# Patient Record
Sex: FEMALE | Race: BLACK OR AFRICAN AMERICAN | Employment: OTHER | URBAN - METROPOLITAN AREA
[De-identification: names, ages, dates, MRNs, and addresses within clinical notes are randomized per-mention and may not be internally consistent; named-entity substitution may affect disease eponyms.]

---

## 2020-02-02 ENCOUNTER — HOSPITAL ENCOUNTER (OUTPATIENT)
Dept: CT IMAGING | Age: 45
Discharge: HOME | End: 2020-02-02
Attending: NURSE PRACTITIONER

## 2020-02-02 ENCOUNTER — APPOINTMENT (OUTPATIENT)
Dept: GENERAL RADIOLOGY | Age: 45
DRG: 004 | End: 2020-02-02
Attending: NURSE PRACTITIONER
Payer: COMMERCIAL

## 2020-02-02 ENCOUNTER — HOSPITAL ENCOUNTER (INPATIENT)
Age: 45
LOS: 16 days | Discharge: NURSING, REHAB OR LONG-TERM CARE | DRG: 004 | End: 2020-02-18
Attending: NEUROLOGICAL SURGERY | Admitting: NEUROLOGICAL SURGERY
Payer: COMMERCIAL

## 2020-02-02 DIAGNOSIS — R53.1 ACUTE LEFT-SIDED WEAKNESS: ICD-10-CM

## 2020-02-02 DIAGNOSIS — D18.00 CAVERNOMA: ICD-10-CM

## 2020-02-02 DIAGNOSIS — J96.01 ACUTE RESPIRATORY FAILURE WITH HYPOXIA (HCC): ICD-10-CM

## 2020-02-02 DIAGNOSIS — R93.0 ABNORMAL MRI OF HEAD: ICD-10-CM

## 2020-02-02 DIAGNOSIS — R93.0 ABNORMAL MRI SCAN, HEAD: ICD-10-CM

## 2020-02-02 DIAGNOSIS — G95.19 EDEMA OF SPINAL CORD (HCC): ICD-10-CM

## 2020-02-02 DIAGNOSIS — I67.1 CEREBROVASCULAR DURAL AV FISTULA: ICD-10-CM

## 2020-02-02 DIAGNOSIS — Q28.8 AVM (ARTERIOVENOUS MALFORMATION) SPINE: Primary | ICD-10-CM

## 2020-02-02 LAB
ABO + RH BLD: NORMAL
AMPHET UR QL SCN: NEGATIVE
ANION GAP SERPL CALC-SCNC: 6 MMOL/L (ref 7–16)
APPEARANCE UR: CLEAR
APPEARANCE UR: CLEAR
ARTERIAL PATENCY WRIST A: ABNORMAL
BACTERIA URNS QL MICRO: 0 /HPF
BACTERIA URNS QL MICRO: 0 /HPF
BARBITURATES UR QL SCN: NEGATIVE
BASE EXCESS BLD CALC-SCNC: 2 MMOL/L
BASOPHILS # BLD: 0.1 K/UL (ref 0–0.2)
BASOPHILS NFR BLD: 1 % (ref 0–2)
BDY SITE: ABNORMAL
BENZODIAZ UR QL: NEGATIVE
BILIRUB UR QL: NEGATIVE
BILIRUB UR QL: NEGATIVE
BLOOD GROUP ANTIBODIES SERPL: NORMAL
BUN SERPL-MCNC: 9 MG/DL (ref 6–23)
CALCIUM SERPL-MCNC: 8.3 MG/DL (ref 8.3–10.4)
CANNABINOIDS UR QL SCN: NEGATIVE
CASTS URNS QL MICRO: ABNORMAL /LPF
CHLORIDE SERPL-SCNC: 107 MMOL/L (ref 98–107)
CHOLEST SERPL-MCNC: 168 MG/DL
CO2 BLD-SCNC: 29 MMOL/L
CO2 SERPL-SCNC: 27 MMOL/L (ref 21–32)
COCAINE UR QL SCN: NEGATIVE
COLLECT TIME,HTIME: 410
COLOR UR: ABNORMAL
COLOR UR: YELLOW
CREAT SERPL-MCNC: 0.79 MG/DL (ref 0.6–1)
DIFFERENTIAL METHOD BLD: ABNORMAL
EOSINOPHIL # BLD: 0 K/UL (ref 0–0.8)
EOSINOPHIL NFR BLD: 0 % (ref 0.5–7.8)
EPI CELLS #/AREA URNS HPF: ABNORMAL /HPF
ERYTHROCYTE [DISTWIDTH] IN BLOOD BY AUTOMATED COUNT: 12.7 % (ref 11.9–14.6)
EXHALED MINUTE VOLUME, VE: 7.5 L/MIN
GAS FLOW.O2 O2 DELIVERY SYS: ABNORMAL L/MIN
GAS FLOW.O2 SETTING OXYMISER: 15 BPM
GLUCOSE SERPL-MCNC: 105 MG/DL (ref 65–100)
GLUCOSE UR STRIP.AUTO-MCNC: NEGATIVE MG/DL
GLUCOSE UR STRIP.AUTO-MCNC: NEGATIVE MG/DL
HCG UR QL: NEGATIVE
HCO3 BLD-SCNC: 27.3 MMOL/L (ref 22–26)
HCT VFR BLD AUTO: 34.7 % (ref 35.8–46.3)
HDLC SERPL-MCNC: 40 MG/DL (ref 40–60)
HDLC SERPL: 4.2 {RATIO}
HGB BLD-MCNC: 10.9 G/DL (ref 11.7–15.4)
HGB UR QL STRIP: NEGATIVE
HGB UR QL STRIP: NEGATIVE
IMM GRANULOCYTES # BLD AUTO: 0 K/UL (ref 0–0.5)
IMM GRANULOCYTES NFR BLD AUTO: 0 % (ref 0–5)
KETONES UR QL STRIP.AUTO: 15 MG/DL
KETONES UR QL STRIP.AUTO: NEGATIVE MG/DL
LDLC SERPL CALC-MCNC: 105.6 MG/DL
LEUKOCYTE ESTERASE UR QL STRIP.AUTO: NEGATIVE
LEUKOCYTE ESTERASE UR QL STRIP.AUTO: NEGATIVE
LIPID PROFILE,FLP: ABNORMAL
LYMPHOCYTES # BLD: 1.8 K/UL (ref 0.5–4.6)
LYMPHOCYTES NFR BLD: 17 % (ref 13–44)
MCH RBC QN AUTO: 27.9 PG (ref 26.1–32.9)
MCHC RBC AUTO-ENTMCNC: 31.4 G/DL (ref 31.4–35)
MCV RBC AUTO: 88.7 FL (ref 79.6–97.8)
METHADONE UR QL: NEGATIVE
MONOCYTES # BLD: 0.9 K/UL (ref 0.1–1.3)
MONOCYTES NFR BLD: 9 % (ref 4–12)
NEUTS SEG # BLD: 7.6 K/UL (ref 1.7–8.2)
NEUTS SEG NFR BLD: 73 % (ref 43–78)
NITRITE UR QL STRIP.AUTO: NEGATIVE
NITRITE UR QL STRIP.AUTO: NEGATIVE
NRBC # BLD: 0 K/UL (ref 0–0.2)
O2/TOTAL GAS SETTING VFR VENT: 60 %
OPIATES UR QL: POSITIVE
PCO2 BLD: 45.4 MMHG (ref 35–45)
PCP UR QL: NEGATIVE
PEEP RESPIRATORY: 8 CMH2O
PH BLD: 7.39 [PH] (ref 7.35–7.45)
PH UR STRIP: 6 [PH] (ref 5–9)
PH UR STRIP: 7 [PH] (ref 5–9)
PLATELET # BLD AUTO: 193 K/UL (ref 150–450)
PMV BLD AUTO: 11 FL (ref 9.4–12.3)
PO2 BLD: 199 MMHG (ref 75–100)
POTASSIUM SERPL-SCNC: 4 MMOL/L (ref 3.5–5.1)
PRESSURE SUPPORT SETTING VENT: 10 CMH2O
PROT UR STRIP-MCNC: ABNORMAL MG/DL
PROT UR STRIP-MCNC: NEGATIVE MG/DL
RBC # BLD AUTO: 3.91 M/UL (ref 4.05–5.2)
RBC #/AREA URNS HPF: ABNORMAL /HPF
SAO2 % BLD: 100 % (ref 95–98)
SERVICE CMNT-IMP: ABNORMAL
SODIUM SERPL-SCNC: 140 MMOL/L (ref 136–145)
SP GR UR REFRACTOMETRY: 1.03 (ref 1–1.02)
SP GR UR REFRACTOMETRY: <1.005 (ref 1–1.02)
SPECIMEN EXP DATE BLD: NORMAL
SPECIMEN TYPE: ABNORMAL
TRIGL SERPL-MCNC: 112 MG/DL (ref 35–150)
TROPONIN I SERPL-MCNC: <0.02 NG/ML (ref 0.02–0.05)
UROBILINOGEN UR QL STRIP.AUTO: 0.2 EU/DL (ref 0.2–1)
UROBILINOGEN UR QL STRIP.AUTO: 0.2 EU/DL (ref 0.2–1)
VENTILATION MODE VENT: ABNORMAL
VLDLC SERPL CALC-MCNC: 22.4 MG/DL (ref 6–23)
VT SETTING VENT: 500 ML
WBC # BLD AUTO: 10.3 K/UL (ref 4.3–11.1)
WBC URNS QL MICRO: ABNORMAL /HPF

## 2020-02-02 PROCEDURE — C1751 CATH, INF, PER/CENT/MIDLINE: HCPCS

## 2020-02-02 PROCEDURE — 70450 CT HEAD/BRAIN W/O DYE: CPT

## 2020-02-02 PROCEDURE — 77030040830 HC CATH URETH FOL MDII -A

## 2020-02-02 PROCEDURE — 87070 CULTURE OTHR SPECIMN AEROBIC: CPT

## 2020-02-02 PROCEDURE — 87086 URINE CULTURE/COLONY COUNT: CPT

## 2020-02-02 PROCEDURE — 74018 RADEX ABDOMEN 1 VIEW: CPT

## 2020-02-02 PROCEDURE — 36415 COLL VENOUS BLD VENIPUNCTURE: CPT

## 2020-02-02 PROCEDURE — 71045 X-RAY EXAM CHEST 1 VIEW: CPT

## 2020-02-02 PROCEDURE — 84484 ASSAY OF TROPONIN QUANT: CPT

## 2020-02-02 PROCEDURE — 36620 INSERTION CATHETER ARTERY: CPT | Performed by: NURSE PRACTITIONER

## 2020-02-02 PROCEDURE — 94002 VENT MGMT INPAT INIT DAY: CPT

## 2020-02-02 PROCEDURE — 87040 BLOOD CULTURE FOR BACTERIA: CPT

## 2020-02-02 PROCEDURE — 70498 CT ANGIOGRAPHY NECK: CPT

## 2020-02-02 PROCEDURE — 86900 BLOOD TYPING SEROLOGIC ABO: CPT

## 2020-02-02 PROCEDURE — 74011250636 HC RX REV CODE- 250/636

## 2020-02-02 PROCEDURE — 80048 BASIC METABOLIC PNL TOTAL CA: CPT

## 2020-02-02 PROCEDURE — 5A1945Z RESPIRATORY VENTILATION, 24-96 CONSECUTIVE HOURS: ICD-10-PCS | Performed by: NEUROLOGICAL SURGERY

## 2020-02-02 PROCEDURE — 74011250637 HC RX REV CODE- 250/637: Performed by: INTERNAL MEDICINE

## 2020-02-02 PROCEDURE — 81025 URINE PREGNANCY TEST: CPT

## 2020-02-02 PROCEDURE — 74011250637 HC RX REV CODE- 250/637: Performed by: NURSE PRACTITIONER

## 2020-02-02 PROCEDURE — 65610000001 HC ROOM ICU GENERAL

## 2020-02-02 PROCEDURE — 81003 URINALYSIS AUTO W/O SCOPE: CPT

## 2020-02-02 PROCEDURE — 03HY32Z INSERTION OF MONITORING DEVICE INTO UPPER ARTERY, PERCUTANEOUS APPROACH: ICD-10-PCS | Performed by: NEUROLOGICAL SURGERY

## 2020-02-02 PROCEDURE — 82803 BLOOD GASES ANY COMBINATION: CPT

## 2020-02-02 PROCEDURE — 80061 LIPID PANEL: CPT

## 2020-02-02 PROCEDURE — 85025 COMPLETE CBC W/AUTO DIFF WBC: CPT

## 2020-02-02 PROCEDURE — 77030005513 HC CATH URETH FOL11 MDII -B

## 2020-02-02 PROCEDURE — 80307 DRUG TEST PRSMV CHEM ANLYZR: CPT

## 2020-02-02 PROCEDURE — 75810000275 HC EMERGENCY DEPT VISIT NO LEVEL OF CARE

## 2020-02-02 PROCEDURE — 74011250636 HC RX REV CODE- 250/636: Performed by: NURSE PRACTITIONER

## 2020-02-02 PROCEDURE — 36573 INSJ PICC RS&I 5 YR+: CPT | Performed by: NURSE PRACTITIONER

## 2020-02-02 RX ORDER — FENTANYL CITRATE 50 UG/ML
50 INJECTION, SOLUTION INTRAMUSCULAR; INTRAVENOUS
Status: DISCONTINUED | OUTPATIENT
Start: 2020-02-02 | End: 2020-02-18 | Stop reason: HOSPADM

## 2020-02-02 RX ORDER — FAMOTIDINE 20 MG/1
20 TABLET, FILM COATED ORAL DAILY
Status: DISCONTINUED | OUTPATIENT
Start: 2020-02-02 | End: 2020-02-03

## 2020-02-02 RX ORDER — FENTANYL CITRATE 50 UG/ML
INJECTION, SOLUTION INTRAMUSCULAR; INTRAVENOUS
Status: COMPLETED
Start: 2020-02-02 | End: 2020-02-02

## 2020-02-02 RX ORDER — ACETAMINOPHEN 325 MG/1
650 TABLET ORAL
Status: DISCONTINUED | OUTPATIENT
Start: 2020-02-02 | End: 2020-02-02

## 2020-02-02 RX ORDER — HEPARIN 100 UNIT/ML
900 SYRINGE INTRAVENOUS EVERY 8 HOURS
Status: DISCONTINUED | OUTPATIENT
Start: 2020-02-02 | End: 2020-02-18 | Stop reason: HOSPADM

## 2020-02-02 RX ORDER — SODIUM CHLORIDE 9 MG/ML
75 INJECTION, SOLUTION INTRAVENOUS CONTINUOUS
Status: DISCONTINUED | OUTPATIENT
Start: 2020-02-02 | End: 2020-02-08

## 2020-02-02 RX ORDER — ATORVASTATIN CALCIUM 40 MG/1
40 TABLET, FILM COATED ORAL
Status: DISCONTINUED | OUTPATIENT
Start: 2020-02-02 | End: 2020-02-18 | Stop reason: HOSPADM

## 2020-02-02 RX ORDER — ONDANSETRON 2 MG/ML
4 INJECTION INTRAMUSCULAR; INTRAVENOUS
Status: DISCONTINUED | OUTPATIENT
Start: 2020-02-02 | End: 2020-02-18 | Stop reason: HOSPADM

## 2020-02-02 RX ORDER — SODIUM CHLORIDE 0.9 % (FLUSH) 0.9 %
5-40 SYRINGE (ML) INJECTION AS NEEDED
Status: DISCONTINUED | OUTPATIENT
Start: 2020-02-02 | End: 2020-02-18 | Stop reason: HOSPADM

## 2020-02-02 RX ORDER — NICARDIPINE HYDROCHLORIDE 0.1 MG/ML
5-15 INJECTION INTRAVENOUS
Status: DISCONTINUED | OUTPATIENT
Start: 2020-02-02 | End: 2020-02-02

## 2020-02-02 RX ORDER — ATORVASTATIN CALCIUM 40 MG/1
40 TABLET, FILM COATED ORAL
Status: DISCONTINUED | OUTPATIENT
Start: 2020-02-02 | End: 2020-02-02

## 2020-02-02 RX ORDER — SODIUM CHLORIDE 0.9 % (FLUSH) 0.9 %
30 SYRINGE (ML) INJECTION AS NEEDED
Status: DISCONTINUED | OUTPATIENT
Start: 2020-02-02 | End: 2020-02-18 | Stop reason: HOSPADM

## 2020-02-02 RX ORDER — FENTANYL CITRATE 50 UG/ML
100 INJECTION, SOLUTION INTRAMUSCULAR; INTRAVENOUS ONCE
Status: COMPLETED | OUTPATIENT
Start: 2020-02-02 | End: 2020-02-02

## 2020-02-02 RX ORDER — ACETAMINOPHEN 325 MG/1
650 TABLET ORAL
Status: DISCONTINUED | OUTPATIENT
Start: 2020-02-02 | End: 2020-02-18 | Stop reason: HOSPADM

## 2020-02-02 RX ORDER — ONDANSETRON 2 MG/ML
INJECTION INTRAMUSCULAR; INTRAVENOUS
Status: COMPLETED
Start: 2020-02-02 | End: 2020-02-02

## 2020-02-02 RX ORDER — SODIUM CHLORIDE 0.9 % (FLUSH) 0.9 %
30 SYRINGE (ML) INJECTION EVERY 8 HOURS
Status: DISCONTINUED | OUTPATIENT
Start: 2020-02-02 | End: 2020-02-18 | Stop reason: HOSPADM

## 2020-02-02 RX ORDER — ONDANSETRON 2 MG/ML
8 INJECTION INTRAMUSCULAR; INTRAVENOUS ONCE
Status: COMPLETED | OUTPATIENT
Start: 2020-02-02 | End: 2020-02-02

## 2020-02-02 RX ORDER — HEPARIN 100 UNIT/ML
900 SYRINGE INTRAVENOUS AS NEEDED
Status: DISCONTINUED | OUTPATIENT
Start: 2020-02-02 | End: 2020-02-18 | Stop reason: HOSPADM

## 2020-02-02 RX ORDER — KETOROLAC TROMETHAMINE 30 MG/ML
15 INJECTION, SOLUTION INTRAMUSCULAR; INTRAVENOUS
Status: DISPENSED | OUTPATIENT
Start: 2020-02-02 | End: 2020-02-07

## 2020-02-02 RX ADMIN — ACETAMINOPHEN 650 MG: 325 TABLET, FILM COATED ORAL at 10:06

## 2020-02-02 RX ADMIN — Medication 900 UNITS: at 21:19

## 2020-02-02 RX ADMIN — ACETAMINOPHEN 650 MG: 325 TABLET, FILM COATED ORAL at 14:55

## 2020-02-02 RX ADMIN — FENTANYL CITRATE 50 MCG: 50 INJECTION, SOLUTION INTRAMUSCULAR; INTRAVENOUS at 09:43

## 2020-02-02 RX ADMIN — FENTANYL CITRATE 100 MCG: 50 INJECTION, SOLUTION INTRAMUSCULAR; INTRAVENOUS at 03:59

## 2020-02-02 RX ADMIN — ONDANSETRON 8 MG: 2 INJECTION INTRAMUSCULAR; INTRAVENOUS at 04:24

## 2020-02-02 RX ADMIN — Medication 30 ML: at 13:19

## 2020-02-02 RX ADMIN — ACETAMINOPHEN 650 MG: 325 TABLET, FILM COATED ORAL at 19:36

## 2020-02-02 RX ADMIN — KETOROLAC TROMETHAMINE 15 MG: 30 INJECTION, SOLUTION INTRAMUSCULAR at 08:34

## 2020-02-02 RX ADMIN — Medication 30 ML: at 21:39

## 2020-02-02 RX ADMIN — ONDANSETRON 4 MG: 2 INJECTION INTRAMUSCULAR; INTRAVENOUS at 16:16

## 2020-02-02 RX ADMIN — FENTANYL CITRATE 50 MCG: 50 INJECTION, SOLUTION INTRAMUSCULAR; INTRAVENOUS at 13:19

## 2020-02-02 RX ADMIN — KETOROLAC TROMETHAMINE 15 MG: 30 INJECTION, SOLUTION INTRAMUSCULAR at 14:28

## 2020-02-02 RX ADMIN — Medication 1 EACH: at 17:11

## 2020-02-02 RX ADMIN — SODIUM CHLORIDE 75 ML/HR: 900 INJECTION, SOLUTION INTRAVENOUS at 19:41

## 2020-02-02 RX ADMIN — FAMOTIDINE 20 MG: 20 TABLET, FILM COATED ORAL at 08:33

## 2020-02-02 RX ADMIN — SODIUM CHLORIDE 75 ML/HR: 900 INJECTION, SOLUTION INTRAVENOUS at 06:46

## 2020-02-02 RX ADMIN — ATORVASTATIN CALCIUM 40 MG: 40 TABLET, FILM COATED ORAL at 21:19

## 2020-02-02 RX ADMIN — Medication 900 UNITS: at 13:19

## 2020-02-02 NOTE — PROGRESS NOTES
Pt arrived from [facility] via flight and brought to unit on stretcher. ETT in place and bagged by RT, then placed onto ventilator. [facility] NP at bedside to place arterial line and update family. Pt alert, following commands with weakness on the left arm/leg. NIH 13 on admission. Skin dry and intact with no signs of bruising, tears, excoriation, etc. Pt cleaned with CHG wipes, placed on the monitor, and family brought into the bedside.

## 2020-02-02 NOTE — PROGRESS NOTES
Bedside shift change report given to DEEP Bello (oncoming nurse) by Lavinia Pond RN (offgoing nurse). Report included the following information SBAR, Kardex, ED Summary, Intake/Output, MAR, Recent Results and Dual Neuro Assessment.

## 2020-02-02 NOTE — PROGRESS NOTES
Patient resting. Family at bedside. Patient is able to pick her left foot and left hand off of the bed.

## 2020-02-02 NOTE — PROGRESS NOTES
Patient  from [facility], transported to CT scan then arrived to ICU and placed on the ventilator on documented settings. Patient is orally intubated with a # 7.5 ET Tube secured at the 23 cm wade at the lip. Breath sounds are clear. Trachea is midline. Negative for subcutaneous air, chest excursion is symmetrical.  Negative for pitting edema. Patient is also Negative for cyanosis. Patient has a Left Radial arterial line. All alarms are set and audible. Resuscitation bag and mask are at the head of the bed.       Ventilator Settings  Mode FIO2 Rate Tidal Volume Pressure PEEP I:E Ratio   PRVC, SIMV  60 %   15 500 ml  10 cm H2O  8 cm H20  1:2      Peak airway pressure: 24 cm H2O   Minute ventilation: 7.5 l/min       Katie Liu

## 2020-02-02 NOTE — PROGRESS NOTES
PICC Placement Note    PRE-PROCEDURE VERIFICATION  Correct Procedure: yes. Time out completed with assistant Fabiola Regan RN and all persons present in agreement with time out. Correct Site:  yes  Temperature: Temp: 100.4 °F (38 °C), Temperature Source: Temp Source: Bladder  Recent Labs     02/02/20  0358   BUN 9   CREA 0.79      WBC 10.3     Allergies: Ace inhibitors  Education materials for [facility] Care given to patient or family. PROCEDURE DETAIL  A triple lumen PICC line was started for vascular access and desire for reliable access. The following documentation is in addition to the PICC properties in the lines/airways flowsheet :  Lot #: [id number]  xylocaine used: yes  Mid-Arm Circumference: 37 (cm)  Internal Catheter Length: 36 (cm)  Internal Catheter Total Length: 36 (cm)  Vein Selection for PICC:right brachial  Central Line Bundle followed yes  Complication Related to Insertion: Difficulty getting wire to advance in basilic times 2 attempts  Both the insertion guidewire and ECG guidewire were removed intact all ports have positive blood return and were flush well with normal saline. The location of the tip of the PICC is verified using ECG technology. The tip is in the SVC per ECG reading. See image below.          Line is okay to use: yes

## 2020-02-02 NOTE — PROGRESS NOTES
Ventilator check complete; patient has a #7.5 ET tube secured at the 23 at the lip. Patient is not able to follow commands. Breath sounds are diminished. Trachea is midline, Negative for subcutaneous air, and chest excursion is symmetric. Patient is also Negative for cyanosis and is Negative for pitting edema. All alarms are set and audible. Resuscitation bag is at the head of the bed. Ventilator Settings  Mode FIO2 Rate Tidal Volume Pressure PEEP I:E Ratio   SIMV, Pressure support, PRVC  46 %   15 500 ml  10 cm H2O  8 cm H20  1:2      Peak airway pressure: 24.9 cm H2O   Minute ventilation: 7.3 l/min     ABG: No results for input(s): PH, PCO2, PO2, HCO3 in the last 72 hours.       Enma Rich, RT

## 2020-02-02 NOTE — H&P
History and Physical    Patient: Sofiya Dacosta MRN: [id number]  SSN: [id number]    YOB: 1975  Age: 53 y.o. Sex: female      Subjective: Sofiya Dacosta is a 53 y.o. female who originally presented to [facility] in [facility] with acute left neck pain and left arm weakness and paresthesia. The pt had a CT of head which was normal, the pt was transported to [facility] ED for a MRI of brain/C-spine, she had acute resp failure/bradycardia and required intubation. The MRI of Cspine was suspicious for vascular abnormality, AVM vs [place], with hemorrhage and edema noted Cervical Spine at C2-C5 area. The pt was transferred to [address] [place] [address] Dr. Lamar Chan  via [facility] flight team to Dr. Talia Montanez and Endovascular team for further evaluation and plan of care. The pt was on propofol gtt on arrival and sedated, but it was turned off on arrival and pt was noted to wake up easily and began to follow commands. She is alert with obvious weakness on her left sided compared to R. She is able to hole RUE off bed, but unable to lift up LUE, she was able to give me a thumbs up bilat, and  with left hand. She can move toes LLE and has tone. Pt remains intubated with 7.5 OETT, [email or web address]. GCS: E4, V1I, M6: 11I, NIHSS 12 ( intubated). Alla Banegas      PAST MED HX: Diagnosis Date    Anemia    Anxiety    Breast mass 2014   left    Carpal tunnel syndrome 2012   bilateral    Diffuse cystic mastopathy of breast, left 2015    Genital herpes 2014    Herpes    Hypertension    Menorrhagia    Ovarian cyst 04/15/2015     Past Surgical History:   Procedure Laterality Date    BREAST BIOPSY Left 1912   US core, benign    BREAST BIOPSY EXCISION Right    benign     SECTION   times 2    [facility]OSCOPY [facility] Dr. Hernandez Melvin CYST REMOVAL Right   RIGHT BREAST    TONSILLECTOMY    TUBAL LIGATION 5098    UMBILICAL HERNIA REPAIR   2 times        FAMILY HX:  family history includes Breast cancer in her cousin; Diabetes in an other family member; Heart disease in her maternal grandmother; Lung cancer in her mother; No Known Problems in her daughter and daughter; Prostate cancer in her father. SOCIAL HX:  and lives with . TOBACCO: no  ETOH: no     No Known Allergies    Review of Systems:  Unable to obtain due to pt condition:  Per report pt with acute left neck pain and then acute left hemiparesis with left sided paresthesia ( per notes). Objective:     Vitals:    02/02/20 0414 02/02/20 0430 02/02/20 0444 02/02/20 0459   BP: 104/65 126/74 133/74 124/68   Pulse: 61 74 66 (!) 58   Resp: 15 15 15 15   Temp:       SpO2: 100% 100% 100% 100%   Weight:       Height:            Physical Exam:  General:  Alert, follows commands. Intubated. HEENT: Supple, symmetrical, trachea midline, no adenopathy, thyroid: no enlargment/tenderness/nodules, no carotid bruit and no JVD. PERRL +3 midline. Lungs:   Clear to auscultation bilaterally. Heart:  Regular rate and rhythm, S1, S2 normal, no murmur, click, rub or gallop. Abdomen:   Soft, non-tender. Bowel sounds normal. No masses,  No organomegaly. Extremities: Extremities normal, atraumatic, no cyanosis or edema. Pulses: 2+ and symmetric all extremities. Skin: Skin color, texture, turgor normal. No rashes or lesions   Neurologic: Alert, intubated for airway protection, follows commands. R sided 5/5 strength. Left: upper 2/5, lower 3/5. Assessment:     Hospital Problems  Never Reviewed          Codes Class Noted POA    AVM (arteriovenous malformation) spine ICD-10-CM: Q28.8  ICD-9-CM: 747.82  2/2/2020 Unknown            DYSPHAGIA SCORE ON ADMIT: 0 ( pt intubated)  NIHSS ON ADMIT: 12 ( pt intubated and weak on left)  MALLAMPATI ON ADMIT: ( pt already intubated).      Plan:     NEURO: pt is intubated, but alert, sedation held at this time for neuro exam. Pt with cervical edema/hemorrhage secondary to vascular abnormality, will repeat MRI C-spine in a few days, plan for Cerebral angiogram on Monday afternoon with Dr. Norbert Ag. PT/OT/ST/Rehab consulted. Family updated at bedside.  states pt in normal state of health until this am, complained of acute left lateral neck pain with onset around 0700 Sat am. No prior hx of cva or known vascular abnormality per /family. Repeat CTA H/N and CT head on arrival to [address] [place] [address] Dr. Katelyn Curtis, Dr. Norbert Ag has viewed and discussed images with team. Family updated at bedside this am.   RESP: pt intubated PTA, 7.5OETT. ABG on 60%: 7.3/45/199, decreased to 50%, pt comfortable. CXR/KUB pending. Pt has NGT in place on arrival. Lungs sounds CTA. CV:SBP goal 100-160, cardene gtt prn. 2D Echo pending. Trop neg. , placed on lipitor 40mg po HS. SCD. HEME:HH: 10/34,   NEPH:bun/cr: 9/0.7  GI:NPO, will start NS @ 75cc/hr. NGT/intubated. Will consult nutrition. Pepcid. ID:afebrile no abx. LINES:IV x3, floyd, arterial line. PICC ordered. Reviewed Care everywhere:  Pt seen in Sept 2019 at PCP office with complaints of left ear pain/left arm pain and dizziness. No imaging at time.      Signed By: Leopold Fruit, NP     February 2, 2020

## 2020-02-02 NOTE — PROGRESS NOTES
Bedside shift change report given to Harleen Lopez RN (oncoming nurse) by [facility] DEEP VALENTINE (offgoing nurse). Report included the following information SBAR, Kardex, ED Summary, Intake/Output, MAR, Recent Results and Dual Neuro Assessment.

## 2020-02-02 NOTE — PROCEDURES
ARTERIAL LINE (A-Line) PLACEMENT  Date: 2-2-20  Time: 0400  Indication: Hemodynamic monitoring        A time-out was completed verifying correct patient, procedure, site, positioning, and special equipment if applicable. Anders test was performed to ensure adequate perfusion. The patients left wrist was prepped and draped in sterile fashion. 1% Lidocaine was used to anesthetize the area. A 20G Arrow arterial line was introduced into the left radial artery. The catheter was threaded over the guide wire and the needle was removed with appropriate pulsatile blood return. The catheter was then sutured in place to the skin and a sterile dressing applied. Perfusion to the extremity distal to the point of catheter insertion was checked and found to be adequate. Dr. Angelito Malcolm was in agreement with plan of care and available at all times during procedure.     The patient tolerated the procedure well and there were no complications

## 2020-02-02 NOTE — PROGRESS NOTES
Bedside shift change report given to [address] (oncoming nurse) by Roya Burroughs (offgoing nurse). Report included the following information SBAR, ED Summary, Intake/Output, MAR, Recent Results, Cardiac Rhythm Sinus, Alarm Parameters  and Dual Neuro Assessment. Dual NIH completed, pt calm and resting with  present.

## 2020-02-02 NOTE — PROGRESS NOTES
LMSW consulted to follow pt plan of care for anticipate discharge needs. PT/OT and ST evals ordered. Await assessments for rehab recommendations and appropriate referrals. Per report pt normally manages ADL's. Lives with spouse and has supportive extended family. She is insured by [employer] and they will require precert for in pt rehab if recommended. Will follow and assist as needs are known. Care Management Interventions  PCP Verified by CM: Ayanna Ibanez)  Transition of Care Consult (CM Consult): Discharge Planning(Pt is employed and insured by [employer] heat plan which includes pharmacy benefits.  )  Discharge Durable Medical Equipment: No  Physical Therapy Consult: Yes  Occupational Therapy Consult: Yes  Speech Therapy Consult: Yes  Current Support Network: Lives with Spouse  Confirm Follow Up Transport: Family  The Plan for Transition of Care is Related to the Following Treatment Goals : Pt is anticipated to needs supportive care services to return to her functional baseline.   Discharge Location  Discharge Placement: Unable to determine at this time(Await therpy evals/recommendations)

## 2020-02-02 NOTE — PROGRESS NOTES
Azalia NP notified of patient's minimal hourly UOP, persistently elevated temperature, and nausea. Orders received.

## 2020-02-02 NOTE — PROGRESS NOTES
PT Note:  Evaluation received and per RN pt with plans for angio tomorrow (2/3/20). Will hold today and evaluate following procedure.   Thanks,  Suleiman Bravo, PT, DPT

## 2020-02-02 NOTE — INTERDISCIPLINARY ROUNDS
Interdisciplinary team rounds were held 2/2/2020 with the following team members:Nursing and Physician and the patient. Plan of care discussed. See clinical pathway and/or care plan for interventions and desired outcomes.

## 2020-02-02 NOTE — PROGRESS NOTES
SPEECH PATHOLOGY NOTE:  Consult received and chart reviewed. Patient is currently on vent. Will follow along and assess when medically appropriate off vent.   Maria Luisa Crook MA, CCC-SLP

## 2020-02-03 ENCOUNTER — APPOINTMENT (OUTPATIENT)
Dept: OTHER | Age: 45
DRG: 004 | End: 2020-02-03
Attending: NURSE PRACTITIONER
Payer: COMMERCIAL

## 2020-02-03 ENCOUNTER — APPOINTMENT (OUTPATIENT)
Dept: GENERAL RADIOLOGY | Age: 45
DRG: 004 | End: 2020-02-03
Attending: NURSE PRACTITIONER
Payer: COMMERCIAL

## 2020-02-03 ENCOUNTER — APPOINTMENT (OUTPATIENT)
Dept: CT IMAGING | Age: 45
DRG: 004 | End: 2020-02-03
Attending: NEUROLOGICAL SURGERY
Payer: COMMERCIAL

## 2020-02-03 PROBLEM — J96.01 ACUTE RESPIRATORY FAILURE WITH HYPOXIA (HCC): Status: ACTIVE | Noted: 2020-02-03

## 2020-02-03 PROBLEM — D18.00 CAVERNOMA: Status: ACTIVE | Noted: 2020-02-03

## 2020-02-03 PROBLEM — R53.1 ACUTE LEFT-SIDED WEAKNESS: Status: ACTIVE | Noted: 2020-02-03

## 2020-02-03 PROBLEM — G95.19 EDEMA OF SPINAL CORD (HCC): Status: ACTIVE | Noted: 2020-02-03

## 2020-02-03 LAB
ANION GAP SERPL CALC-SCNC: 7 MMOL/L (ref 7–16)
ARTERIAL PATENCY WRIST A: ABNORMAL
BASE EXCESS BLD CALC-SCNC: 2 MMOL/L
BDY SITE: ABNORMAL
BUN SERPL-MCNC: 10 MG/DL (ref 6–23)
CALCIUM SERPL-MCNC: 8.9 MG/DL (ref 8.3–10.4)
CHLORIDE SERPL-SCNC: 109 MMOL/L (ref 98–107)
CO2 BLD-SCNC: 28 MMOL/L
CO2 SERPL-SCNC: 27 MMOL/L (ref 21–32)
COLLECT TIME,HTIME: 435
CREAT SERPL-MCNC: 0.71 MG/DL (ref 0.6–1)
ERYTHROCYTE [DISTWIDTH] IN BLOOD BY AUTOMATED COUNT: 12.9 % (ref 11.9–14.6)
EXHALED MINUTE VOLUME, VE: 7.4 L/MIN
GAS FLOW.O2 O2 DELIVERY SYS: ABNORMAL L/MIN
GAS FLOW.O2 SETTING OXYMISER: 15 BPM
GLUCOSE SERPL-MCNC: 95 MG/DL (ref 65–100)
HCO3 BLD-SCNC: 26.5 MMOL/L (ref 22–26)
HCT VFR BLD AUTO: 33.3 % (ref 35.8–46.3)
HGB BLD-MCNC: 10.7 G/DL (ref 11.7–15.4)
MAGNESIUM SERPL-MCNC: 2.4 MG/DL (ref 1.8–2.4)
MCH RBC QN AUTO: 28.3 PG (ref 26.1–32.9)
MCHC RBC AUTO-ENTMCNC: 32.1 G/DL (ref 31.4–35)
MCV RBC AUTO: 88.1 FL (ref 79.6–97.8)
NRBC # BLD: 0 K/UL (ref 0–0.2)
O2/TOTAL GAS SETTING VFR VENT: 45 %
PCO2 BLD: 39 MMHG (ref 35–45)
PEEP RESPIRATORY: 8 CMH2O
PH BLD: 7.44 [PH] (ref 7.35–7.45)
PLATELET # BLD AUTO: 156 K/UL (ref 150–450)
PMV BLD AUTO: 10.5 FL (ref 9.4–12.3)
PO2 BLD: 184 MMHG (ref 75–100)
POTASSIUM SERPL-SCNC: 3.6 MMOL/L (ref 3.5–5.1)
RBC # BLD AUTO: 3.78 M/UL (ref 4.05–5.2)
SAO2 % BLD: 100 % (ref 95–98)
SERVICE CMNT-IMP: ABNORMAL
SERVICE CMNT-IMP: ABNORMAL
SODIUM SERPL-SCNC: 143 MMOL/L (ref 136–145)
SPECIMEN TYPE: ABNORMAL
VENTILATION MODE VENT: ABNORMAL
VT SETTING VENT: 500 ML
WBC # BLD AUTO: 8.8 K/UL (ref 4.3–11.1)

## 2020-02-03 PROCEDURE — 36227 PLACE CATH XTRNL CAROTID: CPT

## 2020-02-03 PROCEDURE — 87070 CULTURE OTHR SPECIMN AEROBIC: CPT

## 2020-02-03 PROCEDURE — 99251 PR INITL INPATIENT CONSULT NEW/ESTAB PT 20 MIN: CPT | Performed by: PHYSICAL MEDICINE & REHABILITATION

## 2020-02-03 PROCEDURE — C1887 CATHETER, GUIDING: HCPCS

## 2020-02-03 PROCEDURE — 36620 INSERTION CATHETER ARTERY: CPT | Performed by: NURSE PRACTITIONER

## 2020-02-03 PROCEDURE — C1894 INTRO/SHEATH, NON-LASER: HCPCS

## 2020-02-03 PROCEDURE — 76376 3D RENDER W/INTRP POSTPROCES: CPT

## 2020-02-03 PROCEDURE — 74011250636 HC RX REV CODE- 250/636: Performed by: NURSE PRACTITIONER

## 2020-02-03 PROCEDURE — 03HY32Z INSERTION OF MONITORING DEVICE INTO UPPER ARTERY, PERCUTANEOUS APPROACH: ICD-10-PCS | Performed by: NEUROLOGICAL SURGERY

## 2020-02-03 PROCEDURE — 36226 PLACE CATH VERTEBRAL ART: CPT | Performed by: NEUROLOGICAL SURGERY

## 2020-02-03 PROCEDURE — 85027 COMPLETE CBC AUTOMATED: CPT

## 2020-02-03 PROCEDURE — 36224 PLACE CATH CAROTD ART: CPT

## 2020-02-03 PROCEDURE — 71045 X-RAY EXAM CHEST 1 VIEW: CPT

## 2020-02-03 PROCEDURE — 74011250637 HC RX REV CODE- 250/637: Performed by: NURSE PRACTITIONER

## 2020-02-03 PROCEDURE — 65610000001 HC ROOM ICU GENERAL

## 2020-02-03 PROCEDURE — 77030031476 HC EXCH HEAT MOISTW FLTR HALY -A

## 2020-02-03 PROCEDURE — 99291 CRITICAL CARE FIRST HOUR: CPT | Performed by: NEUROLOGICAL SURGERY

## 2020-02-03 PROCEDURE — B31C1ZZ FLUOROSCOPY OF BILATERAL EXTERNAL CAROTID ARTERIES USING LOW OSMOLAR CONTRAST: ICD-10-PCS | Performed by: NEUROLOGICAL SURGERY

## 2020-02-03 PROCEDURE — C8929 TTE W OR WO FOL WCON,DOPPLER: HCPCS

## 2020-02-03 PROCEDURE — 74011636320 HC RX REV CODE- 636/320: Performed by: NEUROLOGICAL SURGERY

## 2020-02-03 PROCEDURE — 77030002916 HC SUT ETHLN J&J -A

## 2020-02-03 PROCEDURE — C1769 GUIDE WIRE: HCPCS

## 2020-02-03 PROCEDURE — 82803 BLOOD GASES ANY COMBINATION: CPT

## 2020-02-03 PROCEDURE — 74011000250 HC RX REV CODE- 250: Performed by: NURSE PRACTITIONER

## 2020-02-03 PROCEDURE — B3181ZZ FLUOROSCOPY OF BILATERAL INTERNAL CAROTID ARTERIES USING LOW OSMOLAR CONTRAST: ICD-10-PCS | Performed by: NEUROLOGICAL SURGERY

## 2020-02-03 PROCEDURE — 74011250636 HC RX REV CODE- 250/636: Performed by: NEUROLOGICAL SURGERY

## 2020-02-03 PROCEDURE — 36227 PLACE CATH XTRNL CAROTID: CPT | Performed by: NEUROLOGICAL SURGERY

## 2020-02-03 PROCEDURE — 36224 PLACE CATH CAROTD ART: CPT | Performed by: NEUROLOGICAL SURGERY

## 2020-02-03 PROCEDURE — 77030005402 HC CATH RAD ART LN KT TELE -B

## 2020-02-03 PROCEDURE — 76376 3D RENDER W/INTRP POSTPROCES: CPT | Performed by: NEUROLOGICAL SURGERY

## 2020-02-03 PROCEDURE — 77030013794 HC KT TRNSDUC BLD EDWD -B

## 2020-02-03 PROCEDURE — 83735 ASSAY OF MAGNESIUM: CPT

## 2020-02-03 PROCEDURE — 71250 CT THORAX DX C-: CPT

## 2020-02-03 PROCEDURE — 74011000250 HC RX REV CODE- 250: Performed by: NEUROLOGICAL SURGERY

## 2020-02-03 PROCEDURE — C1760 CLOSURE DEV, VASC: HCPCS

## 2020-02-03 PROCEDURE — 80048 BASIC METABOLIC PNL TOTAL CA: CPT

## 2020-02-03 PROCEDURE — B31G1ZZ FLUOROSCOPY OF BILATERAL VERTEBRAL ARTERIES USING LOW OSMOLAR CONTRAST: ICD-10-PCS | Performed by: NEUROLOGICAL SURGERY

## 2020-02-03 PROCEDURE — 77030012468 HC VLV BLEEDBK CNTRL ABBT -B

## 2020-02-03 PROCEDURE — 94003 VENT MGMT INPAT SUBQ DAY: CPT

## 2020-02-03 RX ORDER — FENTANYL CITRATE 50 UG/ML
25-100 INJECTION, SOLUTION INTRAMUSCULAR; INTRAVENOUS
Status: DISCONTINUED | OUTPATIENT
Start: 2020-02-03 | End: 2020-02-03 | Stop reason: ALTCHOICE

## 2020-02-03 RX ORDER — MIDAZOLAM HYDROCHLORIDE 1 MG/ML
.25-2 INJECTION, SOLUTION INTRAMUSCULAR; INTRAVENOUS
Status: DISCONTINUED | OUTPATIENT
Start: 2020-02-03 | End: 2020-02-03 | Stop reason: ALTCHOICE

## 2020-02-03 RX ORDER — METOPROLOL TARTRATE 25 MG/1
25 TABLET, FILM COATED ORAL 2 TIMES DAILY
Status: DISCONTINUED | OUTPATIENT
Start: 2020-02-03 | End: 2020-02-06

## 2020-02-03 RX ORDER — SODIUM CHLORIDE 9 MG/ML
25 INJECTION, SOLUTION INTRAVENOUS ONCE
Status: COMPLETED | OUTPATIENT
Start: 2020-02-03 | End: 2020-02-03

## 2020-02-03 RX ORDER — METOPROLOL TARTRATE 25 MG/1
25 TABLET, FILM COATED ORAL 2 TIMES DAILY
Status: DISCONTINUED | OUTPATIENT
Start: 2020-02-03 | End: 2020-02-03

## 2020-02-03 RX ORDER — LIDOCAINE HYDROCHLORIDE 10 MG/ML
1-20 INJECTION INFILTRATION; PERINEURAL
Status: DISCONTINUED | OUTPATIENT
Start: 2020-02-03 | End: 2020-02-03 | Stop reason: ALTCHOICE

## 2020-02-03 RX ORDER — HEPARIN SODIUM 200 [USP'U]/100ML
1000 INJECTION, SOLUTION INTRAVENOUS
Status: DISCONTINUED | OUTPATIENT
Start: 2020-02-03 | End: 2020-02-03 | Stop reason: ALTCHOICE

## 2020-02-03 RX ORDER — FAMOTIDINE 20 MG/1
20 TABLET, FILM COATED ORAL DAILY
Status: DISCONTINUED | OUTPATIENT
Start: 2020-02-03 | End: 2020-02-12

## 2020-02-03 RX ORDER — FAMOTIDINE 20 MG/1
20 TABLET, FILM COATED ORAL DAILY
Status: DISCONTINUED | OUTPATIENT
Start: 2020-02-04 | End: 2020-02-03

## 2020-02-03 RX ORDER — TRIPROLIDINE/PSEUDOEPHEDRINE 2.5MG-60MG
600 TABLET ORAL
Status: DISCONTINUED | OUTPATIENT
Start: 2020-02-03 | End: 2020-02-18

## 2020-02-03 RX ADMIN — HEPARIN SODIUM 2000 UNITS: 200 INJECTION, SOLUTION INTRAVENOUS at 11:24

## 2020-02-03 RX ADMIN — METOPROLOL TARTRATE 25 MG: 25 TABLET ORAL at 17:20

## 2020-02-03 RX ADMIN — IOPAMIDOL 224 ML: 612 INJECTION, SOLUTION INTRAVENOUS at 12:04

## 2020-02-03 RX ADMIN — FENTANYL CITRATE 100 MCG: 50 INJECTION, SOLUTION INTRAMUSCULAR; INTRAVENOUS at 11:07

## 2020-02-03 RX ADMIN — SODIUM CHLORIDE 25 ML/HR: 900 INJECTION, SOLUTION INTRAVENOUS at 11:13

## 2020-02-03 RX ADMIN — ACETAMINOPHEN 650 MG: 325 TABLET, FILM COATED ORAL at 16:37

## 2020-02-03 RX ADMIN — Medication 900 UNITS: at 21:16

## 2020-02-03 RX ADMIN — SODIUM CHLORIDE 75 ML/HR: 900 INJECTION, SOLUTION INTRAVENOUS at 09:56

## 2020-02-03 RX ADMIN — Medication 30 ML: at 23:30

## 2020-02-03 RX ADMIN — FENTANYL CITRATE 50 MCG: 50 INJECTION, SOLUTION INTRAMUSCULAR; INTRAVENOUS at 13:35

## 2020-02-03 RX ADMIN — Medication 30 ML: at 05:36

## 2020-02-03 RX ADMIN — PERFLUTREN 1 ML: 6.52 INJECTION, SUSPENSION INTRAVENOUS at 09:00

## 2020-02-03 RX ADMIN — KETOROLAC TROMETHAMINE 15 MG: 30 INJECTION, SOLUTION INTRAMUSCULAR at 15:01

## 2020-02-03 RX ADMIN — Medication 900 UNITS: at 05:36

## 2020-02-03 RX ADMIN — FAMOTIDINE 20 MG: 20 TABLET, FILM COATED ORAL at 12:49

## 2020-02-03 RX ADMIN — NICARDIPINE HYDROCHLORIDE 5 MG/HR: 25 INJECTION INTRAVENOUS at 14:06

## 2020-02-03 RX ADMIN — KETOROLAC TROMETHAMINE 15 MG: 30 INJECTION, SOLUTION INTRAMUSCULAR at 05:34

## 2020-02-03 RX ADMIN — Medication 900 UNITS: at 14:15

## 2020-02-03 RX ADMIN — HEPARIN SODIUM 2000 UNITS: 200 INJECTION, SOLUTION INTRAVENOUS at 11:25

## 2020-02-03 RX ADMIN — SODIUM CHLORIDE 75 ML/HR: 900 INJECTION, SOLUTION INTRAVENOUS at 22:45

## 2020-02-03 RX ADMIN — HEPARIN SODIUM 2000 UNITS: 200 INJECTION, SOLUTION INTRAVENOUS at 11:20

## 2020-02-03 RX ADMIN — ACETAMINOPHEN 650 MG: 325 TABLET, FILM COATED ORAL at 01:34

## 2020-02-03 RX ADMIN — FENTANYL CITRATE 50 MCG: 50 INJECTION, SOLUTION INTRAMUSCULAR; INTRAVENOUS at 10:08

## 2020-02-03 RX ADMIN — LIDOCAINE HYDROCHLORIDE 5 ML: 10 INJECTION, SOLUTION INFILTRATION; PERINEURAL at 11:10

## 2020-02-03 RX ADMIN — HEPARIN SODIUM 2000 UNITS: 200 INJECTION, SOLUTION INTRAVENOUS at 11:23

## 2020-02-03 RX ADMIN — FENTANYL CITRATE 50 MCG: 50 INJECTION, SOLUTION INTRAMUSCULAR; INTRAVENOUS at 20:02

## 2020-02-03 RX ADMIN — MIDAZOLAM 2 MG: 1 INJECTION INTRAMUSCULAR; INTRAVENOUS at 11:07

## 2020-02-03 RX ADMIN — ATORVASTATIN CALCIUM 40 MG: 40 TABLET, FILM COATED ORAL at 21:15

## 2020-02-03 RX ADMIN — Medication 30 ML: at 14:15

## 2020-02-03 RX ADMIN — FENTANYL CITRATE 50 MCG: 50 INJECTION, SOLUTION INTRAMUSCULAR; INTRAVENOUS at 07:09

## 2020-02-03 NOTE — PROGRESS NOTES
[facility] Pt resting quietly, opens eyes to voice, follows commands. R  > L . Moves all extremities, L arm weakness. Pt nodding appropriately, writing words on notepad. T 99.5, NSR, -160, O2 sat 100% on FiO2 45% on vent. Skin assessed.

## 2020-02-03 NOTE — PROGRESS NOTES
Chart reviewed after admission to ICU pt of Dr. Mickey Rae for AVM spine. Screen completed by [facility] yesterday. Pt remains intubated/vent currently. CM will continue to follow for any assist and d/c POC when stable.

## 2020-02-03 NOTE — CONSULTS
PM&R Rehab Consult    Subjective:     Date of Consultation:  February 3, 2020    Referring Physician: Dr Jemma Anderson    Patient is a 53 y.o. 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 [address] (2/3/2020)      Edema of spinal cord ([facility] [address] (2/3/2020)      Acute left-sided weakness (2/3/2020)    HPI; per original H&P; Ariel Cortes is a 53 y.o. female who originally presented to [facility] in [facility] with acute left neck pain and left arm weakness and paresthesia. The pt had a CT of head which was normal, the pt was transported to [facility] ED for a MRI of brain/C-spine, she had acute resp failure/bradycardia and required intubation. The MRI of [facility] [place] was suspicious for vascular abnormality, AVM vs Cavernoma, with hemorrhage and edema noted Cervical Spine at C2-C5 area. The pt was transferred to [address] [place] [address] Dr. Emma Negrete DT via [facility] flight team to Dr. Artem Hahn and Endovascular team for further evaluation and plan of care. The pt was on propofol gtt on arrival and sedated, but it was turned off on arrival and pt was noted to wake up easily and began to follow commands. She is alert with obvious weakness on her left sided compared to R. She is able to hole RUE off bed, but unable to lift up LUE, she was able to give me a thumbs up bilat, and  with left hand. She can move toes LLE and has tone. Pt remains intubated with 7.5 OETT, [email or web address]. GCS: E4, V1I, M6: 11I, NIHSS 12 ( intubated). \"  SBP goal 100-160 on admit with cardene gtt prn. She was placed on lipitor 40mg, .6 , NGT placed as well as A-line. ( of note; pt seen by PCP in Sept 2019 with c/o left ear pain, left arm pain and dizziness. No imaging)  Today, pt went for a cerebral angio with conscious sedation with fentanyl and versed.  Findings included a questionable abnl vessel around C3 region. 2D ECHO with EF of 55-60%, no valvular abnl and no shunting. CT chest neg. Obtained due to hypoxia. Tmax 101.3, bp 109//107. PAST MED HX: Diagnosis Date    Anemia    Anxiety    Breast mass 2014   left    Carpal tunnel syndrome 2012   bilateral    Diffuse cystic mastopathy of breast, left 2015    Genital herpes 2014    Herpes    Hypertension    Menorrhagia    Ovarian cyst 04/15/2015     Past Surgical History:   Procedure Laterality Date    BREAST BIOPSY Left 1912   US core, benign    BREAST BIOPSY EXCISION Right    benign     SECTION   times 2    COLONOSCOPY 2013   [facility] - Dr. Ezequiel Barron CYST REMOVAL Right   RIGHT BREAST    TONSILLECTOMY    TUBAL LIGATION 1493    UMBILICAL HERNIA REPAIR   2 times         FAMILY HX:  family history includes Breast cancer in her cousin; Diabetes in an other family member; Heart disease in her maternal grandmother; Lung cancer in her mother; No Known Problems in her daughter and daughter; Prostate cancer in her father.     SOCIAL HX:  and lives with . TOBACCO: no  ETOH: no      Allergies; ACE inhibitors     Review of Systems:  Review of systems not obtained due to patient factors. Objective:     Vitals:  Blood pressure 132/76, pulse 74, temperature 99.6 °F (37.6 °C), resp. rate 15, height 5' 4\" (1.626 m), weight 217 lb 6 oz (98.6 kg), SpO2 100 %. Temp (24hrs), Av.2 °F (37.9 °C), Min:98.9 °F (37.2 °C), Max:101.3 °F (38.5 °C)      Intake and Output:   1901 -  0700  In: 767.5 [I.V.:767.5]  Out: 5468 [Urine:1085]    Physical Exam:  Deferred. Pt intubated.  Returned from [facility] where she was given Fentanyl and Versed    Labs/Studies:  Recent Results (from the past 72 hour(s))   CULTURE, RESPIRATORY/SPUTUM/BRONCH W GRAM STAIN    Collection Time: 20 12:36 AM   Result Value Ref Range    Special Requests: NO SPECIAL REQUESTS      GRAM STAIN PENDING     Culture result:        NO GROWTH AFTER SHORT PERIOD OF INCUBATION. FURTHER RESULTS TO FOLLOW AFTER OVERNIGHT INCUBATION. METABOLIC PANEL, BASIC    Collection Time: 02/02/20  3:58 AM   Result Value Ref Range    Sodium 140 136 - 145 mmol/L    Potassium 4.0 3.5 - 5.1 mmol/L    Chloride 107 98 - 107 mmol/L    CO2 27 21 - 32 mmol/L    Anion gap 6 (L) 7 - 16 mmol/L    Glucose 105 (H) 65 - 100 mg/dL    BUN 9 6 - 23 MG/DL    Creatinine 0.79 0.6 - 1.0 MG/DL    GFR est AA >60 >60 ml/min/1.73m2    GFR est non-AA >60 >60 ml/min/1.73m2    Calcium 8.3 8.3 - 10.4 MG/DL   CBC WITH AUTOMATED DIFF    Collection Time: 02/02/20  3:58 AM   Result Value Ref Range    WBC 10.3 4.3 - 11.1 K/uL    RBC 3.91 (L) 4.05 - 5.2 M/uL    HGB 10.9 (L) 11.7 - 15.4 g/dL    HCT 34.7 (L) 35.8 - 46.3 %    MCV 88.7 79.6 - 97.8 FL    MCH 27.9 26.1 - 32.9 PG    MCHC 31.4 31.4 - 35.0 g/dL    RDW 12.7 11.9 - 14.6 %    PLATELET 189 529 - 139 K/uL    MPV 11.0 9.4 - 12.3 FL    ABSOLUTE NRBC 0.00 0.0 - 0.2 K/uL    DF AUTOMATED      NEUTROPHILS 73 43 - 78 %    LYMPHOCYTES 17 13 - 44 %    MONOCYTES 9 4.0 - 12.0 %    EOSINOPHILS 0 (L) 0.5 - 7.8 %    BASOPHILS 1 0.0 - 2.0 %    IMMATURE GRANULOCYTES 0 0.0 - 5.0 %    ABS. NEUTROPHILS 7.6 1.7 - 8.2 K/UL    ABS. LYMPHOCYTES 1.8 0.5 - 4.6 K/UL    ABS. MONOCYTES 0.9 0.1 - 1.3 K/UL    ABS. EOSINOPHILS 0.0 0.0 - 0.8 K/UL    ABS. BASOPHILS 0.1 0.0 - 0.2 K/UL    ABS. IMM.  GRANS. 0.0 0.0 - 0.5 K/UL   LIPID PANEL    Collection Time: 02/02/20  3:58 AM   Result Value Ref Range    LIPID PROFILE          Cholesterol, total 168 <200 MG/DL    Triglyceride 112 35 - 150 MG/DL    HDL Cholesterol 40 40 - 60 MG/DL    LDL, calculated 105.6 (H) <100 MG/DL    VLDL, calculated 22.4 6.0 - 23.0 MG/DL    CHOL/HDL Ratio 4.2     TROPONIN I    Collection Time: 02/02/20  3:58 AM   Result Value Ref Range    Troponin-I, Qt. <0.02 (L) 0.02 - 0.05 NG/ML   DRUG SCREEN, URINE    Collection Time: 02/02/20  4:06 AM   Result Value Ref Range PCP(PHENCYCLIDINE) NEGATIVE       BENZODIAZEPINES NEGATIVE       COCAINE NEGATIVE       AMPHETAMINES NEGATIVE       METHADONE NEGATIVE       THC (TH-CANNABINOL) NEGATIVE       OPIATES POSITIVE      BARBITURATES NEGATIVE      HCG URINE, QL    Collection Time: 02/02/20  4:06 AM   Result Value Ref Range    HCG urine, QL NEGATIVE  NEG     URINALYSIS W/ RFLX MICROSCOPIC    Collection Time: 02/02/20  4:06 AM   Result Value Ref Range    Color YELLOW      Appearance CLEAR      Specific gravity <1.005 1.001 - 1.023    pH (UA) 7.0 5.0 - 9.0      Protein NEGATIVE  NEG mg/dL    Glucose NEGATIVE  NEG mg/dL    Ketone NEGATIVE  NEG mg/dL    Bilirubin NEGATIVE  NEG      Blood NEGATIVE  NEG      Urobilinogen 0.2 0.2 - 1.0 EU/dL    Nitrites NEGATIVE  NEG      Leukocyte Esterase NEGATIVE  NEG      Bacteria 0 0 /hpf   TYPE & SCREEN    Collection Time: 02/02/20  4:13 AM   Result Value Ref Range    Crossmatch Expiration 02/05/2020     ABO/Rh(D) B POSITIVE     Antibody screen NEG    POC G3    Collection Time: 02/02/20  4:14 AM   Result Value Ref Range    Device: VENT      FIO2 (POC) 60 %    pH (POC) 7.387 7.35 - 7.45      pCO2 (POC) 45.4 (H) 35 - 45 MMHG    pO2 (POC) 199 (H) 75 - 100 MMHG    HCO3 (POC) 27.3 (H) 22 - 26 MMOL/L    sO2 (POC) 100 (H) 95 - 98 %    Base excess (POC) 2 mmol/L    Mode Pressure regulated volume control      Tidal volume 500 ml    Set Rate 15 bpm    PEEP/CPAP (POC) 8 cmH2O    Pressure support 10 cmH2O    Allens test (POC) NOT APPLICABLE      Site DRAWN FROM ARTERIAL LINE      Specimen type (POC) ARTERIAL      Performed by Bunny     CO2, POC 29 MMOL/L    Critical value read back 04:15     Respiratory comment: PhysicianNotified     Exhaled minute volume 7.50 L/min    COLLECT TIME 410     CULTURE, BLOOD    Collection Time: 02/02/20  9:25 PM   Result Value Ref Range    Special Requests: NO SPECIAL REQUESTS  RED PICC        Culture result: NO GROWTH AFTER 9 HOURS     CULTURE, URINE    Collection Time: 02/02/20 9:26 PM   Result Value Ref Range    Special Requests: NO SPECIAL REQUESTS      Culture result:        NO GROWTH AFTER SHORT PERIOD OF INCUBATION. FURTHER RESULTS TO FOLLOW AFTER OVERNIGHT INCUBATION.    CULTURE, BLOOD    Collection Time: 02/02/20  9:30 PM   Result Value Ref Range    Special Requests: LEFT  HAND        Culture result: NO GROWTH AFTER 9 HOURS     URINALYSIS W/ RFLX MICROSCOPIC    Collection Time: 02/02/20  9:34 PM   Result Value Ref Range    Color EMILIANO      Appearance CLEAR      Specific gravity 1.031 (H) 1.001 - 1.023      pH (UA) 6.0 5.0 - 9.0      Protein TRACE (A) NEG mg/dL    Glucose NEGATIVE  mg/dL    Ketone 15 (A) NEG mg/dL    Bilirubin NEGATIVE  NEG      Blood NEGATIVE  NEG      Urobilinogen 0.2 0.2 - 1.0 EU/dL    Nitrites NEGATIVE  NEG      Leukocyte Esterase NEGATIVE  NEG      WBC 5-10 0 /hpf    RBC 5-10 0 /hpf    Epithelial cells 0-3 0 /hpf    Bacteria 0 0 /hpf    Casts 5-10 0 /lpf   CBC W/O DIFF    Collection Time: 02/03/20  3:56 AM   Result Value Ref Range    WBC 8.8 4.3 - 11.1 K/uL    RBC 3.78 (L) 4.05 - 5.2 M/uL    HGB 10.7 (L) 11.7 - 15.4 g/dL    HCT 33.3 (L) 35.8 - 46.3 %    MCV 88.1 79.6 - 97.8 FL    MCH 28.3 26.1 - 32.9 PG    MCHC 32.1 31.4 - 35.0 g/dL    RDW 12.9 11.9 - 14.6 %    PLATELET 844 646 - 013 K/uL    MPV 10.5 9.4 - 12.3 FL    ABSOLUTE NRBC 0.00 0.0 - 0.2 K/uL   METABOLIC PANEL, BASIC    Collection Time: 02/03/20  3:56 AM   Result Value Ref Range    Sodium 143 136 - 145 mmol/L    Potassium 3.6 3.5 - 5.1 mmol/L    Chloride 109 (H) 98 - 107 mmol/L    CO2 27 21 - 32 mmol/L    Anion gap 7 7 - 16 mmol/L    Glucose 95 65 - 100 mg/dL    BUN 10 6 - 23 MG/DL    Creatinine 0.71 0.6 - 1.0 MG/DL    GFR est AA >60 >60 ml/min/1.73m2    GFR est non-AA >60 >60 ml/min/1.73m2    Calcium 8.9 8.3 - 10.4 MG/DL   MAGNESIUM    Collection Time: 02/03/20  3:56 AM   Result Value Ref Range    Magnesium 2.4 1.8 - 2.4 mg/dL   POC G3    Collection Time: 02/03/20  4:35 AM   Result Value Ref Range Device: VENT      FIO2 (POC) 45 %    pH (POC) 7.440 7.35 - 7.45      pCO2 (POC) 39.0 35 - 45 MMHG    pO2 (POC) 184 (H) 75 - 100 MMHG    HCO3 (POC) 26.5 (H) 22 - 26 MMOL/L    sO2 (POC) 100 (H) 95 - 98 %    Base excess (POC) 2 mmol/L    Mode Pressure regulated volume control      Tidal volume 500 ml    Set Rate 15 bpm    PEEP/CPAP (POC) 8 cmH2O    Allens test (POC) NOT APPLICABLE      Site DRAWN FROM ARTERIAL LINE      Specimen type (POC) ARTERIAL      Performed by Laverne     CO2, POC 28 MMOL/L    Respiratory comment: NurseNotified     Exhaled minute volume 7.40 L/min    COLLECT TIME 435           Ambulation:  Activity and Safety  Activity Level: Bed Rest  Ambulate: No (Comment)  Activity: In bed, Resting quietly  Activity Assistance: Complete care  Weight Bearing Status: WBAT (Weight Bearing as Tolerated)  Mode of Transportation: Stretcher, Oxygen, IV equipment  Repositioned: Reverse trendelenberg  Patient Turned: Supine  Head of Bed Elevated: HOB less then 20  Activity Response: Fairly tolerated  Assistive Device: Fall prevention device  Safety Measures: Bed/Chair-Wheels locked, Bed in low position, Call light within reach, Fall prevention (comment), Restraints, Side rails X 3, Video Monitoring for Safety  Venipuncture/BP Precautions: Venipuncture/BP precautions RUE(RUE PICC, LUE art line)     Impression/Plan:     Principal Problem:    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 PT/OT/ST when medically stable to participate. Thus will follow until pt ready for transition to [facility]  -unable to assess due to procedure. Chart reviewed including labs, radiology and nursing/resp/MD notes. -will need insurance clearance  Thank you for this consultation.    Discussion with [facility] admissions staff  Reviewed Therapies/Labs/Meds/Records  Gregory Moore MD

## 2020-02-03 NOTE — PROGRESS NOTES
2/3/2020  Pt is scheduled for angio today. Will hold OT assessment until after procedure.   Thank you,  Dilia Anaya, OT

## 2020-02-03 NOTE — OP NOTES
Procedure: Cerebral angiogram  Surgeon: Dr. Spears Loleta  Assistant: Davidson Bello NP  Pre-op Dx:  Spinal hemorrhage  Post-op Dx: same  Anesthesia: Conscious sedation with fentanyl and versed  Complications: None  Findings: Questionable abnormal vessel around C3 area

## 2020-02-03 NOTE — PROGRESS NOTES
Pt with fever 101.1-101.4 after tylenol administered. Ice packs applied under arms and cool washcloth to forehead. Pan cultures drawn due to continued temperate above 101, per nursing report given from day shift RN.

## 2020-02-03 NOTE — PROGRESS NOTES
Ventilator check complete; patient has a #7.5 ET tube secured at the 23 at the lip. Patient is not sedated. Patient is able to follow commands. Breath sounds are clear. Trachea is midline, Negative for subcutaneous air, and chest excursion is symmetric. Patient is also Negative for cyanosis and is Negative for pitting edema. All alarms are set and audible. Resuscitation bag is at the head of the bed. Ventilator Settings  Mode FIO2 Rate Tidal Volume Pressure PEEP I:E Ratio   SIMV, PRVC  47 %    500 ml  10 cm H2O  8 cm H20  1:2      Peak airway pressure: 23.7 cm H2O   Minute ventilation: 9.2 l/min     ABG: No results for input(s): PH, PCO2, PO2, HCO3 in the last 72 hours.       Josias Barney, RT

## 2020-02-03 NOTE — PROGRESS NOTES
PT note: Patient on hold today, going for procedure then will be bed rest for rest of day. Will attempt PT evaluation tomorrow as time and schedule allow. Thank you.    Deni Ho, PT  2/3/2020

## 2020-02-03 NOTE — PROGRESS NOTES
Progress Note    Patient: Weston Fenton MRN: [id number]  SSN: [id number]    YOB: 1975  Age: 53 y.o. Sex: female      Admit Date: 2/2/2020    LOS: 1 day     Subjective:   +HA. Left art line stopped working this am, replaced with R radial wo difficulty. Family updated at bedside.      Current Facility-Administered Medications   Medication Dose Route Frequency    famotidine (PEPCID) tablet 20 mg  20 mg Per NG tube DAILY    perflutren lipid microspheres (DEFINITY) in NS bolus IV  1 mL IntraVENous PRN    iopamidoL (ISOVUE 300) 61 % contrast injection 5-200 mL  5-200 mL IntraarTERial RAD ONCE    heparin (PF) 2 units/ml in NS infusion 2,000 Units  1,000 mL Irrigation Multiple    0.9% sodium chloride infusion  25 mL/hr IntraVENous ONCE    fentaNYL citrate (PF) injection  mcg   mcg IntraVENous Multiple    midazolam (VERSED) injection 0.25-2 mg  0.25-2 mg IntraVENous Multiple    lidocaine (XYLOCAINE) 10 mg/mL (1 %) injection 1-20 mL  1-20 mL SubCUTAneous Rad Multiple    sodium chloride (NS) flush 5-40 mL  5-40 mL IntraVENous PRN    fentaNYL citrate (PF) injection 50 mcg  50 mcg IntraVENous Q2H PRN    niCARdipine in Saline (CARDENE) 25 MG/250 mL infusion kit  0-15 mg/hr IntraVENous TITRATE    acetaminophen (TYLENOL) tablet 650 mg  650 mg Per NG tube Q4H PRN    ondansetron (ZOFRAN) injection 4 mg  4 mg IntraVENous Q4H PRN    ketorolac (TORADOL) injection 15 mg  15 mg IntraVENous Q6H PRN    0.9% sodium chloride infusion  75 mL/hr IntraVENous CONTINUOUS    atorvastatin (LIPITOR) tablet 40 mg  40 mg Per NG tube QHS    sodium chloride (NS) flush 30 mL  30 mL InterCATHeter Q8H    heparin (porcine) pf 900 Units  900 Units InterCATHeter Q8H    sodium chloride (NS) flush 30 mL  30 mL InterCATHeter PRN    heparin (porcine) pf 900 Units  900 Units InterCATHeter PRN    lip protectant (BLISTEX) ointment 1 Each  1 Each Topical PRN       Objective:     Vitals:    02/03/20 0900 02/03/20 0914 02/03/20 0929 02/03/20 0944   BP: 132/71 132/71 144/78 152/83   Pulse: 80 67 61 64   Resp: 18 15 15 15   Temp:  99.5 °F (37.5 °C) 99.6 °F (37.6 °C) 99.6 °F (37.6 °C)   SpO2: 100% 100% 99% 98%   Weight:       Height:             Intake and Output:  Current Shift: 02/03 0701 - 02/03 1900  In: -   Out: 155 [Urine:155]  Last 24 hr: 02/02 0701 - 02/03 0700  In: 767.5 [I.V.:767.5]  Out: 284 [Urine:865]    Physical Exam:   General:  Alert, cooperative, no distress. Eyes:  PERRL, EOMs intact. Neck: Supple, symmetrical, trachea midline, no adenopathy, thyroid: no enlargment/tenderness/nodules, no carotid bruit and no JVD. Lungs:   Clear to auscultation bilaterally. Heart:  Regular rate and rhythm, S1, S2 normal, no murmur, click, rub or gallop. Abdomen:   Soft, non-tender. Bowel sounds normal. No masses,  No organomegaly. Extremities: Extremities normal, atraumatic, no cyanosis or edema. Moving Left side better today, can hold LUE/LLE off bed to command. Pulses: 2+ and symmetric all extremities. Skin: Skin color, texture, turgor normal. No rashes or lesions   Neurologic: Alert, continues to follow commands and is stronger today on her left side, able to hold left extrems off bed,  4/5. Writing notes to communicate as she is intubated.        Lab/Data Review:    Recent Results (from the past 12 hour(s))   CBC W/O DIFF    Collection Time: 02/03/20  3:56 AM   Result Value Ref Range    WBC 8.8 4.3 - 11.1 K/uL    RBC 3.78 (L) 4.05 - 5.2 M/uL    HGB 10.7 (L) 11.7 - 15.4 g/dL    HCT 33.3 (L) 35.8 - 46.3 %    MCV 88.1 79.6 - 97.8 FL    MCH 28.3 26.1 - 32.9 PG    MCHC 32.1 31.4 - 35.0 g/dL    RDW 12.9 11.9 - 14.6 %    PLATELET 509 252 - 958 K/uL    MPV 10.5 9.4 - 12.3 FL    ABSOLUTE NRBC 0.00 0.0 - 0.2 K/uL   METABOLIC PANEL, BASIC    Collection Time: 02/03/20  3:56 AM   Result Value Ref Range    Sodium 143 136 - 145 mmol/L    Potassium 3.6 3.5 - 5.1 mmol/L    Chloride 109 (H) 98 - 107 mmol/L    CO2 27 21 - 32 mmol/L    Anion gap 7 7 - 16 mmol/L    Glucose 95 65 - 100 mg/dL    BUN 10 6 - 23 MG/DL    Creatinine 0.71 0.6 - 1.0 MG/DL    GFR est AA >60 >60 ml/min/1.73m2    GFR est non-AA >60 >60 ml/min/1.73m2    Calcium 8.9 8.3 - 10.4 MG/DL   MAGNESIUM    Collection Time: 20  3:56 AM   Result Value Ref Range    Magnesium 2.4 1.8 - 2.4 mg/dL   POC G3    Collection Time: 20  4:35 AM   Result Value Ref Range    Device: VENT      FIO2 (POC) 45 %    pH (POC) 7.440 7.35 - 7.45      pCO2 (POC) 39.0 35 - 45 MMHG    pO2 (POC) 184 (H) 75 - 100 MMHG    HCO3 (POC) 26.5 (H) 22 - 26 MMOL/L    sO2 (POC) 100 (H) 95 - 98 %    Base excess (POC) 2 mmol/L    Mode Pressure regulated volume control      Tidal volume 500 ml    Set Rate 15 bpm    PEEP/CPAP (POC) 8 cmH2O    Allens test (POC) NOT APPLICABLE      Site DRAWN FROM ARTERIAL LINE      Specimen type (POC) ARTERIAL      Performed by [email or web address]     CO2, POC 28 MMOL/L    Respiratory comment: NurseNotified     Exhaled minute volume 7.40 L/min    COLLECT TIME 435         Assessment/ Plan:     Principal Problem:    AVM (arteriovenous malformation) spine (2020)    Active Problems:    Cavernoma (2/3/2020)        NEURO:pt with cervical hemorrhage and edema secondary to vascular abnormality, cavernoma vs AVM. Pt remains intubated, but is alert and following commands this am. Family updated by Dr. Delfino Solomon this am, consent for diagnostic angio today obtained. Pt looks better this am. Fentanyl and toradol for headache. No other neuro changes. MRI Cspine repeat this weak. RESP:PRVC @ 50%: AB.4/184. no distress. CV:-160 goal. Replaced virgil this am. No pressors needed. 2D Echo final read pending. Trop neg. [address] 10/33,   NEPH:bun/cr: 10/0.7  GI:NPO. IVF NS @ 75cc/hr. paulette Davidson. Will discuss nutrition post procedure today. ID:[id number] [id number]: pan cx sent, neg thus far. Will send resp cx. LINES:RUE: PICC. Right radial virgil.        Signed By: Trent Hraley Luis Rea, NP     February 3, 2020

## 2020-02-03 NOTE — PROGRESS NOTES
Assessed PICC line on pt. PIVs noted. Primary RN at bedside and stated will remove PIVs as PICC line in place now.

## 2020-02-03 NOTE — PROGRESS NOTES
SPEECH PATHOLOGY NOTE:    Patient remains on ventilator. Will continue to follow with plans to initiate evaluation when medically appropriate.    Eloise Bryson [address] CCC-SLP

## 2020-02-03 NOTE — PROGRESS NOTES
Ventilator check complete; patient has a #7.5 ET tube secured at the 23 at the lip. Patient is not sedated. Patient is able to follow commands. Breath sounds are clear. Trachea is midline, Negative for subcutaneous air, and chest excursion is symmetric. Patient is also Negative for cyanosis and is Negative for pitting edema. All alarms are set and audible. Resuscitation bag is at the head of the bed. Ventilator Settings  Mode FIO2 Rate Tidal Volume Pressure PEEP I:E Ratio   SIMV, PRVC  45 %    500 ml  10 cm H2O  8 cm H20  1:2      Peak airway pressure: 17 cm H2O   Minute ventilation: 9.2 l/min     ABG: No results for input(s): PH, PCO2, PO2, HCO3 in the last 72 hours.       Charlee Huerta, RT

## 2020-02-03 NOTE — PROGRESS NOTES
Called by RT for being unable to draw blood back from arterial line. On assessment, catheter still intact but unable to draw back blood, yet still able to flush. Arterial line pressures inaccurate and was unable to position for a good waveform. Cuff pressures have been correlating with BP's in parameter without additional Cardene gtt. Will notify Regina Sr NP in the morning and maintain frequent cuff pressures.

## 2020-02-03 NOTE — PROCEDURES
ARTERIAL LINE (A-Line) PLACEMENT  Date: 2-3-20  Time: 1000  Indication: Hemodynamic monitoring        A time-out was completed verifying correct patient, procedure, site, positioning, and special equipment if applicable. Anders test was performed to ensure adequate perfusion. The patients right wrist was prepped and draped in sterile fashion. 1% Lidocaine was used to anesthetize the area. A 20G Arrow arterial line was introduced into the radial artery. The catheter was threaded over the guide wire and the needle was removed with appropriate pulsatile blood return. The catheter was then sutured in place to the skin and a sterile dressing applied. Perfusion to the extremity distal to the point of catheter insertion was checked and found to be adequate. Dr. Denana Florence was in agreement with plan of care and available at all times during procedure.     The patient tolerated the procedure well and there were no complications

## 2020-02-04 ENCOUNTER — APPOINTMENT (OUTPATIENT)
Dept: GENERAL RADIOLOGY | Age: 45
DRG: 004 | End: 2020-02-04
Attending: NURSE PRACTITIONER
Payer: COMMERCIAL

## 2020-02-04 LAB
ANION GAP SERPL CALC-SCNC: 10 MMOL/L (ref 7–16)
ARTERIAL PATENCY WRIST A: ABNORMAL
BASE DEFICIT BLD-SCNC: 1 MMOL/L
BDY SITE: ABNORMAL
BUN SERPL-MCNC: 13 MG/DL (ref 6–23)
CALCIUM SERPL-MCNC: 8.9 MG/DL (ref 8.3–10.4)
CHLORIDE SERPL-SCNC: 109 MMOL/L (ref 98–107)
CO2 BLD-SCNC: 26 MMOL/L
CO2 SERPL-SCNC: 25 MMOL/L (ref 21–32)
COLLECT TIME,HTIME: 255
CREAT SERPL-MCNC: 0.53 MG/DL (ref 0.6–1)
ERYTHROCYTE [DISTWIDTH] IN BLOOD BY AUTOMATED COUNT: 12.6 % (ref 11.9–14.6)
EXHALED MINUTE VOLUME, VE: 7.2 L/MIN
GAS FLOW.O2 O2 DELIVERY SYS: ABNORMAL L/MIN
GAS FLOW.O2 SETTING OXYMISER: 15 BPM
GLUCOSE SERPL-MCNC: 96 MG/DL (ref 65–100)
HCO3 BLD-SCNC: 24.7 MMOL/L (ref 22–26)
HCT VFR BLD AUTO: 32.7 % (ref 35.8–46.3)
HGB BLD-MCNC: 10.4 G/DL (ref 11.7–15.4)
INSPIRATION.DURATION SETTING TIME VENT: 0.9 SEC
MAGNESIUM SERPL-MCNC: 2.2 MG/DL (ref 1.8–2.4)
MCH RBC QN AUTO: 27.9 PG (ref 26.1–32.9)
MCHC RBC AUTO-ENTMCNC: 31.8 G/DL (ref 31.4–35)
MCV RBC AUTO: 87.7 FL (ref 79.6–97.8)
NRBC # BLD: 0 K/UL (ref 0–0.2)
O2/TOTAL GAS SETTING VFR VENT: 45 %
PCO2 BLD: 41.7 MMHG (ref 35–45)
PEEP RESPIRATORY: 8 CMH2O
PH BLD: 7.38 [PH] (ref 7.35–7.45)
PLATELET # BLD AUTO: 148 K/UL (ref 150–450)
PMV BLD AUTO: 10.9 FL (ref 9.4–12.3)
PO2 BLD: 186 MMHG (ref 75–100)
POTASSIUM SERPL-SCNC: 3.7 MMOL/L (ref 3.5–5.1)
PRESSURE SUPPORT SETTING VENT: 10 CMH2O
RBC # BLD AUTO: 3.73 M/UL (ref 4.05–5.2)
SAO2 % BLD: 100 % (ref 95–98)
SERVICE CMNT-IMP: ABNORMAL
SERVICE CMNT-IMP: ABNORMAL
SODIUM SERPL-SCNC: 144 MMOL/L (ref 136–145)
SPECIMEN TYPE: ABNORMAL
VENTILATION MODE VENT: ABNORMAL
VT SETTING VENT: 500 ML
WBC # BLD AUTO: 9.4 K/UL (ref 4.3–11.1)

## 2020-02-04 PROCEDURE — 97162 PT EVAL MOD COMPLEX 30 MIN: CPT

## 2020-02-04 PROCEDURE — 83735 ASSAY OF MAGNESIUM: CPT

## 2020-02-04 PROCEDURE — 99231 SBSQ HOSP IP/OBS SF/LOW 25: CPT | Performed by: PHYSICAL MEDICINE & REHABILITATION

## 2020-02-04 PROCEDURE — 36592 COLLECT BLOOD FROM PICC: CPT

## 2020-02-04 PROCEDURE — 80048 BASIC METABOLIC PNL TOTAL CA: CPT

## 2020-02-04 PROCEDURE — 99291 CRITICAL CARE FIRST HOUR: CPT | Performed by: NEUROLOGICAL SURGERY

## 2020-02-04 PROCEDURE — 97116 GAIT TRAINING THERAPY: CPT

## 2020-02-04 PROCEDURE — 97112 NEUROMUSCULAR REEDUCATION: CPT

## 2020-02-04 PROCEDURE — 71045 X-RAY EXAM CHEST 1 VIEW: CPT

## 2020-02-04 PROCEDURE — 74011250637 HC RX REV CODE- 250/637: Performed by: NURSE PRACTITIONER

## 2020-02-04 PROCEDURE — 65610000001 HC ROOM ICU GENERAL

## 2020-02-04 PROCEDURE — 85027 COMPLETE CBC AUTOMATED: CPT

## 2020-02-04 PROCEDURE — 97166 OT EVAL MOD COMPLEX 45 MIN: CPT

## 2020-02-04 PROCEDURE — 77030018798 HC PMP KT ENTRL FED COVD -A

## 2020-02-04 PROCEDURE — 74011250636 HC RX REV CODE- 250/636: Performed by: NURSE PRACTITIONER

## 2020-02-04 PROCEDURE — 74011250636 HC RX REV CODE- 250/636: Performed by: NEUROLOGICAL SURGERY

## 2020-02-04 PROCEDURE — 97161 PT EVAL LOW COMPLEX 20 MIN: CPT

## 2020-02-04 PROCEDURE — 82803 BLOOD GASES ANY COMBINATION: CPT

## 2020-02-04 PROCEDURE — 94003 VENT MGMT INPAT SUBQ DAY: CPT

## 2020-02-04 RX ORDER — CLONIDINE HYDROCHLORIDE 0.1 MG/1
0.1 TABLET ORAL
COMMUNITY
End: 2020-02-18

## 2020-02-04 RX ORDER — SCOLOPAMINE TRANSDERMAL SYSTEM 1 MG/1
1 PATCH, EXTENDED RELEASE TRANSDERMAL
Status: DISCONTINUED | OUTPATIENT
Start: 2020-02-04 | End: 2020-02-05

## 2020-02-04 RX ORDER — VALSARTAN 80 MG/1
80 TABLET ORAL DAILY
COMMUNITY
End: 2020-02-18

## 2020-02-04 RX ORDER — ENOXAPARIN SODIUM 100 MG/ML
40 INJECTION SUBCUTANEOUS EVERY 24 HOURS
Status: DISCONTINUED | OUTPATIENT
Start: 2020-02-04 | End: 2020-02-13

## 2020-02-04 RX ADMIN — ATORVASTATIN CALCIUM 40 MG: 40 TABLET, FILM COATED ORAL at 21:07

## 2020-02-04 RX ADMIN — SODIUM CHLORIDE 75 ML/HR: 900 INJECTION, SOLUTION INTRAVENOUS at 12:00

## 2020-02-04 RX ADMIN — ENOXAPARIN SODIUM 40 MG: 40 INJECTION SUBCUTANEOUS at 09:35

## 2020-02-04 RX ADMIN — FENTANYL CITRATE 50 MCG: 50 INJECTION, SOLUTION INTRAMUSCULAR; INTRAVENOUS at 05:04

## 2020-02-04 RX ADMIN — FAMOTIDINE 20 MG: 20 TABLET, FILM COATED ORAL at 09:35

## 2020-02-04 RX ADMIN — Medication 30 ML: at 05:07

## 2020-02-04 RX ADMIN — Medication 30 ML: at 13:57

## 2020-02-04 RX ADMIN — METOPROLOL TARTRATE 25 MG: 25 TABLET ORAL at 09:35

## 2020-02-04 RX ADMIN — IBUPROFEN 600 MG: 200 SUSPENSION ORAL at 19:08

## 2020-02-04 RX ADMIN — FENTANYL CITRATE 50 MCG: 50 INJECTION, SOLUTION INTRAMUSCULAR; INTRAVENOUS at 09:35

## 2020-02-04 RX ADMIN — METOPROLOL TARTRATE 25 MG: 25 TABLET ORAL at 17:00

## 2020-02-04 RX ADMIN — ACETAMINOPHEN 650 MG: 325 TABLET, FILM COATED ORAL at 14:58

## 2020-02-04 RX ADMIN — Medication 900 UNITS: at 13:56

## 2020-02-04 RX ADMIN — Medication 900 UNITS: at 05:06

## 2020-02-04 RX ADMIN — FENTANYL CITRATE 50 MCG: 50 INJECTION, SOLUTION INTRAMUSCULAR; INTRAVENOUS at 19:08

## 2020-02-04 RX ADMIN — Medication 30 ML: at 21:07

## 2020-02-04 NOTE — PROGRESS NOTES
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 Did very well. Excellent rehab potential. Plans for repeat MRI and angio early next week. Cont PT/OT as tolerated. OOB to chair bid for 2hrs at a time. History/Subjective/Complaint:     Patient seen and examined. Records reviewed. Pt orally intubated.  No current complaints    Pain 1  Pain Scale 1: Numeric (0 - 10) (02/04/20 1100)  Pain Intensity 1: 0 (02/04/20 1100)  Patient Stated Pain Goal: 0 (02/04/20 1100)  Pain Reassessment 1: Yes (02/04/20 0537)  Pain Onset 1: ongoing (02/04/20 1016)  Pain Location 1: Head (02/04/20 1016)  Pain Orientation 1: Anterior (02/04/20 0504)  Pain Description 1: Aching (02/04/20 1016)  Pain Intervention(s) 1: Emotional support (02/04/20 1016)     Objective:     Vitals:  Patient Vitals for the past 8 hrs:   BP Temp Pulse Resp SpO2   02/04/20 1130  99.6 °F (37.6 °C) 65 10 100 %   02/04/20 1123   69 17 100 %   02/04/20 1100   77 14 100 %   02/04/20 1030   98 18    02/04/20 1000 124/66 99.8 °F (37.7 °C) 66 15 100 %   02/04/20 0930   90 21    02/04/20 0929 136/74 99.8 °F (37.7 °C)   100 %   02/04/20 0900 135/76 99.8 °F (37.7 °C) 81 14 100 %   02/04/20 0833   71 15 100 %   02/04/20 0829 125/62 99.8 °F (37.7 °C) 67 15 100 %   02/04/20 0800 138/71 99.8 °F (37.7 °C) 69 14 100 %   02/04/20 0730   60 17    02/04/20 0729 124/62 99.8 °F (37.7 °C)   100 %   02/04/20 0700 135/66 99.8 °F (37.7 °C) 71 18 100 %   02/04/20 0600 114/62 100 °F (37.8 °C) 76 15 100 %      Intake and Output:  02/02 1901 - 02/04 0700  In: 2171.3 [I.V.:1981.3]  Out: 2895 [Urine:2145]    Allergies   Allergen Reactions    Ace Inhibitors Angioedema     Current Facility-Administered Medications   Medication Dose Route Frequency    enoxaparin (LOVENOX) injection 40 mg  40 mg SubCUTAneous Q24H    famotidine (PEPCID) tablet 20 mg  20 mg Per NG tube DAILY    metoprolol tartrate (LOPRESSOR) tablet 25 mg  25 mg Per NG tube BID    ibuprofen (ADVIL;MOTRIN) 100 mg/5 mL oral suspension 600 mg  600 mg Per NG tube Q6H PRN    sodium chloride (NS) flush 5-40 mL  5-40 mL IntraVENous PRN    fentaNYL citrate (PF) injection 50 mcg  50 mcg IntraVENous Q2H PRN    acetaminophen (TYLENOL) tablet 650 mg  650 mg Per NG tube Q4H PRN    ondansetron (ZOFRAN) injection 4 mg  4 mg IntraVENous Q4H PRN    ketorolac (TORADOL) injection 15 mg  15 mg IntraVENous Q6H PRN    0.9% sodium chloride infusion  75 mL/hr IntraVENous CONTINUOUS    atorvastatin (LIPITOR) tablet 40 mg  40 mg Per NG tube QHS    sodium chloride (NS) flush 30 mL  30 mL InterCATHeter Q8H    heparin (porcine) pf 900 Units  900 Units InterCATHeter Q8H    sodium chloride (NS) flush 30 mL  30 mL InterCATHeter PRN    heparin (porcine) pf 900 Units  900 Units InterCATHeter PRN    lip protectant (BLISTEX) ointment 1 Each  1 Each Topical PRN       Physical Exam:  Observed with therapy. Min assist to sit and get to eob. STS min assist of 2.   ambul 3ft to chair, baby steps, pivoted well. Stand to sit controlled well. Follows commands well    Functional Assessment:  Gross Assessment  AROM: Grossly decreased, non-functional (02/04/20 1100)  PROM: Generally decreased, functional (02/04/20 1100)  Strength: Grossly decreased, non-functional (02/04/20 1100)  Coordination: Generally decreased, functional (02/04/20 1100)  Tone: Normal (02/04/20 1100)  Sensation: Impaired(LUE < RUE) (02/04/20 1100)           Bed Mobility  Rolling:  Moderate assistance;Assist x2 (02/04/20 1100)  Supine to Sit: Moderate assistance;Assist x2 (02/04/20 1100)  Scooting: Maximum assistance (02/04/20 1100)     Balance  Sitting: Impaired (02/04/20 1100)  Sitting - Static: Good (unsupported); Prop sitting (02/04/20 1100)  Sitting - Dynamic: Fair (occasional); Prop sitting (02/04/20 1100)  Standing: Impaired (02/04/20 1100)  Standing - Static: Fair (02/04/20 1100)  Standing - Dynamic : Fair((-)) (02/04/20 1100)                       Bed/Mat Mobility  Rolling:  Moderate assistance;Assist x2 (02/04/20 1100)  Supine to Sit: Moderate assistance;Assist x2 (02/04/20 1100)  Sit to Stand: Minimum assistance;Assist x2 (02/04/20 1100)  Stand to Sit: Minimum assistance;Assist x2 (02/04/20 1100)  Bed to Chair: Minimum assistance;Assist x2 (02/04/20 1100)  Scooting: Maximum assistance (02/04/20 1100)     Labs/Studies:  Recent Results (from the past 72 hour(s))   CULTURE, RESPIRATORY/SPUTUM/BRONCH W GRAM STAIN    Collection Time: 02/02/20 12:36 AM   Result Value Ref Range    Special Requests: NO SPECIAL REQUESTS      GRAM STAIN 30 TO 50 WBC'S/OIF     GRAM STAIN 0 TO 1 EPITHELIAL CELLS/OIF     GRAM STAIN MODERATE GRAM POSITIVE COCCI      GRAM STAIN RARE GRAM NEGATIVE RODS      GRAM STAIN 3+ MUCUS PRESENT      Culture result: MODERATE NORMAL RESPIRATORY KRISTINA     METABOLIC PANEL, BASIC    Collection Time: 02/02/20  3:58 AM   Result Value Ref Range    Sodium 140 136 - 145 mmol/L    Potassium 4.0 3.5 - 5.1 mmol/L    Chloride 107 98 - 107 mmol/L    CO2 27 21 - 32 mmol/L    Anion gap 6 (L) 7 - 16 mmol/L    Glucose 105 (H) 65 - 100 mg/dL    BUN 9 6 - 23 MG/DL    Creatinine 0.79 0.6 - 1.0 MG/DL    GFR est AA >60 >60 ml/min/1.73m2    GFR est non-AA >60 >60 ml/min/1.73m2    Calcium 8.3 8.3 - 10.4 MG/DL   CBC WITH AUTOMATED DIFF    Collection Time: 02/02/20  3:58 AM   Result Value Ref Range    WBC 10.3 4.3 - 11.1 K/uL    RBC 3.91 (L) 4.05 - 5.2 M/uL    HGB 10.9 (L) 11.7 - 15.4 g/dL    HCT 34.7 (L) 35.8 - 46.3 %    MCV 88.7 79.6 - 97.8 FL    MCH 27.9 26.1 - 32.9 PG    MCHC 31.4 31.4 - 35.0 g/dL    RDW 12.7 11.9 - 14.6 %    PLATELET 334 635 - 294 K/uL    MPV 11.0 9.4 - 12.3 FL ABSOLUTE NRBC 0.00 0.0 - 0.2 K/uL    DF AUTOMATED      NEUTROPHILS 73 43 - 78 %    LYMPHOCYTES 17 13 - 44 %    MONOCYTES 9 4.0 - 12.0 %    EOSINOPHILS 0 (L) 0.5 - 7.8 %    BASOPHILS 1 0.0 - 2.0 %    IMMATURE GRANULOCYTES 0 0.0 - 5.0 %    ABS. NEUTROPHILS 7.6 1.7 - 8.2 K/UL    ABS. LYMPHOCYTES 1.8 0.5 - 4.6 K/UL    ABS. MONOCYTES 0.9 0.1 - 1.3 K/UL    ABS. EOSINOPHILS 0.0 0.0 - 0.8 K/UL    ABS. BASOPHILS 0.1 0.0 - 0.2 K/UL    ABS. IMM.  GRANS. 0.0 0.0 - 0.5 K/UL   LIPID PANEL    Collection Time: 02/02/20  3:58 AM   Result Value Ref Range    LIPID PROFILE          Cholesterol, total 168 <200 MG/DL    Triglyceride 112 35 - 150 MG/DL    HDL Cholesterol 40 40 - 60 MG/DL    LDL, calculated 105.6 (H) <100 MG/DL    VLDL, calculated 22.4 6.0 - 23.0 MG/DL    CHOL/HDL Ratio 4.2     TROPONIN I    Collection Time: 02/02/20  3:58 AM   Result Value Ref Range    Troponin-I, Qt. <0.02 (L) 0.02 - 0.05 NG/ML   DRUG SCREEN, URINE    Collection Time: 02/02/20  4:06 AM   Result Value Ref Range    PCP(PHENCYCLIDINE) NEGATIVE       BENZODIAZEPINES NEGATIVE       COCAINE NEGATIVE       AMPHETAMINES NEGATIVE       METHADONE NEGATIVE       THC (TH-CANNABINOL) NEGATIVE       OPIATES POSITIVE      BARBITURATES NEGATIVE      HCG URINE, QL    Collection Time: 02/02/20  4:06 AM   Result Value Ref Range    HCG urine, QL NEGATIVE  NEG     URINALYSIS W/ RFLX MICROSCOPIC    Collection Time: 02/02/20  4:06 AM   Result Value Ref Range    Color YELLOW      Appearance CLEAR      Specific gravity <1.005 1.001 - 1.023    pH (UA) 7.0 5.0 - 9.0      Protein NEGATIVE  NEG mg/dL    Glucose NEGATIVE  NEG mg/dL    Ketone NEGATIVE  NEG mg/dL    Bilirubin NEGATIVE  NEG      Blood NEGATIVE  NEG      Urobilinogen 0.2 0.2 - 1.0 EU/dL    Nitrites NEGATIVE  NEG      Leukocyte Esterase NEGATIVE  NEG      Bacteria 0 0 /hpf   TYPE & SCREEN    Collection Time: 02/02/20  4:13 AM   Result Value Ref Range    Crossmatch Expiration 02/05/2020     ABO/Rh(D) B POSITIVE     Antibody screen NEG    POC G3    Collection Time: 02/02/20  4:14 AM   Result Value Ref Range    Device: VENT      FIO2 (POC) 60 %    pH (POC) 7.387 7.35 - 7.45      pCO2 (POC) 45.4 (H) 35 - 45 MMHG    pO2 (POC) 199 (H) 75 - 100 MMHG    HCO3 (POC) 27.3 (H) 22 - 26 MMOL/L    sO2 (POC) 100 (H) 95 - 98 %    Base excess (POC) 2 mmol/L    Mode Pressure regulated volume control      Tidal volume 500 ml    Set Rate 15 bpm    PEEP/CPAP (POC) 8 cmH2O    Pressure support 10 cmH2O    Allens test (POC) NOT APPLICABLE      Site DRAWN FROM ARTERIAL LINE      Specimen type (POC) ARTERIAL      Performed by Bunny     CO2, POC 29 MMOL/L    Critical value read back 04:15     Respiratory comment: PhysicianNotified     Exhaled minute volume 7.50 L/min    COLLECT TIME 410     CULTURE, BLOOD    Collection Time: 02/02/20  9:25 PM   Result Value Ref Range    Special Requests: NO SPECIAL REQUESTS  RED PICC        Culture result: NO GROWTH 2 DAYS     CULTURE, URINE    Collection Time: 02/02/20  9:26 PM   Result Value Ref Range    Special Requests: NO SPECIAL REQUESTS      Culture result: NO GROWTH 1 DAY     CULTURE, BLOOD    Collection Time: 02/02/20  9:30 PM   Result Value Ref Range    Special Requests: LEFT  HAND        Culture result: NO GROWTH 2 DAYS     URINALYSIS W/ RFLX MICROSCOPIC    Collection Time: 02/02/20  9:34 PM   Result Value Ref Range    Color EMILIANO      Appearance CLEAR      Specific gravity 1.031 (H) 1.001 - 1.023      pH (UA) 6.0 5.0 - 9.0      Protein TRACE (A) NEG mg/dL    Glucose NEGATIVE  mg/dL    Ketone 15 (A) NEG mg/dL    Bilirubin NEGATIVE  NEG      Blood NEGATIVE  NEG      Urobilinogen 0.2 0.2 - 1.0 EU/dL    Nitrites NEGATIVE  NEG      Leukocyte Esterase NEGATIVE  NEG      WBC 5-10 0 /hpf    RBC 5-10 0 /hpf    Epithelial cells 0-3 0 /hpf    Bacteria 0 0 /hpf    Casts 5-10 0 /lpf   CBC W/O DIFF    Collection Time: 02/03/20  3:56 AM   Result Value Ref Range    WBC 8.8 4.3 - 11.1 K/uL    RBC 3.78 (L) 4.05 - 5.2 M/uL HGB 10.7 (L) 11.7 - 15.4 g/dL    HCT 33.3 (L) 35.8 - 46.3 %    MCV 88.1 79.6 - 97.8 FL    MCH 28.3 26.1 - 32.9 PG    MCHC 32.1 31.4 - 35.0 g/dL    RDW 12.9 11.9 - 14.6 %    PLATELET 236 479 - 334 K/uL    MPV 10.5 9.4 - 12.3 FL    ABSOLUTE NRBC 0.00 0.0 - 0.2 K/uL   METABOLIC PANEL, BASIC    Collection Time: 02/03/20  3:56 AM   Result Value Ref Range    Sodium 143 136 - 145 mmol/L    Potassium 3.6 3.5 - 5.1 mmol/L    Chloride 109 (H) 98 - 107 mmol/L    CO2 27 21 - 32 mmol/L    Anion gap 7 7 - 16 mmol/L    Glucose 95 65 - 100 mg/dL    BUN 10 6 - 23 MG/DL    Creatinine 0.71 0.6 - 1.0 MG/DL    GFR est AA >60 >60 ml/min/1.73m2    GFR est non-AA >60 >60 ml/min/1.73m2    Calcium 8.9 8.3 - 10.4 MG/DL   MAGNESIUM    Collection Time: 02/03/20  3:56 AM   Result Value Ref Range    Magnesium 2.4 1.8 - 2.4 mg/dL   POC G3    Collection Time: 02/03/20  4:35 AM   Result Value Ref Range    Device: VENT      FIO2 (POC) 45 %    pH (POC) 7.440 7.35 - 7.45      pCO2 (POC) 39.0 35 - 45 MMHG    pO2 (POC) 184 (H) 75 - 100 MMHG    HCO3 (POC) 26.5 (H) 22 - 26 MMOL/L    sO2 (POC) 100 (H) 95 - 98 %    Base excess (POC) 2 mmol/L    Mode Pressure regulated volume control      Tidal volume 500 ml    Set Rate 15 bpm    PEEP/CPAP (POC) 8 cmH2O    Allens test (POC) NOT APPLICABLE      Site DRAWN FROM ARTERIAL LINE      Specimen type (POC) ARTERIAL      Performed by MayranorRRT     CO2, POC 28 MMOL/L    Respiratory comment: NurseNotified     Exhaled minute volume 7.40 L/min    COLLECT TIME 435     CULTURE, RESPIRATORY/SPUTUM/BRONCH W GRAM STAIN    Collection Time: 02/03/20  1:17 PM   Result Value Ref Range    Special Requests: NO SPECIAL REQUESTS      GRAM STAIN 10 TO 30 WBC'S/OIF     GRAM STAIN 0 TO 4 EPITHELIAL CELLS/OIF     GRAM STAIN MODERATE GRAM POSITIVE COCCI      GRAM STAIN FEW GRAM NEGATIVE RODS      GRAM STAIN 4+ MUCUS PRESENT      Culture result: MODERATE NORMAL RESPIRATORY KRISTINA     POC G3    Collection Time: 02/04/20  2:58 AM   Result Value Ref Range    Device: VENT      FIO2 (POC) 45 %    pH (POC) 7.380 7.35 - 7.45      pCO2 (POC) 41.7 35 - 45 MMHG    pO2 (POC) 186 (H) 75 - 100 MMHG    HCO3 (POC) 24.7 22 - 26 MMOL/L    sO2 (POC) 100 (H) 95 - 98 %    Base deficit (POC) 1 mmol/L    Mode SIMV      Tidal volume 500 ml    Set Rate 15 bpm    PEEP/CPAP (POC) 8 cmH2O    Pressure support 10 cmH2O    Allens test (POC) NOT APPLICABLE      Inspiratory Time 0.9 sec    Site DRAWN FROM ARTERIAL LINE      Specimen type (POC) ARTERIAL      Performed by Radha     CO2, POC 26 MMOL/L    Respiratory comment: NurseNotified     Exhaled minute volume 7.20 L/min    COLLECT TIME 255     CBC W/O DIFF    Collection Time: 02/04/20  3:09 AM   Result Value Ref Range    WBC 9.4 4.3 - 11.1 K/uL    RBC 3.73 (L) 4.05 - 5.2 M/uL    HGB 10.4 (L) 11.7 - 15.4 g/dL    HCT 32.7 (L) 35.8 - 46.3 %    MCV 87.7 79.6 - 97.8 FL    MCH 27.9 26.1 - 32.9 PG    MCHC 31.8 31.4 - 35.0 g/dL    RDW 12.6 11.9 - 14.6 %    PLATELET 931 (L) 727 - 450 K/uL    MPV 10.9 9.4 - 12.3 FL    ABSOLUTE NRBC 0.00 0.0 - 0.2 K/uL   METABOLIC PANEL, BASIC    Collection Time: 02/04/20  3:09 AM   Result Value Ref Range    Sodium 144 136 - 145 mmol/L    Potassium 3.7 3.5 - 5.1 mmol/L    Chloride 109 (H) 98 - 107 mmol/L    CO2 25 21 - 32 mmol/L    Anion gap 10 7 - 16 mmol/L    Glucose 96 65 - 100 mg/dL    BUN 13 6 - 23 MG/DL    Creatinine 0.53 (L) 0.6 - 1.0 MG/DL    GFR est AA >60 >60 ml/min/1.73m2    GFR est non-AA >60 >60 ml/min/1.73m2    Calcium 8.9 8.3 - 10.4 MG/DL   MAGNESIUM    Collection Time: 02/04/20  3:09 AM   Result Value Ref Range    Magnesium 2.2 1.8 - 2.4 mg/dL        Assessment:     Principal Problem:    AVM (arteriovenous malformation) spine (2/2/2020)    Active Problems:    Cavernoma (2/3/2020)      Acute respiratory failure with hypoxia (HCC) (2/3/2020)      Edema of spinal cord (HCC) (2/3/2020)      Acute left-sided weakness (2/3/2020)        Plan:     Recommendations: Continue Acute Rehab Program  Coordination of rehab/medical care  Counseling of PM & R care issues management  Monitoring and management of medical conditions per plan of care/orders  Discussion with Family/Caregiver/Staff  Reviewed Therapies/Labs/Medications/Records

## 2020-02-04 NOTE — PROGRESS NOTES
Ventilator check complete; patient has a #7.5 ET tube secured at the 23 at the lip. Patient is not sedated. Patient is able to follow commands. Breath sounds are clear. Trachea is midline, Negative for subcutaneous air, and chest excursion is symmetric. Patient is also Negative for cyanosis and is Negative for pitting edema. All alarms are set and audible. Resuscitation bag is at the head of the bed. Ventilator Settings  Mode FIO2 Rate Tidal Volume Pressure PEEP I:E Ratio   SIMV, PRVC, Pressure support  45 % 15 500 ml  10 cm H2O  8 cm H20  1:2      Peak airway pressure: 21.7 cm H2O   Minute ventilation: 7.3 l/min     ABG: No results for input(s): PH, PCO2, PO2, HCO3 in the last 72 hours.       Mario Irvin, RT

## 2020-02-04 NOTE — PROGRESS NOTES
Problem: Mobility Impaired (Adult and Pediatric)  Goal: *Acute Goals and Plan of Care (Insert Text)  Description  LTG:  (1.)Ms. Jalyn Heath will move from supine to sit and sit to supine , scoot up and down and roll side to side in bed with MODIFIED INDEPENDENCE within 7 treatment day(s). (2.)Ms. Jalyn Heath will transfer from bed to chair and chair to bed with STAND BY ASSIST using the least restrictive device within 7 treatment day(s). (3.)Ms. Jalyn Heath will ambulate with STAND BY ASSIST for 85 feet with the least restrictive device within 7 treatment day(s). (4.)Ms. Jalyn Haeth will perform standing static and dynamic balance activities x 15 minutes with STAND BY ASSIST to improve safety within 7 treatment day(s). ________________________________________________________________________________________________      Outcome: Progressing Towards Goal     PHYSICAL THERAPY: Initial Assessment and AM 2/4/2020  INPATIENT: PT Visit Days : 1  Payor: [facility] / Plan: [facility] STATE / Product Type: PPO /       NAME/AGE/GENDER: Jemma Vernon is a 53 y.o. 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 Jalyn Heath is a 53 y.o. female in the hospital for an AVM with edema noted around the third cervical vertebrae. Per EV noted, pt's SBP should be kept within 100-160 range. Ms. Jalyn Heath presents to PT with decreased AROM and strength in L LE. Pt also presents with decreased L LE coordination but WFL B LE sensation. Pt performed bed mobility today with modA x 2 and fair sitting balance with prop support. She was given cuing for scooting forward on EOB in preparation for OOB activity.   PT stood and transferred to chair with Miguel x 2/HHA. Ms. Zarina Marquez could benefit from skilled PT as she is currently functioning below her baseline. At this time, patient is appropriate for Co-treatment with occupational therapy due to patient's decreased overall endurance/tolerance levels, as well as need for high level skilled assistance to complete functional transfers/mobility and functional tasks. Ryan Blanco is appropriate for a multidisciplinary co-treatment of PT and OT to address goals of both disciplines. This section established at most recent assessment   PROBLEM LIST (Impairments causing functional limitations):  Decreased Strength  Decreased ADL/Functional Activities  Decreased Transfer Abilities  Decreased Ambulation Ability/Technique  Decreased Balance  Decreased Activity Tolerance  Increased Fatigue   INTERVENTIONS PLANNED: (Benefits and precautions of physical therapy have been discussed with the patient.)  Balance Exercise  Bed Mobility  Family Education  Gait Training  Neuromuscular Re-education/Strengthening  Therapeutic Activites  Therapeutic Exercise/Strengthening  Transfer Training     TREATMENT PLAN: Frequency/Duration: 3 times a week for duration of hospital stay  Rehabilitation Potential For Stated Goals: Excellent     REHAB RECOMMENDATIONS (at time of discharge pending progress):    Placement: It is my opinion, based on this patient's performance to date, that Ms. Zarina Marquez may benefit from intensive therapy at an [address] [place] [address] after discharge due to a probable need for multiple therapy disciplines and potential to make ongoing and sustainable functional improvement that is of practical value. .  Equipment:   None at this time              HISTORY:   History of Present Injury/Illness (Reason for Referral): AVM  Past Medical History/Comorbidities:   Ms. Zarina Marquez  has no past medical history on file.   Ms. Zarina Marquez  has no past surgical history on file.  Social History/Living Environment:   Home Environment: Private residence  # Steps to Enter: 2  Rails to Enter: No  One/Two Story Residence: One story  Living Alone: No  Support Systems: Spouse/Significant Other/Partner  Patient Expects to be Discharged to[de-identified] Rehabilitation facility  Current DME Used/Available at Home: None  Tub or Shower Type: Tub/Shower combination  Prior Level of Function/Work/Activity:  Lives with spouse and child. PTA independent and working for [employer]. Number of Personal Factors/Comorbidities that affect the Plan of Care: 0: LOW COMPLEXITY   EXAMINATION:   Most Recent Physical Functioning:   Gross Assessment:  AROM: Generally decreased, functional(L hip flexion)  Strength: Generally decreased, functional(L LE)  Coordination: Generally decreased, functional  Sensation: Intact               Posture:     Balance:  Sitting: Impaired  Sitting - Static: Good (unsupported); Prop sitting  Sitting - Dynamic: Fair (occasional); Prop sitting  Standing: Impaired  Standing - Static: Fair;Constant support  Standing - Dynamic : Fair;Constant support Bed Mobility:  Supine to Sit: Moderate assistance;Assist x2  Scooting:  Moderate assistance;Assist x2  Wheelchair Mobility:     Transfers:  Sit to Stand: Minimum assistance;Assist x2  Stand to Sit: Minimum assistance;Assist x2  Bed to Chair: Minimum assistance;Assist x2  Gait:            Body Structures Involved:  Nerves  Lungs  Muscles Body Functions Affected:  Mental  Sensory/Pain  Respiratory  Neuromusculoskeletal  Movement Related Activities and Participation Affected:  General Tasks and Demands  Mobility  [address] [place] [address]   Number of elements that affect the Plan of Care: 4+: HIGH COMPLEXITY   CLINICAL PRESENTATION:   Presentation: Evolving clinical presentation with changing clinical characteristics: MODERATE COMPLEXITY   CLINICAL DECISION MAKIN [address] Short Form  How much difficulty does the patient currently have. .. Unable A Lot A Little None   1. Turning over in bed (including adjusting bedclothes, sheets and blankets)? [] 1   [x] 2   [] 3   [] 4   2. Sitting down on and standing up from a chair with arms ( e.g., wheelchair, bedside commode, etc.)   [] 1   [] 2   [x] 3   [] 4   3. Moving from lying on back to sitting on the side of the bed? [] 1   [x] 2   [] 3   [] 4   How much help from another person does the patient currently need. .. Total A Lot A Little None   4. Moving to and from a bed to a chair (including a wheelchair)? [] 1   [x] 2   [] 3   [] 4   5. Need to walk in hospital room? [] 1   [] 2   [x] 3   [] 4   6. Climbing 3-5 steps with a railing? [] 1   [x] 2   [] 3   [] 4   © 2007, Trustees of [address] [place] [address], under license to [employer]. All rights reserved      Score:  Initial: 14 Most Recent: X (Date: -- )    Interpretation of Tool:  Represents activities that are increasingly more difficult (i.e. Bed mobility, Transfers, Gait). Medical Necessity:     Patient demonstrates   excellent   rehab potential due to higher previous functional level. Reason for Services/Other Comments:  Patient continues to require skilled intervention due to   Decreased balance and functional mobility   . Use of outcome tool(s) and clinical judgement create a POC that gives a: Questionable prediction of patient's progress: MODERATE COMPLEXITY            TREATMENT:   (In addition to Assessment/Re-Assessment sessions the following treatments were rendered)   Pre-treatment Symptoms/Complaints:  Unable to verbalize  Pain: Initial:   Pain Intensity 1: 0  Post Session:  0     Today's treatment session addressed Decreased Strength, Decreased Transfer Abilities, Decreased Ambulation Ability/Technique, Decreased Balance, Decreased Activity Tolerance, and Increased Fatigue to progress towards achieving goal(s) .  During this session, Occupational Therapy addressed neuromuscular re-education  to progress towards their discipline specific goal(s). Co-treatment was necessary to improve patient's ability to follow higher level commands, ability to increase activity demands, and ability to return to normal functional activity. Gait Training ( 10 minutes):  Pre-gait training consisting of bed mobility, sitting balance, STS transfers, and pivoting transfer to recliner that required min-modA with verbal, visual, manual cuing. Braces/Orthotics/Lines/Etc:   IV  floyd catheter  nasogastric tube  arterial line  O2 Device: Endotracheal tube, Ventilator  Treatment/Session Assessment:    Response to Treatment:  Tolerated very well without distress. Interdisciplinary Collaboration:   Physical Therapist  Occupational Therapist  Registered Nurse  Physician  After treatment position/precautions:   Up in chair  Bed alarm/tab alert on  Bed/Chair-wheels locked  Bed in low position  Call light within reach  Nurse at bedside   Compliance with Program/Exercises: Will assess as treatment progresses  Recommendations/Intent for next treatment session: \"Next visit will focus on advancements to more challenging activities and reduction in assistance provided\".   Total Treatment Duration:  PT Patient Time In/Time Out  Time In: 1016  Time Out: [address] [facility] Joe Burr PT, DPT

## 2020-02-04 NOTE — PROGRESS NOTES
Bedside and Verbal shift change report given to [facility] (oncoming nurse) by Abilio Nunes (offgoing nurse). Report included the following information SBAR, Kardex, ED Summary, OR Summary, Procedure Summary, Intake/Output, MAR and Recent Results.

## 2020-02-04 NOTE — PROGRESS NOTES
Ventilator check complete; patient has a #7.5 ET tube secured at the 22 at the lip. Patient is not sedated. Patient is able to follow commands. Breath sounds are clear. Trachea is midline, Negative for subcutaneous air, and chest excursion is symmetric. Patient is also Negative for cyanosis and is Negative for pitting edema. All alarms are set and audible. Resuscitation bag is at the head of the bed. Ventilator Settings  Mode FIO2 Rate Tidal Volume Pressure PEEP I:E Ratio   SIMV, PRVC  47 %    500 ml  10 cm H2O  8 cm H20  1:2      Peak airway pressure: 23.9 cm H2O   Minute ventilation: 7.2 l/min     ABG: No results for input(s): PH, PCO2, PO2, HCO3 in the last 72 hours.       Ely Issa, RT

## 2020-02-04 NOTE — PROGRESS NOTES
Problem: Patient Education: Go to Patient Education Activity  Goal: Patient/Family Education  Description  1. Patient will complete upper body bathing and dressing with mod A and adaptive equipment as needed. 2. Patient will complete toileting with mod A.   3. Patient will tolerate 25 minutes of OT treatment with 1-2 rest breaks to increase activity tolerance for ADLs. 4. Patient will complete functional transfers with min A and adaptive equipment as needed. 5. Patient will tolerate 20 minutes functional activity while seated edge of bed unsupported with SBA and adaptive equipment as needed. 6. Patient will complete self-grooming with MOD I and adaptive equipment as needed. 7. Patient will tolerate 15 minutes therapeutic exercise with LUE to increase use during BADLs. 8. Patient complete self-feeding with use of LUE with min A after set-up. Timeframe: 7 visits        Outcome: Progressing Towards Goal      OCCUPATIONAL THERAPY: Initial Assessment, Daily Note and AM 2/4/2020  INPATIENT: OT Visit Days: 1  Payor: [address] [place] [address] / Plan: [address] / Product Type: PPO /      NAME/AGE/GENDER: Luis Hector is a 53 y.o. female   PRIMARY DIAGNOSIS:  AVM (arteriovenous malformation) spine [Q28.8] AVM (arteriovenous malformation) spine AVM (arteriovenous malformation) spine        ICD-10: Treatment Diagnosis:    · Generalized Muscle Weakness (M62.81)  · Other lack of cordination (R27.8)   Precautions/Allergies:    fall precautions   Ace inhibitors      ASSESSMENT:     Ms. Thompson Brothers presents in the ICU for AVM s/p angiogram. Upon arrival, pt supine in bed, currently intubated, however alert. Father at bedside to provide background information. At baseline, pt was living with  and daughter in a 1-story home with 2 steps to enter. Pt was independent with ADLs, IADLs, functional mobility and work tasks; still drives. No hx of falls.    Today, pt presents with deficits in overall activity tolerance, LUE strength, ADL performance, and functional mobility. Pt required mod A x 2 for supine to sit transfer to edge of bed. Static and dynamic sitting balance are fair with prop sitting required. Required mod A for scooting to edge of bed. Pt stood with min A x 2 d/t fair static and dynamic standing balance. Ambulated ~3 steps to bedside chair. Pt placed sitting upright in bedside chair. RUE AROM, strength, coordination, and sensation are intact; minimal LUE shoulder flexion AROM; elbow flexion/extension AROM functional however unable to complete full AROM; LUE shoulder strength (2/5); LUE elbow strength (3-/5); mild coordination deficits however WFL; RUE sensation > LUE sensation. Pt left sitting upright in bedside chair with needs met, call light within reach, RN at bedside and father at bedside. At this time, Irving Maldonado is functioning below baseline for ADLs and functional mobility. Pt would benefit from skilled OT services to address OT goals and plan of care. At this time, patient is appropriate for Co-treatment with physical  therapy due to patient's clinical complexity, decreased overall endurance/tolerance levels, as well as need for high level assistance and cues and intervention to complete functional transfers/mobility and functional tasks in safe manner. Irving Maldonado  is appropriate for a multidisciplinary co-treatment of PT and OT to address goals of both disciplines. This section established at most recent assessment   PROBLEM LIST (Impairments causing functional limitations):  1. Decreased Strength  2. Decreased ADL/Functional Activities  3. Decreased Transfer Abilities  4. Decreased Ambulation Ability/Technique  5. Decreased Balance  6. Decreased Activity Tolerance   INTERVENTIONS PLANNED: (Benefits and precautions of occupational therapy have been discussed with the patient.)  1. Activities of daily living training  2. Adaptive equipment training  3.  Balance training  4. Clothing management  5. Community reintergration  6. Donning&doffing training  7. Neuromuscular re-eduation  8. Re-evaluation  9. Therapeutic activity  10. Therapeutic exercise     TREATMENT PLAN: Frequency/Duration: Follow patient 3x/week to address above goals. Rehabilitation Potential For Stated Goals: Good     REHAB RECOMMENDATIONS (at time of discharge pending progress):    Placement: It is my opinion, based on this patient's performance to date, that Ms. Lisa Ross may benefit from intensive therapy at an [address] [place] [address] after discharge due to a probable need for multiple therapy disciplines and potential to make ongoing and sustainable functional improvement that is of practical value. .  Equipment:    TBD              OCCUPATIONAL PROFILE AND HISTORY:   History of Present Injury/Illness (Reason for Referral):  See H&P  Past Medical History/Comorbidities:   Ms. Lisa Ross  has no past medical history on file. Ms. Lisa Ross  has no past surgical history on file. Social History/Living Environment:   Home Environment: Private residence  # Steps to Enter: 2  Rails to Enter: No  One/Two Story Residence: One story  Living Alone: No  Support Systems: Spouse/Significant Other/Partner, Child(meggan), Family member(s)  Patient Expects to be Discharged to[de-identified] Rehabilitation facility  Current DME Used/Available at Home: None  Tub or Shower Type: Tub/Shower combination  Prior Level of Function/Work/Activity:  Independent with ADLs, IADLs, work tasks, functional mobility and driving. Personal Factors:          Sex:  female        Age:  53 y.o.         Other factors that influence how disability is experienced by the patient:  Multiple co-morbidities    Number of Personal Factors/Comorbidities that affect the Plan of Care: Expanded review of therapy/medical records (1-2):  MODERATE COMPLEXITY   ASSESSMENT OF OCCUPATIONAL PERFORMANCE[de-identified]   Activities of Daily Living:   Basic ADLs (From Assessment) Complex ADLs (From Assessment)   Feeding: Total assistance  Oral Facial Hygiene/Grooming: Maximum assistance  Bathing: Total assistance  Upper Body Dressing: Total assistance  Lower Body Dressing: Total assistance  Toileting: Total assistance Instrumental ADL  Meal Preparation: Total assistance  Homemaking: Total assistance   Grooming/Bathing/Dressing Activities of Daily Living     Cognitive Retraining  Safety/Judgement: Awareness of environment; Fall prevention                       Bed/Mat Mobility  Rolling: Moderate assistance;Assist x2  Supine to Sit: Moderate assistance;Assist x2  Sit to Stand: Minimum assistance;Assist x2  Stand to Sit: Minimum assistance;Assist x2  Bed to Chair: Minimum assistance;Assist x2  Scooting: Maximum assistance     Most Recent Physical Functioning:   Gross Assessment:  AROM: Grossly decreased, non-functional  PROM: Generally decreased, functional  Strength: Grossly decreased, non-functional  Coordination: Generally decreased, functional  Tone: Normal  Sensation: Impaired(LUE < RUE)               Posture:     Balance:  Sitting: Impaired  Sitting - Static: Good (unsupported); Prop sitting  Sitting - Dynamic: Fair (occasional); Prop sitting  Standing: Impaired  Standing - Static: Fair  Standing - Dynamic : Fair((-)) Bed Mobility:  Rolling:  Moderate assistance;Assist x2  Supine to Sit: Moderate assistance;Assist x2  Scooting: Maximum assistance  Wheelchair Mobility:     Transfers:  Sit to Stand: Minimum assistance;Assist x2  Stand to Sit: Minimum assistance;Assist x2  Bed to Chair: Minimum assistance;Assist x2            Patient Vitals for the past 6 hrs:   BP SpO2 Pulse   02/04/20 0700 135/66 100 % 71   02/04/20 0729 124/62 100 %    02/04/20 0730   60   02/04/20 0800 138/71 100 % 69   02/04/20 0829 125/62 100 % 67   02/04/20 0833  100 % 71   02/04/20 0900 135/76 100 % 81   02/04/20 0929 136/74 100 %    02/04/20 0930   90   02/04/20 1000 124/66 100 % 66   02/04/20 1030   98   20 1100  100 % 77   20 1123  100 % 69   20 1130  100 % 65       Mental Status  Neurologic State: Alert  Orientation Level: Unable to verbalize  Cognition: Appropriate decision making, Follows commands  Perception: Appears intact  Perseveration: No perseveration noted  Safety/Judgement: Awareness of environment, Fall prevention                          Physical Skills Involved:  1. Balance  2. Strength  3. Activity Tolerance  4. Sensation  5. Fine Motor Control  6. Gross Motor Control Cognitive Skills Affected (resulting in the inability to perform in a timely and safe manner): 1. none Psychosocial Skills Affected:  1. Habits/Routines  2. Environmental Adaptation   Number of elements that affect the Plan of Care: 5+:  HIGH COMPLEXITY   CLINICAL DECISION MAKIN[address] [place] [address] AM-PAC 6 Clicks   Daily Activity Inpatient Short Form  How much help from another person does the patient currently need. .. Total A Lot A Little None   1. Putting on and taking off regular lower body clothing? [x] 1   [] 2   [] 3   [] 4   2. Bathing (including washing, rinsing, drying)? [] 1   [x] 2   [] 3   [] 4   3. Toileting, which includes using toilet, bedpan or urinal?   [x] 1   [] 2   [] 3   [] 4   4. Putting on and taking off regular upper body clothing? [x] 1   [] 2   [] 3   [] 4   5. Taking care of personal grooming such as brushing teeth? [x] 1   [] 2   [] 3   [] 4   6. Eating meals? [x] 1   [] 2   [] 3   [] 4   © , Trustees of [address] [place] [address], under license to [employer]. All rights reserved      Score:  Initial: 7 Most Recent: X (Date: -- )    Interpretation of Tool:  Represents activities that are increasingly more difficult (i.e. Bed mobility, Transfers, Gait). Medical Necessity:     · Patient demonstrates good rehab potential due to higher previous functional level.   Reason for Services/Other Comments:  · Patient continues to require skilled intervention due to medical complications and patient unable to attend/participate in therapy as expected. Use of outcome tool(s) and clinical judgement create a POC that gives a: MODERATE COMPLEXITY         TREATMENT:   (In addition to Assessment/Re-Assessment sessions the following treatments were rendered)     Pre-treatment Symptoms/Complaints:  Unable to verbalize  Pain: Initial:   Pain Intensity 1: 0  Pain Location 1: Head  Pain Intervention(s) 1: Emotional support  Post Session:  same     Neuromuscular Re-education: ( ):  Exercise/activities per grid below to improve balance, coordination and posture. Required moderate visual, verbal, manual and tactile cues to promote static and dynamic balance in sitting and standing. Braces/Orthotics/Lines/Etc:   · IV  · art line; ICU monitors  · O2 Device: Endotracheal tube, Ventilator  Treatment/Session Assessment:    · Response to Treatment:  Tolerated well with no issues noted. · Interdisciplinary Collaboration:   o Physical Therapist  o Occupational Therapist  o Registered Nurse  · After treatment position/precautions:   o Up in chair  o Bed alarm/tab alert on  o Bed/Chair-wheels locked  o Call light within reach  o RN notified  o Family at bedside   · Compliance with Program/Exercises: Will assess as treatment progresses. · Recommendations/Intent for next treatment session: \"Next visit will focus on advancements to more challenging activities and reduction in assistance provided\".   Total Treatment Duration:  OT Patient Time In/Time Out  Time In: 1016  Time Out: 217 Lupis Miller

## 2020-02-04 NOTE — PROGRESS NOTES
A follow up visit was made to the patient. Emotional support, spiritual presence and   prayer were provided for the patient and her father, Skyla Gaspar. The patient was sitting in her recliner.       CAMILLE Wright

## 2020-02-04 NOTE — PROGRESS NOTES
Nutrition:  Nutrition Consult for TF Management. (Dr. Murtaza Stokes, NP)  Assessment:  Anthropometrics:   Height: 5' 4\" (162.6 cm), wgt - 98.6 kg (ICU bed 2/3/2020), BMI 37.3 BMI class of obese, edema - none reported. Macronutrient Needs (CBW 98 kg):  Estimated calorie needs - 7157-5850 kenny/day (11-14 kenny/kg/day)  Estimated protein needs - >110 gm pro/day (>2 gm pro/kgIBW/day) IBW - 54.5 kg  Max CHO/day - 172 gm CHO/day (50% kenny/day)   Fluid/day - 1.1-1.4 liters/day (1 ml/kenny/day)  Intake/Comparative Standards: The patient is currently NPO which meets 0% of calorie and 0% of protein needs. Pertinent Labs/Hemodynamic Parameters:   Sodium 144; MAP - 89. Pertinent Medications/Drips:   IVF - NS @ 75 ml/hr. GI Function/Activity:    Soft abdomen with active  bowel sounds. Last bowel movement is unknown . Diet:   DIET NPO  Enteral Access:   NGT. Food/Nutrition History:   53year old obese lady with a h/o hypertension admitted with acute left sided weakness due to cervical spine hemorrhage at C3. The patient and  do not report any change in her eating habits prior to hospital admission. Diagnosis (Nutrition): Inadequate energy intake related to NPO status as evidenced by the patient being intubated and ventilated and requires TF for nutrition support. Intervention:  Meals and Snacks: NPO. Enteral Nutrition: Start TF with Promote @ 20 ml/hr with a 10 ml/hr water flush via NGT. Start 2 pouches ProSource twice daily via NGT with a 50 ml water flush before and after protein. Increase TF as tolerated to the goal rate of 50 ml/hr - 1360 calories/day (100% calorie goal), 120 grams protein/day (100% protein goal), 156 grams CHO/day (does not exceed max CHO limit) and 1448 ml water/day (100% fluid goal). IV Fluid: Do not adjust IVF rate. Coordination of Nutrition Care by a Nutrition Professional: Collaboration with [address] [place] [address], [address] [place] [address]. Peter Gomez.  Gian Bardales  [phone]

## 2020-02-04 NOTE — PROGRESS NOTES
Progress Note    Patient: Yasemin Joiner MRN: [id number]  SSN: [id number]    YOB: 1975  Age: 53 y.o. Sex: female      Admit Date: 2/2/2020    LOS: 2 days     Subjective:   No acute neuro changes. +MURCIA  Will place on PS this am.     Current Facility-Administered Medications   Medication Dose Route Frequency    enoxaparin (LOVENOX) injection 40 mg  40 mg SubCUTAneous Q24H    famotidine (PEPCID) tablet 20 mg  20 mg Per NG tube DAILY    metoprolol tartrate (LOPRESSOR) tablet 25 mg  25 mg Per NG tube BID    ibuprofen (ADVIL;MOTRIN) 100 mg/5 mL oral suspension 600 mg  600 mg Per NG tube Q6H PRN    sodium chloride (NS) flush 5-40 mL  5-40 mL IntraVENous PRN    fentaNYL citrate (PF) injection 50 mcg  50 mcg IntraVENous Q2H PRN    acetaminophen (TYLENOL) tablet 650 mg  650 mg Per NG tube Q4H PRN    ondansetron (ZOFRAN) injection 4 mg  4 mg IntraVENous Q4H PRN    ketorolac (TORADOL) injection 15 mg  15 mg IntraVENous Q6H PRN    0.9% sodium chloride infusion  75 mL/hr IntraVENous CONTINUOUS    atorvastatin (LIPITOR) tablet 40 mg  40 mg Per NG tube QHS    sodium chloride (NS) flush 30 mL  30 mL InterCATHeter Q8H    heparin (porcine) pf 900 Units  900 Units InterCATHeter Q8H    sodium chloride (NS) flush 30 mL  30 mL InterCATHeter PRN    heparin (porcine) pf 900 Units  900 Units InterCATHeter PRN    lip protectant (BLISTEX) ointment 1 Each  1 Each Topical PRN       Objective:     Vitals:    02/04/20 0833 02/04/20 0900 02/04/20 0929 02/04/20 0930   BP:  135/76 136/74    Pulse: 71 81  90   Resp: 15 14  21   Temp:  99.8 °F (37.7 °C) 99.8 °F (37.7 °C)    SpO2: 100% 100% 100%    Weight:       Height:             Intake and Output:  Current Shift: No intake/output data recorded. Last 24 hr: 02/03 0701 - 02/04 0700  In: 2171.3 [I.V.:1981.3]  Out: 1740 [Urine:1690]    Physical Exam:   General:  Alert, cooperative, no distress. Eyes:  PERRL, EOMs intact.     Neck: Supple, symmetrical, trachea midline, no adenopathy, thyroid: no enlargment/tenderness/nodules, no carotid bruit and no JVD. Lungs:   Clear to auscultation bilaterally. Heart:  Regular rate and rhythm, S1, S2 normal, no murmur, click, rub or gallop. Abdomen:   Soft, non-tender. Bowel sounds normal. No masses,  No organomegaly. Extremities: Extremities normal, atraumatic, no cyanosis or edema. Moving Left side better today, can hold LUE/LLE off bed to command. Pulses: 2+ and symmetric all extremities. Skin: Skin color, texture, turgor normal. No rashes or lesions   Neurologic: Alert, continues to follow commands and is stronger today on her left side, able to hold left extrems off bed,  4/5. Writing notes to communicate as she is intubated.        Lab/Data Review:    Recent Results (from the past 12 hour(s))   POC G3    Collection Time: 02/04/20  2:58 AM   Result Value Ref Range    Device: VENT      FIO2 (POC) 45 %    pH (POC) 7.380 7.35 - 7.45      pCO2 (POC) 41.7 35 - 45 MMHG    pO2 (POC) 186 (H) 75 - 100 MMHG    HCO3 (POC) 24.7 22 - 26 MMOL/L    sO2 (POC) 100 (H) 95 - 98 %    Base deficit (POC) 1 mmol/L    Mode SIMV      Tidal volume 500 ml    Set Rate 15 bpm    PEEP/CPAP (POC) 8 cmH2O    Pressure support 10 cmH2O    Allens test (POC) NOT APPLICABLE      Inspiratory Time 0.9 sec    Site DRAWN FROM ARTERIAL LINE      Specimen type (POC) ARTERIAL      Performed by Radha     CO2, POC 26 MMOL/L    Respiratory comment: NurseNotified     Exhaled minute volume 7.20 L/min    COLLECT TIME 255     CBC W/O DIFF    Collection Time: 02/04/20  3:09 AM   Result Value Ref Range    WBC 9.4 4.3 - 11.1 K/uL    RBC 3.73 (L) 4.05 - 5.2 M/uL    HGB 10.4 (L) 11.7 - 15.4 g/dL    HCT 32.7 (L) 35.8 - 46.3 %    MCV 87.7 79.6 - 97.8 FL    MCH 27.9 26.1 - 32.9 PG    MCHC 31.8 31.4 - 35.0 g/dL    RDW 12.6 11.9 - 14.6 %    PLATELET 226 (L) 262 - 450 K/uL    MPV 10.9 9.4 - 12.3 FL    ABSOLUTE NRBC 0.00 0.0 - 0.2 K/uL   METABOLIC PANEL, BASIC Collection Time: 20  3:09 AM   Result Value Ref Range    Sodium 144 136 - 145 mmol/L    Potassium 3.7 3.5 - 5.1 mmol/L    Chloride 109 (H) 98 - 107 mmol/L    CO2 25 21 - 32 mmol/L    Anion gap 10 7 - 16 mmol/L    Glucose 96 65 - 100 mg/dL    BUN 13 6 - 23 MG/DL    Creatinine 0.53 (L) 0.6 - 1.0 MG/DL    GFR est AA >60 >60 ml/min/1.73m2    GFR est non-AA >60 >60 ml/min/1.73m2    Calcium 8.9 8.3 - 10.4 MG/DL   MAGNESIUM    Collection Time: 20  3:09 AM   Result Value Ref Range    Magnesium 2.2 1.8 - 2.4 mg/dL       Assessment/ Plan:     Principal Problem:    AVM (arteriovenous malformation) spine (2020)    Active Problems:    Cavernoma (2/3/2020)      Acute respiratory failure with hypoxia (HCC) (2/3/2020)      Edema of spinal cord (HCC) (2/3/2020)      Acute left-sided weakness (2/3/2020)        NEURO:pt with cervical hemorrhage and edema secondary to vascular abnormality, cavernoma vs AVM. Pt remains intubated, but is alert and following commands this am. Cerebral angiogram yesterday with suspicious abnormal vessel around C3 area. Will repeat Cerebral angiogram in 6 days and MRI brain. Pt is alert and following commands. R groin is CDI with no hematoma. Pulses are intact. Family updated at bedside this am with Dr. Vee Sainz. RESP:PRVC @ 45%: AB.38/41/186. no distress. Will place on PS this am and wean as tolerated. Pt with cervical edema/hematoma, monitor resp status. May extubate in am if no issues. Has + resp secretions, neg cx thus far. CV:-160 goal. No pressors needed. 2D Echo: EF 55-60%,  Trop neg. SCD, lovenox. Metoprolol 25mg po bid. [address] 10/32,   NEPH:bun/cr: 13/0.5  GI: IVF NS @ 75cc/hr. Pepcid, senna. Will start TF today per nutrition recs. ID:TM: [id number]: pan cx sent, neg thus far. resp with normal navin. LINES:RUE: PICC. Right radial virgil.        Signed By: Sheri Crum NP     2020

## 2020-02-05 ENCOUNTER — APPOINTMENT (OUTPATIENT)
Dept: GENERAL RADIOLOGY | Age: 45
DRG: 004 | End: 2020-02-05
Attending: NURSE PRACTITIONER
Payer: COMMERCIAL

## 2020-02-05 LAB
ANION GAP SERPL CALC-SCNC: 5 MMOL/L (ref 7–16)
ARTERIAL PATENCY WRIST A: ABNORMAL
BACTERIA SPEC CULT: NORMAL
BASE EXCESS BLD CALC-SCNC: 1 MMOL/L
BASE EXCESS BLD CALC-SCNC: 2 MMOL/L
BASE EXCESS BLD CALC-SCNC: 3 MMOL/L
BDY SITE: ABNORMAL
BUN SERPL-MCNC: 18 MG/DL (ref 6–23)
CALCIUM SERPL-MCNC: 9 MG/DL (ref 8.3–10.4)
CHLORIDE SERPL-SCNC: 110 MMOL/L (ref 98–107)
CO2 BLD-SCNC: 29 MMOL/L
CO2 BLD-SCNC: 29 MMOL/L
CO2 BLD-SCNC: 33 MMOL/L
CO2 SERPL-SCNC: 28 MMOL/L (ref 21–32)
COLLECT TIME,HTIME: 1147
COLLECT TIME,HTIME: 303
COLLECT TIME,HTIME: 911
CREAT SERPL-MCNC: 0.54 MG/DL (ref 0.6–1)
ERYTHROCYTE [DISTWIDTH] IN BLOOD BY AUTOMATED COUNT: 12.8 % (ref 11.9–14.6)
EXHALED MINUTE VOLUME, VE: 7.1 L/MIN
EXHALED MINUTE VOLUME, VE: 7.6 L/MIN
FLOW RATE ISTAT,IFRATE: 4 L/MIN
GAS FLOW.O2 O2 DELIVERY SYS: ABNORMAL L/MIN
GAS FLOW.O2 SETTING OXYMISER: 15 BPM
GLUCOSE SERPL-MCNC: 121 MG/DL (ref 65–100)
GRAM STN SPEC: NORMAL
HCO3 BLD-SCNC: 28 MMOL/L (ref 22–26)
HCO3 BLD-SCNC: 28.1 MMOL/L (ref 22–26)
HCO3 BLD-SCNC: 30.2 MMOL/L (ref 22–26)
HCT VFR BLD AUTO: 31.1 % (ref 35.8–46.3)
HGB BLD-MCNC: 9.7 G/DL (ref 11.7–15.4)
MAGNESIUM SERPL-MCNC: 2.3 MG/DL (ref 1.8–2.4)
MCH RBC QN AUTO: 27.6 PG (ref 26.1–32.9)
MCHC RBC AUTO-ENTMCNC: 31.2 G/DL (ref 31.4–35)
MCV RBC AUTO: 88.6 FL (ref 79.6–97.8)
NRBC # BLD: 0 K/UL (ref 0–0.2)
O2/TOTAL GAS SETTING VFR VENT: 100 %
O2/TOTAL GAS SETTING VFR VENT: 45 %
PCO2 BLD: 46 MMHG (ref 35–45)
PCO2 BLD: 46.8 MMHG (ref 35–45)
PCO2 BLD: 78.1 MMHG (ref 35–45)
PEEP RESPIRATORY: 8 CMH2O
PEEP RESPIRATORY: 8 CMH2O
PH BLD: 7.2 [PH] (ref 7.35–7.45)
PH BLD: 7.38 [PH] (ref 7.35–7.45)
PH BLD: 7.39 [PH] (ref 7.35–7.45)
PLATELET # BLD AUTO: 146 K/UL (ref 150–450)
PMV BLD AUTO: 10.9 FL (ref 9.4–12.3)
PO2 BLD: 119 MMHG (ref 75–100)
PO2 BLD: 187 MMHG (ref 75–100)
PO2 BLD: 306 MMHG (ref 75–100)
POTASSIUM SERPL-SCNC: 3.6 MMOL/L (ref 3.5–5.1)
PRESSURE SUPPORT SETTING VENT: 10 CMH2O
RBC # BLD AUTO: 3.51 M/UL (ref 4.05–5.2)
SAO2 % BLD: 100 % (ref 95–98)
SAO2 % BLD: 100 % (ref 95–98)
SAO2 % BLD: 97 % (ref 95–98)
SERVICE CMNT-IMP: ABNORMAL
SERVICE CMNT-IMP: NORMAL
SODIUM SERPL-SCNC: 143 MMOL/L (ref 136–145)
SPECIMEN TYPE: ABNORMAL
TOTAL RESP. RATE, ITRR: 16
VENTILATION MODE VENT: ABNORMAL
VENTILATION MODE VENT: ABNORMAL
VT SETTING VENT: 450 ML
WBC # BLD AUTO: 8.9 K/UL (ref 4.3–11.1)

## 2020-02-05 PROCEDURE — 74018 RADEX ABDOMEN 1 VIEW: CPT

## 2020-02-05 PROCEDURE — 94640 AIRWAY INHALATION TREATMENT: CPT

## 2020-02-05 PROCEDURE — 74011250636 HC RX REV CODE- 250/636: Performed by: NEUROLOGICAL SURGERY

## 2020-02-05 PROCEDURE — 94762 N-INVAS EAR/PLS OXIMTRY CONT: CPT

## 2020-02-05 PROCEDURE — 5A1955Z RESPIRATORY VENTILATION, GREATER THAN 96 CONSECUTIVE HOURS: ICD-10-PCS | Performed by: NEUROLOGICAL SURGERY

## 2020-02-05 PROCEDURE — 85027 COMPLETE CBC AUTOMATED: CPT

## 2020-02-05 PROCEDURE — 74011250636 HC RX REV CODE- 250/636: Performed by: NURSE PRACTITIONER

## 2020-02-05 PROCEDURE — 74011000250 HC RX REV CODE- 250: Performed by: NURSE PRACTITIONER

## 2020-02-05 PROCEDURE — 99291 CRITICAL CARE FIRST HOUR: CPT | Performed by: NEUROLOGICAL SURGERY

## 2020-02-05 PROCEDURE — 71045 X-RAY EXAM CHEST 1 VIEW: CPT

## 2020-02-05 PROCEDURE — 36600 WITHDRAWAL OF ARTERIAL BLOOD: CPT

## 2020-02-05 PROCEDURE — 74011250637 HC RX REV CODE- 250/637: Performed by: NURSE PRACTITIONER

## 2020-02-05 PROCEDURE — 94003 VENT MGMT INPAT SUBQ DAY: CPT

## 2020-02-05 PROCEDURE — 83735 ASSAY OF MAGNESIUM: CPT

## 2020-02-05 PROCEDURE — 74011250636 HC RX REV CODE- 250/636

## 2020-02-05 PROCEDURE — 74011000258 HC RX REV CODE- 258: Performed by: NURSE PRACTITIONER

## 2020-02-05 PROCEDURE — 80048 BASIC METABOLIC PNL TOTAL CA: CPT

## 2020-02-05 PROCEDURE — 77030018798 HC PMP KT ENTRL FED COVD -A

## 2020-02-05 PROCEDURE — 82803 BLOOD GASES ANY COMBINATION: CPT

## 2020-02-05 PROCEDURE — 65610000001 HC ROOM ICU GENERAL

## 2020-02-05 PROCEDURE — 74011000250 HC RX REV CODE- 250

## 2020-02-05 PROCEDURE — 31500 INSERT EMERGENCY AIRWAY: CPT | Performed by: NURSE PRACTITIONER

## 2020-02-05 PROCEDURE — 0BH17EZ INSERTION OF ENDOTRACHEAL AIRWAY INTO TRACHEA, VIA NATURAL OR ARTIFICIAL OPENING: ICD-10-PCS | Performed by: NEUROLOGICAL SURGERY

## 2020-02-05 RX ORDER — DEXAMETHASONE SODIUM PHOSPHATE 4 MG/ML
10 INJECTION, SOLUTION INTRA-ARTICULAR; INTRALESIONAL; INTRAMUSCULAR; INTRAVENOUS; SOFT TISSUE ONCE
Status: COMPLETED | OUTPATIENT
Start: 2020-02-05 | End: 2020-02-05

## 2020-02-05 RX ORDER — PROPOFOL 10 MG/ML
0-50 VIAL (ML) INTRAVENOUS
Status: DISCONTINUED | OUTPATIENT
Start: 2020-02-05 | End: 2020-02-06

## 2020-02-05 RX ORDER — FENTANYL CITRATE 50 UG/ML
50 INJECTION, SOLUTION INTRAMUSCULAR; INTRAVENOUS ONCE
Status: COMPLETED | OUTPATIENT
Start: 2020-02-05 | End: 2020-02-05

## 2020-02-05 RX ORDER — ETOMIDATE 2 MG/ML
INJECTION INTRAVENOUS
Status: COMPLETED
Start: 2020-02-05 | End: 2020-02-05

## 2020-02-05 RX ORDER — HYDROCODONE BITARTRATE AND ACETAMINOPHEN 7.5; 325 MG/1; MG/1
1 TABLET ORAL
Status: DISCONTINUED | OUTPATIENT
Start: 2020-02-05 | End: 2020-02-18 | Stop reason: HOSPADM

## 2020-02-05 RX ORDER — METOPROLOL TARTRATE 5 MG/5ML
5 INJECTION INTRAVENOUS ONCE
Status: DISPENSED | OUTPATIENT
Start: 2020-02-05 | End: 2020-02-05

## 2020-02-05 RX ORDER — MIDAZOLAM HYDROCHLORIDE 1 MG/ML
2 INJECTION, SOLUTION INTRAMUSCULAR; INTRAVENOUS ONCE
Status: COMPLETED | OUTPATIENT
Start: 2020-02-05 | End: 2020-02-05

## 2020-02-05 RX ORDER — SUCCINYLCHOLINE CHLORIDE 20 MG/ML INJECTION SOLUTION
SOLUTION
Status: COMPLETED
Start: 2020-02-05 | End: 2020-02-05

## 2020-02-05 RX ORDER — VANCOMYCIN/0.9 % SOD CHLORIDE 1.5G/250ML
1500 PLASTIC BAG, INJECTION (ML) INTRAVENOUS EVERY 12 HOURS
Status: DISCONTINUED | OUTPATIENT
Start: 2020-02-06 | End: 2020-02-07

## 2020-02-05 RX ORDER — SUCCINYLCHOLINE CHLORIDE 20 MG/ML INJECTION SOLUTION
100 SOLUTION
Status: COMPLETED | OUTPATIENT
Start: 2020-02-05 | End: 2020-02-05

## 2020-02-05 RX ORDER — IPRATROPIUM BROMIDE AND ALBUTEROL SULFATE 2.5; .5 MG/3ML; MG/3ML
SOLUTION RESPIRATORY (INHALATION)
Status: ACTIVE
Start: 2020-02-05 | End: 2020-02-05

## 2020-02-05 RX ORDER — IPRATROPIUM BROMIDE AND ALBUTEROL SULFATE 2.5; .5 MG/3ML; MG/3ML
3 SOLUTION RESPIRATORY (INHALATION)
Status: DISCONTINUED | OUTPATIENT
Start: 2020-02-05 | End: 2020-02-06

## 2020-02-05 RX ORDER — DEXAMETHASONE SODIUM PHOSPHATE 4 MG/ML
4 INJECTION, SOLUTION INTRA-ARTICULAR; INTRALESIONAL; INTRAMUSCULAR; INTRAVENOUS; SOFT TISSUE EVERY 4 HOURS
Status: DISCONTINUED | OUTPATIENT
Start: 2020-02-05 | End: 2020-02-09

## 2020-02-05 RX ORDER — ETOMIDATE 2 MG/ML
20 INJECTION INTRAVENOUS ONCE
Status: COMPLETED | OUTPATIENT
Start: 2020-02-05 | End: 2020-02-05

## 2020-02-05 RX ADMIN — Medication 900 UNITS: at 21:10

## 2020-02-05 RX ADMIN — HYDROCODONE BITARTRATE AND ACETAMINOPHEN 1 TABLET: 7.5; 325 TABLET ORAL at 20:32

## 2020-02-05 RX ADMIN — Medication 30 ML: at 05:27

## 2020-02-05 RX ADMIN — RACEPINEPHRINE HYDROCHLORIDE 0.5 ML: 11.25 SOLUTION RESPIRATORY (INHALATION) at 08:56

## 2020-02-05 RX ADMIN — FENTANYL CITRATE 50 MCG: 50 INJECTION, SOLUTION INTRAMUSCULAR; INTRAVENOUS at 17:03

## 2020-02-05 RX ADMIN — DEXAMETHASONE SODIUM PHOSPHATE 4 MG: 4 INJECTION, SOLUTION INTRAMUSCULAR; INTRAVENOUS at 16:10

## 2020-02-05 RX ADMIN — FENTANYL CITRATE 50 MCG: 50 INJECTION, SOLUTION INTRAMUSCULAR; INTRAVENOUS at 09:15

## 2020-02-05 RX ADMIN — IPRATROPIUM BROMIDE AND ALBUTEROL SULFATE 3 ML: .5; 3 SOLUTION RESPIRATORY (INHALATION) at 19:27

## 2020-02-05 RX ADMIN — PROPOFOL 25 MCG/KG/MIN: 10 INJECTION, EMULSION INTRAVENOUS at 09:38

## 2020-02-05 RX ADMIN — Medication 100 MG: at 09:32

## 2020-02-05 RX ADMIN — Medication 900 UNITS: at 13:56

## 2020-02-05 RX ADMIN — VANCOMYCIN HYDROCHLORIDE 2500 MG: 10 INJECTION, POWDER, LYOPHILIZED, FOR SOLUTION INTRAVENOUS at 16:37

## 2020-02-05 RX ADMIN — METOPROLOL TARTRATE 25 MG: 25 TABLET ORAL at 17:01

## 2020-02-05 RX ADMIN — IPRATROPIUM BROMIDE AND ALBUTEROL SULFATE 3 ML: .5; 3 SOLUTION RESPIRATORY (INHALATION) at 23:03

## 2020-02-05 RX ADMIN — SODIUM CHLORIDE 75 ML/HR: 900 INJECTION, SOLUTION INTRAVENOUS at 00:42

## 2020-02-05 RX ADMIN — MIDAZOLAM 2 MG: 1 INJECTION INTRAMUSCULAR; INTRAVENOUS at 09:45

## 2020-02-05 RX ADMIN — ENOXAPARIN SODIUM 40 MG: 40 INJECTION SUBCUTANEOUS at 08:01

## 2020-02-05 RX ADMIN — KETOROLAC TROMETHAMINE 15 MG: 30 INJECTION, SOLUTION INTRAMUSCULAR at 02:12

## 2020-02-05 RX ADMIN — SODIUM CHLORIDE 75 ML/HR: 900 INJECTION, SOLUTION INTRAVENOUS at 13:56

## 2020-02-05 RX ADMIN — DEXAMETHASONE SODIUM PHOSPHATE 4 MG: 4 INJECTION, SOLUTION INTRAMUSCULAR; INTRAVENOUS at 11:55

## 2020-02-05 RX ADMIN — Medication 30 ML: at 21:11

## 2020-02-05 RX ADMIN — DEXAMETHASONE SODIUM PHOSPHATE 10 MG: 4 INJECTION, SOLUTION INTRAMUSCULAR; INTRAVENOUS at 08:46

## 2020-02-05 RX ADMIN — SUCCINYLCHOLINE CHLORIDE 20 MG/ML INJECTION SOLUTION 100 MG: SOLUTION at 09:32

## 2020-02-05 RX ADMIN — FAMOTIDINE 20 MG: 20 TABLET, FILM COATED ORAL at 08:01

## 2020-02-05 RX ADMIN — DEXAMETHASONE SODIUM PHOSPHATE 4 MG: 4 INJECTION, SOLUTION INTRAMUSCULAR; INTRAVENOUS at 20:25

## 2020-02-05 RX ADMIN — ETOMIDATE 20 MG: 40 INJECTION, SOLUTION INTRAVENOUS at 09:31

## 2020-02-05 RX ADMIN — FENTANYL CITRATE 50 MCG: 50 INJECTION, SOLUTION INTRAMUSCULAR; INTRAVENOUS at 08:46

## 2020-02-05 RX ADMIN — KETOROLAC TROMETHAMINE 15 MG: 30 INJECTION, SOLUTION INTRAMUSCULAR at 08:46

## 2020-02-05 RX ADMIN — IPRATROPIUM BROMIDE AND ALBUTEROL SULFATE 3 ML: .5; 3 SOLUTION RESPIRATORY (INHALATION) at 08:43

## 2020-02-05 RX ADMIN — FENTANYL CITRATE 50 MCG: 50 INJECTION, SOLUTION INTRAMUSCULAR; INTRAVENOUS at 09:45

## 2020-02-05 RX ADMIN — ATORVASTATIN CALCIUM 40 MG: 40 TABLET, FILM COATED ORAL at 21:10

## 2020-02-05 RX ADMIN — ETOMIDATE 20 MG: 2 INJECTION INTRAVENOUS at 09:31

## 2020-02-05 RX ADMIN — ACETAMINOPHEN 650 MG: 325 TABLET, FILM COATED ORAL at 11:55

## 2020-02-05 RX ADMIN — IPRATROPIUM BROMIDE AND ALBUTEROL SULFATE 3 ML: .5; 3 SOLUTION RESPIRATORY (INHALATION) at 15:36

## 2020-02-05 RX ADMIN — FENTANYL CITRATE 50 MCG: 50 INJECTION, SOLUTION INTRAMUSCULAR; INTRAVENOUS at 00:18

## 2020-02-05 RX ADMIN — IPRATROPIUM BROMIDE AND ALBUTEROL SULFATE 3 ML: .5; 3 SOLUTION RESPIRATORY (INHALATION) at 12:24

## 2020-02-05 RX ADMIN — ACETAMINOPHEN 650 MG: 325 TABLET, FILM COATED ORAL at 16:09

## 2020-02-05 RX ADMIN — CEFEPIME HYDROCHLORIDE 1 G: 1 INJECTION, POWDER, FOR SOLUTION INTRAMUSCULAR; INTRAVENOUS at 16:10

## 2020-02-05 RX ADMIN — NICARDIPINE HYDROCHLORIDE 10 MG/HR: 25 INJECTION INTRAVENOUS at 09:18

## 2020-02-05 RX ADMIN — Medication 30 ML: at 13:56

## 2020-02-05 NOTE — PROGRESS NOTES
Progress Note    Patient: Yolanda Valentin MRN: [id number]  SSN: [id number]    YOB: 1975  Age: 53 y.o. Sex: female      Admit Date: 2/2/2020    LOS: 3 days     Subjective:   Pt ao this am and moving left side well  Pt extubated this am, noted to have increased wheezing post removal.  Pt given decadron 10mg IV x1, scheduled AA nebs q4 and pt given racemic epi neb x1. Pt in NAD. Will have low threshold for re-intubation due to cervical edema/hemorrhage.         Current Facility-Administered Medications   Medication Dose Route Frequency    HYDROcodone-acetaminophen (NORCO) 7.5-325 mg per tablet 1 Tab  1 Tab Oral Q6H PRN    albuterol-ipratropium (DUO-NEB) 2.5 MG-0.5 MG/3 ML  3 mL Nebulization Q4H RT    racEPINEPHrine (VAPONEFRIN) 2.25% nebulizer solution  0.5 mL Nebulization NOW    albuterol-ipratropium (DUO-NEB) 2.5 mg-0.5 mg/3 ml nebulizer solution        dexamethasone (DECADRON) 4 mg/mL injection 4 mg  4 mg IntraVENous Q4H    enoxaparin (LOVENOX) injection 40 mg  40 mg SubCUTAneous Q24H    famotidine (PEPCID) tablet 20 mg  20 mg Per NG tube DAILY    metoprolol tartrate (LOPRESSOR) tablet 25 mg  25 mg Per NG tube BID    ibuprofen (ADVIL;MOTRIN) 100 mg/5 mL oral suspension 600 mg  600 mg Per NG tube Q6H PRN    sodium chloride (NS) flush 5-40 mL  5-40 mL IntraVENous PRN    fentaNYL citrate (PF) injection 50 mcg  50 mcg IntraVENous Q2H PRN    acetaminophen (TYLENOL) tablet 650 mg  650 mg Per NG tube Q4H PRN    ondansetron (ZOFRAN) injection 4 mg  4 mg IntraVENous Q4H PRN    ketorolac (TORADOL) injection 15 mg  15 mg IntraVENous Q6H PRN    0.9% sodium chloride infusion  75 mL/hr IntraVENous CONTINUOUS    atorvastatin (LIPITOR) tablet 40 mg  40 mg Per NG tube QHS    sodium chloride (NS) flush 30 mL  30 mL InterCATHeter Q8H    heparin (porcine) pf 900 Units  900 Units InterCATHeter Q8H    sodium chloride (NS) flush 30 mL  30 mL InterCATHeter PRN    heparin (porcine) pf 900 Units  900 Units InterCATHeter PRN    lip protectant (BLISTEX) ointment 1 Each  1 Each Topical PRN       Objective:     Vitals:    02/05/20 0615 02/05/20 0630 02/05/20 0700 02/05/20 0843   BP:       Pulse: 69 (!) 54 (!) 59    Resp: 27 18 18    Temp: 99.3 °F (37.4 °C) 99.3 °F (37.4 °C) 99.5 °F (37.5 °C)    SpO2: 100% 100% 100% 100%   Weight:       Height:             Intake and Output:  Current Shift: 02/05 0701 - 02/05 1900  In: 698   Out: -   Last 24 hr: 02/04 0701 - 02/05 0700  In: 1051 [I.V.:855]  Out: 990 [Urine:990]     IO: +61/24hr  TOTAL OVERALL: -525     Physical Exam:   General:  Alert, cooperative. Extubated this am.    Eyes:  PERRL, EOMs intact. Neck: Supple, symmetrical, trachea midline, no adenopathy, thyroid: no enlargment/tenderness/nodules, no carotid bruit and no JVD. Lungs:   Rhonchi bilat/coarse. Heart:  Regular rate and rhythm, S1, S2 normal, no murmur, click, rub or gallop. Abdomen:   Soft, non-tender. Bowel sounds normal. No masses,  No organomegaly. Extremities: Extremities normal, atraumatic, no cyanosis or edema. Moving Left side better today, can hold LUE/LLE off bed to command. Pulses: 2+ and symmetric all extremities. Skin: Skin color, texture, turgor normal. No rashes or lesions   Neurologic: Alert and following commands, post extubation pt with coarse lungs, increased WOB. Cont to follow commands.        Lab/Data Review:    Recent Results (from the past 12 hour(s))   CBC W/O DIFF    Collection Time: 02/05/20  3:04 AM   Result Value Ref Range    WBC 8.9 4.3 - 11.1 K/uL    RBC 3.51 (L) 4.05 - 5.2 M/uL    HGB 9.7 (L) 11.7 - 15.4 g/dL    HCT 31.1 (L) 35.8 - 46.3 %    MCV 88.6 79.6 - 97.8 FL    MCH 27.6 26.1 - 32.9 PG    MCHC 31.2 (L) 31.4 - 35.0 g/dL    RDW 12.8 11.9 - 14.6 %    PLATELET 347 (L) 846 - 450 K/uL    MPV 10.9 9.4 - 12.3 FL    ABSOLUTE NRBC 0.00 0.0 - 0.2 K/uL   METABOLIC PANEL, BASIC    Collection Time: 02/05/20  3:04 AM   Result Value Ref Range    Sodium 143 136 - 145 mmol/L    Potassium 3.6 3.5 - 5.1 mmol/L    Chloride 110 (H) 98 - 107 mmol/L    CO2 28 21 - 32 mmol/L    Anion gap 5 (L) 7 - 16 mmol/L    Glucose 121 (H) 65 - 100 mg/dL    BUN 18 6 - 23 MG/DL    Creatinine 0.54 (L) 0.6 - 1.0 MG/DL    GFR est AA >60 >60 ml/min/1.73m2    GFR est non-AA >60 >60 ml/min/1.73m2    Calcium 9.0 8.3 - 10.4 MG/DL   MAGNESIUM    Collection Time: 20  3:04 AM   Result Value Ref Range    Magnesium 2.3 1.8 - 2.4 mg/dL   POC G3    Collection Time: 20  3:08 AM   Result Value Ref Range    Device: VENT      FIO2 (POC) 45 %    pH (POC) 7.385 7.35 - 7.45      pCO2 (POC) 46.8 (H) 35 - 45 MMHG    pO2 (POC) 187 (H) 75 - 100 MMHG    HCO3 (POC) 28.0 (H) 22 - 26 MMOL/L    sO2 (POC) 100 (H) 95 - 98 %    Base excess (POC) 2 mmol/L    Mode VENTILATOR/SPONTANEOUS/PRESSURE SUPPORT      PEEP/CPAP (POC) 8 cmH2O    Pressure support 10 cmH2O    Allens test (POC) NOT APPLICABLE      Total resp. rate 16      Site DRAWN FROM ARTERIAL LINE      Specimen type (POC) ARTERIAL      Performed by Radha     CO2, POC 29 MMOL/L    Respiratory comment: NurseNotified     Exhaled minute volume 7.60 L/min    COLLECT TIME 303         Assessment/ Plan:     Principal Problem:    AVM (arteriovenous malformation) spine (2020)    Active Problems:    Cavernoma (2/3/2020)      Acute respiratory failure with hypoxia (HCC) (2/3/2020)      Edema of spinal cord ([address] (2/3/2020)      Acute left-sided weakness (2/3/2020)        NEURO:pt with cervical hemorrhage and edema secondary to vascular abnormality, cavernoma vs AVM. Pt remains intubated, but is alert and following commands this am. Cerebral angiogram yesterday with suspicious abnormal vessel around C3 area. Will repeat Cerebral angiogram in 6 days and MRI brain. Pt is alert and following commands. R groin is CDI with no hematoma. Pulses are intact. Pt extubated this am.  RESP:PRVC @ 45%: AB.38/46/186. no distress.  Pt tolerated PS last 24 hours and was placed on CP this am, doing well with no wheezes noted. Pt extubated and acute wheezes noted/coarse all lung fields. Decadron 10mg IV x1 and scheduled Q4 today. Racemic epi neb given. AA nebs ordered. Will repeat ABG. Low threshold for re-intubation due to cervical hemorrhage/edema. CV:-160 goal. No pressors needed. 2D Echo: EF 55-60%, Trop neg. SCD, lovenox. Metoprolol 25mg po bid. Will restart cardene gtt this am, -190's post extubation. [address] 9/31,   NEPH:bun/cr: 18/0.5  GI: IVF NS @ 75cc/hr. Pepelicia senradha. TF held for extubation this am. Will keep NPO for now. .   ID:TM: 100.7: pan cx sent, neg thus far. resp with normal navin. LINES:RUE: PICC. Right radial virgil.        Signed By: Riya Dawson NP     February 5, 2020

## 2020-02-05 NOTE — PROGRESS NOTES
Pt extubated to 4L NC    Breath sounds are coarse and wheezing, bilaterally     NP Indigo at bedside new nebulizer treatments ordered     RT at bedside     Will continue to monitor pt condition

## 2020-02-05 NOTE — PROCEDURES
Endotracheal Intubation  Date: 2-5-20  Time: 0930    Indication: Respiratory Distress      Attending: Dr. Haris Coats. A time-out was completed verifying correct patient, procedure, site, positioning, and special equipment if applicable. The patient was placed in a flat position. Sedation was obtained using  Etomidate 20mg, Ancetine 100mcg IV x1, fentanyl 50mcg IV. The patient was easily ventilated using an ambu bag. The <GLIDESCOPE TECHNOLOGY/ MAC 4  BLADE> was used and inserted into the oropharynx at which time there was a Grade 2 view of the vocal cords. A 7.5-Tajik endotracheal tube was attempted, but unable to pass through cords and difficulty visualizing cords. Pt was ventilated via ambu bag without difficulty and MAC 3 glidescope used with 7.0 OETT placed x1 attempt and visualized going through the vocal cords. The stylette was removed. Colorimetric change was visualized on the CO2 meter. Breath sounds were heard in both lung fields equally. The endotracheal tube was placed at 22 cm, measured at the teeth. After 1st attempt I asked Dr. Ashok Contreras,  who was in ICU rounding,  to evaluate pt as well. He came into room and was available for any acute difficult airway. I very much appreciate his guidance and help during re-intubation of Ms. Montiel. A chest x-ray was ordered to assess for pneumothorax and verify endotrachealtube placement. It was viewed at bedside by myself and noted to be in good position above christine. NGT replaced and in good position. I updated  about need to re-intubate pt and he verbalized understanding.

## 2020-02-05 NOTE — PROGRESS NOTES
Pharmacokinetic Consult to Pharmacist    Irving Erica is a 53 y.o. female being treated for VAP with vancomycin. Height: 5' 4\" (162.6 cm)  Weight: 98.6 kg (217 lb 6 oz)  Lab Results   Component Value Date/Time    BUN 18 02/05/2020 03:04 AM    Creatinine 0.54 (L) 02/05/2020 03:04 AM    WBC 8.9 02/05/2020 03:04 AM      Estimated Creatinine Clearance: 114.6 mL/min (A) (by C-G formula based on SCr of 0.54 mg/dL (L)).     CULTURES:  Results     Procedure Component Value Units Date/Time    CULTURE, RESPIRATORY/SPUTUM/BRONCH Angelito Mix [[id number]] Collected:  02/03/20 1317    Order Status:  Completed Specimen:  Sputum from Endotracheal aspirate Updated:  02/05/20 0824     Special Requests: NO SPECIAL REQUESTS        GRAM STAIN 10 TO 30 WBC'S/OIF      0 TO 4 EPITHELIAL CELLS/OIF            MODERATE GRAM POSITIVE COCCI            FEW GRAM NEGATIVE RODS         4+ MUCUS PRESENT        Culture result:       MODERATE NORMAL RESPIRATORY KRISTINA          CULTURE, BLOOD [[id number]] Collected:  02/02/20 2130    Order Status:  Completed Specimen:  Blood Updated:  02/05/20 0715     Special Requests: --        LEFT  HAND       Culture result: NO GROWTH 3 DAYS       CULTURE, URINE [[id number]] Collected:  02/02/20 2126    Order Status:  Completed Specimen:  Urine from [place] Specimen Updated:  02/05/20 0801     Special Requests: NO SPECIAL REQUESTS        Culture result: NO GROWTH 2 DAYS       CULTURE, BLOOD [[id number]] Collected:  02/02/20 2125    Order Status:  Completed Specimen:  Blood Updated:  02/05/20 0714     Special Requests: --        NO SPECIAL REQUESTS  RED PICC       Culture result: NO GROWTH 3 DAYS       CULTURE, RESPIRATORY/SPUTUM/BRONCH Clifford Chiu STAIN [[id number]] Collected:  02/02/20 0036    Order Status:  Completed Specimen:  Sputum,ET Suction Updated:  02/05/20 0823     Special Requests: NO SPECIAL REQUESTS        GRAM STAIN 30 TO 50 WBC'S/OIF      0 TO 1 EPITHELIAL CELLS/OIF            MODERATE GRAM POSITIVE COCCI RARE GRAM NEGATIVE RODS         3+ MUCUS PRESENT        Culture result:       MODERATE NORMAL RESPIRATORY KRISTINA                  No results found for: VANCT, VANCR    Day 1 of vancomycin. Goal trough is 15-20. Will initiate with a 2500mg loading dose x 1 followed by 1500mg IV q 12 h to target goal trough. Will continue to follow patient.       Thank you,  Saeid BurkettD, [facility]  Pharmacist  [phone]

## 2020-02-05 NOTE — PROGRESS NOTES
Bedside shift change report given to Robbie Gilliland RN (oncoming nurse) by Yasmine RN (offgoing nurse). Report included the following information SBAR, Kardex, ED Summary, Procedure Summary, Intake/Output, MAR, Recent Results, Med Rec Status, Cardiac Rhythm SB, Alarm Parameters , Quality Measures and Dual Neuro Assessment.

## 2020-02-05 NOTE — PROGRESS NOTES
Bedside shift change report given to Sandoval Bill RN (oncoming nurse) by Tierra Couch RN (offgoing nurse). Report included the following information SBAR, Kardex, ED Summary, Intake/Output, MAR, Recent Results, Med Rec Status, Cardiac Rhythm SB, Alarm Parameters , Quality Measures and Dual Neuro Assessment. 02/05/20 0700   Vitals   Temp 99.5 °F (37.5 °C)   Temp Source Rectal   Pulse (Heart Rate) (!) 59   Heart Rate Source Monitor   Resp Rate 18   O2 Sat (%) 100 %   Level of Consciousness Responds to Voice   BP 1 Location Right arm   BP 1 Method Arterial   BP Patient Position At rest   Cardiac Rhythm NSR   Alarms Set and Audible Cardiac alarms;Pulse ox alarms; Respiratory alarms   Arterial Line 1 Data   Current Arterial Line? Yes   Arterial Line 1 BP (mmHg) 142/76   Arterial Line 1 MAP (mmHg) 100 mmHg   Art Line Wave Form Appropriate wave forms   Assessment  Type   Assessment Type Shift assessment   Reassessment Performed Changes documented below   Neuro   Neurologic State Eyes open spontaneously; Alert   Orientation Level Unable to verbalize   Cognition Appropriate for age attention/concentration; Appropriate safety awareness; Follows commands;Recognition of people/places   Speech Intubated   Facial Droop Normal   Assessment L Pupil Brisk;Round   Size L Pupil (mm) 3   Assessment R Pupil Brisk;Round   Size R Pupil (mm) 3   LUE Motor Response Purposeful;Spontaneous ; Withdraws   Sensation Decreased   LLE Motor Response Purposeful;Spontaneous    Sensation Present   LLE  Babinski Absent   RUE Motor Response Purposeful;Spontaneous    Sensation Present   RLE Motor Response Purposeful;Spontaneous    Sensation Present   RLE Babinski Absent   Seizure Activity   Seizure Precautions?  0   Mark Anthony Coma Scale   Eye Opening 4   Best Verbal Response 1   Best Motor Response 6   Crane Coma Scale Score 11   [place] Sedation Scale   [place] Sedation Score 2   RASS   Montoya Agitation Sedation Scale (RASS) 0   Cranial Nerves   Eye Assessment Conjugate; Focuses with eyes;Tracks with eyes   Eye Opening Spontaneously   Cranial Nerve Assessments No facial numbness; No facial weakness; Tongue midline;Strong cough   NIH Stroke Scale   Interval Other (comment)  (shift)   LOC 0   LOC Questions 0   LOC Commands 0   Best Gaze 0   Visual 0   Facial Palsy 0   Motor Right Arm 0   Motor Left Arm 1   Motor Right Leg 0   Motor Left Leg 0   Limb Ataxia 0   Sensory 1   Best Language 0   Dysarthria UN   Extinction and Inattention 0   Total 2   Peripheral Vascular   Peripheral Vascular (WDL) WDL   RLE Peripheral Vascular    Capillary Refill Less than/equal to 3 seconds   Color Appropriate for race   Temperature Warm   Pedal Pulse +1   Post-Procedure Site Assessment (1)   Wound Type Catheter entry/exit   Location Groin   Orientation  Right   Hemostasis  Dressing applied during procedure   Closure Device Tegaderm; Angioseal   Site Assessment No bleeding;Dry/intact   Additional Site Assessments No     Pt denies pain at this time     Will continue to monitor pt condition     Pt currently on spontaneous mode on vent

## 2020-02-05 NOTE — PROGRESS NOTES
PT Note:  Per chart pt extubated this AM and then required re-intubation. Will hold treatment today and re-attempt pending schedule and pt status.   Thanks,  Lynn Pendleton, PT, DPT

## 2020-02-05 NOTE — PROGRESS NOTES
SPEECH PATHOLOGY NOTE:    Patient extubated briefly this AM, but is now re-intubated. Will continue to follow with plans to initiate dysphagia evaluation as medically appropriate.      [address] CCC-SLP

## 2020-02-05 NOTE — PROGRESS NOTES
Bedside and Verbal shift change report given to [id number] [address] [facility] (oncoming nurse) by Vertro (offgoing nurse). Report included the following information SBAR, Kardex, Intake/Output, MAR, Recent Results, Med Rec Status, Cardiac Rhythm NSR/SB, Alarm Parameters  and Quality Measures.
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
## 2020-02-05 NOTE — PROGRESS NOTES
A follow up visit was made to the patient. Emotional support, spiritual presence and   prayer were provided for the patient. She was sedated. Her  and father did request prayer.       Joycelyn Ramirez MDIV

## 2020-02-05 NOTE — PROGRESS NOTES
Ventilator check complete; patient has a #7.5 ET tube secured at the 22 at the lip. Patient is not sedated. Patient is able to follow commands. Breath sounds are clear and diminished. Trachea is midline, Negative for subcutaneous air, and chest excursion is symmetric. Patient is also Negative for cyanosis and is Negative for pitting edema. All alarms are set and audible. Resuscitation bag is at the head of the bed. Ventilator Settings  Mode FIO2 Rate Tidal Volume Pressure PEEP I:E Ratio   Pressure support  45 %     10 cmH20  8 cm H20  1:2      Peak airway pressure: 20.2 cm H2O   Minute ventilation: 6.3 l/min     ABG: No results for input(s): PH, PCO2, PO2, HCO3 in the last 72 hours.       Alisha Trejo, RT

## 2020-02-05 NOTE — PROGRESS NOTES
NP Indigo at bedside for morning rounds     Updated on pt condition     Plans for extubation this AM     SP/ PT/ OT to see patient today

## 2020-02-05 NOTE — PROGRESS NOTES
Problem: Ventilator Management  Goal: *Adequate oxygenation and ventilation  Outcome: Progressing Towards Goal  Goal: *Patient maintains clear airway/free of aspiration  Outcome: Progressing Towards Goal  Goal: *Absence of infection signs and symptoms  Outcome: Progressing Towards Goal  Goal: *Normal spontaneous ventilation  Outcome: Progressing Towards Goal     Problem: Patient Education: Go to Patient Education Activity  Goal: Patient/Family Education  Outcome: Progressing Towards Goal     Problem: Non-Violent Restraints  Goal: *Removal from restraints as soon as assessed to be safe  Outcome: Progressing Towards Goal  Goal: *No harm/injury to patient while restraints in use  Outcome: Progressing Towards Goal  Goal: *Patient's dignity will be maintained  Outcome: Progressing Towards Goal  Goal: *Patient Specific Goal (EDIT GOAL, INSERT TEXT)  Outcome: Progressing Towards Goal  Goal: Non-violent Restaints:Standard Interventions  Outcome: Progressing Towards Goal  Goal: Non-violent Restraints:Patient Interventions  Outcome: Progressing Towards Goal  Goal: Patient/Family Education  Outcome: Progressing Towards Goal     Problem: Pressure Injury - Risk of  Goal: *Prevention of pressure injury  Description  Document Bahman Scale and appropriate interventions in the flowsheet. Outcome: Progressing Towards Goal  Note: Pressure Injury Interventions:  Sensory Interventions: Assess changes in LOC, Assess need for specialty bed, Avoid rigorous massage over bony prominences, Check visual cues for pain         Activity Interventions: Assess need for specialty bed, Pressure redistribution bed/mattress(bed type), PT/OT evaluation    Mobility Interventions: Assess need for specialty bed, HOB 30 degrees or less, Pressure redistribution bed/mattress (bed type), PT/OT evaluation, Turn and reposition approx.  every two hours(pillow and wedges)    Nutrition Interventions: Document food/fluid/supplement intake, Discuss nutritional consult with provider, Offer support with meals,snacks and hydration    Friction and Shear Interventions: Feet elevated on foot rest, Foam dressings/transparent film/skin sealants, HOB 30 degrees or less                Problem: Patient Education: Go to Patient Education Activity  Goal: Patient/Family Education  Outcome: Progressing Towards Goal     Problem: Falls - Risk of  Goal: *Absence of Falls  Description  Document Clide Failing Fall Risk and appropriate interventions in the flowsheet.   Outcome: Progressing Towards Goal  Note: Fall Risk Interventions:            Medication Interventions: Assess postural VS orthostatic hypotension, Bed/chair exit alarm, Evaluate medications/consider consulting pharmacy, Patient to call before getting OOB, Teach patient to arise slowly    Elimination Interventions: Bed/chair exit alarm, Call light in reach, Patient to call for help with toileting needs              Problem: Patient Education: Go to Patient Education Activity  Goal: Patient/Family Education  Outcome: Progressing Towards Goal     Problem: Delirium Treatment  Goal: *Level of consciousness restored to baseline  Outcome: Progressing Towards Goal  Goal: *Level of environmental perceptions restored to baseline  Outcome: Progressing Towards Goal  Goal: *Sensory perception restored to baseline  Outcome: Progressing Towards Goal  Goal: *Emotional stability restored to baseline  Outcome: Progressing Towards Goal  Goal: *Functional assessment restored to baseline  Outcome: Progressing Towards Goal  Goal: *Absence of falls  Outcome: Progressing Towards Goal  Goal: *Will remain free of delirium, CAM Score negative  Outcome: Progressing Towards Goal  Goal: *Cognitive status will be restored to baseline  Outcome: Progressing Towards Goal  Goal: Interventions  Outcome: Progressing Towards Goal     Problem: Patient Education: Go to Patient Education Activity  Goal: Patient/Family Education  Outcome: Progressing Towards Goal     Problem: Patient Education: Go to Patient Education Activity  Goal: Patient/Family Education  Outcome: Progressing Towards Goal     Problem: Hemorrhagic Stroke: Admission Day (0-3 hours)  Goal: Off Pathway (Use only if patient is Off Pathway)  Outcome: Progressing Towards Goal  Goal: Activity/Safety  Outcome: Progressing Towards Goal  Goal: Consults, if ordered  Outcome: Progressing Towards Goal  Goal: Diagnostic Test/Procedures  Outcome: Progressing Towards Goal  Goal: Nutrition/Diet  Outcome: Progressing Towards Goal  Goal: Medications  Outcome: Progressing Towards Goal  Goal: Respiratory  Outcome: Progressing Towards Goal  Goal: Treatments/Interventions/Procedures  Outcome: Progressing Towards Goal  Goal: Psychosocial  Outcome: Progressing Towards Goal  Goal: *Establish/diagnose type of stroke  Outcome: Progressing Towards Goal  Goal: *Patient maintains clear airway/free of aspiration  Outcome: Progressing Towards Goal  Goal: *Hemodynamically stable  Outcome: Progressing Towards Goal     Problem: Hemorrhagic Stroke:  3-24 hours  Goal: Off Pathway (Use only if patient is Off Pathway)  Outcome: Progressing Towards Goal  Goal: Activity/Safety  Outcome: Progressing Towards Goal  Goal: Consults, if ordered  Outcome: Progressing Towards Goal  Goal: Diagnostic Test/Procedures  Outcome: Progressing Towards Goal  Goal: Nutrition/Diet  Outcome: Progressing Towards Goal  Goal: Discharge Planning  Outcome: Progressing Towards Goal  Goal: Medications  Outcome: Progressing Towards Goal  Goal: Respiratory  Outcome: Progressing Towards Goal  Goal: Treatments/Interventions/Procedures  Outcome: Progressing Towards Goal  Goal: Psychosocial  Outcome: Progressing Towards Goal  Goal: *Hemodynamically stable  Outcome: Progressing Towards Goal  Goal: *Verbalizes anxiety and depression are reduced or absent  Outcome: Progressing Towards Goal  Goal: *Absence of aspiration  Outcome: Progressing Towards Goal  Goal: *Absence of signs and symptoms of DVT  Outcome: Progressing Towards Goal  Goal: *Optimal pain control at patient's stated goal  Outcome: Progressing Towards Goal  Goal: *Tolerating diet  Outcome: Progressing Towards Goal  Goal: *Progressive mobility and function (eg: ADL's)  Outcome: Progressing Towards Goal  Goal: *Rehabilitation readiness  Outcome: Progressing Towards Goal     Problem: Hemorrhagic Stroke: Day 2  Goal: Off Pathway (Use only if patient is Off Pathway)  Outcome: Progressing Towards Goal  Goal: Activity/Safety  Outcome: Progressing Towards Goal  Goal: Consults, if ordered  Outcome: Progressing Towards Goal  Goal: Diagnostic Test/Procedures  Outcome: Progressing Towards Goal  Goal: Nutrition/Diet  Outcome: Progressing Towards Goal  Goal: Medications  Outcome: Progressing Towards Goal  Goal: Respiratory  Outcome: Progressing Towards Goal  Goal: Treatments/Interventions/Procedures  Outcome: Progressing Towards Goal  Goal: Psychosocial  Outcome: Progressing Towards Goal  Goal: *Hemodynamically stable  Outcome: Progressing Towards Goal  Goal: *Verbalizes anxiety and depression are reduced or absent  Outcome: Progressing Towards Goal  Goal: *Absence of aspiration  Outcome: Progressing Towards Goal  Goal: *Absence of signs and symptoms of DVT  Outcome: Progressing Towards Goal  Goal: *Optimal pain control at patient's stated goal  Outcome: Progressing Towards Goal  Goal: *Tolerating diet  Outcome: Progressing Towards Goal  Goal: *Progressive mobility and function  Outcome: Progressing Towards Goal  Goal: *Rehabilitation readiness  Outcome: Progressing Towards Goal     Problem: Hemorrhagic Stroke: Day 3  Goal: Off Pathway (Use only if patient is Off Pathway)  Outcome: Progressing Towards Goal  Goal: Activity/Safety  Outcome: Progressing Towards Goal  Goal: Consults, if ordered  Outcome: Progressing Towards Goal  Goal: Diagnostic Test/Procedures  Outcome: Progressing Towards Goal  Goal: Nutrition/Diet  Outcome: Progressing Towards Goal  Goal: Medications  Outcome: Progressing Towards Goal  Goal: Respiratory  Outcome: Progressing Towards Goal  Goal: Treatments/Interventions/Procedures  Outcome: Progressing Towards Goal  Goal: Psychosocial  Outcome: Progressing Towards Goal  Goal: *Hemodynamically stable  Outcome: Progressing Towards Goal  Goal: *Verbalizes anxiety and depression are reduced or absent  Outcome: Progressing Towards Goal  Goal: *Absence of aspiration  Outcome: Progressing Towards Goal  Goal: *Absence of signs and symptoms of DVT  Outcome: Progressing Towards Goal  Goal: *Optimal pain control at patient's stated goal  Outcome: Progressing Towards Goal  Goal: *Tolerating diet  Outcome: Progressing Towards Goal  Goal: *Progressive mobility and function  Outcome: Progressing Towards Goal  Goal: *Rehabilitation readiness  Outcome: Progressing Towards Goal     Problem: Hemorrhagic Stroke: Day 4  Goal: Off Pathway (Use only if patient is Off Pathway)  Outcome: Progressing Towards Goal  Goal: Activity/Safety  Outcome: Progressing Towards Goal  Goal: Consults, if ordered  Outcome: Progressing Towards Goal  Goal: Diagnostic Test/Procedures  Outcome: Progressing Towards Goal  Goal: Nutrition/Diet  Outcome: Progressing Towards Goal  Goal: Medications  Outcome: Progressing Towards Goal  Goal: Respiratory  Outcome: Progressing Towards Goal  Goal: Treatments/Interventions/Procedures  Outcome: Progressing Towards Goal  Goal: Psychosocial  Outcome: Progressing Towards Goal  Goal: *Hemodynamically stable  Outcome: Progressing Towards Goal  Goal: *Verbalizes anxiety and depression are reduced or absent  Outcome: Progressing Towards Goal  Goal: *Absence of aspiration  Outcome: Progressing Towards Goal  Goal: *Absence of signs and symptoms of DVT  Outcome: Progressing Towards Goal  Goal: *Optimal pain control at patient's stated goal  Outcome: Progressing Towards Goal  Goal: *Tolerating diet  Outcome: Progressing Towards Goal  Goal: *Progressive mobility and function  Outcome: Progressing Towards Goal  Goal: *Rehabilitation readiness  Outcome: Progressing Towards Goal     Problem: Hemorrhagic Stroke: Day 5 through Discharge  Goal: Off Pathway (Use only if patient is Off Pathway)  Outcome: Progressing Towards Goal  Goal: Activity/Safety  Outcome: Progressing Towards Goal  Goal: Consults, if ordered  Outcome: Progressing Towards Goal  Goal: Diagnostic Test/Procedures  Outcome: Progressing Towards Goal  Goal: Nutrition/Diet  Outcome: Progressing Towards Goal  Goal: Medications  Outcome: Progressing Towards Goal  Goal: Respiratory  Outcome: Progressing Towards Goal  Goal: Treatments/Interventions/Procedures  Outcome: Progressing Towards Goal  Goal: Psychosocial  Outcome: Progressing Towards Goal  Goal: *Hemodynamically stable  Outcome: Progressing Towards Goal  Goal: *Verbalizes anxiety and depression are reduced or absent  Outcome: Progressing Towards Goal  Goal: *Absence of aspiration  Outcome: Progressing Towards Goal  Goal: *Absence of signs and symptoms of DVT  Outcome: Progressing Towards Goal  Goal: *Optimal pain control at patient's stated goal  Outcome: Progressing Towards Goal  Goal: *Tolerating diet  Outcome: Progressing Towards Goal  Goal: *Progressive mobility and function  Outcome: Progressing Towards Goal  Goal: *Rehabilitation readiness  Outcome: Progressing Towards Goal     Problem: Hemorrhagic Stroke: Discharge Outcomes  Goal: *Verbalizes anxiety and depression are reduced or absent  Outcome: Progressing Towards Goal  Goal: *Verbalize understanding of risk factor modification(Stroke Metric)  Outcome: Progressing Towards Goal  Goal: *Optimal pain control at patient's stated goal  Outcome: Progressing Towards Goal  Goal: *Hemodynamically stable  Outcome: Progressing Towards Goal  Goal: *Absence of aspiration pneumonia  Outcome: Progressing Towards Goal  Goal: *Aware of needed dietary changes  Outcome: Progressing Towards Goal  Goal: *Verbalizes understanding and describes medication purposes and frequencies  Outcome: Progressing Towards Goal  Goal: *Tolerating diet  Outcome: Progressing Towards Goal  Goal: *Absence of signs and symptoms of DVT  Outcome: Progressing Towards Goal  Goal: *Absence of aspiration  Outcome: Progressing Towards Goal  Goal: *Progressive mobility and function  Outcome: Progressing Towards Goal  Goal: *Home safety concerns addressed  Outcome: Progressing Towards Goal

## 2020-02-05 NOTE — PROGRESS NOTES
2/5/2020   OT NOTE:  Patient was extubated today but then had to be re-intubated. Will HOLD treatment for today and re-attempt when pt is medically appropriate.   Thank you,  Aletta Dancer, OT

## 2020-02-05 NOTE — PROGRESS NOTES
Patient extubated to 4L NC, upper airways coarse/wheezing, Duoneb neb given, pt continued to have coarse stridorous breath sounds, Racemic Epi neb given, ABG completed, patient to be reintubated with 7.0 ETT 22 @ lip. Placed back on Vent Vt 450, R15 +8 @ 100%.

## 2020-02-06 ENCOUNTER — APPOINTMENT (OUTPATIENT)
Dept: GENERAL RADIOLOGY | Age: 45
DRG: 004 | End: 2020-02-06
Attending: NURSE PRACTITIONER
Payer: COMMERCIAL

## 2020-02-06 LAB
ANION GAP SERPL CALC-SCNC: 4 MMOL/L (ref 7–16)
ANION GAP SERPL CALC-SCNC: 6 MMOL/L (ref 7–16)
ARTERIAL PATENCY WRIST A: ABNORMAL
BASE EXCESS BLD CALC-SCNC: 2 MMOL/L
BDY SITE: ABNORMAL
BUN SERPL-MCNC: 15 MG/DL (ref 6–23)
BUN SERPL-MCNC: 19 MG/DL (ref 6–23)
CALCIUM SERPL-MCNC: 8.8 MG/DL (ref 8.3–10.4)
CALCIUM SERPL-MCNC: 8.9 MG/DL (ref 8.3–10.4)
CHLORIDE SERPL-SCNC: 111 MMOL/L (ref 98–107)
CHLORIDE SERPL-SCNC: 111 MMOL/L (ref 98–107)
CO2 BLD-SCNC: 28 MMOL/L
CO2 SERPL-SCNC: 27 MMOL/L (ref 21–32)
CO2 SERPL-SCNC: 29 MMOL/L (ref 21–32)
COLLECT TIME,HTIME: 245
CREAT SERPL-MCNC: 0.57 MG/DL (ref 0.6–1)
CREAT SERPL-MCNC: 0.6 MG/DL (ref 0.6–1)
ERYTHROCYTE [DISTWIDTH] IN BLOOD BY AUTOMATED COUNT: 12.3 % (ref 11.9–14.6)
EXHALED MINUTE VOLUME, VE: 8.6 L/MIN
GAS FLOW.O2 O2 DELIVERY SYS: ABNORMAL L/MIN
GAS FLOW.O2 SETTING OXYMISER: 16 BPM
GLUCOSE SERPL-MCNC: 148 MG/DL (ref 65–100)
GLUCOSE SERPL-MCNC: 169 MG/DL (ref 65–100)
HCO3 BLD-SCNC: 26.9 MMOL/L (ref 22–26)
HCT VFR BLD AUTO: 30.2 % (ref 35.8–46.3)
HGB BLD-MCNC: 9.5 G/DL (ref 11.7–15.4)
MAGNESIUM SERPL-MCNC: 2.4 MG/DL (ref 1.8–2.4)
MCH RBC QN AUTO: 27.7 PG (ref 26.1–32.9)
MCHC RBC AUTO-ENTMCNC: 31.5 G/DL (ref 31.4–35)
MCV RBC AUTO: 88 FL (ref 79.6–97.8)
NRBC # BLD: 0 K/UL (ref 0–0.2)
O2/TOTAL GAS SETTING VFR VENT: 50 %
PCO2 BLD: 42.9 MMHG (ref 35–45)
PEEP RESPIRATORY: 8 CMH2O
PH BLD: 7.41 [PH] (ref 7.35–7.45)
PLATELET # BLD AUTO: 148 K/UL (ref 150–450)
PMV BLD AUTO: 11 FL (ref 9.4–12.3)
PO2 BLD: 147 MMHG (ref 75–100)
POTASSIUM SERPL-SCNC: 4 MMOL/L (ref 3.5–5.1)
POTASSIUM SERPL-SCNC: 4.1 MMOL/L (ref 3.5–5.1)
RBC # BLD AUTO: 3.43 M/UL (ref 4.05–5.2)
SAO2 % BLD: 99 % (ref 95–98)
SERVICE CMNT-IMP: ABNORMAL
SODIUM SERPL-SCNC: 144 MMOL/L (ref 136–145)
SODIUM SERPL-SCNC: 144 MMOL/L (ref 136–145)
SPECIMEN TYPE: ABNORMAL
VENTILATION MODE VENT: ABNORMAL
VT SETTING VENT: 450 ML
WBC # BLD AUTO: 9.1 K/UL (ref 4.3–11.1)

## 2020-02-06 PROCEDURE — 74011250637 HC RX REV CODE- 250/637: Performed by: NURSE PRACTITIONER

## 2020-02-06 PROCEDURE — 36600 WITHDRAWAL OF ARTERIAL BLOOD: CPT

## 2020-02-06 PROCEDURE — 83735 ASSAY OF MAGNESIUM: CPT

## 2020-02-06 PROCEDURE — 74011000258 HC RX REV CODE- 258: Performed by: NEUROLOGICAL SURGERY

## 2020-02-06 PROCEDURE — 71045 X-RAY EXAM CHEST 1 VIEW: CPT

## 2020-02-06 PROCEDURE — 97530 THERAPEUTIC ACTIVITIES: CPT

## 2020-02-06 PROCEDURE — 97112 NEUROMUSCULAR REEDUCATION: CPT

## 2020-02-06 PROCEDURE — 94640 AIRWAY INHALATION TREATMENT: CPT

## 2020-02-06 PROCEDURE — 74011250636 HC RX REV CODE- 250/636: Performed by: NURSE PRACTITIONER

## 2020-02-06 PROCEDURE — 74011000258 HC RX REV CODE- 258: Performed by: NURSE PRACTITIONER

## 2020-02-06 PROCEDURE — 85027 COMPLETE CBC AUTOMATED: CPT

## 2020-02-06 PROCEDURE — 80048 BASIC METABOLIC PNL TOTAL CA: CPT

## 2020-02-06 PROCEDURE — 94003 VENT MGMT INPAT SUBQ DAY: CPT

## 2020-02-06 PROCEDURE — 74011000250 HC RX REV CODE- 250: Performed by: NURSE PRACTITIONER

## 2020-02-06 PROCEDURE — 82803 BLOOD GASES ANY COMBINATION: CPT

## 2020-02-06 PROCEDURE — 74011250636 HC RX REV CODE- 250/636: Performed by: NEUROLOGICAL SURGERY

## 2020-02-06 PROCEDURE — 99232 SBSQ HOSP IP/OBS MODERATE 35: CPT | Performed by: NEUROLOGICAL SURGERY

## 2020-02-06 PROCEDURE — 97116 GAIT TRAINING THERAPY: CPT

## 2020-02-06 PROCEDURE — 65610000001 HC ROOM ICU GENERAL

## 2020-02-06 RX ORDER — SENNOSIDES 8.6 MG/1
1 TABLET ORAL DAILY
Status: DISCONTINUED | OUTPATIENT
Start: 2020-02-07 | End: 2020-02-18 | Stop reason: HOSPADM

## 2020-02-06 RX ORDER — METOPROLOL TARTRATE 50 MG/1
50 TABLET ORAL 2 TIMES DAILY
Status: DISCONTINUED | OUTPATIENT
Start: 2020-02-06 | End: 2020-02-17

## 2020-02-06 RX ADMIN — DEXAMETHASONE SODIUM PHOSPHATE 4 MG: 4 INJECTION, SOLUTION INTRAMUSCULAR; INTRAVENOUS at 08:41

## 2020-02-06 RX ADMIN — SODIUM CHLORIDE 75 ML/HR: 900 INJECTION, SOLUTION INTRAVENOUS at 23:47

## 2020-02-06 RX ADMIN — VANCOMYCIN HYDROCHLORIDE 1500 MG: 10 INJECTION, POWDER, LYOPHILIZED, FOR SOLUTION INTRAVENOUS at 03:51

## 2020-02-06 RX ADMIN — Medication 900 UNITS: at 14:00

## 2020-02-06 RX ADMIN — DEXAMETHASONE SODIUM PHOSPHATE 4 MG: 4 INJECTION, SOLUTION INTRAMUSCULAR; INTRAVENOUS at 19:40

## 2020-02-06 RX ADMIN — ENOXAPARIN SODIUM 40 MG: 40 INJECTION SUBCUTANEOUS at 08:40

## 2020-02-06 RX ADMIN — METOPROLOL TARTRATE 25 MG: 25 TABLET ORAL at 08:41

## 2020-02-06 RX ADMIN — DEXAMETHASONE SODIUM PHOSPHATE 4 MG: 4 INJECTION, SOLUTION INTRAMUSCULAR; INTRAVENOUS at 12:03

## 2020-02-06 RX ADMIN — FAMOTIDINE 20 MG: 20 TABLET, FILM COATED ORAL at 08:41

## 2020-02-06 RX ADMIN — Medication 30 ML: at 22:29

## 2020-02-06 RX ADMIN — Medication 30 ML: at 14:01

## 2020-02-06 RX ADMIN — HYDROCODONE BITARTRATE AND ACETAMINOPHEN 1 TABLET: 7.5; 325 TABLET ORAL at 08:42

## 2020-02-06 RX ADMIN — CEFEPIME HYDROCHLORIDE 1 G: 1 INJECTION, POWDER, FOR SOLUTION INTRAMUSCULAR; INTRAVENOUS at 08:39

## 2020-02-06 RX ADMIN — Medication 900 UNITS: at 22:29

## 2020-02-06 RX ADMIN — Medication 30 ML: at 05:45

## 2020-02-06 RX ADMIN — DEXAMETHASONE SODIUM PHOSPHATE 4 MG: 4 INJECTION, SOLUTION INTRAMUSCULAR; INTRAVENOUS at 00:38

## 2020-02-06 RX ADMIN — IPRATROPIUM BROMIDE AND ALBUTEROL SULFATE 3 ML: .5; 3 SOLUTION RESPIRATORY (INHALATION) at 07:18

## 2020-02-06 RX ADMIN — VANCOMYCIN HYDROCHLORIDE 1500 MG: 10 INJECTION, POWDER, LYOPHILIZED, FOR SOLUTION INTRAVENOUS at 16:10

## 2020-02-06 RX ADMIN — SODIUM CHLORIDE 1000 ML: 900 INJECTION, SOLUTION INTRAVENOUS at 16:30

## 2020-02-06 RX ADMIN — KETOROLAC TROMETHAMINE 15 MG: 30 INJECTION, SOLUTION INTRAMUSCULAR at 20:51

## 2020-02-06 RX ADMIN — Medication 900 UNITS: at 05:46

## 2020-02-06 RX ADMIN — DEXAMETHASONE SODIUM PHOSPHATE 4 MG: 4 INJECTION, SOLUTION INTRAMUSCULAR; INTRAVENOUS at 03:51

## 2020-02-06 RX ADMIN — METOPROLOL TARTRATE 50 MG: 50 TABLET, FILM COATED ORAL at 18:16

## 2020-02-06 RX ADMIN — ATORVASTATIN CALCIUM 40 MG: 40 TABLET, FILM COATED ORAL at 22:28

## 2020-02-06 RX ADMIN — DEXAMETHASONE SODIUM PHOSPHATE 4 MG: 4 INJECTION, SOLUTION INTRAMUSCULAR; INTRAVENOUS at 15:27

## 2020-02-06 RX ADMIN — IPRATROPIUM BROMIDE AND ALBUTEROL SULFATE 3 ML: .5; 3 SOLUTION RESPIRATORY (INHALATION) at 03:55

## 2020-02-06 RX ADMIN — HYDROCODONE BITARTRATE AND ACETAMINOPHEN 1 TABLET: 7.5; 325 TABLET ORAL at 16:10

## 2020-02-06 RX ADMIN — CEFEPIME 2 G: 2 INJECTION, POWDER, FOR SOLUTION INTRAVENOUS at 15:27

## 2020-02-06 RX ADMIN — CEFEPIME HYDROCHLORIDE 1 G: 1 INJECTION, POWDER, FOR SOLUTION INTRAMUSCULAR; INTRAVENOUS at 00:38

## 2020-02-06 RX ADMIN — HYDROCODONE BITARTRATE AND ACETAMINOPHEN 1 TABLET: 7.5; 325 TABLET ORAL at 02:29

## 2020-02-06 NOTE — PROGRESS NOTES
Problem: Patient Education: Go to Patient Education Activity  Goal: Patient/Family Education  Description  1. Patient will complete upper body bathing and dressing with mod A and adaptive equipment as needed. 2. Patient will complete toileting with mod A.   3. Patient will tolerate 25 minutes of OT treatment with 1-2 rest breaks to increase activity tolerance for ADLs. 4. Patient will complete functional transfers with min A and adaptive equipment as needed. 5. Patient will tolerate 20 minutes functional activity while seated edge of bed unsupported with SBA and adaptive equipment as needed. 6. Patient will complete self-grooming with MOD I and adaptive equipment as needed. 7. Patient will tolerate 15 minutes therapeutic exercise with LUE to increase use during BADLs. 8. Patient complete self-feeding with use of LUE with min A after set-up. Timeframe: 7 visits        Outcome: Progressing Towards Goal      OCCUPATIONAL THERAPY: Daily Note and AM 2/6/2020  INPATIENT: OT Visit Days: 2  Payor: [address] [place] [address] / Plan: [address] / Product Type: PPO /      NAME/AGE/GENDER: Hazel Coon is a 53 y.o. 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 Chao Mendez presents in the ICU for AVM s/p angiogram. Upon arrival, pt supine in bed, currently intubated, however alert. Father at bedside to provide background information. At baseline, pt was living with  and daughter in a 1-story home with 2 steps to enter. Pt was independent with ADLs, IADLs, functional mobility and work tasks; still drives. No hx of falls.      2/6/20: Pt was supine in the bed upon arrival. Pt is currently intubated with PT and nursing at bedside for assistance with patient's transfer and line management. PT/OT co-treatment completed to increase safety and to increase activity levels. Pt was alert and followed commands well. Pt does have her head turned to the R with encouragement to look to the L with activities. Pt participated in functional transfers with minimal assistance x 2 with nursing helping with lines. Pt worked on prolonged standing with activities to encourage improved strength and functional use of the L UE with activities. Pt tends to move arm from elbow flex/extension with little movement at the shoulder. Pt provided with R UE facilitation to promote more shoulder motion. Pt did well with encouragement for weightbearing through the L UE. Pt needed minimal assistance x 1-2 with balance activities. Pt left sitting up in the chair at the end of the session. Pt making progress towards the above goals. This section established at most recent assessment   PROBLEM LIST (Impairments causing functional limitations):  1. Decreased Strength  2. Decreased ADL/Functional Activities  3. Decreased Transfer Abilities  4. Decreased Ambulation Ability/Technique  5. Decreased Balance  6. Decreased Activity Tolerance   INTERVENTIONS PLANNED: (Benefits and precautions of occupational therapy have been discussed with the patient.)  1. Activities of daily living training  2. Adaptive equipment training  3. Balance training  4. Clothing management  5. Community reintergration  6. Donning&doffing training  7. Neuromuscular re-eduation  8. Re-evaluation  9. Therapeutic activity  10. Therapeutic exercise     TREATMENT PLAN: Frequency/Duration: Follow patient 3x/week to address above goals. Rehabilitation Potential For Stated Goals: Good     REHAB RECOMMENDATIONS (at time of discharge pending progress):    Placement: It is my opinion, based on this patient's performance to date, that Ms. Elise Morales may benefit from intensive therapy at an INPATIENT REHABILITATION FACILITY after discharge due to a probable need for multiple therapy disciplines and potential to make ongoing and sustainable functional improvement that is of practical value. .  Equipment:    TBD              OCCUPATIONAL PROFILE AND HISTORY:   History of Present Injury/Illness (Reason for Referral):  See H&P  Past Medical History/Comorbidities:   Ms. Quin Schilling  has no past medical history on file. Ms. Quin Schilling  has no past surgical history on file. Social History/Living Environment:   Home Environment: Private residence  # Steps to Enter: 2  Rails to Enter: No  One/Two Story Residence: One story  Living Alone: No  Support Systems: Family member(s)  Patient Expects to be Discharged to[de-identified] Rehabilitation facility  Current DME Used/Available at Home: None  Tub or Shower Type: Tub/Shower combination  Prior Level of Function/Work/Activity:  Independent with ADLs, IADLs, work tasks, functional mobility and driving. Personal Factors:          Sex:  female        Age:  53 y.o. Other factors that influence how disability is experienced by the patient:  Multiple co-morbidities    Number of Personal Factors/Comorbidities that affect the Plan of Care: Expanded review of therapy/medical records (1-2):  MODERATE COMPLEXITY   ASSESSMENT OF OCCUPATIONAL PERFORMANCE[de-identified]   Activities of Daily Living:   Basic ADLs (From Assessment) Complex ADLs (From Assessment)   Feeding: Total assistance  Oral Facial Hygiene/Grooming: Maximum assistance  Bathing: Total assistance  Upper Body Dressing: Total assistance  Lower Body Dressing: Total assistance  Toileting: Total assistance Instrumental ADL  Meal Preparation: Total assistance  Homemaking:  Total assistance   Grooming/Bathing/Dressing Activities of Daily Living     Cognitive Retraining  Safety/Judgement: Awareness of environment                       Bed/Mat Mobility  Rolling: Minimum assistance;Assist x2  Sit to Stand: Minimum assistance;Assist x2  Stand to Sit: Minimum assistance;Assist x2  Bed to Chair: Minimum assistance;Assist x2  Scooting: Minimum assistance     Most Recent Physical Functioning:   Gross Assessment:                  Posture:     Balance:  Sitting: Impaired  Sitting - Static: Good (unsupported)  Sitting - Dynamic: Fair (occasional)  Standing: Impaired  Standing - Static: Fair  Standing - Dynamic : Fair Bed Mobility:  Rolling: Minimum assistance;Assist x2  Scooting: Minimum assistance  Wheelchair Mobility:     Transfers:  Sit to Stand: Minimum assistance;Assist x2  Stand to Sit: Minimum assistance;Assist x2  Bed to Chair: Minimum assistance;Assist x2            Patient Vitals for the past 6 hrs:   BP SpO2 Pulse   02/06/20 0721  100 % (!) 58   02/06/20 0730  100 % 92   02/06/20 0745  100 % 93   02/06/20 0800  100 % 94   02/06/20 0815  100 % 69   02/06/20 0830  96 % 85   02/06/20 0839 152/80  67   02/06/20 0845  100 % 69   02/06/20 0900  100 % 90   02/06/20 0915  97 % 81   02/06/20 0930  100 % 64   02/06/20 0945  100 % 70   02/06/20 1000  100 % (!) 57   02/06/20 1015  100 % 69   02/06/20 1030  100 % 64   02/06/20 1100  98 % 90   02/06/20 1115  100 % 84   02/06/20 1130  100 % 78   02/06/20 1145  100 % 69   02/06/20 1146  100 % 65   02/06/20 1200  100 % 63   02/06/20 1230  100 % 65   02/06/20 1245  100 % 60   02/06/20 1300  100 % 62       Mental Status  Neurologic State: Alert  Orientation Level: Unable to verbalize  Cognition: Appropriate for age attention/concentration, Follows commands  Perception: Appears intact  Perseveration: No perseveration noted  Safety/Judgement: Awareness of environment                          Physical Skills Involved:  1. Balance  2. Strength  3. Activity Tolerance  4. Sensation  5. Fine Motor Control  6. Gross Motor Control Cognitive Skills Affected (resulting in the inability to perform in a timely and safe manner):   1. none Psychosocial Skills Affected:  1. Habits/Routines  2. Environmental Adaptation   Number of elements that affect the Plan of Care: 5+:  HIGH COMPLEXITY   CLINICAL DECISION MAKIN[address] [place] [address] AM-PAC 6 Clicks   Daily Activity Inpatient Short Form  How much help from another person does the patient currently need. .. Total A Lot A Little None   1. Putting on and taking off regular lower body clothing? [x] 1   [] 2   [] 3   [] 4   2. Bathing (including washing, rinsing, drying)? [] 1   [x] 2   [] 3   [] 4   3. Toileting, which includes using toilet, bedpan or urinal?   [x] 1   [] 2   [] 3   [] 4   4. Putting on and taking off regular upper body clothing? [x] 1   [] 2   [] 3   [] 4   5. Taking care of personal grooming such as brushing teeth? [x] 1   [] 2   [] 3   [] 4   6. Eating meals? [x] 1   [] 2   [] 3   [] 4   © , Trustees of [address] [place] [address], under license to [employer]. All rights reserved      Score:  Initial: 7 Most Recent: X (Date: -- )    Interpretation of Tool:  Represents activities that are increasingly more difficult (i.e. Bed mobility, Transfers, Gait). Medical Necessity:     · Patient demonstrates good rehab potential due to higher previous functional level. Reason for Services/Other Comments:  · Patient continues to require skilled intervention due to medical complications and patient unable to attend/participate in therapy as expected.    Use of outcome tool(s) and clinical judgement create a POC that gives a: MODERATE COMPLEXITY         TREATMENT:   (In addition to Assessment/Re-Assessment sessions the following treatments were rendered)     Pre-treatment Symptoms/Complaints:  Unable to verbalize  Pain: Initial:   Pain Intensity 1: 0  Post Session:  same     Today's treatment session addressed Decreased Strength, Decreased ADL/Functional Activities, Decreased Transfer Abilities, Decreased Ambulation Ability/Technique, Decreased Balance, Decreased Activity Tolerance, Decreased Flexibility/Joint Mobility and Decreased [place] with Home Exercise Program to progress towards achieving goal(s) stated above. During this session,  Physical Therapy addressed  functional transfers and ambulation  to progress towards their discipline specific goal(s). Co-treatment was necessary to improve patient's ability to increase activity demands and ability to return to normal functional activity. Neuromuscular Re-education: ( 38 minutes):  Exercise/activities per grid below to improve balance, coordination, posture and proprioception. Required minimal visual, verbal, manual and tactile cues to promote dynamic balance in standing, promote coordination of left, upper extremity(s) and promote motor control of left, upper extremity(s). Date:  2/6/20 Date:   Date:     Activity/Exercise Parameters Parameters Parameters   Sit to stand weightbearing throughout L UE 4-5 reps     Scooting  Weight shifting side to side and using L UE to assist      Weightbearing  Standing through R UE on table x 12 reps      Dynamic reaching with grasp and rotation into supination  15 reps      Table Slides 12 reps with additional time to promote shoulder flexion      Balloon Punches  15 reps with pt facilitated into shoulder flexion with elbow extension                Braces/Orthotics/Lines/Etc:   · IV  · art line; ICU monitors  · O2 Device: Endotracheal tube, Ventilator  Treatment/Session Assessment:    · Response to Treatment:  Tolerated well with no issues noted. · Interdisciplinary Collaboration:   o Physical Therapist  o Occupational Therapist  o Registered Nurse  · After treatment position/precautions:   o Up in chair  o Bed alarm/tab alert on  o Bed/Chair-wheels locked  o Call light within reach  o RN notified  o Family at bedside   · Compliance with Program/Exercises: Will assess as treatment progresses. · Recommendations/Intent for next treatment session:   \"Next visit will focus on advancements to more challenging activities and reduction in assistance provided\".   Total Treatment Duration:  OT Patient Time In/Time Out  Time In: 1031  Time Out: [address] [facility],

## 2020-02-06 NOTE — PROGRESS NOTES
Progress Note    Patient: Terra Lam MRN: [id number]  SSN: [id number]    YOB: 1975  Age: 53 y.o. Sex: female      Admit Date: 2/2/2020    LOS: 4 days     Subjective:   Pt alert and following commands.   Remains intubated    updated at bedside this am.    Current Facility-Administered Medications   Medication Dose Route Frequency    HYDROcodone-acetaminophen (NORCO) 7.5-325 mg per tablet 1 Tab  1 Tab Oral Q6H PRN    albuterol-ipratropium (DUO-NEB) 2.5 MG-0.5 MG/3 ML  3 mL Nebulization Q4H RT    dexamethasone (DECADRON) 4 mg/mL injection 4 mg  4 mg IntraVENous Q4H    niCARdipine in Saline (CARDENE) 25 MG/250 mL infusion kit  0-15 mg/hr IntraVENous TITRATE    propofol (DIPRIVAN) 10 mg/mL infusion  0-50 mcg/kg/min IntraVENous TITRATE    cefepime (MAXIPIME) 1 g in 0.9% sodium chloride (MBP/ADV) 50 mL  1 g IntraVENous Q8H    vancomycin (VANCOCIN) 1500 mg in  ml infusion  1,500 mg IntraVENous Q12H    enoxaparin (LOVENOX) injection 40 mg  40 mg SubCUTAneous Q24H    famotidine (PEPCID) tablet 20 mg  20 mg Per NG tube DAILY    metoprolol tartrate (LOPRESSOR) tablet 25 mg  25 mg Per NG tube BID    ibuprofen (ADVIL;MOTRIN) 100 mg/5 mL oral suspension 600 mg  600 mg Per NG tube Q6H PRN    sodium chloride (NS) flush 5-40 mL  5-40 mL IntraVENous PRN    fentaNYL citrate (PF) injection 50 mcg  50 mcg IntraVENous Q2H PRN    acetaminophen (TYLENOL) tablet 650 mg  650 mg Per NG tube Q4H PRN    ondansetron (ZOFRAN) injection 4 mg  4 mg IntraVENous Q4H PRN    ketorolac (TORADOL) injection 15 mg  15 mg IntraVENous Q6H PRN    0.9% sodium chloride infusion  75 mL/hr IntraVENous CONTINUOUS    atorvastatin (LIPITOR) tablet 40 mg  40 mg Per NG tube QHS    sodium chloride (NS) flush 30 mL  30 mL InterCATHeter Q8H    heparin (porcine) pf 900 Units  900 Units InterCATHeter Q8H    sodium chloride (NS) flush 30 mL  30 mL InterCATHeter PRN    heparin (porcine) pf 900 Units  900 Units InterCATHeter PRN    lip protectant (BLISTEX) ointment 1 Each  1 Each Topical PRN       Objective:     Vitals:    02/06/20 0600 02/06/20 0700 02/06/20 0721 02/06/20 0839   BP: 132/65   152/80   Pulse: 63 70 (!) 58 67   Resp: 16 22 23    Temp: 98.6 °F (37 °C) 98.6 °F (37 °C)     SpO2: 100% 100% 100%    Weight:       Height:             Intake and Output:  Current Shift: 02/06 0701 - 02/06 1900  In: 70   Out: 200 [Urine:200]  Last 24 hr: 02/05 0701 - 02/06 0700  In: 5061.3 [I.V.:3416.3]  Out: 9561 [Urine:1790]     IO: +3271/24hr  TOTAL OVERALL: +2.7L     Physical Exam:   General:  Alert, cooperative. Intubated. Eyes:  PERRL, EOMs intact. Neck: Supple, symmetrical, trachea midline, no adenopathy, thyroid: no enlargment/tenderness/nodules, no carotid bruit and no JVD. Lungs:   Lungs CTA all fields. No wheezes noted. No distress. Heart:  Regular rate and rhythm, S1, S2 normal, no murmur, click, rub or gallop. Abdomen:   Soft, non-tender. Bowel sounds normal. No masses,  No organomegaly. Extremities: Extremities normal, atraumatic, no cyanosis or edema. Moving Left side better today, can hold LUE/LLE off bed to command. Pulses: 2+ and symmetric all extremities. Skin: Skin color, texture, turgor normal. No rashes or lesions   Neurologic: Alert and following commands, walker, LUE with mild drift, but able to touch finger to nose bilat. Lifts LLE off bed. Remains intubated.         Lab/Data Review:    Recent Results (from the past 12 hour(s))   POC G3    Collection Time: 02/06/20  2:51 AM   Result Value Ref Range    Device: VENT      FIO2 (POC) 50 %    pH (POC) 7.405 7.35 - 7.45      pCO2 (POC) 42.9 35 - 45 MMHG    pO2 (POC) 147 (H) 75 - 100 MMHG    HCO3 (POC) 26.9 (H) 22 - 26 MMOL/L    sO2 (POC) 99 (H) 95 - 98 %    Base excess (POC) 2 mmol/L    Mode Pressure regulated volume control      Tidal volume 450 ml    Set Rate 16 bpm    PEEP/CPAP (POC) 8 cmH2O    Allens test (POC) NOT APPLICABLE      Site DRAWN FROM ARTERIAL LINE      Specimen type (POC) ARTERIAL      Performed by Bunny     CO2, POC 28 MMOL/L    Critical value read back 02:53     Respiratory comment: NurseNotified     Exhaled minute volume 8.60 L/min    COLLECT TIME 245     CBC W/O DIFF    Collection Time: 02/06/20  3:43 AM   Result Value Ref Range    WBC 9.1 4.3 - 11.1 K/uL    RBC 3.43 (L) 4.05 - 5.2 M/uL    HGB 9.5 (L) 11.7 - 15.4 g/dL    HCT 30.2 (L) 35.8 - 46.3 %    MCV 88.0 79.6 - 97.8 FL    MCH 27.7 26.1 - 32.9 PG    MCHC 31.5 31.4 - 35.0 g/dL    RDW 12.3 11.9 - 14.6 %    PLATELET 321 (L) 169 - 450 K/uL    MPV 11.0 9.4 - 12.3 FL    ABSOLUTE NRBC 0.00 0.0 - 0.2 K/uL   METABOLIC PANEL, BASIC    Collection Time: 02/06/20  3:43 AM   Result Value Ref Range    Sodium 144 136 - 145 mmol/L    Potassium 4.1 3.5 - 5.1 mmol/L    Chloride 111 (H) 98 - 107 mmol/L    CO2 29 21 - 32 mmol/L    Anion gap 4 (L) 7 - 16 mmol/L    Glucose 148 (H) 65 - 100 mg/dL    BUN 15 6 - 23 MG/DL    Creatinine 0.60 0.6 - 1.0 MG/DL    GFR est AA >60 >60 ml/min/1.73m2    GFR est non-AA >60 >60 ml/min/1.73m2    Calcium 8.9 8.3 - 10.4 MG/DL   MAGNESIUM    Collection Time: 02/06/20  3:43 AM   Result Value Ref Range    Magnesium 2.4 1.8 - 2.4 mg/dL       Assessment/ Plan:     Principal Problem:    AVM (arteriovenous malformation) spine (2/2/2020)    Active Problems:    Cavernoma (2/3/2020)      Acute respiratory failure with hypoxia (HCC) (2/3/2020)      Edema of spinal cord (HCC) (2/3/2020)      Acute left-sided weakness (2/3/2020)        NEURO:pt with cervical hemorrhage and edema secondary to vascular abnormality, cavernoma vs AVM. Pt remains intubated, but is alert and following commands this am. Cerebral angiogram yesterday with suspicious abnormal vessel around C3 area. Will repeat Cerebral angiogram in 6 days and MRI brain. Pt is alert and following commands. R groin is CDI with no hematoma. Pulses are intact. Pt re-intubated yesterday after she did not tolerate extubation.  updated at bedside. RESP:PRVC @ 50%: AB.4/42/147. lungs cta. Will cont decadron 4mg IV every 4 hrs today and stop in am. AA nebs prn now. CV:-160 goal. 2D Echo: EF 55-60%, Trop neg. SCD, lovenox. Metoprolol 50mg po bid. HEME:HH 9.5/30,   NEPH:bun/cr: 15/0.6  GI: IVF NS @ 75cc/hr. Pepcid, senna. Tolerating TF well. ID:TM: [id number]: pan cx sent, neg thus far, but started on Cef/Vanc D2/10 due to continued fever. LINES:RUE: PICC. Right radial virgil. [facility] paperwork given to  this am, has been filled out by Dr. Jared Nieto.        Signed By: Amanda Silvestre NP     2020

## 2020-02-06 NOTE — PROGRESS NOTES
Ventilator check complete; patient has a #7. 0 ET tube secured at the 21 at the lip. Patient is not sedated. Patient is able to follow commands. Breath sounds are coarse and diminished. Trachea is midline, Negative for subcutaneous air, and chest excursion is symmetric. Patient is also Negative for cyanosis and is Negative for pitting edema. All alarms are set and audible. Resuscitation bag is at the head of the bed. Ventilator Settings  Mode FIO2 Rate Tidal Volume Pressure PEEP I:E Ratio   PRVC  52 %    450 ml  10 cm H2O(decreased due to higher VT)  8 cm H20  1:3.13      Peak airway pressure: 18.5 cm H2O   Minute ventilation: 7.2 l/min     ABG: No results for input(s): PH, PCO2, PO2, HCO3 in the last 72 hours.       Michelle Mora

## 2020-02-06 NOTE — PROGRESS NOTES
entilator check complete; patient has a #7. 0 ET tube secured at the 22 at the lip. Patient is not sedated. Patient is able to follow commands. Breath sounds are clear and diminished. Trachea is midline, Negative for subcutaneous air, and chest excursion is symmetric. Patient is also Negative for cyanosis and is Negative for pitting edema. All alarms are set and audible. Resuscitation bag is at the head of the bed. Ventilator Settings  Mode FIO2 Rate Tidal Volume Pressure PEEP I:E Ratio   PRVC  52 %    450 ml    8 cm H20  1:3.13      Peak airway pressure: 14.6 cm H2O   Minute ventilation: 7.2 l/min     ABG: No results for input(s): PH, PCO2, PO2, HCO3 in the last 72 hours.       Mounika Bosch

## 2020-02-06 NOTE — PROGRESS NOTES
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 At this moment, she will certainly need rehab post acute stay. Will continue to follow. Must await next weeks repeat MRI and angio to r/o cavernoma of anterior spinal artery at C3. Due to hematoma, could not be visualized well.   -no charge  History/Subjective/Complaint:     . Records reviewed. reintubated ; CT chest and xray without abnl. Pain 1  Pain Scale 1: Numeric (0 - 10) (02/06/20 1300)  Pain Intensity 1: 0 (02/06/20 1300)  Patient Stated Pain Goal: 0 (02/06/20 0400)  Pain Reassessment 1: Yes (02/06/20 0930)  Pain Onset 1: acute (02/06/20 0842)  Pain Location 1: Head (02/06/20 1882)  Pain Orientation 1:  Inner;Left (02/06/20 0229)  Pain Description 1: Aching (02/06/20 1214)  Pain Intervention(s) 1: Medication (see MAR) (02/06/20 8304)     Objective:     Vitals:  Patient Vitals for the past 8 hrs:   BP Temp Pulse Resp SpO2   02/06/20 1500  99.6 °F (37.6 °C) 70 14 100 %   02/06/20 1445   61 15 100 %   02/06/20 1430   (!) 56 15 100 %   02/06/20 1415   60 19 100 %   02/06/20 1400  99.5 °F (37.5 °C) 69 13 100 %   02/06/20 1345   (!) 57 16 100 %   02/06/20 1330   (!) 57 19 100 %   02/06/20 1315   62 13 100 %   02/06/20 1300  99.5 °F (37.5 °C) 62 16 100 %   02/06/20 1245  99.5 °F (37.5 °C) 60 16 100 %   02/06/20 1230  99.5 °F (37.5 °C) 65 16 100 %   02/06/20 1215   67 23 100 %   02/06/20 1200  99.6 °F (37.6 °C) 63 18 100 %   02/06/20 1146   65 16 100 %   02/06/20 1145  99.8 °F (37.7 °C) 69 17 100 %   02/06/20 1130  100 °F (37.8 °C) 78 16 100 %   02/06/20 1115   84 20 100 %   02/06/20 1100   90 22 98 %   02/06/20 1030   64 16 100 % 02/06/20 1015   69 16 100 %   02/06/20 1000  99.1 °F (37.3 °C) (!) 57 14 100 %   02/06/20 0945  99.1 °F (37.3 °C) 70 17 100 %   02/06/20 0930  99.1 °F (37.3 °C) 64 15 100 %   02/06/20 0915  98.9 °F (37.2 °C) 81 27 97 %   02/06/20 0900  98.9 °F (37.2 °C) 90 (!) 98 100 %   02/06/20 0845  98.9 °F (37.2 °C) 69 14 100 %   02/06/20 0839 152/80  67     02/06/20 0830  98.7 °F (37.1 °C) 85 16 96 %   02/06/20 0815  98.7 °F (37.1 °C) 69 15 100 %      Intake and Output:  02/04 1901 - 02/06 0700  In: 5131.3 [I.V.:3416.3]  Out: 2215 [Urine:2215]    Allergies   Allergen Reactions    Ace Inhibitors Angioedema     Current Facility-Administered Medications   Medication Dose Route Frequency    [START ON 2/7/2020] VANCOMYCIN TROUGH REMINDER   Other ONCE    cefepime (MAXIPIME) 2 g in 0.9% sodium chloride (MBP/ADV) 100 mL  2 g IntraVENous Q8H    metoprolol tartrate (LOPRESSOR) tablet 50 mg  50 mg Per NG tube BID    NUTRITIONAL SUPPORT ELECTROLYTE PRN ORDERS   Does Not Apply PRN    [START ON 2/7/2020] senna (SENOKOT) tablet 8.6 mg  1 Tab Per NG tube DAILY    HYDROcodone-acetaminophen (NORCO) 7.5-325 mg per tablet 1 Tab  1 Tab Oral Q6H PRN    dexamethasone (DECADRON) 4 mg/mL injection 4 mg  4 mg IntraVENous Q4H    niCARdipine in Saline (CARDENE) 25 MG/250 mL infusion kit  0-15 mg/hr IntraVENous TITRATE    vancomycin (VANCOCIN) 1500 mg in  ml infusion  1,500 mg IntraVENous Q12H    enoxaparin (LOVENOX) injection 40 mg  40 mg SubCUTAneous Q24H    famotidine (PEPCID) tablet 20 mg  20 mg Per NG tube DAILY    ibuprofen (ADVIL;MOTRIN) 100 mg/5 mL oral suspension 600 mg  600 mg Per NG tube Q6H PRN    sodium chloride (NS) flush 5-40 mL  5-40 mL IntraVENous PRN    fentaNYL citrate (PF) injection 50 mcg  50 mcg IntraVENous Q2H PRN    acetaminophen (TYLENOL) tablet 650 mg  650 mg Per NG tube Q4H PRN    ondansetron (ZOFRAN) injection 4 mg  4 mg IntraVENous Q4H PRN    ketorolac (TORADOL) injection 15 mg  15 mg IntraVENous Q6H PRN    0.9% sodium chloride infusion  75 mL/hr IntraVENous CONTINUOUS    atorvastatin (LIPITOR) tablet 40 mg  40 mg Per NG tube QHS    sodium chloride (NS) flush 30 mL  30 mL InterCATHeter Q8H    heparin (porcine) pf 900 Units  900 Units InterCATHeter Q8H    sodium chloride (NS) flush 30 mL  30 mL InterCATHeter PRN    heparin (porcine) pf 900 Units  900 Units InterCATHeter PRN    lip protectant (BLISTEX) ointment 1 Each  1 Each Topical PRN           Functional Assessment:  Gross Assessment  AROM: Generally decreased, functional(L hip flexion) (02/04/20 1400)  PROM: Generally decreased, functional (02/04/20 1100)  Strength: Generally decreased, functional(L LE) (02/04/20 1400)  Coordination: Generally decreased, functional (02/04/20 1400)  Tone: Normal (02/04/20 1100)  Sensation: Intact (02/04/20 1400)     Gait  Speed/Linda: Slow;Shuffled (02/06/20 1400)  Step Length: Right shortened;Left shortened (02/06/20 1400)  Gait Abnormalities: Decreased step clearance;Trunk sway increased (02/06/20 1400)  Ambulation - Level of Assistance: Minimal assistance;Assist x2 (02/06/20 1400)  Distance (ft): 2 Feet (ft) (02/06/20 1400)  Assistive Device: Gait belt (02/06/20 1400)     Bed Mobility  Rolling: Minimum assistance;Assist x2 (02/06/20 1300)  Supine to Sit: Minimum assistance;Assist x2 (02/06/20 1400)  Scooting: Minimum assistance (02/06/20 1400)     Balance  Sitting: Impaired (02/06/20 1400)  Sitting - Static: Good (unsupported) (02/06/20 1400)  Sitting - Dynamic: Fair (occasional) (02/06/20 1400)  Standing: Impaired (02/06/20 1400)  Standing - Static: Fair (02/06/20 1400)  Standing - Dynamic : Fair (02/06/20 1400)                       Bed/Mat Mobility  Rolling: Minimum assistance;Assist x2 (02/06/20 1300)  Supine to Sit: Minimum assistance;Assist x2 (02/06/20 1400)  Sit to Stand: Minimum assistance;Assist x2 (02/06/20 1400)  Stand to Sit: Minimum assistance;Assist x2 (02/06/20 1400)  Bed to Chair: Minimum assistance;Assist x2 (02/06/20 1400)  Scooting: Minimum assistance (02/06/20 1400)     Labs/Studies:  Recent Results (from the past 67 hour(s))   POC G3    Collection Time: 02/04/20  2:58 AM   Result Value Ref Range    Device: VENT      FIO2 (POC) 45 %    pH (POC) 7.380 7.35 - 7.45      pCO2 (POC) 41.7 35 - 45 MMHG    pO2 (POC) 186 (H) 75 - 100 MMHG    HCO3 (POC) 24.7 22 - 26 MMOL/L    sO2 (POC) 100 (H) 95 - 98 %    Base deficit (POC) 1 mmol/L    Mode SIMV      Tidal volume 500 ml    Set Rate 15 bpm    PEEP/CPAP (POC) 8 cmH2O    Pressure support 10 cmH2O    Allens test (POC) NOT APPLICABLE      Inspiratory Time 0.9 sec    Site DRAWN FROM ARTERIAL LINE      Specimen type (POC) ARTERIAL      Performed by Radha     CO2, POC 26 MMOL/L    Respiratory comment: NurseNotified     Exhaled minute volume 7.20 L/min    COLLECT TIME 255     CBC W/O DIFF    Collection Time: 02/04/20  3:09 AM   Result Value Ref Range    WBC 9.4 4.3 - 11.1 K/uL    RBC 3.73 (L) 4.05 - 5.2 M/uL    HGB 10.4 (L) 11.7 - 15.4 g/dL    HCT 32.7 (L) 35.8 - 46.3 %    MCV 87.7 79.6 - 97.8 FL    MCH 27.9 26.1 - 32.9 PG    MCHC 31.8 31.4 - 35.0 g/dL    RDW 12.6 11.9 - 14.6 %    PLATELET 050 (L) 100 - 450 K/uL    MPV 10.9 9.4 - 12.3 FL    ABSOLUTE NRBC 0.00 0.0 - 0.2 K/uL   METABOLIC PANEL, BASIC    Collection Time: 02/04/20  3:09 AM   Result Value Ref Range    Sodium 144 136 - 145 mmol/L    Potassium 3.7 3.5 - 5.1 mmol/L    Chloride 109 (H) 98 - 107 mmol/L    CO2 25 21 - 32 mmol/L    Anion gap 10 7 - 16 mmol/L    Glucose 96 65 - 100 mg/dL    BUN 13 6 - 23 MG/DL    Creatinine 0.53 (L) 0.6 - 1.0 MG/DL    GFR est AA >60 >60 ml/min/1.73m2    GFR est non-AA >60 >60 ml/min/1.73m2    Calcium 8.9 8.3 - 10.4 MG/DL   MAGNESIUM    Collection Time: 02/04/20  3:09 AM   Result Value Ref Range    Magnesium 2.2 1.8 - 2.4 mg/dL   CBC W/O DIFF    Collection Time: 02/05/20  3:04 AM   Result Value Ref Range    WBC 8.9 4.3 - 11.1 K/uL    RBC 3.51 (L) 4.05 - 5.2 M/uL    HGB 9.7 (L) 11.7 - 15.4 g/dL    HCT 31.1 (L) 35.8 - 46.3 %    MCV 88.6 79.6 - 97.8 FL    MCH 27.6 26.1 - 32.9 PG    MCHC 31.2 (L) 31.4 - 35.0 g/dL    RDW 12.8 11.9 - 14.6 %    PLATELET 690 (L) 164 - 450 K/uL    MPV 10.9 9.4 - 12.3 FL    ABSOLUTE NRBC 0.00 0.0 - 0.2 K/uL   METABOLIC PANEL, BASIC    Collection Time: 02/05/20  3:04 AM   Result Value Ref Range    Sodium 143 136 - 145 mmol/L    Potassium 3.6 3.5 - 5.1 mmol/L    Chloride 110 (H) 98 - 107 mmol/L    CO2 28 21 - 32 mmol/L    Anion gap 5 (L) 7 - 16 mmol/L    Glucose 121 (H) 65 - 100 mg/dL    BUN 18 6 - 23 MG/DL    Creatinine 0.54 (L) 0.6 - 1.0 MG/DL    GFR est AA >60 >60 ml/min/1.73m2    GFR est non-AA >60 >60 ml/min/1.73m2    Calcium 9.0 8.3 - 10.4 MG/DL   MAGNESIUM    Collection Time: 02/05/20  3:04 AM   Result Value Ref Range    Magnesium 2.3 1.8 - 2.4 mg/dL   POC G3    Collection Time: 02/05/20  3:08 AM   Result Value Ref Range    Device: VENT      FIO2 (POC) 45 %    pH (POC) 7.385 7.35 - 7.45      pCO2 (POC) 46.8 (H) 35 - 45 MMHG    pO2 (POC) 187 (H) 75 - 100 MMHG    HCO3 (POC) 28.0 (H) 22 - 26 MMOL/L    sO2 (POC) 100 (H) 95 - 98 %    Base excess (POC) 2 mmol/L    Mode VENTILATOR/SPONTANEOUS/PRESSURE SUPPORT      PEEP/CPAP (POC) 8 cmH2O    Pressure support 10 cmH2O    Allens test (POC) NOT APPLICABLE      Total resp.  rate 16      Site DRAWN FROM ARTERIAL LINE      Specimen type (POC) ARTERIAL      Performed by Radha     CO2, POC 29 MMOL/L    Respiratory comment: NurseNotified     Exhaled minute volume 7.60 L/min    COLLECT TIME 303     POC G3    Collection Time: 02/05/20  9:14 AM   Result Value Ref Range    Device: NASAL CANNULA      pH (POC) 7.195 (LL) 7.35 - 7.45      pCO2 (POC) 78.1 (HH) 35 - 45 MMHG    pO2 (POC) 119 (H) 75 - 100 MMHG    HCO3 (POC) 30.2 (H) 22 - 26 MMOL/L    sO2 (POC) 97 95 - 98 %    Base excess (POC) 1 mmol/L    Allens test (POC) NOT APPLICABLE      Site DRAWN FROM ARTERIAL LINE      Specimen type (POC) ARTERIAL      Performed by David     CO2, POC 33 MMOL/L    Flow rate (POC) 4.000 L/min    Critical value read back 00:01     COLLECT TIME 911     POC G3    Collection Time: 02/05/20 11:48 AM   Result Value Ref Range    Device: VENT      FIO2 (POC) 100 %    pH (POC) 7.393 7.35 - 7.45      pCO2 (POC) 46.0 (H) 35 - 45 MMHG    pO2 (POC) 306 (H) 75 - 100 MMHG    HCO3 (POC) 28.1 (H) 22 - 26 MMOL/L    sO2 (POC) 100 (H) 95 - 98 %    Base excess (POC) 3 mmol/L    Mode Pressure regulated volume control      Tidal volume 450 ml    Set Rate 15 bpm    PEEP/CPAP (POC) 8 cmH2O    Allens test (POC) NOT APPLICABLE      Site DRAWN FROM ARTERIAL LINE      Specimen type (POC) ARTERIAL      Performed by David     CO2, POC 29 MMOL/L    Critical value read back 00:01     Respiratory comment: PhysicianNotified     Exhaled minute volume 7.10 L/min    COLLECT TIME 1,147     POC G3    Collection Time: 02/06/20  2:51 AM   Result Value Ref Range    Device: VENT      FIO2 (POC) 50 %    pH (POC) 7.405 7.35 - 7.45      pCO2 (POC) 42.9 35 - 45 MMHG    pO2 (POC) 147 (H) 75 - 100 MMHG    HCO3 (POC) 26.9 (H) 22 - 26 MMOL/L    sO2 (POC) 99 (H) 95 - 98 %    Base excess (POC) 2 mmol/L    Mode Pressure regulated volume control      Tidal volume 450 ml    Set Rate 16 bpm    PEEP/CPAP (POC) 8 cmH2O    Allens test (POC) NOT APPLICABLE      Site DRAWN FROM ARTERIAL LINE      Specimen type (POC) ARTERIAL      Performed by Bunny     CO2, POC 28 MMOL/L    Critical value read back 02:53     Respiratory comment: NurseNotified     Exhaled minute volume 8.60 L/min    COLLECT TIME 245     CBC W/O DIFF    Collection Time: 02/06/20  3:43 AM   Result Value Ref Range    WBC 9.1 4.3 - 11.1 K/uL    RBC 3.43 (L) 4.05 - 5.2 M/uL    HGB 9.5 (L) 11.7 - 15.4 g/dL    HCT 30.2 (L) 35.8 - 46.3 %    MCV 88.0 79.6 - 97.8 FL    MCH 27.7 26.1 - 32.9 PG    MCHC 31.5 31.4 - 35.0 g/dL    RDW 12.3 11.9 - 14.6 %    PLATELET 293 (L) 410 - 450 K/uL    MPV 11.0 9.4 - 12.3 FL    ABSOLUTE NRBC 0.00 0.0 - 0.2 K/uL   METABOLIC PANEL, BASIC    Collection Time: 02/06/20  3:43 AM   Result Value Ref Range    Sodium 144 136 - 145 mmol/L    Potassium 4.1 3.5 - 5.1 mmol/L    Chloride 111 (H) 98 - 107 mmol/L    CO2 29 21 - 32 mmol/L    Anion gap 4 (L) 7 - 16 mmol/L    Glucose 148 (H) 65 - 100 mg/dL    BUN 15 6 - 23 MG/DL    Creatinine 0.60 0.6 - 1.0 MG/DL    GFR est AA >60 >60 ml/min/1.73m2    GFR est non-AA >60 >60 ml/min/1.73m2    Calcium 8.9 8.3 - 10.4 MG/DL   MAGNESIUM    Collection Time: 02/06/20  3:43 AM   Result Value Ref Range    Magnesium 2.4 1.8 - 2.4 mg/dL        Assessment:     Principal Problem:    AVM (arteriovenous malformation) spine (2/2/2020)    Active Problems:    Cavernoma (2/3/2020)      Acute respiratory failure with hypoxia (HCC) (2/3/2020)      Edema of spinal cord (HCC) (2/3/2020)      Acute left-sided weakness (2/3/2020)        Plan:     Recommendations: Continue Acute Rehab Program  Coordination of rehab/medical care  Counseling of PM & R care issues management  Monitoring and management of medical conditions per plan of care/orders  Discussion with Family/Caregiver/Staff  Reviewed Therapies/Labs/Medications/Records

## 2020-02-06 NOTE — PROGRESS NOTES
Problem: Mobility Impaired (Adult and Pediatric)  Goal: *Acute Goals and Plan of Care (Insert Text)  Description  LTG:  (1.)Ms. Omar Pena will move from supine to sit and sit to supine , scoot up and down and roll side to side in bed with MODIFIED INDEPENDENCE within 7 treatment day(s). (2.)Ms. Omar Pena will transfer from bed to chair and chair to bed with STAND BY ASSIST using the least restrictive device within 7 treatment day(s). (3.)Ms. Omar Pena will ambulate with STAND BY ASSIST for 85 feet with the least restrictive device within 7 treatment day(s). (4.)Ms. Omar Pena will perform standing static and dynamic balance activities x 15 minutes with STAND BY ASSIST to improve safety within 7 treatment day(s). ________________________________________________________________________________________________      Outcome: Progressing Towards Goal     PHYSICAL THERAPY: Daily Note and AM 2/6/2020  INPATIENT: PT Visit Days : 2  Payor: [facility] / Plan: [address] / Product Type: PPO /       NAME/AGE/GENDER: Trupti Duncan is a 53 y.o. 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 Omar Pena is a 53 y.o. female in the hospital for an AVM with edema noted around the third cervical vertebrae. Per EV note, pt's SBP should be kept within 100-160 range. 2/9/20- Pt alert and following commands well today. She appeared to have some L side neglect but could be due to neck pain. Pt performed bed mobility with Miguel x 2 and cuing for safety awareness. She was able to scoot forward on EOB with Miguel and good-fair sitting balance.   Pt performed several STS with Miguel x 2 as well as ambulating short distance. Pt performed standing pre-gait activities that included weight shifting onto L LE as well as balance in semi-tandem stance. She has progressed from last session in mobility and ambulation. At this time, patient is appropriate for Co-treatment with occupational therapy due to patient's decreased overall endurance/tolerance levels, as well as need for high level skilled assistance to complete functional transfers/mobility and functional tasks. Po Rodríguez is appropriate for a multidisciplinary co-treatment of PT and OT to address goals of both disciplines. This section established at most recent assessment   PROBLEM LIST (Impairments causing functional limitations):  1. Decreased Strength  2. Decreased ADL/Functional Activities  3. Decreased Transfer Abilities  4. Decreased Ambulation Ability/Technique  5. Decreased Balance  6. Decreased Activity Tolerance  7. Increased Fatigue   INTERVENTIONS PLANNED: (Benefits and precautions of physical therapy have been discussed with the patient.)  1. Balance Exercise  2. Bed Mobility  3. Family Education  4. Gait Training  5. Neuromuscular Re-education/Strengthening  6. Therapeutic Activites  7. Therapeutic Exercise/Strengthening  8. Transfer Training     TREATMENT PLAN: Frequency/Duration: 3 times a week for duration of hospital stay  Rehabilitation Potential For Stated Goals: Excellent     REHAB RECOMMENDATIONS (at time of discharge pending progress):    Placement: It is my opinion, based on this patient's performance to date, that Ms. Allegra Savage may benefit from intensive therapy at an [id number] after discharge due to a probable need for multiple therapy disciplines and potential to make ongoing and sustainable functional improvement that is of practical value. .  Equipment:    None at this time              HISTORY:   History of Present Injury/Illness (Reason for Referral):   AVM  Past Medical History/Comorbidities: Ms. Lesley Fields  has no past medical history on file. Ms. Lesley Fields  has no past surgical history on file. Social History/Living Environment:   Home Environment: Private residence  # Steps to Enter: 2  Rails to Enter: No  One/Two Story Residence: One story  Living Alone: No  Support Systems: Family member(s)  Patient Expects to be Discharged to[de-identified] Rehabilitation facility  Current DME Used/Available at Home: None  Tub or Shower Type: Tub/Shower combination  Prior Level of Function/Work/Activity:  Lives with spouse and child. PTA independent and working for [employer]. Number of Personal Factors/Comorbidities that affect the Plan of Care: 0: LOW COMPLEXITY   EXAMINATION:   Most Recent Physical Functioning:   Gross Assessment:  AROM: Generally decreased, functional(L hip flexion)  Strength: Generally decreased, functional(L LE)  Coordination: Generally decreased, functional  Sensation: Intact               Posture:     Balance:  Sitting: Impaired  Sitting - Static: Good (unsupported)  Sitting - Dynamic: Fair (occasional)  Standing: Impaired  Standing - Static: Fair  Standing - Dynamic : Fair Bed Mobility:  Supine to Sit: Minimum assistance;Assist x2  Scooting: Minimum assistance  Interventions: Manual cues; Safety awareness training;Verbal cues  Wheelchair Mobility:     Transfers:  Sit to Stand: Minimum assistance;Assist x2  Stand to Sit: Minimum assistance;Assist x2  Bed to Chair: Minimum assistance;Assist x2  Interventions: Manual cues; Safety awareness training;Verbal cues; Visual cues  Gait:     Speed/Linda: Slow;Shuffled  Step Length: Right shortened;Left shortened  Gait Abnormalities: Decreased step clearance;Trunk sway increased  Distance (ft): 2 Feet (ft)  Assistive Device: Gait belt  Ambulation - Level of Assistance: Minimal assistance;Assist x2      Body Structures Involved:  1. Nerves  2. Lungs  3.  Muscles Body Functions Affected:  1. Mental  2. Sensory/Pain  3. Respiratory  4. Neuromusculoskeletal  5. Movement Related Activities and Participation Affected:  1. General Tasks and Demands  2. Mobility  3. Self Care  4. Domestic Life  5. Community, Social and [address] [place], [place] [address]y    Number of elements that affect the Plan of Care: 4+: HIGH COMPLEXITY   CLINICAL PRESENTATION:   Presentation: Evolving clinical presentation with changing clinical characteristics: MODERATE COMPLEXITY   CLINICAL DECISION MAKIN [facility] Mobility Inpatient Short Form  How much difficulty does the patient currently have. .. Unable A Lot A Little None   1. Turning over in bed (including adjusting bedclothes, sheets and blankets)? [] 1   [x] 2   [] 3   [] 4   2. Sitting down on and standing up from a chair with arms ( e.g., wheelchair, bedside commode, etc.)   [] 1   [] 2   [x] 3   [] 4   3. Moving from lying on back to sitting on the side of the bed? [] 1   [x] 2   [] 3   [] 4   How much help from another person does the patient currently need. .. Total A Lot A Little None   4. Moving to and from a bed to a chair (including a wheelchair)? [] 1   [x] 2   [] 3   [] 4   5. Need to walk in hospital room? [] 1   [] 2   [x] 3   [] 4   6. Climbing 3-5 steps with a railing? [] 1   [x] 2   [] 3   [] 4   © , Trustees of [address] [place] [address], under license to [employer]. All rights reserved      Score:  Initial: 14 Most Recent: X (Date: -- )    Interpretation of Tool:  Represents activities that are increasingly more difficult (i.e. Bed mobility, Transfers, Gait). Medical Necessity:     · Patient demonstrates   · excellent  ·  rehab potential due to higher previous functional level. Reason for Services/Other Comments:  · Patient continues to require skilled intervention due to   · Decreased balance and functional mobility   · .    Use of outcome tool(s) and clinical judgement create a POC that gives a: Questionable prediction of patient's progress: MODERATE COMPLEXITY            TREATMENT:   (In addition to Assessment/Re-Assessment sessions the following treatments were rendered)   Pre-treatment Symptoms/Complaints:  Unable to verbalize  Pain: Initial:   Pain Intensity 1: 2  Post Session:  0     Today's treatment session addressed Decreased Strength, Decreased Transfer Abilities, Decreased Ambulation Ability/Technique, Decreased Balance, Decreased Activity Tolerance, and Increased Fatigue to progress towards achieving goal(s) . During this session, Occupational Therapy addressed neuromuscular re-education  to progress towards their discipline specific goal(s). Co-treatment was necessary to improve patient's ability to follow higher level commands, ability to increase activity demands, and ability to return to normal functional activity. Gait Training ( 13 minutes):  Training consisting of weight shifting, step initiation, and ambulation on level ground that required min-modA verbal, visual, manual cuing. Therapeutic Activity: (    25 minutes): Therapeutic activities including Bed transfers, standing activities for balance and STS transfers to improve mobility, strength, balance and coordination. Required moderate   to promote static and dynamic balance in standing. Braces/Orthotics/Lines/Etc:   · IV  · floyd catheter  · nasogastric tube  · arterial line  · O2 Device: Endotracheal tube, Ventilator  Treatment/Session Assessment:    · Response to Treatment:  SBP observed elevated during activity but could be due to artifact. · Interdisciplinary Collaboration:   o Physical Therapist  o Occupational Therapist  o Registered Nurse  · After treatment position/precautions:   o Up in chair  o Bed alarm/tab alert on  o Bed/Chair-wheels locked  o Bed in low position  o Call light within reach  o Nurse at bedside   · Compliance with Program/Exercises:  Will assess as treatment progresses  · Recommendations/Intent for next treatment session: \"Next visit will focus on advancements to more challenging activities and reduction in assistance provided\".   Total Treatment Duration:  PT Patient Time In/Time Out  Time In: 1032  Time Out: Mervat Malone [address] PT, DPT

## 2020-02-06 NOTE — PROGRESS NOTES
Chart reviewed as pt remains ICU. Attempted extubation yesterday and pt unable to tolerate with re-intubation. IRC/Dr. Mejias Line continue to follow for d/c POC and STR needs. CM will continue to follow for any assist and d/c POC when medically stable.

## 2020-02-06 NOTE — PROGRESS NOTES
Bedside shift change report given to Roma Hughes (oncoming nurse) by Magy Reaves (offgoing nurse). Report included the following information SBAR, ED Summary, Procedure Summary, Intake/Output, MAR, Med Rec Status, Cardiac Rhythm Sinus and Dual Neuro Assessment. Dual NIH complete at bedside. Left sided deficits still improving. Drift on the left arm and  stronger on the R side.

## 2020-02-06 NOTE — PROGRESS NOTES
A follow up visit was made to the patient. Emotional support, spiritual presence and   prayer were provided for the patient. She had therapy this morning and she was resting in her recliner.       CAMILLE Rios

## 2020-02-06 NOTE — PROGRESS NOTES
Nutrition F/U:  TF Management for Dr. Rachel Sultana, NP. Assessment:  Weight 98.6 kg (ICU bed 2/3/2020), edema - none. The patient was extubated yesterday briefly but was re-intubated. TF was resumed at the goal rate. Good GI tolerance is reported with low gastric residuals, however no bowel movement has been reported yet. Labs are remarkable for sodium 144 and AM glucose 148. Noted that Decadron was started yesterday. Enteral Access:   NGT. Macronutrient Needs (CBW 98 kg):  Estimated calorie needs - [id number] kenny/day (11-14 kenny/kg/day)  Estimated protein needs - >110 gm pro/day (>2 gm pro/kgIBW/day) IBW - 54.5 kg  Max CHO/day - 172 gm CHO/day (50% kenny/day)   Fluid/day - 1.1-1.4 liters/day (1 ml/kenny/day)  Intake/Comparative Standards:  Current intake of TF (Promote @ 50 ml/hr with a 10 ml/hr water flush via NGT and 2 pouches [employer] twice daily via NGT with a 50 ml water flush before and after protein) provides 1360 calories/day (100% calorie goal), 120 grams protein/day (100% protein goal), 156 grams CHO/day (does not exceed max CHO limit) and 1448 ml water/day (100% fluid goal). Intervention:   Meals and Snacks: NPO. Enteral Nutrition: Continue current TF, water flushes and liquid protein. Mineral Supplement Therapy: Nutrition Support Orders/Electrolyte Management Replacement Protocols are active on the [facility] for potassium and magnesium only. Nutrition-Related Medication Management: Start Senna daily for bowel management. Coordination of Nutrition Care by a Nutrition Professional: AM ICU rounds, collaboration with Maggy Rich. Nutrition Discharge Plan: Too soon to determine. Carole Rodriguez.  Travis Morales  [phone]

## 2020-02-07 ENCOUNTER — APPOINTMENT (OUTPATIENT)
Dept: GENERAL RADIOLOGY | Age: 45
DRG: 004 | End: 2020-02-07
Attending: NURSE PRACTITIONER
Payer: COMMERCIAL

## 2020-02-07 LAB
ANION GAP SERPL CALC-SCNC: 4 MMOL/L (ref 7–16)
ARTERIAL PATENCY WRIST A: ABNORMAL
BACTERIA SPEC CULT: NORMAL
BACTERIA SPEC CULT: NORMAL
BASE EXCESS BLD CALC-SCNC: 4 MMOL/L
BDY SITE: ABNORMAL
BUN SERPL-MCNC: 21 MG/DL (ref 6–23)
CALCIUM SERPL-MCNC: 8.8 MG/DL (ref 8.3–10.4)
CHLORIDE SERPL-SCNC: 113 MMOL/L (ref 98–107)
CO2 BLD-SCNC: 31 MMOL/L
CO2 SERPL-SCNC: 29 MMOL/L (ref 21–32)
COLLECT TIME,HTIME: 335
CREAT SERPL-MCNC: 0.58 MG/DL (ref 0.6–1)
ERYTHROCYTE [DISTWIDTH] IN BLOOD BY AUTOMATED COUNT: 12.4 % (ref 11.9–14.6)
EXHALED MINUTE VOLUME, VE: 7.9 L/MIN
GAS FLOW.O2 O2 DELIVERY SYS: ABNORMAL L/MIN
GAS FLOW.O2 SETTING OXYMISER: 16 BPM
GLUCOSE SERPL-MCNC: 145 MG/DL (ref 65–100)
HCO3 BLD-SCNC: 29.1 MMOL/L (ref 22–26)
HCT VFR BLD AUTO: 29.1 % (ref 35.8–46.3)
HGB BLD-MCNC: 8.9 G/DL (ref 11.7–15.4)
INSPIRATION.DURATION SETTING TIME VENT: 0.9 SEC
MAGNESIUM SERPL-MCNC: 2.4 MG/DL (ref 1.8–2.4)
MCH RBC QN AUTO: 27.5 PG (ref 26.1–32.9)
MCHC RBC AUTO-ENTMCNC: 30.6 G/DL (ref 31.4–35)
MCV RBC AUTO: 89.8 FL (ref 79.6–97.8)
NRBC # BLD: 0 K/UL (ref 0–0.2)
O2/TOTAL GAS SETTING VFR VENT: 50 %
PCO2 BLD: 47.6 MMHG (ref 35–45)
PEEP RESPIRATORY: 8 CMH2O
PH BLD: 7.39 [PH] (ref 7.35–7.45)
PLATELET # BLD AUTO: 161 K/UL (ref 150–450)
PMV BLD AUTO: 11.5 FL (ref 9.4–12.3)
PO2 BLD: 194 MMHG (ref 75–100)
POTASSIUM SERPL-SCNC: 4.3 MMOL/L (ref 3.5–5.1)
RBC # BLD AUTO: 3.24 M/UL (ref 4.05–5.2)
SAO2 % BLD: 100 % (ref 95–98)
SERVICE CMNT-IMP: ABNORMAL
SERVICE CMNT-IMP: ABNORMAL
SERVICE CMNT-IMP: NORMAL
SERVICE CMNT-IMP: NORMAL
SODIUM SERPL-SCNC: 146 MMOL/L (ref 136–145)
SPECIMEN TYPE: ABNORMAL
VANCOMYCIN TROUGH SERPL-MCNC: 6.6 UG/ML (ref 5–20)
VENTILATION MODE VENT: ABNORMAL
VT SETTING VENT: 450 ML
WBC # BLD AUTO: 10.7 K/UL (ref 4.3–11.1)

## 2020-02-07 PROCEDURE — 99232 SBSQ HOSP IP/OBS MODERATE 35: CPT | Performed by: NURSE PRACTITIONER

## 2020-02-07 PROCEDURE — 74011000258 HC RX REV CODE- 258: Performed by: NEUROLOGICAL SURGERY

## 2020-02-07 PROCEDURE — 71045 X-RAY EXAM CHEST 1 VIEW: CPT

## 2020-02-07 PROCEDURE — 74011250637 HC RX REV CODE- 250/637: Performed by: NURSE PRACTITIONER

## 2020-02-07 PROCEDURE — 97116 GAIT TRAINING THERAPY: CPT

## 2020-02-07 PROCEDURE — 97112 NEUROMUSCULAR REEDUCATION: CPT

## 2020-02-07 PROCEDURE — 83735 ASSAY OF MAGNESIUM: CPT

## 2020-02-07 PROCEDURE — 80202 ASSAY OF VANCOMYCIN: CPT

## 2020-02-07 PROCEDURE — 77030018798 HC PMP KT ENTRL FED COVD -A

## 2020-02-07 PROCEDURE — 94003 VENT MGMT INPAT SUBQ DAY: CPT

## 2020-02-07 PROCEDURE — 65610000001 HC ROOM ICU GENERAL

## 2020-02-07 PROCEDURE — 85027 COMPLETE CBC AUTOMATED: CPT

## 2020-02-07 PROCEDURE — 82803 BLOOD GASES ANY COMBINATION: CPT

## 2020-02-07 PROCEDURE — 74011250636 HC RX REV CODE- 250/636: Performed by: NURSE PRACTITIONER

## 2020-02-07 PROCEDURE — 99231 SBSQ HOSP IP/OBS SF/LOW 25: CPT | Performed by: PHYSICAL MEDICINE & REHABILITATION

## 2020-02-07 PROCEDURE — 74011250637 HC RX REV CODE- 250/637: Performed by: NEUROLOGICAL SURGERY

## 2020-02-07 PROCEDURE — 74011250636 HC RX REV CODE- 250/636: Performed by: NEUROLOGICAL SURGERY

## 2020-02-07 PROCEDURE — 80048 BASIC METABOLIC PNL TOTAL CA: CPT

## 2020-02-07 RX ORDER — VANCOMYCIN HYDROCHLORIDE
1250 EVERY 8 HOURS
Status: DISCONTINUED | OUTPATIENT
Start: 2020-02-07 | End: 2020-02-08 | Stop reason: SDUPTHER

## 2020-02-07 RX ADMIN — DEXAMETHASONE SODIUM PHOSPHATE 4 MG: 4 INJECTION, SOLUTION INTRAMUSCULAR; INTRAVENOUS at 19:16

## 2020-02-07 RX ADMIN — DEXAMETHASONE SODIUM PHOSPHATE 4 MG: 4 INJECTION, SOLUTION INTRAMUSCULAR; INTRAVENOUS at 12:03

## 2020-02-07 RX ADMIN — DEXAMETHASONE SODIUM PHOSPHATE 4 MG: 4 INJECTION, SOLUTION INTRAMUSCULAR; INTRAVENOUS at 04:27

## 2020-02-07 RX ADMIN — ATORVASTATIN CALCIUM 40 MG: 40 TABLET, FILM COATED ORAL at 21:00

## 2020-02-07 RX ADMIN — HYDROCODONE BITARTRATE AND ACETAMINOPHEN 1 TABLET: 7.5; 325 TABLET ORAL at 19:23

## 2020-02-07 RX ADMIN — Medication 900 UNITS: at 15:19

## 2020-02-07 RX ADMIN — FAMOTIDINE 20 MG: 20 TABLET, FILM COATED ORAL at 09:03

## 2020-02-07 RX ADMIN — HYDROCODONE BITARTRATE AND ACETAMINOPHEN 1 TABLET: 7.5; 325 TABLET ORAL at 01:16

## 2020-02-07 RX ADMIN — SENNOSIDES 8.6 MG: 8.6 TABLET, FILM COATED ORAL at 09:03

## 2020-02-07 RX ADMIN — CEFEPIME 2 G: 2 INJECTION, POWDER, FOR SOLUTION INTRAVENOUS at 07:26

## 2020-02-07 RX ADMIN — Medication 30 ML: at 21:00

## 2020-02-07 RX ADMIN — CEFEPIME 2 G: 2 INJECTION, POWDER, FOR SOLUTION INTRAVENOUS at 00:51

## 2020-02-07 RX ADMIN — DEXAMETHASONE SODIUM PHOSPHATE 4 MG: 4 INJECTION, SOLUTION INTRAMUSCULAR; INTRAVENOUS at 00:51

## 2020-02-07 RX ADMIN — DEXAMETHASONE SODIUM PHOSPHATE 4 MG: 4 INJECTION, SOLUTION INTRAMUSCULAR; INTRAVENOUS at 23:02

## 2020-02-07 RX ADMIN — Medication 30 ML: at 15:19

## 2020-02-07 RX ADMIN — CEFEPIME 2 G: 2 INJECTION, POWDER, FOR SOLUTION INTRAVENOUS at 23:02

## 2020-02-07 RX ADMIN — FENTANYL CITRATE 50 MCG: 50 INJECTION, SOLUTION INTRAMUSCULAR; INTRAVENOUS at 07:22

## 2020-02-07 RX ADMIN — HYDROCODONE BITARTRATE AND ACETAMINOPHEN 1 TABLET: 7.5; 325 TABLET ORAL at 09:03

## 2020-02-07 RX ADMIN — ENOXAPARIN SODIUM 40 MG: 40 INJECTION SUBCUTANEOUS at 09:03

## 2020-02-07 RX ADMIN — VANCOMYCIN HYDROCHLORIDE 1250 MG: 10 INJECTION, POWDER, LYOPHILIZED, FOR SOLUTION INTRAVENOUS at 11:38

## 2020-02-07 RX ADMIN — DEXAMETHASONE SODIUM PHOSPHATE 4 MG: 4 INJECTION, SOLUTION INTRAMUSCULAR; INTRAVENOUS at 15:18

## 2020-02-07 RX ADMIN — METOPROLOL TARTRATE 50 MG: 50 TABLET, FILM COATED ORAL at 09:22

## 2020-02-07 RX ADMIN — METOPROLOL TARTRATE 50 MG: 50 TABLET, FILM COATED ORAL at 18:00

## 2020-02-07 RX ADMIN — VANCOMYCIN HYDROCHLORIDE 1250 MG: 10 INJECTION, POWDER, LYOPHILIZED, FOR SOLUTION INTRAVENOUS at 19:17

## 2020-02-07 RX ADMIN — Medication 900 UNITS: at 21:01

## 2020-02-07 RX ADMIN — Medication 900 UNITS: at 06:00

## 2020-02-07 RX ADMIN — DEXAMETHASONE SODIUM PHOSPHATE 4 MG: 4 INJECTION, SOLUTION INTRAMUSCULAR; INTRAVENOUS at 07:22

## 2020-02-07 RX ADMIN — Medication 30 ML: at 06:00

## 2020-02-07 RX ADMIN — VANCOMYCIN HYDROCHLORIDE 1500 MG: 10 INJECTION, POWDER, LYOPHILIZED, FOR SOLUTION INTRAVENOUS at 04:31

## 2020-02-07 RX ADMIN — CEFEPIME 2 G: 2 INJECTION, POWDER, FOR SOLUTION INTRAVENOUS at 15:19

## 2020-02-07 RX ADMIN — SODIUM CHLORIDE 75 ML/HR: 900 INJECTION, SOLUTION INTRAVENOUS at 13:28

## 2020-02-07 NOTE — PROGRESS NOTES
Problem: Patient Education: Go to Patient Education Activity  Goal: Patient/Family Education  Description  1. Patient will complete upper body bathing and dressing with mod A and adaptive equipment as needed. 2. Patient will complete toileting with mod A.   3. Patient will tolerate 25 minutes of OT treatment with 1-2 rest breaks to increase activity tolerance for ADLs. 4. Patient will complete functional transfers with min A and adaptive equipment as needed. 5. Patient will tolerate 20 minutes functional activity while seated edge of bed unsupported with SBA and adaptive equipment as needed. 6. Patient will complete self-grooming with MOD I and adaptive equipment as needed. 7. Patient will tolerate 15 minutes therapeutic exercise with LUE to increase use during BADLs. 8. Patient complete self-feeding with use of LUE with min A after set-up. Timeframe: 7 visits        Outcome: Progressing Towards Goal      OCCUPATIONAL THERAPY: Daily Note and PM 2/7/2020  INPATIENT: OT Visit Days: 3  Payor: [address] [place] [address] / Plan: [address] / Product Type: PPO /      NAME/AGE/GENDER: Dao Peter is a 53 y.o. 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 Pamela Bergeron presents in the ICU for AVM s/p angiogram. Upon arrival, pt supine in bed, currently intubated, however alert. Father at bedside to provide background information. At baseline, pt was living with  and daughter in a 1-story home with 2 steps to enter. Pt was independent with ADLs, IADLs, functional mobility and work tasks; still drives. No hx of falls.      2/7/20: Pt was sitting up in the chair upon arrival. Pt is alert and appropriate for her age. Pt is still intubated. Multiple team members at bedside to help with management of lines. PT/OT co-treatment to increase activity demands. Pt completed multiple sit to stands this session. Pt demonstrated good awareness and carry over from previous session to work on pushing up through L UE to provide strengthening/weightbearing. Pt also worked on reaching back with L UE with sitting to help with controlling sitting. Pt required minimal assistance for dynamic balance throughout session. Pt returned back to the room and back to the chair. Pt appears fatigued but denies it. Pt wrote on board that her neck is sore and stiff. Pt completed dynamic reaching to targets with B UEs (to encourage awareness of movement for L UE). Pt needed moderate facilitation at the shoulder to promote forward flexion. Pt demonstrates decreased coordination in the L UE as well. Pt left sitting up in the chair at the end of the session with multiple supportive family members at bedside. Pt demonstrated good progress today. Pt to continue per plan of care. This section established at most recent assessment   PROBLEM LIST (Impairments causing functional limitations):  1. Decreased Strength  2. Decreased ADL/Functional Activities  3. Decreased Transfer Abilities  4. Decreased Ambulation Ability/Technique  5. Decreased Balance  6. Decreased Activity Tolerance   INTERVENTIONS PLANNED: (Benefits and precautions of occupational therapy have been discussed with the patient.)  1. Activities of daily living training  2. Adaptive equipment training  3. Balance training  4. Clothing management  5. Community reintergration  6. Donning&doffing training  7. Neuromuscular re-eduation  8. Re-evaluation  9. Therapeutic activity  10. Therapeutic exercise     TREATMENT PLAN: Frequency/Duration: Follow patient 3x/week to address above goals.   Rehabilitation Potential For Stated Goals: Good     REHAB RECOMMENDATIONS (at time of discharge pending progress):    Placement: It is my opinion, based on this patient's performance to date, that Ms. Tyrone Prabhakar may benefit from intensive therapy at an [address] [place] [address] after discharge due to a probable need for multiple therapy disciplines and potential to make ongoing and sustainable functional improvement that is of practical value. .  Equipment:    TBD              OCCUPATIONAL PROFILE AND HISTORY:   History of Present Injury/Illness (Reason for Referral):  See H&P  Past Medical History/Comorbidities:   Ms. Tyrone Prabhakar  has no past medical history on file. Ms. Tyrone Prabhakar  has no past surgical history on file. Social History/Living Environment:   Home Environment: Private residence  # Steps to Enter: 2  Rails to Enter: No  One/Two Story Residence: One story  Living Alone: No  Support Systems: Family member(s)  Patient Expects to be Discharged to[de-identified] Rehabilitation facility  Current DME Used/Available at Home: None  Tub or Shower Type: Tub/Shower combination  Prior Level of Function/Work/Activity:  Independent with ADLs, IADLs, work tasks, functional mobility and driving. Personal Factors:          Sex:  female        Age:  53 y.o. Other factors that influence how disability is experienced by the patient:  Multiple co-morbidities    Number of Personal Factors/Comorbidities that affect the Plan of Care: Expanded review of therapy/medical records (1-2):  MODERATE COMPLEXITY   ASSESSMENT OF OCCUPATIONAL PERFORMANCE[de-identified]   Activities of Daily Living:   Basic ADLs (From Assessment) Complex ADLs (From Assessment)   Feeding: Total assistance  Oral Facial Hygiene/Grooming: Maximum assistance  Bathing: Total assistance  Upper Body Dressing: Total assistance  Lower Body Dressing: Total assistance  Toileting: Total assistance Instrumental ADL  Meal Preparation: Total assistance  Homemaking:  Total assistance   Grooming/Bathing/Dressing Activities of Daily Living     Cognitive Retraining  Safety/Judgement: Awareness of environment; Fall prevention                       Bed/Mat Mobility  Sit to Stand: Minimum assistance;Assist x2  Stand to Sit: Minimum assistance;Assist x2     Most Recent Physical Functioning:   Gross Assessment:                  Posture:     Balance:  Sitting: Impaired  Sitting - Static: Good (unsupported)  Sitting - Dynamic: Fair (occasional)  Standing: Impaired  Standing - Static: Fair  Standing - Dynamic : Fair Bed Mobility:     Wheelchair Mobility:     Transfers:  Sit to Stand: Minimum assistance;Assist x2  Stand to Sit: Minimum assistance;Assist x2  Interventions: Safety awareness training;Manual cues; Verbal cues            Patient Vitals for the past 6 hrs:   BP SpO2 Pulse   20 1100 140/72 100 % (!) 52   20 1102  100 % (!) 51   20 1132  100 % (!) 46   20 1200 134/71 100 % (!) 43   20 1230  100 % (!) 57   20 1300  100 % (!) 53   20 1330  100 % (!) 43   20 1400  100 % (!) 49   20 1500  100 % 83   20 1512  100 % 73       Mental Status  Neurologic State: Alert  Orientation Level: Unable to verbalize  Cognition: Appropriate for age attention/concentration, Follows commands  Perception: Appears intact  Perseveration: No perseveration noted  Safety/Judgement: Awareness of environment, Fall prevention                          Physical Skills Involved:  1. Balance  2. Strength  3. Activity Tolerance  4. Sensation  5. Fine Motor Control  6. Gross Motor Control Cognitive Skills Affected (resulting in the inability to perform in a timely and safe manner): 1. none Psychosocial Skills Affected:  1. Habits/Routines  2. Environmental Adaptation   Number of elements that affect the Plan of Care: 5+:  HIGH COMPLEXITY   CLINICAL DECISION MAKIN \[facility]\"" [address] [address] AM-PAC 6 Clicks   Daily Activity Inpatient Short Form  How much help from another person does the patient currently need. ..  Total A Lot A Little None 1.  Putting on and taking off regular lower body clothing? [x] 1   [] 2   [] 3   [] 4   2. Bathing (including washing, rinsing, drying)? [] 1   [x] 2   [] 3   [] 4   3. Toileting, which includes using toilet, bedpan or urinal?   [x] 1   [] 2   [] 3   [] 4   4. Putting on and taking off regular upper body clothing? [x] 1   [] 2   [] 3   [] 4   5. Taking care of personal grooming such as brushing teeth? [x] 1   [] 2   [] 3   [] 4   6. Eating meals? [x] 1   [] 2   [] 3   [] 4   © 2007, Trustees of [address] [place] [address] [address] [address], under license to [employer]. All rights reserved      Score:  Initial: 7 Most Recent: X (Date: -- )    Interpretation of Tool:  Represents activities that are increasingly more difficult (i.e. Bed mobility, Transfers, Gait). Medical Necessity:     · Patient demonstrates good rehab potential due to higher previous functional level. Reason for Services/Other Comments:  · Patient continues to require skilled intervention due to medical complications and patient unable to attend/participate in therapy as expected. Use of outcome tool(s) and clinical judgement create a POC that gives a: MODERATE COMPLEXITY         TREATMENT:   (In addition to Assessment/Re-Assessment sessions the following treatments were rendered)     Pre-treatment Symptoms/Complaints:  Unable to verbalize  Pain: Initial:   Pain Intensity 1: 3  Pain Location 1: Neck  Pain Intervention(s) 1: Ambulation/Increased Activity  Post Session:  same     Today's treatment session addressed Decreased Strength, Decreased ADL/Functional Activities, Decreased Transfer Abilities, Decreased Ambulation Ability/Technique, Decreased Balance, Decreased Activity Tolerance, Decreased Flexibility/Joint Mobility and Decreased [place] with Home Exercise Program to progress towards achieving goal(s) stated above. During this session,  Physical Therapy addressed  Ambulation to progress towards their discipline specific goal(s).   Co-treatment was necessary to improve patient's ability to increase activity demands and ability to return to normal functional activity. Neuromuscular Re-education: ( 33 minutes):  Exercise/activities per grid below to improve balance, coordination, posture and proprioception. Required minimal visual, verbal, manual and tactile cues to promote dynamic balance in standing, promote coordination of left, upper extremity(s) and promote motor control of left, upper extremity(s). Date:  2/6/20 Date:  2/7/20 Date:     Activity/Exercise Parameters Parameters Parameters   Sit to stand weightbearing throughout L UE 4-5 reps Multiple reps with patient able to initiate on her own     Reaching back with L UE  Multiple reps with minimal assistance to find armrests    Scooting  Weight shifting side to side and using L UE to assist  Weight shifting side to side and using L UE to assist     Weightbearing  Standing through R UE on table x 12 reps      Dynamic reaching with grasp and rotation into supination  15 reps      Table Slides 12 reps with additional time to promote shoulder flexion      Balloon Punches  15 reps with pt facilitated into shoulder flexion with elbow extension      Dynamic reaching w/ B UE with facilitation for forward flexion in L UE  ~12 reps with moderate facilitation at L shoulder         Braces/Orthotics/Lines/Etc:   · IV  · art line; ICU monitors  · O2 Device: Endotracheal tube, Ventilator  Treatment/Session Assessment:    · Response to Treatment:  Tolerated well with no issues noted. · Interdisciplinary Collaboration:   o Physical Therapist  o Occupational Therapist  o Registered Nurse  · After treatment position/precautions:   o Up in chair  o Bed alarm/tab alert on  o Bed/Chair-wheels locked  o Call light within reach  o RN notified  o Family at bedside  o Restraints   · Compliance with Program/Exercises: Will assess as treatment progresses.   · Recommendations/Intent for next treatment session: \"Next visit will focus on advancements to more challenging activities and reduction in assistance provided\".   Total Treatment Duration:  OT Patient Time In/Time Out  Time In: 3281  Time Out: JUSTYN Velazquez

## 2020-02-07 NOTE — PROGRESS NOTES
Problem: Ventilator Management  Goal: *Adequate oxygenation and ventilation  Outcome: Progressing Towards Goal  Goal: *Patient maintains clear airway/free of aspiration  Outcome: Progressing Towards Goal  Goal: *Absence of infection signs and symptoms  Outcome: Progressing Towards Goal  Goal: *Normal spontaneous ventilation  Outcome: Progressing Towards Goal     Problem: Patient Education: Go to Patient Education Activity  Goal: Patient/Family Education  Outcome: Progressing Towards Goal     Problem: Non-Violent Restraints  Goal: *Removal from restraints as soon as assessed to be safe  Outcome: Progressing Towards Goal  Goal: *No harm/injury to patient while restraints in use  Outcome: Progressing Towards Goal  Goal: *Patient's dignity will be maintained  Outcome: Progressing Towards Goal  Goal: *Patient Specific Goal (EDIT GOAL, INSERT TEXT)  Outcome: Progressing Towards Goal  Goal: Non-violent Restaints:Standard Interventions  Outcome: Progressing Towards Goal  Goal: Non-violent Restraints:Patient Interventions  Outcome: Progressing Towards Goal  Goal: Patient/Family Education  Outcome: Progressing Towards Goal     Problem: Pressure Injury - Risk of  Goal: *Prevention of pressure injury  Description  Document Bahman Scale and appropriate interventions in the flowsheet. Outcome: Progressing Towards Goal  Note: Pressure Injury Interventions:  Sensory Interventions: Assess changes in LOC, Assess need for specialty bed, Avoid rigorous massage over bony prominences, Chair cushion, Check visual cues for pain, Float heels, Keep linens dry and wrinkle-free, Maintain/enhance activity level, Minimize linen layers, Monitor skin under medical devices, Pad between skin to skin, Pressure redistribution bed/mattress (bed type), Turn and reposition approx.  every two hours (pillows and wedges if needed)         Activity Interventions: Assess need for specialty bed, Chair cushion, Increase time out of bed, Pressure redistribution bed/mattress(bed type), PT/OT evaluation    Mobility Interventions: Assess need for specialty bed, Chair cushion, Float heels, Pressure redistribution bed/mattress (bed type), PT/OT evaluation, Turn and reposition approx. every two hours(pillow and wedges)    Nutrition Interventions: Document food/fluid/supplement intake, Discuss nutritional consult with provider    Friction and Shear Interventions: Apply protective barrier, creams and emollients, Feet elevated on foot rest, HOB 30 degrees or less, Lift sheet, Lift team/patient mobility team                Problem: Patient Education: Go to Patient Education Activity  Goal: Patient/Family Education  Outcome: Progressing Towards Goal     Problem: Falls - Risk of  Goal: *Absence of Falls  Description  Document Violett President Fall Risk and appropriate interventions in the flowsheet.   Outcome: Progressing Towards Goal  Note: Fall Risk Interventions:  Mobility Interventions: Assess mobility with egress test, Bed/chair exit alarm, Patient to call before getting OOB, Strengthening exercises (ROM-active/passive)         Medication Interventions: Assess postural VS orthostatic hypotension, Bed/chair exit alarm, Evaluate medications/consider consulting pharmacy    Elimination Interventions: Bed/chair exit alarm, Call light in reach, Patient to call for help with toileting needs              Problem: Patient Education: Go to Patient Education Activity  Goal: Patient/Family Education  Outcome: Progressing Towards Goal     Problem: Delirium Treatment  Goal: *Level of consciousness restored to baseline  Outcome: Progressing Towards Goal  Goal: *Level of environmental perceptions restored to baseline  Outcome: Progressing Towards Goal  Goal: *Sensory perception restored to baseline  Outcome: Progressing Towards Goal  Goal: *Emotional stability restored to baseline  Outcome: Progressing Towards Goal  Goal: *Functional assessment restored to baseline  Outcome: Progressing Towards Goal  Goal: *Absence of falls  Outcome: Progressing Towards Goal  Goal: *Will remain free of delirium, CAM Score negative  Outcome: Progressing Towards Goal  Goal: *Cognitive status will be restored to baseline  Outcome: Progressing Towards Goal  Goal: Interventions  Outcome: Progressing Towards Goal     Problem: Patient Education: Go to Patient Education Activity  Goal: Patient/Family Education  Outcome: Progressing Towards Goal     Problem: Patient Education: Go to Patient Education Activity  Goal: Patient/Family Education  Outcome: Progressing Towards Goal     Problem: Hemorrhagic Stroke: Admission Day (0-3 hours)  Goal: Off Pathway (Use only if patient is Off Pathway)  Outcome: Progressing Towards Goal  Goal: Activity/Safety  Outcome: Progressing Towards Goal  Goal: Consults, if ordered  Outcome: Progressing Towards Goal  Goal: Diagnostic Test/Procedures  Outcome: Progressing Towards Goal  Goal: Nutrition/Diet  Outcome: Progressing Towards Goal  Goal: Medications  Outcome: Progressing Towards Goal  Goal: Respiratory  Outcome: Progressing Towards Goal  Goal: Treatments/Interventions/Procedures  Outcome: Progressing Towards Goal  Goal: Psychosocial  Outcome: Progressing Towards Goal  Goal: *Establish/diagnose type of stroke  Outcome: Progressing Towards Goal  Goal: *Patient maintains clear airway/free of aspiration  Outcome: Progressing Towards Goal  Goal: *Hemodynamically stable  Outcome: Progressing Towards Goal     Problem: Hemorrhagic Stroke:  3-24 hours  Goal: Off Pathway (Use only if patient is Off Pathway)  Outcome: Progressing Towards Goal  Goal: Activity/Safety  Outcome: Progressing Towards Goal  Goal: Consults, if ordered  Outcome: Progressing Towards Goal  Goal: Diagnostic Test/Procedures  Outcome: Progressing Towards Goal  Goal: Nutrition/Diet  Outcome: Progressing Towards Goal  Goal: Discharge Planning  Outcome: Progressing Towards Goal  Goal: Medications  Outcome: Progressing Towards Goal  Goal: Respiratory  Outcome: Progressing Towards Goal  Goal: Treatments/Interventions/Procedures  Outcome: Progressing Towards Goal  Goal: Psychosocial  Outcome: Progressing Towards Goal  Goal: *Hemodynamically stable  Outcome: Progressing Towards Goal  Goal: *Verbalizes anxiety and depression are reduced or absent  Outcome: Progressing Towards Goal  Goal: *Absence of aspiration  Outcome: Progressing Towards Goal  Goal: *Absence of signs and symptoms of DVT  Outcome: Progressing Towards Goal  Goal: *Optimal pain control at patient's stated goal  Outcome: Progressing Towards Goal  Goal: *Tolerating diet  Outcome: Progressing Towards Goal  Goal: *Progressive mobility and function (eg: ADL's)  Outcome: Progressing Towards Goal  Goal: *Rehabilitation readiness  Outcome: Progressing Towards Goal     Problem: Hemorrhagic Stroke: Day 2  Goal: Off Pathway (Use only if patient is Off Pathway)  Outcome: Progressing Towards Goal  Goal: Activity/Safety  Outcome: Progressing Towards Goal  Goal: Consults, if ordered  Outcome: Progressing Towards Goal  Goal: Diagnostic Test/Procedures  Outcome: Progressing Towards Goal  Goal: Nutrition/Diet  Outcome: Progressing Towards Goal  Goal: Medications  Outcome: Progressing Towards Goal  Goal: Respiratory  Outcome: Progressing Towards Goal  Goal: Treatments/Interventions/Procedures  Outcome: Progressing Towards Goal  Goal: Psychosocial  Outcome: Progressing Towards Goal  Goal: *Hemodynamically stable  Outcome: Progressing Towards Goal  Goal: *Verbalizes anxiety and depression are reduced or absent  Outcome: Progressing Towards Goal  Goal: *Absence of aspiration  Outcome: Progressing Towards Goal  Goal: *Absence of signs and symptoms of DVT  Outcome: Progressing Towards Goal  Goal: *Optimal pain control at patient's stated goal  Outcome: Progressing Towards Goal  Goal: *Tolerating diet  Outcome: Progressing Towards Goal  Goal: *Progressive mobility and function  Outcome: Progressing Towards Goal  Goal: *Rehabilitation readiness  Outcome: Progressing Towards Goal     Problem: Hemorrhagic Stroke: Day 3  Goal: Off Pathway (Use only if patient is Off Pathway)  Outcome: Progressing Towards Goal  Goal: Activity/Safety  Outcome: Progressing Towards Goal  Goal: Consults, if ordered  Outcome: Progressing Towards Goal  Goal: Diagnostic Test/Procedures  Outcome: Progressing Towards Goal  Goal: Nutrition/Diet  Outcome: Progressing Towards Goal  Goal: Medications  Outcome: Progressing Towards Goal  Goal: Respiratory  Outcome: Progressing Towards Goal  Goal: Treatments/Interventions/Procedures  Outcome: Progressing Towards Goal  Goal: Psychosocial  Outcome: Progressing Towards Goal  Goal: *Hemodynamically stable  Outcome: Progressing Towards Goal  Goal: *Verbalizes anxiety and depression are reduced or absent  Outcome: Progressing Towards Goal  Goal: *Absence of aspiration  Outcome: Progressing Towards Goal  Goal: *Absence of signs and symptoms of DVT  Outcome: Progressing Towards Goal  Goal: *Optimal pain control at patient's stated goal  Outcome: Progressing Towards Goal  Goal: *Tolerating diet  Outcome: Progressing Towards Goal  Goal: *Progressive mobility and function  Outcome: Progressing Towards Goal  Goal: *Rehabilitation readiness  Outcome: Progressing Towards Goal     Problem: Hemorrhagic Stroke: Day 4  Goal: Off Pathway (Use only if patient is Off Pathway)  Outcome: Progressing Towards Goal  Goal: Activity/Safety  Outcome: Progressing Towards Goal  Goal: Consults, if ordered  Outcome: Progressing Towards Goal  Goal: Diagnostic Test/Procedures  Outcome: Progressing Towards Goal  Goal: Nutrition/Diet  Outcome: Progressing Towards Goal  Goal: Medications  Outcome: Progressing Towards Goal  Goal: Respiratory  Outcome: Progressing Towards Goal  Goal: Treatments/Interventions/Procedures  Outcome: Progressing Towards Goal  Goal: Psychosocial  Outcome: Progressing Towards Goal  Goal: *Hemodynamically stable  Outcome: Progressing Towards Goal  Goal: *Verbalizes anxiety and depression are reduced or absent  Outcome: Progressing Towards Goal  Goal: *Absence of aspiration  Outcome: Progressing Towards Goal  Goal: *Absence of signs and symptoms of DVT  Outcome: Progressing Towards Goal  Goal: *Optimal pain control at patient's stated goal  Outcome: Progressing Towards Goal  Goal: *Tolerating diet  Outcome: Progressing Towards Goal  Goal: *Progressive mobility and function  Outcome: Progressing Towards Goal  Goal: *Rehabilitation readiness  Outcome: Progressing Towards Goal     Problem: Hemorrhagic Stroke: Day 5 through Discharge  Goal: Off Pathway (Use only if patient is Off Pathway)  Outcome: Progressing Towards Goal  Goal: Activity/Safety  Outcome: Progressing Towards Goal  Goal: Consults, if ordered  Outcome: Progressing Towards Goal  Goal: Diagnostic Test/Procedures  Outcome: Progressing Towards Goal  Goal: Nutrition/Diet  Outcome: Progressing Towards Goal  Goal: Medications  Outcome: Progressing Towards Goal  Goal: Respiratory  Outcome: Progressing Towards Goal  Goal: Treatments/Interventions/Procedures  Outcome: Progressing Towards Goal  Goal: Psychosocial  Outcome: Progressing Towards Goal  Goal: *Hemodynamically stable  Outcome: Progressing Towards Goal  Goal: *Verbalizes anxiety and depression are reduced or absent  Outcome: Progressing Towards Goal  Goal: *Absence of aspiration  Outcome: Progressing Towards Goal  Goal: *Absence of signs and symptoms of DVT  Outcome: Progressing Towards Goal  Goal: *Optimal pain control at patient's stated goal  Outcome: Progressing Towards Goal  Goal: *Tolerating diet  Outcome: Progressing Towards Goal  Goal: *Progressive mobility and function  Outcome: Progressing Towards Goal  Goal: *Rehabilitation readiness  Outcome: Progressing Towards Goal     Problem: Hemorrhagic Stroke: Discharge Outcomes  Goal: *Verbalizes anxiety and depression are reduced or absent  Outcome: Progressing Towards Goal  Goal: *Verbalize understanding of risk factor modification(Stroke Metric)  Outcome: Progressing Towards Goal  Goal: *Optimal pain control at patient's stated goal  Outcome: Progressing Towards Goal  Goal: *Hemodynamically stable  Outcome: Progressing Towards Goal  Goal: *Absence of aspiration pneumonia  Outcome: Progressing Towards Goal  Goal: *Aware of needed dietary changes  Outcome: Progressing Towards Goal  Goal: *Verbalizes understanding and describes medication purposes and frequencies  Outcome: Progressing Towards Goal  Goal: *Tolerating diet  Outcome: Progressing Towards Goal  Goal: *Absence of signs and symptoms of DVT  Outcome: Progressing Towards Goal  Goal: *Absence of aspiration  Outcome: Progressing Towards Goal  Goal: *Progressive mobility and function  Outcome: Progressing Towards Goal  Goal: *Home safety concerns addressed  Outcome: Progressing Towards Goal

## 2020-02-07 NOTE — PROGRESS NOTES
PT Note:  Treatment attempted this AM but RN reported just having transferred pt to chair. Will re-attempt pending schedule and pt status.   Thanks,  Paco Hirsch, PT, DPT

## 2020-02-07 NOTE — ADT AUTH CERT NOTES
Systemic or Infectious Condition GRG - Care Day 5 (2/6/2020) by Vane Titus RN  
 
   
Review Status Review Entered Completed 2/6/2020 16:59  
   
Criteria Review Care Day: 5 Care Date: 2/6/2020 Level of Care: ICU Guideline Day 3 Level Of Care ( ) * Activity level acceptable Clinical Status ( ) * Hemodynamic stability ( ) * Respiratory status acceptable ( ) * Neurologic status acceptable ( ) * Temperature status acceptable ( ) * No infection, or status acceptable ( ) * No neutropenia, or status acceptable ( ) * Special infection or injury situations absent   
( ) * Electrolyte status acceptable ( ) * General Discharge Criteria met Interventions ( ) * Intake acceptable ( ) * No inpatient interventions needed * Milestone Additional Notes 2/6  
  
99 84 24 100% 145/73  
  
2/6/2020 03:43 RBC 3.43 (L) HGB 9.5 (L) HCT 30.2 (L) MCHC 31.5 PLATELET 617 (L)  
  
  
2/6/2020 15:35 Chloride 111 (H) Anion gap 6 (L) Glucose 169 (H) Creatinine 0.57 (L)  
  
  
2/6/2020 02:51  
pO2 (POC) 147 (H) HCO3 (POC) 26.9 (H)  
sO2 (POC) 99 (H) PCXR  
1. Mild bibasilar atelectasis. ET tube tip is in satisfactory position.  
   
2. No significant change. Orders IP ICU, Full Code, VS, IP Stroke Coordinator, CM Consult, Rehab Medicine, Fall Precautions, SCDS, TUBE CARE, NG insertion, Jalloh, Non Violent Restraints, PICC Insertion, NPO, Meds NS @ 75ml/hr IV cont, Tylenol 650 mg per NG tube x3,  Duoneb x2, Lipitor 80 mg per NG tube x1, Fentanyl 50 mcg IV x 4 Toradol 15 mg IV x2, Zofran 4 mg IV x1, Zofran 8 mg IV x1, Fentanyl 100 mcg IV x1, Nicardepine 5mg/hr IV cont, Versed 2 mg IV x1 Decadron 4 mg IV x1, Cardene 10 mg /hr IV cont, Diprivan 25 mcg/hg/min IV cont, Decadron 10 mg IV x1, Neuro surgery Note  
 Pt alert and following commands. Remains intubated  updated at bedside this am  
Physical Exam: General: Alert, cooperative. Extubated this am.   
Eyes: PERRL, EOMs intact. Neck: Supple, symmetrical, trachea midline, no adenopathy, thyroid: no enlargment/tenderness/nodules, no carotid bruit and no JVD. Lungs:   Rhonchi bilat/coarse. Heart: Regular rate and rhythm, S1, S2 normal, no murmur, click, rub or gallop. Abdomen:   Soft, non-tender. Bowel sounds normal. No masses,  No organomegaly. Extremities: Extremities normal, atraumatic, no cyanosis or edema. Moving Left side better today, can hold LUE/LLE off bed to command. Pulses: 2+ and symmetric all extremities. Skin: Skin color, texture, turgor normal. No rashes or lesions Neurologic: Alert and following commands, post extubation pt with coarse lungs, increased WOB. Cont to follow commands Principal Problem:  
  AVM (arteriovenous malformation) spine (2020)  
  Active Problems:  
  Cavernoma (2/3/2020)    
  Acute respiratory failure with hypoxia ([address] (2/3/2020)    
  Edema of spinal cord ([address] (2/3/2020)    
  Acute left-sided weakness (2/3/2020)    
   
   
NEURO:pt with cervical hemorrhage and edema secondary to vascular abnormality, cavernoma vs AVM. Pt remains intubated, but is alert and following commands this am. Cerebral angiogram yesterday with suspicious abnormal vessel around C3 area. Will repeat Cerebral angiogram in 6 days and MRI brain. Pt is alert and following commands. R groin is CDI with no hematoma. Pulses are intact. Pt re-intubated yesterday after she did not tolerate extubation.  updated at bedside. RESP:PRVC @ 50%: AB.4/42/147. lungs cta. Will cont decadron 4mg IV every 4 hrs today and stop in am. AA nebs prn now. CV:-160 goal. 2D Echo: EF 55-60%, Trop neg. SCD, lovenox. Metoprolol 50mg po bid. HEME:HH 9.5/30,  NEPH:bun/cr: 15/0.6 GI: IVF NS @ 75cc/hr. Pepcid, senna. Tolerating TF well. ID:TM: 100.2: pan cx sent, neg thus far, but started on Cef/Vanc D2/10 due to continued fever. LINES:RUE: PICC. Right radial virgil.   
   
[facility] paperwork given to  this am, has been filled out by Dr. Carleen Montenegro.   
   
  
  
  
I have seen and examined the patient and agree with the findings of the history and the exam.   
   
NEURO: Pt with cervical spine hemorrhage at C3 and edema secondary to vascular abnormality, cavernoma vs AVM. CTA of the head and neck shows a questionable large vessel at the C3 area. MRI of the C-spine shows an area of hemorrhage at C3 that is suspicious for cavernoma. The angio shows that there is a very small abnormal vessel coming off of the anterior spinal artery at C3. However there is no definite AVM or AVF seen. I will need to repeat the angiogram after some of the blood has resolved in a week. She will also need a repeat MRI brain in a week. She has good strength on the left side now. I updated the family and patient at bedside. RESP: Patient reintubated yesterday. PRVC @ 50%: AB.4/42/147. Chest CT showed no abnormality. CXR remains clear. + secretions. Cultures negative so far.  Decadron given x 48 hours. CV:-160 goal. 2D Echo EF 55-60%. Trop neg. Metoprolol 50mg BID. Cardene gtt PRN. HEME:HH 9.5/30 . SCDs/Lovenox. NEPH:bun/cr: 15/0.6 GI:NPO. IVF NS @ 75cc/hr. Pepcid, senna. TF restarted. ID:TM: 100.2: pan cx sent, neg thus far. Started on Cefepime/vanv D2/10. LINES:RUE: PICC. Right radial virgil. A/P  
   
Principal Problem:  
  AVM (arteriovenous malformation) spine (2020)  
  Active Problems:  
  Cavernoma (2/3/2020)    
  Acute respiratory failure with hypoxia ([address] (2/3/2020)    
  Edema of spinal cord ([address] (2/3/2020)    
  Acute left-sided weakness (2/3/2020 Recommendations: Continue Acute Rehab Program  
Coordination of rehab/medical care Counseling of PM & R care issues management Monitoring and management of medical conditions per plan of care/orders Discussion with Family/Caregiver/Staff Reviewed Therapies/Labs/Medications/Records  
   
  
  
PT NOTE Ms. Lesley Fields is a 53 y.o. female in the hospital for an AVM with edema noted around the third cervical vertebrae.  Per EV note, pt's SBP should be kept within 100-160 range.  
   
2/9/20- Pt alert and following commands well today.  She appeared to have some L side neglect but could be due to neck pain. Pt performed bed mobility with Miguel x 2 and cuing for safety awareness.  She was able to scoot forward on EOB with Miguel and good-fair sitting balance.  Pt performed several STS with Miguel x 2 as well as ambulating short distance.  Pt performed standing pre-gait activities that included weight shifting onto L LE as well as balance in semi-tandem stance.  She has progressed from last session in mobility and ambulation.  
    At this time, patient is appropriate for Co-treatment with occupational therapy due to patient's decreased overall endurance/tolerance levels, as well as need for high level skilled assistance to complete functional transfers/mobility and functional tasks. Christiane Garcia is appropriate for a multidisciplinary co-treatment of PT and OT to address goals of both disciplines.   
   
   
  
This section established at most recent assessment PROBLEM LIST (Impairments causing functional limitations): 1. Decreased Strength 2. Decreased ADL/Functional Activities 3. Decreased Transfer Abilities 4. Decreased Ambulation Ability/Technique 5. Decreased Balance 6. Decreased Activity Tolerance 7. Increased Fatigue INTERVENTIONS PLANNED: (Benefits and precautions of physical therapy have been discussed with the patient.) 1. Balance Exercise 2. Bed Mobility 3. Family Education 4. Gait Training 5. Neuromuscular Re-education/Strengthening 6. Therapeutic Activites 7. Therapeutic Exercise/Strengthening 8. Transfer Training  
   
TREATMENT PLAN: Frequency/Duration: 3 times a week for duration of hospital stay Rehabilitation Potential For Stated Goals: Excellent  
   
REHAB RECOMMENDATIONS (at time of discharge pending progress):    
Placement: It is my opinion, based on this patient's performance to date, that Ms. Taras Win may benefit from intensive therapy at an [address] [place] [address] after discharge due to a probable need for multiple therapy disciplines and potential to make ongoing and sustainable functional improvement that is of practical value. Ethelene Gauss Equipment:   
\" None at this time Ms. Taras Win presents in the ICU for AVM s/p angiogram. Upon arrival, pt supine in bed, currently intubated, however alert. Father at bedside to provide background information. At baseline, pt was living with  and daughter in a 1-story home with 2 steps to enter. Pt was independent with ADLs, IADLs, functional mobility and work tasks; still drives. No hx of falls.   
   
2/6/20: Pt was supine in the bed upon arrival. Pt is currently intubated with PT and nursing at bedside for assistance with patient's transfer and line management. PT/OT co-treatment completed to increase safety and to increase activity levels. Pt was alert and followed commands well. Pt does have her head turned to the R with encouragement to look to the L with activities. Pt participated in functional transfers with minimal assistance x 2 with nursing helping with lines. Pt worked on prolonged standing with activities to encourage improved strength and functional use of the L UE with activities. Pt tends to move arm from elbow flex/extension with little movement at the shoulder. Pt provided with R UE facilitation to promote more shoulder motion. Pt did well with encouragement for weightbearing through the L UE. Pt needed minimal assistance x 1-2 with balance activities.  Pt left sitting up in the chair at the end of the session. Pt making progress towards the above goals.     
  
This section established at most recent assessment PROBLEM LIST (Impairments causing functional limitations): 1. Decreased Strength 2. Decreased ADL/Functional Activities 3. Decreased Transfer Abilities 4. Decreased Ambulation Ability/Technique 5. Decreased Balance 6. Decreased Activity Tolerance INTERVENTIONS PLANNED: (Benefits and precautions of occupational therapy have been discussed with the patient.) 1. Activities of daily living training 2. Adaptive equipment training 3. Balance training 4. Clothing management 5. Community reintergration 6. Donning&doffing training 7. Neuromuscular re-eduation 8. Re-evaluation 9. Therapeutic activity 10. Therapeutic exercise  
   
TREATMENT PLAN: Frequency/Duration: Follow patient 3x/week to address above goals. Rehabilitation Potential For Stated Goals: Good  
   
REHAB RECOMMENDATIONS (at time of discharge pending progress):    
Placement: It is my opinion, based on this patient's performance to date, that Ms. Melita Rivera may benefit from intensive therapy at an [address] [place] [address] after discharge due to a probable need for multiple therapy disciplines and potential to make ongoing and sustainable functional improvement that is of practical value. Sales Navas Equipment:   
\" TBD Nutrition F/U:  
TF Management for Dr. Margoth Canseco, NP. Assessment:  
Weight 98.6 kg (ICU bed 2/3/2020), edema - none. The patient was extubated yesterday briefly but was re-intubated. TF was resumed at the goal rate. Good GI tolerance is reported with low gastric residuals, however no bowel movement has been reported yet. Labs are remarkable for sodium 144 and AM glucose 148. Noted that Decadron was started yesterday. Enteral Access:  
           NGT. Macronutrient Needs (CBW 98 kg): Estimated calorie needs - 4022-9036 kenny/day (11-14 kenny/kg/day) Estimated protein needs - >110 gm pro/day (>2 gm pro/kgIBW/day) IBW - 54.5 kg Max CHO/day - 172 gm CHO/day (50% kenny/day) Fluid/day - 1.1-1.4 liters/day (1 ml/kenny/day) Intake/Comparative Standards:  
Current intake of TF (Promote @ 50 ml/hr with a 10 ml/hr water flush via NGT and 2 pouches [employer] twice daily via NGT with a 50 ml water flush before and after protein) provides 1360 calories/day (100% calorie goal), 120 grams protein/day (100% protein goal), 156 grams CHO/day (does not exceed max CHO limit) and 1448 ml water/day (100% fluid goal). Intervention:   
Meals and Snacks: NPO. Enteral Nutrition: Continue current TF, water flushes and liquid protein. Mineral Supplement Therapy: Nutrition Support Orders/Electrolyte Management Replacement Protocols are active on the [facility] for potassium and magnesium only. Nutrition-Related Medication Management: Start Senna daily for bowel management. Coordination of Nutrition Care by a Nutrition Professional: AM ICU rounds, collaboration with Omar Barros. Nutrition Discharge Plan: Too soon to determine. CM NOTE Chart reviewed as pt remains ICU. Attempted extubation yesterday and pt unable to tolerate with re-intubation. IRC/Dr. Edmond Ramirez continue to follow for d/c POC and STR needs. CM will continue to follow for any assist and d/c POC when medically stable. RT NOTE  
entilator check complete; patient has a #7. 0 ET tube secured at the 22 at the lip.  Patient is not sedated.  Patient is able to follow commands.  Breath sounds are clear and diminished. Mahogany Fanning is midline, Negative for subcutaneous air, and chest excursion is symmetric. Patient is also Negative for cyanosis and is Negative for pitting edema.  All alarms are set and audible.  Resuscitation bag is at the head of the bed.    
   
Ventilator Settings Mode FIO2 Rate Tidal Volume Pressure PEEP I:E Ratio PRVC 52 %   450 ml 8 cm H20 1:3.13   
   
Peak airway pressure: 14.6 cm H2O   
 Minute ventilation: 7.2 l/min   
   
ABG: No results for input(s): PH, PCO2, PO2, HCO3 in the last 72 hours  
  
   
Systemic or Infectious Condition GRG - Care Day 4 (2/5/2020) by Sandra Beverly RN  
 
   
Review Status Review Entered Completed 2/5/2020 13:29  
   
Criteria Review Care Day: 4 Care Date: 2/5/2020 Level of Care: ICU Guideline Day 3 Level Of Care ( ) * Activity level acceptable Clinical Status ( ) * Hemodynamic stability ( ) * Respiratory status acceptable ( ) * Neurologic status acceptable ( ) * Temperature status acceptable ( ) * No infection, or status acceptable ( ) * No neutropenia, or status acceptable ( ) * Special infection or injury situations absent   
( ) * Electrolyte status acceptable ( ) * General Discharge Criteria met Interventions ( ) * Intake acceptable ( ) * No inpatient interventions needed * Milestone Additional Notes 2/5  
  
100 rectal 76 17 100% 138/74  
  
2/5/2020 03:04 Chloride 110 (H) Anion gap 5 (L) Glucose 121 (H) Creatinine 0.54 (L)  
  
  
2/5/2020 03:04 RBC 3.51 (L) HGB 9.7 (L) HCT 31.1 (L) MCHC 31.2 (L) PLATELET 022 (L)  
  
  
2/5/2020 11:48  
pCO2 (POC) 46.0 (H)  
pO2 (POC) 306 (H) HCO3 (POC) 28.1 (H)  
sO2 (POC) 100 (H) Isidra Soto 1. ET tube tip is in satisfactory position. Mild left basilar atelectasis.  
   
2. No significant change compared to prior exam  
PCXR Stable portable chest  
   
KUB Feeding tube as described. Orders IP ICU, Full Code, VS, IP Stroke Coordinator, CM Consult, Rehab Medicine, Fall Precautions, SCDS, TUBE CARE, NG insertion, Jalloh, Non Violent Restraints, PICC Insertion, NPO, Meds Meds NS @ 75ml/hr IV cont, Tylenol 650 mg per NG tube x3,  Duoneb x2, Lipitor 80 mg per NG tube x1, Fentanyl 50 mcg IV x 4 Toradol 15 mg IV x2, Zofran 4 mg IV x1, Zofran 8 mg IV x1, Fentanyl 100 mcg IV x1, Nicardepine 5mg/hr IV cont, Versed 2 mg IV x1 Decadron 4 mg IV x1, Cardene 10 mg /hr IV cont, Diprivan 25 mcg/hg/min IV cont, Decadron 10 mg IV x1, Neuro surgery Note Pt ao this am and moving left side well Pt extubated this am, noted to have increased wheezing post removal.  
Pt given decadron 10mg IV x1, scheduled AA nebs q4 and pt given racemic epi neb x1. Pt in NAD. Will have low threshold for re-intubation due to cervical edema/hemorrhage.   
   
Physical Exam:   
General: Alert, cooperative. Extubated this am.   
Eyes: PERRL, EOMs intact. Neck: Supple, symmetrical, trachea midline, no adenopathy, thyroid: no enlargment/tenderness/nodules, no carotid bruit and no JVD. Lungs:   Rhonchi bilat/coarse. Heart: Regular rate and rhythm, S1, S2 normal, no murmur, click, rub or gallop. Abdomen:   Soft, non-tender. Bowel sounds normal. No masses,  No organomegaly. Extremities: Extremities normal, atraumatic, no cyanosis or edema. Moving Left side better today, can hold LUE/LLE off bed to command. Pulses: 2+ and symmetric all extremities. Skin: Skin color, texture, turgor normal. No rashes or lesions Neurologic: Alert and following commands, post extubation pt with coarse lungs, increased WOB. Cont to follow commands Principal Problem:  
  AVM (arteriovenous malformation) spine (2/2/2020)  
  Active Problems:  
  Cavernoma (2/3/2020)    
  Acute respiratory failure with hypoxia ([address] (2/3/2020)    
  Edema of spinal cord ([address] (2/3/2020)    
  Acute left-sided weakness (2/3/2020)    
   
   
NEURO:pt with cervical hemorrhage and edema secondary to vascular abnormality, cavernoma vs AVM. Pt remains intubated, but is alert and following commands this am. Cerebral angiogram yesterday with suspicious abnormal vessel around C3 area. Will repeat Cerebral angiogram in 6 days and MRI brain. Pt is alert and following commands.  R groin is CDI with no hematoma. Pulses are intact. Pt extubated this am.  
RESP:PRVC @ 45%: AB.38/46/186. no distress. Pt tolerated PS last 24 hours and was placed on CP this am, doing well with no wheezes noted. Pt extubated and acute wheezes noted/coarse all lung fields. Decadron 10mg IV x1 and scheduled Q4 today. Racemic epi neb given. AA nebs ordered. Will repeat ABG. Low threshold for re-intubation due to cervical hemorrhage/edema. CV:-160 goal. No pressors needed. 2D Echo: EF 55-60%, Trop neg. SCD, lovenox. Metoprolol 25mg po bid. Will restart cardene gtt this am, -190's post extubation. HEME:HH ,  NEPH:bun/cr: 18/0.5 GI: IVF NS @ 75cc/hr. Pepcid, senna. TF held for extubation this am. Will keep NPO for now. Shaw Anglin ID:TM: 100.7: pan cx sent, neg thus far. resp with normal navin. LINES:RUE: PICC. Right radial virgil. Pt extubated this am at 0830. She had noted increased wheezes and coarse bilat lung sounds post extubation. She was given decadron 10mg IV x1, AA neb x1 and a racemic epi Neb x1 without any relief. The pt was awake, but with increased labored RR.  
   
ABG: on 4L NC: 7.19/78/119.  
   
I discussed the pt with Dr. Alexandra Salas and she was re-intubated. I was unable to view cords secondary to edema in airway on first attempt. I changed to MAC 3 blade and was able to visualize cords and a 7.0 OETT was placed without difficulty. Airway secure, pt placed back on PRVC @ 100%, P8. Will repeat ABG and wean vent as tolerated.  
   
Pt's  updated that she could not tolerate extubation and he is very understanding. CXR confirms OETT/NGT.  
   
Dr. Manan Sotelo was at bedside with 2nd attempt and very grateful for his help. Endotracheal Intubation Date: 20 Time: 0930 Indication: Respiratory Distress Attending: Dr. Edmundo Jensen.    
   
A time-out was completed verifying correct patient, procedure, site, positioning, and special equipment if applicable. The patient was placed in a flat position. Sedation was obtained using  Etomidate 20mg, Ancetine 100mcg IV x1, fentanyl 50mcg IV. The patient was easily ventilated using an ambu bag. The <GLIDESCOPE TECHNOLOGY/ MAC 4  BLADE> was used and inserted into the oropharynx at which time there was a Grade 2 view of the vocal cords. A 7.5-Upper sorbian endotracheal tube was attempted, but unable to pass through cords and difficulty visualizing cords. Pt was ventilated via ambu bag without difficulty and MAC 3 glidescope used with 7.0 OETT placed x1 attempt and visualized going through the vocal cords. The stylette was removed. Colorimetric change was visualized on the CO2 meter. Breath sounds were heard in both lung fields equally. The endotracheal tube was placed at 22 cm, measured at the teeth.   
   
After 1st attempt I asked Dr. Aye Krishna was in ICU rounding,  to evaluate pt as well. He came into room and was available for any acute difficult airway. I very much appreciate his guidance and help during re-intubation of Ms. Montiel.   
   
A chest x-ray was ordered to assess for pneumothorax and verify endotrachealtube placement. It was viewed at bedside by myself and noted to be in good position above christine. NGT replaced and in good position.   
   
I updated  about need to re-intubate pt and he verbalized understanding.  
 ST NOTE  
SPEECH PATHOLOGY NOTE:  
   
Patient extubated briefly this AM, but is now re-intubated. Will continue to follow with plans to initiate dysphagia evaluation as medically appropriate. PT NOTE  
PT Note:  
Per chart pt extubated this AM and then required re-intubation.  Will hold treatment today and re-attempt pending schedule and pt status. Thanks,  
  
OT NOTE  
OT NOTE:  
Patient was extubated today but then had to be re-intubated. Will HOLD treatment for today and re-attempt when pt is medically appropriate.

## 2020-02-07 NOTE — PROGRESS NOTES
Pharmacokinetic Consult to Pharmacist    Denisa Arsalan is a 53 y.o. female being treated for VAP with vancomycin. Height: 5' 4\" (162.6 cm)  Weight: 112.3 kg (247 lb 9.2 oz)  Lab Results   Component Value Date/Time    BUN 21 02/07/2020 03:09 AM    Creatinine 0.58 (L) 02/07/2020 03:09 AM    WBC 10.7 02/07/2020 03:09 AM      Estimated Creatinine Clearance: 123.2 mL/min (A) (by C-G formula based on SCr of 0.58 mg/dL (L)). Lab Results   Component Value Date/Time    Vancomycin,trough 6.6 02/07/2020 03:09 AM       Day 3 of vancomycin. Goal trough is 15-20. Will change dose to 1250 mg q 8 hours due to subtherapeutic trough. Will continue to follow patient.       Thank you,  Virginie Robb, PharmD  Clinical Pharmacy PGY1 Resident   [phone]

## 2020-02-07 NOTE — PROGRESS NOTES
A follow up visit was made to the patient. Emotional support, spiritual presence and   prayer were provided for the patient.       CAMILLE Grover

## 2020-02-07 NOTE — ADT AUTH CERT NOTES
Utilization Reviews  
 
   
Systemic or Infectious Condition GRG - Care Day 5 (2/6/2020) by Scott Rai RN  
 
   
Review Status Review Entered Completed 2/6/2020 16:59  
   
Criteria Review Care Day: 5 Care Date: 2/6/2020 Level of Care: ICU Guideline Day 3 Level Of Care ( ) * Activity level acceptable Clinical Status ( ) * Hemodynamic stability ( ) * Respiratory status acceptable ( ) * Neurologic status acceptable ( ) * Temperature status acceptable ( ) * No infection, or status acceptable ( ) * No neutropenia, or status acceptable ( ) * Special infection or injury situations absent   
( ) * Electrolyte status acceptable ( ) * General Discharge Criteria met Interventions ( ) * Intake acceptable ( ) * No inpatient interventions needed * Milestone Additional Notes 2/6  
  
99 84 24 100% 145/73  
  
2/6/2020 03:43 RBC 3.43 (L) HGB 9.5 (L) HCT 30.2 (L) MCHC 31.5 PLATELET 629 (L)  
  
  
2/6/2020 15:35 Chloride 111 (H) Anion gap 6 (L) Glucose 169 (H) Creatinine 0.57 (L)  
  
  
2/6/2020 02:51  
pO2 (POC) 147 (H) HCO3 (POC) 26.9 (H)  
sO2 (POC) 99 (H) PCXR  
1. Mild bibasilar atelectasis. ET tube tip is in satisfactory position.  
   
2. No significant change. Orders IP ICU, Full Code, VS, IP Stroke Coordinator, CM Consult, Rehab Medicine, Fall Precautions, SCDS, TUBE CARE, NG insertion, Jalloh, Non Violent Restraints, PICC Insertion, NPO, Meds NS @ 75ml/hr IV cont, Tylenol 650 mg per NG tube x3,  Duoneb x2, Lipitor 80 mg per NG tube x1, Fentanyl 50 mcg IV x 4 Toradol 15 mg IV x2, Zofran 4 mg IV x1, Zofran 8 mg IV x1, Fentanyl 100 mcg IV x1, Nicardepine 5mg/hr IV cont, Versed 2 mg IV x1 Decadron 4 mg IV x1, Cardene 10 mg /hr IV cont, Diprivan 25 mcg/hg/min IV cont, Decadron 10 mg IV x1, Neuro surgery Note  
 Pt alert and following commands. Remains intubated  updated at bedside this am  
 Physical Exam:   
General: Alert, cooperative. Extubated this am.   
Eyes: PERRL, EOMs intact. Neck: Supple, symmetrical, trachea midline, no adenopathy, thyroid: no enlargment/tenderness/nodules, no carotid bruit and no JVD. Lungs:   Rhonchi bilat/coarse. Heart: Regular rate and rhythm, S1, S2 normal, no murmur, click, rub or gallop. Abdomen:   Soft, non-tender. Bowel sounds normal. No masses,  No organomegaly. Extremities: Extremities normal, atraumatic, no cyanosis or edema. Moving Left side better today, can hold LUE/LLE off bed to command. Pulses: 2+ and symmetric all extremities. Skin: Skin color, texture, turgor normal. No rashes or lesions Neurologic: Alert and following commands, post extubation pt with coarse lungs, increased WOB. Cont to follow commands Principal Problem:  
  AVM (arteriovenous malformation) spine (2020)  
  Active Problems:  
  Cavernoma (2/3/2020)    
  Acute respiratory failure with hypoxia ([address] (2/3/2020)    
  Edema of spinal cord ([address] (2/3/2020)    
  Acute left-sided weakness (2/3/2020)    
   
   
NEURO:pt with cervical hemorrhage and edema secondary to vascular abnormality, cavernoma vs AVM. Pt remains intubated, but is alert and following commands this am. Cerebral angiogram yesterday with suspicious abnormal vessel around C3 area. Will repeat Cerebral angiogram in 6 days and MRI brain. Pt is alert and following commands. R groin is CDI with no hematoma. Pulses are intact. Pt re-intubated yesterday after she did not tolerate extubation.  updated at bedside. RESP:PRVC @ 50%: AB.4/42/147. lungs cta. Will cont decadron 4mg IV every 4 hrs today and stop in am. AA nebs prn now. CV:-160 goal. 2D Echo: EF 55-60%, Trop neg. SCD, lovenox. Metoprolol 50mg po bid. HEME:HH 9.5/30,  NEPH:bun/cr: 15/0.6 GI: IVF NS @ 75cc/hr. Pepcid, senna. Tolerating TF well. ID:TM: 100.2: pan cx sent, neg thus far, but started on Cef/Vanc D2/10 due to continued fever. LINES:RUE: PICC. Right radial virgil.   
   
[place] paperwork given to  this am, has been filled out by Dr. Petty Prior.   
   
  
  
  
I have seen and examined the patient and agree with the findings of the history and the exam.   
   
NEURO: Pt with cervical spine hemorrhage at C3 and edema secondary to vascular abnormality, cavernoma vs AVM. CTA of the head and neck shows a questionable large vessel at the C3 area. MRI of the C-spine shows an area of hemorrhage at C3 that is suspicious for cavernoma. The angio shows that there is a very small abnormal vessel coming off of the anterior spinal artery at C3. However there is no definite AVM or AVF seen. I will need to repeat the angiogram after some of the blood has resolved in a week. She will also need a repeat MRI brain in a week. She has good strength on the left side now. I updated the family and patient at bedside. RESP: Patient reintubated yesterday. PRVC @ 50%: AB.4/42/147. Chest CT showed no abnormality. CXR remains clear. + secretions. Cultures negative so far.  Decadron given x 48 hours. CV:-160 goal. 2D Echo EF 55-60%. Trop neg. Metoprolol 50mg BID. Cardene gtt PRN. HEME:HH 9.5/30 . SCDs/Lovenox. NEPH:bun/cr: 15/0.6 GI:NPO. IVF NS @ 75cc/hr. Pepcid, senna. TF restarted. ID:TM: 100.2: pan cx sent, neg thus far. Started on Cefepime/vanv D2/10. LINES:RUE: PICC. Right radial virgil. A/P  
   
Principal Problem:  
  AVM (arteriovenous malformation) spine (2020)  
  Active Problems:  
  Cavernoma (2/3/2020)    
  Acute respiratory failure with hypoxia ([address] (2/3/2020)    
  Edema of spinal cord ([address] (2/3/2020)    
  Acute left-sided weakness (2/3/2020 Recommendations: Continue Acute Rehab Program  
Coordination of rehab/medical care Counseling of PM & R care issues management Monitoring and management of medical conditions per plan of care/orders Discussion with Family/Caregiver/Staff Reviewed Therapies/Labs/Medications/Records  
   
  
  
PT NOTE Ms. Kinza Sifuentes is a 53 y.o. female in the hospital for an AVM with edema noted around the third cervical vertebrae.  Per EV note, pt's SBP should be kept within 100-160 range.  
   
2/9/20- Pt alert and following commands well today.  She appeared to have some L side neglect but could be due to neck pain. Pt performed bed mobility with Miguel x 2 and cuing for safety awareness.  She was able to scoot forward on EOB with Miguel and good-fair sitting balance.  Pt performed several STS with Miguel x 2 as well as ambulating short distance.  Pt performed standing pre-gait activities that included weight shifting onto L LE as well as balance in semi-tandem stance.  She has progressed from last session in mobility and ambulation.  
    At this time, patient is appropriate for Co-treatment with occupational therapy due to patient's decreased overall endurance/tolerance levels, as well as need for high level skilled assistance to complete functional transfers/mobility and functional tasks. Terra Lam is appropriate for a multidisciplinary co-treatment of PT and OT to address goals of both disciplines.   
   
   
  
This section established at most recent assessment PROBLEM LIST (Impairments causing functional limitations): 1. Decreased Strength 2. Decreased ADL/Functional Activities 3. Decreased Transfer Abilities 4. Decreased Ambulation Ability/Technique 5. Decreased Balance 6. Decreased Activity Tolerance 7. Increased Fatigue INTERVENTIONS PLANNED: (Benefits and precautions of physical therapy have been discussed with the patient.) 1. Balance Exercise 2. Bed Mobility 3. Family Education 4. Gait Training 5. Neuromuscular Re-education/Strengthening 6. Therapeutic Activites 7. Therapeutic Exercise/Strengthening 8. Transfer Training  
   
TREATMENT PLAN: Frequency/Duration: 3 times a week for duration of hospital stay Rehabilitation Potential For Stated Goals: Excellent  
   
REHAB RECOMMENDATIONS (at time of discharge pending progress):    
Placement: It is my opinion, based on this patient's performance to date, that Ms. Carolina Fields may benefit from intensive therapy at an [address] [place] [address] after discharge due to a probable need for multiple therapy disciplines and potential to make ongoing and sustainable functional improvement that is of practical value. Dorchester Mellow Equipment:   
\" None at this time Ms. Carolina Fields presents in the ICU for AVM s/p angiogram. Upon arrival, pt supine in bed, currently intubated, however alert. Father at bedside to provide background information. At baseline, pt was living with  and daughter in a 1-story home with 2 steps to enter. Pt was independent with ADLs, IADLs, functional mobility and work tasks; still drives. No hx of falls.   
   
2/6/20: Pt was supine in the bed upon arrival. Pt is currently intubated with PT and nursing at bedside for assistance with patient's transfer and line management. PT/OT co-treatment completed to increase safety and to increase activity levels. Pt was alert and followed commands well. Pt does have her head turned to the R with encouragement to look to the L with activities. Pt participated in functional transfers with minimal assistance x 2 with nursing helping with lines. Pt worked on prolonged standing with activities to encourage improved strength and functional use of the L UE with activities. Pt tends to move arm from elbow flex/extension with little movement at the shoulder. Pt provided with R UE facilitation to promote more shoulder motion. Pt did well with encouragement for weightbearing through the L UE. Pt needed minimal assistance x 1-2 with balance activities.  Pt left sitting up in the chair at the end of the session. Pt making progress towards the above goals.     
  
This section established at most recent assessment PROBLEM LIST (Impairments causing functional limitations): 1. Decreased Strength 2. Decreased ADL/Functional Activities 3. Decreased Transfer Abilities 4. Decreased Ambulation Ability/Technique 5. Decreased Balance 6. Decreased Activity Tolerance INTERVENTIONS PLANNED: (Benefits and precautions of occupational therapy have been discussed with the patient.) 1. Activities of daily living training 2. Adaptive equipment training 3. Balance training 4. Clothing management 5. Community reintergration 6. Donning&doffing training 7. Neuromuscular re-eduation 8. Re-evaluation 9. Therapeutic activity 10. Therapeutic exercise  
   
TREATMENT PLAN: Frequency/Duration: Follow patient 3x/week to address above goals. Rehabilitation Potential For Stated Goals: Good  
   
REHAB RECOMMENDATIONS (at time of discharge pending progress):    
Placement: It is my opinion, based on this patient's performance to date, that Ms. Maddison Nicholas may benefit from intensive therapy at an [address] [place] [address] after discharge due to a probable need for multiple therapy disciplines and potential to make ongoing and sustainable functional improvement that is of practical value. Joshua Rob Equipment:   
\" TBD Nutrition F/U:  
TF Management for Dr. Jeff Mackey, NP. Assessment:  
Weight 98.6 kg (ICU bed 2/3/2020), edema - none. The patient was extubated yesterday briefly but was re-intubated. TF was resumed at the goal rate. Good GI tolerance is reported with low gastric residuals, however no bowel movement has been reported yet. Labs are remarkable for sodium 144 and AM glucose 148. Noted that Decadron was started yesterday. Enteral Access:  
           NGT. Macronutrient Needs (CBW 98 kg): Estimated calorie needs - 0466-3306 kenny/day (11-14 kenny/kg/day) Estimated protein needs - >110 gm pro/day (>2 gm pro/kgIBW/day) IBW - 54.5 kg Max CHO/day - 172 gm CHO/day (50% kenny/day) Fluid/day - 1.1-1.4 liters/day (1 ml/kenny/day) Intake/Comparative Standards:  
Current intake of TF (Promote @ 50 ml/hr with a 10 ml/hr water flush via NGT and 2 pouches [employer] twice daily via NGT with a 50 ml water flush before and after protein) provides 1360 calories/day (100% calorie goal), 120 grams protein/day (100% protein goal), 156 grams CHO/day (does not exceed max CHO limit) and 1448 ml water/day (100% fluid goal). Intervention:   
Meals and Snacks: NPO. Enteral Nutrition: Continue current TF, water flushes and liquid protein. Mineral Supplement Therapy: Nutrition Support Orders/Electrolyte Management Replacement Protocols are active on the [facility] for potassium and magnesium only. Nutrition-Related Medication Management: Start Senna daily for bowel management. Coordination of Nutrition Care by a Nutrition Professional: AM ICU rounds, collaboration with Lidia Luevano. Nutrition Discharge Plan: Too soon to determine. CM NOTE Chart reviewed as pt remains ICU. Attempted extubation yesterday and pt unable to tolerate with re-intubation. IRC/Dr. Bib Goldsmith continue to follow for d/c POC and STR needs. CM will continue to follow for any assist and d/c POC when medically stable. RT NOTE  
entilator check complete; patient has a #7. 0 ET tube secured at the 22 at the lip.  Patient is not sedated.  Patient is able to follow commands.  Breath sounds are clear and diminished. Radha Lack is midline, Negative for subcutaneous air, and chest excursion is symmetric. Patient is also Negative for cyanosis and is Negative for pitting edema.  All alarms are set and audible.  Resuscitation bag is at the head of the bed.    
   
Ventilator Settings Mode FIO2 Rate Tidal Volume Pressure PEEP I:E Ratio PRVC 52 %   450 ml 8 cm H20 1:3.13   
   
Peak airway pressure: 14.6 cm H2O   
 Minute ventilation: 7.2 l/min   
   
ABG: No results for input(s): PH, PCO2, PO2, HCO3 in the last 72 hours  
  
   
Systemic or Infectious Condition GRG - Care Day 4 (2/5/2020) by Edmund Busby RN  
 
   
Review Status Review Entered Completed 2/5/2020 13:29  
   
Criteria Review Care Day: 4 Care Date: 2/5/2020 Level of Care: ICU Guideline Day 3 Level Of Care ( ) * Activity level acceptable Clinical Status ( ) * Hemodynamic stability ( ) * Respiratory status acceptable ( ) * Neurologic status acceptable ( ) * Temperature status acceptable ( ) * No infection, or status acceptable ( ) * No neutropenia, or status acceptable ( ) * Special infection or injury situations absent   
( ) * Electrolyte status acceptable ( ) * General Discharge Criteria met Interventions ( ) * Intake acceptable ( ) * No inpatient interventions needed * Milestone Additional Notes 2/5  
  
100 rectal 76 17 100% 138/74  
  
2/5/2020 03:04 Chloride 110 (H) Anion gap 5 (L) Glucose 121 (H) Creatinine 0.54 (L)  
  
  
2/5/2020 03:04 RBC 3.51 (L) HGB 9.7 (L) HCT 31.1 (L) MCHC 31.2 (L) PLATELET 250 (L)  
  
  
2/5/2020 11:48  
pCO2 (POC) 46.0 (H)  
pO2 (POC) 306 (H) HCO3 (POC) 28.1 (H)  
sO2 (POC) 100 (H) Rosmery Garciach 1. ET tube tip is in satisfactory position. Mild left basilar atelectasis.  
   
2. No significant change compared to prior exam  
PCXR Stable portable chest  
   
KUB Feeding tube as described. Orders IP ICU, Full Code, VS, IP Stroke Coordinator, CM Consult, Rehab Medicine, Fall Precautions, SCDS, TUBE CARE, NG insertion, Jalloh, Non Violent Restraints, PICC Insertion, NPO, Meds Meds NS @ 75ml/hr IV cont, Tylenol 650 mg per NG tube x3,  Duoneb x2, Lipitor 80 mg per NG tube x1, Fentanyl 50 mcg IV x 4 Toradol 15 mg IV x2, Zofran 4 mg IV x1, Zofran 8 mg IV x1, Fentanyl 100 mcg IV x1, Nicardepine 5mg/hr IV cont, Versed 2 mg IV x1 Decadron 4 mg IV x1, Cardene 10 mg /hr IV cont, Diprivan 25 mcg/hg/min IV cont, Decadron 10 mg IV x1, Neuro surgery Note Pt ao this am and moving left side well Pt extubated this am, noted to have increased wheezing post removal.  
Pt given decadron 10mg IV x1, scheduled AA nebs q4 and pt given racemic epi neb x1. Pt in NAD. Will have low threshold for re-intubation due to cervical edema/hemorrhage.   
   
Physical Exam:   
General: Alert, cooperative. Extubated this am.   
Eyes: PERRL, EOMs intact. Neck: Supple, symmetrical, trachea midline, no adenopathy, thyroid: no enlargment/tenderness/nodules, no carotid bruit and no JVD. Lungs:   Rhonchi bilat/coarse. Heart: Regular rate and rhythm, S1, S2 normal, no murmur, click, rub or gallop. Abdomen:   Soft, non-tender. Bowel sounds normal. No masses,  No organomegaly. Extremities: Extremities normal, atraumatic, no cyanosis or edema. Moving Left side better today, can hold LUE/LLE off bed to command. Pulses: 2+ and symmetric all extremities. Skin: Skin color, texture, turgor normal. No rashes or lesions Neurologic: Alert and following commands, post extubation pt with coarse lungs, increased WOB. Cont to follow commands Principal Problem:  
  AVM (arteriovenous malformation) spine (2/2/2020)  
  Active Problems:  
  Cavernoma (2/3/2020)    
  Acute respiratory failure with hypoxia ([address] (2/3/2020)    
  Edema of spinal cord ([address] (2/3/2020)    
  Acute left-sided weakness (2/3/2020)    
   
   
NEURO:pt with cervical hemorrhage and edema secondary to vascular abnormality, cavernoma vs AVM. Pt remains intubated, but is alert and following commands this am. Cerebral angiogram yesterday with suspicious abnormal vessel around C3 area. Will repeat Cerebral angiogram in 6 days and MRI brain. Pt is alert and following commands.  R groin is CDI with no hematoma. Pulses are intact. Pt extubated this am.  
RESP:PRVC @ 45%: AB.38/46/186. no distress. Pt tolerated PS last 24 hours and was placed on CP this am, doing well with no wheezes noted. Pt extubated and acute wheezes noted/coarse all lung fields. Decadron 10mg IV x1 and scheduled Q4 today. Racemic epi neb given. AA nebs ordered. Will repeat ABG. Low threshold for re-intubation due to cervical hemorrhage/edema. CV:-160 goal. No pressors needed. 2D Echo: EF 55-60%, Trop neg. SCD, lovenox. Metoprolol 25mg po bid. Will restart cardene gtt this am, -190's post extubation. HEME:HH ,  NEPH:bun/cr: 18/0.5 GI: IVF NS @ 75cc/hr. Pepcid, senna. TF held for extubation this am. Will keep NPO for now. Laurent Milner ID:TM: 100.7: pan cx sent, neg thus far. resp with normal navin. LINES:RUE: PICC. Right radial virgil. Pt extubated this am at 0830. She had noted increased wheezes and coarse bilat lung sounds post extubation. She was given decadron 10mg IV x1, AA neb x1 and a racemic epi Neb x1 without any relief. The pt was awake, but with increased labored RR.  
   
ABG: on 4L NC: 7.19/78/119.  
   
I discussed the pt with Dr. Anthony Foster and she was re-intubated. I was unable to view cords secondary to edema in airway on first attempt. I changed to MAC 3 blade and was able to visualize cords and a 7.0 OETT was placed without difficulty. Airway secure, pt placed back on PRVC @ 100%, P8. Will repeat ABG and wean vent as tolerated.  
   
Pt's  updated that she could not tolerate extubation and he is very understanding. CXR confirms OETT/NGT.  
   
Dr. Otf Mack was at bedside with 2nd attempt and very grateful for his help. Endotracheal Intubation Date: 20 Time: 09 Indication: Respiratory Distress Attending: Dr. Dana Stinson.    
   
A time-out was completed verifying correct patient, procedure, site, positioning, and special equipment if applicable. The patient was placed in a flat position. Sedation was obtained using  Etomidate 20mg, Ancetine 100mcg IV x1, fentanyl 50mcg IV. The patient was easily ventilated using an ambu bag. The <GLIDESCOPE TECHNOLOGY/ MAC 4  BLADE> was used and inserted into the oropharynx at which time there was a Grade 2 view of the vocal cords. A 7.5-Polish endotracheal tube was attempted, but unable to pass through cords and difficulty visualizing cords. Pt was ventilated via ambu bag without difficulty and MAC 3 glidescope used with 7.0 OETT placed x1 attempt and visualized going through the vocal cords. The stylette was removed. Colorimetric change was visualized on the CO2 meter. Breath sounds were heard in both lung fields equally. The endotracheal tube was placed at 22 cm, measured at the teeth.   
   
After 1st attempt I asked Dr. Tarun Martinez was in ICU rounding,  to evaluate pt as well. He came into room and was available for any acute difficult airway. I very much appreciate his guidance and help during re-intubation of Ms. Montiel.   
   
A chest x-ray was ordered to assess for pneumothorax and verify endotrachealtube placement. It was viewed at bedside by myself and noted to be in good position above christine. NGT replaced and in good position.   
   
I updated  about need to re-intubate pt and he verbalized understanding.  
 ST NOTE  
SPEECH PATHOLOGY NOTE:  
   
Patient extubated briefly this AM, but is now re-intubated. Will continue to follow with plans to initiate dysphagia evaluation as medically appropriate. PT NOTE  
PT Note:  
Per chart pt extubated this AM and then required re-intubation.  Will hold treatment today and re-attempt pending schedule and pt status. Thanks,  
  
OT NOTE  
OT NOTE:  
Patient was extubated today but then had to be re-intubated. Will HOLD treatment for today and re-attempt when pt is medically appropriate.

## 2020-02-07 NOTE — PROGRESS NOTES
Ventilator check complete; patient has a #7. 0 ET tube secured at the 22 at the lip. Patient is not sedated. Patient is able to follow commands. Breath sounds are coarse and diminished. Trachea is midline, Negative for subcutaneous air, and chest excursion is symmetric. Patient is also Negative for cyanosis and is Negative for pitting edema. All alarms are set and audible. Resuscitation bag is at the head of the bed. Ventilator Settings  Mode FIO2 Rate Tidal Volume Pressure PEEP I:E Ratio   PRVC  52 %    450 ml  10 cm H2O(decreased due to higher VT)  8 cm H20  1:3.13      Peak airway pressure: 28.4 cm H2O   Minute ventilation: 11.2 l/min     ABG: No results for input(s): PH, PCO2, PO2, HCO3 in the last 72 hours.       Leny Trujillo, RT

## 2020-02-07 NOTE — PROGRESS NOTES
Ventilator check complete; patient has a #7. 0 ET tube secured at the 22 at her lip. Patient is not sedated. Patient is able to follow commands. Breath sounds are clear and diminished. Trachea is midline, Negative for subcutaneous air, and chest excursion is symmetrical. Patient is also Negative for cyanosis and is Negative for pitting edema. All alarms are set and audible.   Resuscitation bag and mask are at the head of the bed.       Ventilator Settings  Mode FIO2 Rate Tidal Volume Pressure PEEP I:E Ratio   PRVC  50% 16   450 ml    8 cm H20  1:3.13       Peak airway pressure: 24 cm H2O   Minute ventilation: 9.0 l/min

## 2020-02-07 NOTE — PROGRESS NOTES
Problem: Mobility Impaired (Adult and Pediatric)  Goal: *Acute Goals and Plan of Care (Insert Text)  Description  LTG:  (1.)Ms. Giovanni Schwab will move from supine to sit and sit to supine , scoot up and down and roll side to side in bed with MODIFIED INDEPENDENCE within 7 treatment day(s). (2.)Ms. Giovanni Schwab will transfer from bed to chair and chair to bed with STAND BY ASSIST using the least restrictive device within 7 treatment day(s). (3.)Ms. Giovanni Schwab will ambulate with STAND BY ASSIST for 85 feet with the least restrictive device within 7 treatment day(s). (4.)Ms. Giovanni Schwab will perform standing static and dynamic balance activities x 15 minutes with STAND BY ASSIST to improve safety within 7 treatment day(s). ________________________________________________________________________________________________      Outcome: Progressing Towards Goal     PHYSICAL THERAPY: Daily Note and PM 2/7/2020  INPATIENT: PT Visit Days : 3  Payor: [facility] / Plan: [address] / Product Type: PPO /       NAME/AGE/GENDER: Denisa Arriaza is a 53 y.o. 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 Giovanni Schwab is a 53 y.o. female in the hospital for an AVM with edema noted around the third cervical vertebrae. Per EV note, pt's SBP should be kept within 100-160 range. 2/7- Pt sitting in recliner and agreeable to PT. RT present and transitioned pt to transport vent. EV RN and primary RN also assisted with line management. Pt performed several STS transfers today with Miguel x 2 and fair standing balance. Pt placed on portable monitor and placed in w/c.   Pt ambulated in nina with chair follow while pushing w/c. She was given cuing for steady gait speed, increased step length, and posture. Pt took sitting rest break and then ambulated back to room. She performed seated activities as well with cuing for forward weight shift. Pt progressed in ambulation today. At this time, patient is appropriate for Co-treatment with occupational therapy due to patient's decreased overall endurance/tolerance levels, as well as need for high level skilled assistance to complete functional transfers/mobility and functional tasks. Veronica Stringer is appropriate for a multidisciplinary co-treatment of PT and OT to address goals of both disciplines. This section established at most recent assessment   PROBLEM LIST (Impairments causing functional limitations):  1. Decreased Strength  2. Decreased ADL/Functional Activities  3. Decreased Transfer Abilities  4. Decreased Ambulation Ability/Technique  5. Decreased Balance  6. Decreased Activity Tolerance  7. Increased Fatigue   INTERVENTIONS PLANNED: (Benefits and precautions of physical therapy have been discussed with the patient.)  1. Balance Exercise  2. Bed Mobility  3. Family Education  4. Gait Training  5. Neuromuscular Re-education/Strengthening  6. Therapeutic Activites  7. Therapeutic Exercise/Strengthening  8. Transfer Training     TREATMENT PLAN: Frequency/Duration: 3 times a week for duration of hospital stay  Rehabilitation Potential For Stated Goals: Excellent     REHAB RECOMMENDATIONS (at time of discharge pending progress):    Placement: It is my opinion, based on this patient's performance to date, that Ms. Joe Hensley may benefit from intensive therapy at an [address] [place] [address] after discharge due to a probable need for multiple therapy disciplines and potential to make ongoing and sustainable functional improvement that is of practical value. .  Equipment:    None at this time              HISTORY:   History of Present Injury/Illness (Reason for Referral): AVM  Past Medical History/Comorbidities:   Ms. Melita Rivera  has no past medical history on file. Ms. Melita Rivera  has no past surgical history on file. Social History/Living Environment:   Home Environment: Private residence  # Steps to Enter: 2  Rails to Enter: No  One/Two Story Residence: One story  Living Alone: No  Support Systems: Family member(s)  Patient Expects to be Discharged to[de-identified] Rehabilitation facility  Current DME Used/Available at Home: None  Tub or Shower Type: Tub/Shower combination  Prior Level of Function/Work/Activity:  Lives with spouse and child. PTA independent and working for [employer]. Number of Personal Factors/Comorbidities that affect the Plan of Care: 0: LOW COMPLEXITY   EXAMINATION:   Most Recent Physical Functioning:   Gross Assessment:  AROM: Generally decreased, functional(L hip flexion)  Strength: Generally decreased, functional(L LE)  Coordination: Generally decreased, functional  Sensation: Intact               Posture:     Balance:  Sitting: Impaired  Sitting - Static: Good (unsupported)  Sitting - Dynamic: Fair (occasional)  Standing: Impaired  Standing - Static: Fair  Standing - Dynamic : Fair Bed Mobility:     Wheelchair Mobility:     Transfers:  Sit to Stand: Minimum assistance;Assist x2  Stand to Sit: Minimum assistance;Assist x2  Interventions: Safety awareness training;Manual cues; Verbal cues  Gait:     Speed/Linda: Slow;Shuffled  Step Length: Right shortened;Left shortened  Gait Abnormalities: Trunk sway increased;Decreased step clearance  Distance (ft): 30 Feet (ft)(x 2)  Assistive Device: Gait belt; Other (comment)(pushing w/c)  Ambulation - Level of Assistance: Contact guard assistance;Minimal assistance;Assist x2      Body Structures Involved:  1. Nerves  2. Lungs  3. Muscles Body Functions Affected:  1. Mental  2. Sensory/Pain  3. Respiratory  4. Neuromusculoskeletal  5. Movement Related Activities and Participation Affected:  1.  General Tasks and Demands  2. Mobility  3. Self Care  4. Domestic Life  5. Community, Social and [place]   Number of elements that affect the Plan of Care: 4+: HIGH COMPLEXITY   CLINICAL PRESENTATION:   Presentation: Evolving clinical presentation with changing clinical characteristics: MODERATE COMPLEXITY   CLINICAL DECISION MAKIN [facility] Mobility Inpatient Short Form  How much difficulty does the patient currently have. .. Unable A Lot A Little None   1. Turning over in bed (including adjusting bedclothes, sheets and blankets)? [] 1   [x] 2   [] 3   [] 4   2. Sitting down on and standing up from a chair with arms ( e.g., wheelchair, bedside commode, etc.)   [] 1   [] 2   [x] 3   [] 4   3. Moving from lying on back to sitting on the side of the bed? [] 1   [x] 2   [] 3   [] 4   How much help from another person does the patient currently need. .. Total A Lot A Little None   4. Moving to and from a bed to a chair (including a wheelchair)? [] 1   [x] 2   [] 3   [] 4   5. Need to walk in hospital room? [] 1   [] 2   [x] 3   [] 4   6. Climbing 3-5 steps with a railing? [] 1   [x] 2   [] 3   [] 4   © , Trustees of [address] [place] [address] [address] [id number], under license to [employer]. All rights reserved      Score:  Initial: 14 Most Recent: X (Date: -- )    Interpretation of Tool:  Represents activities that are increasingly more difficult (i.e. Bed mobility, Transfers, Gait). Medical Necessity:     · Patient demonstrates   · excellent  ·  rehab potential due to higher previous functional level. Reason for Services/Other Comments:  · Patient continues to require skilled intervention due to   · Decreased balance and functional mobility   · .    Use of outcome tool(s) and clinical judgement create a POC that gives a: Questionable prediction of patient's progress: MODERATE COMPLEXITY            TREATMENT:   (In addition to Assessment/Re-Assessment sessions the following treatments were rendered)   Pre-treatment Symptoms/Complaints:  Unable to verbalize  Pain: Initial:   Pain Intensity 1: 0  Post Session:  0     Today's treatment session addressed Decreased Strength, Decreased Transfer Abilities, Decreased Ambulation Ability/Technique, Decreased Balance, Decreased Activity Tolerance, and Increased Fatigue to progress towards achieving goal(s) . During this session, Occupational Therapy addressed neuromuscular re-education  to progress towards their discipline specific goal(s). Co-treatment was necessary to improve patient's ability to follow higher level commands, ability to increase activity demands, and ability to return to normal functional activity. Gait Training (  24 minutes):  Gait training to improve and/or restore physical functioning as related to mobility, strength, balance, coordination and posture. Ambulated 30 Feet (ft)(x 2) with Contact guard assistance;Minimal assistance;Assist x2 using a Gait belt; Other (comment)(pushing w/c) and minimal-moderate cuing   related to their stride length and activity pacing to promote proper body mechanics and safety. Instruction in performance of forward weight shifting in seated position also practiced with OT facilitating reaching activities. Therapeutic Activity: (    ):  Therapeutic activities including Bed transfers, standing activities for balance and STS transfers to improve mobility, strength, balance and coordination. Required moderate   to promote static and dynamic balance in standing. Braces/Orthotics/Lines/Etc:   · IV  · floyd catheter  · nasogastric tube  · arterial line  · O2 Device: Endotracheal tube, Ventilator  Treatment/Session Assessment:    · Response to Treatment:  See above.   · Interdisciplinary Collaboration:   o Physical Therapist  o Occupational Therapist  o Registered Nurse  o Respiratory Therapist  o PORTIA RN  · After treatment position/precautions:   o Up in chair  o Bed alarm/tab alert on  o Bed/Chair-wheels locked  o Call light within reach  o RN notified  o Visitors at bedside   · Compliance with Program/Exercises: Will assess as treatment progresses  · Recommendations/Intent for next treatment session: \"Next visit will focus on advancements to more challenging activities and reduction in assistance provided\".   Total Treatment Duration:  PT Patient Time In/Time Out  Time In: 1432  Time Out: [address] [place] Pippa Burr, PT, DPT

## 2020-02-07 NOTE — PROGRESS NOTES
Progress Note    Patient: Irving Maldonado MRN: [id number]  SSN: [id number]    YOB: 1975  Age: 53 y.o. Sex: female      Admit Date: 2/2/2020    LOS: 5 days     Subjective:   No acute changes.     Current Facility-Administered Medications   Medication Dose Route Frequency    vancomycin (VANCOCIN) 1250 mg in  ml infusion  1,250 mg IntraVENous Q8H    cefepime (MAXIPIME) 2 g in 0.9% sodium chloride (MBP/ADV) 100 mL  2 g IntraVENous Q8H    metoprolol tartrate (LOPRESSOR) tablet 50 mg  50 mg Per NG tube BID    NUTRITIONAL SUPPORT ELECTROLYTE PRN ORDERS   Does Not Apply PRN    senna (SENOKOT) tablet 8.6 mg  1 Tab Per NG tube DAILY    HYDROcodone-acetaminophen (NORCO) 7.5-325 mg per tablet 1 Tab  1 Tab Oral Q6H PRN    dexamethasone (DECADRON) 4 mg/mL injection 4 mg  4 mg IntraVENous Q4H    niCARdipine in Saline (CARDENE) 25 MG/250 mL infusion kit  0-15 mg/hr IntraVENous TITRATE    enoxaparin (LOVENOX) injection 40 mg  40 mg SubCUTAneous Q24H    famotidine (PEPCID) tablet 20 mg  20 mg Per NG tube DAILY    ibuprofen (ADVIL;MOTRIN) 100 mg/5 mL oral suspension 600 mg  600 mg Per NG tube Q6H PRN    sodium chloride (NS) flush 5-40 mL  5-40 mL IntraVENous PRN    fentaNYL citrate (PF) injection 50 mcg  50 mcg IntraVENous Q2H PRN    acetaminophen (TYLENOL) tablet 650 mg  650 mg Per NG tube Q4H PRN    ondansetron (ZOFRAN) injection 4 mg  4 mg IntraVENous Q4H PRN    0.9% sodium chloride infusion  75 mL/hr IntraVENous CONTINUOUS    atorvastatin (LIPITOR) tablet 40 mg  40 mg Per NG tube QHS    sodium chloride (NS) flush 30 mL  30 mL InterCATHeter Q8H    heparin (porcine) pf 900 Units  900 Units InterCATHeter Q8H    sodium chloride (NS) flush 30 mL  30 mL InterCATHeter PRN    heparin (porcine) pf 900 Units  900 Units InterCATHeter PRN    lip protectant (BLISTEX) ointment 1 Each  1 Each Topical PRN       Objective:     Vitals:    02/07/20 0917 02/07/20 0918 02/07/20 0930 02/07/20 0945   BP: Pulse: 77 76 (!) 49 (!) 57   Resp: 14 12 14 19   Temp: 98.9 °F (37.2 °C) 98.9 °F (37.2 °C) 98.9 °F (37.2 °C) 98.9 °F (37.2 °C)   SpO2: 100% 100% 100% 100%   Weight:       Height:             Intake and Output:  Current Shift: 02/07 0701 - 02/07 1900  In: -   Out: 150 [Urine:150]  Last 24 hr: 02/06 0701 - 02/07 0700  In: 5618.5 [I.V.:4017.5]  Out: 5767 [Urine:1796]     IO: +3271/24hr  TOTAL OVERALL: +2.7L     Physical Exam:   General:  Alert, cooperative. Intubated. Eyes:  PERRL, EOMs intact. Neck: Supple, symmetrical, trachea midline, no adenopathy, thyroid: no enlargment/tenderness/nodules, no carotid bruit and no JVD. Lungs:   Lungs CTA all fields. No wheezes noted. No distress. Heart:  Regular rate and rhythm, S1, S2 normal, no murmur, click, rub or gallop. Abdomen:   Soft, non-tender. Bowel sounds normal. No masses,  No organomegaly. Extremities: Extremities normal, atraumatic, no cyanosis or edema. Pulses: 2+ and symmetric all extremities. Skin: Skin color, texture, turgor normal. No rashes or lesions   Neurologic: Alert and following commands, walker, LUE with mild drift, but able to touch finger to nose bilat. Lifts LLE off bed. Remains intubated.         Lab/Data Review:    Recent Results (from the past 12 hour(s))   CBC W/O DIFF    Collection Time: 02/07/20  3:09 AM   Result Value Ref Range    WBC 10.7 4.3 - 11.1 K/uL    RBC 3.24 (L) 4.05 - 5.2 M/uL    HGB 8.9 (L) 11.7 - 15.4 g/dL    HCT 29.1 (L) 35.8 - 46.3 %    MCV 89.8 79.6 - 97.8 FL    MCH 27.5 26.1 - 32.9 PG    MCHC 30.6 (L) 31.4 - 35.0 g/dL    RDW 12.4 11.9 - 14.6 %    PLATELET 524 482 - 999 K/uL    MPV 11.5 9.4 - 12.3 FL    ABSOLUTE NRBC 0.00 0.0 - 0.2 K/uL   METABOLIC PANEL, BASIC    Collection Time: 02/07/20  3:09 AM   Result Value Ref Range    Sodium 146 (H) 136 - 145 mmol/L    Potassium 4.3 3.5 - 5.1 mmol/L    Chloride 113 (H) 98 - 107 mmol/L    CO2 29 21 - 32 mmol/L    Anion gap 4 (L) 7 - 16 mmol/L    Glucose 145 (H) 65 - 100 mg/dL    BUN 21 6 - 23 MG/DL    Creatinine 0.58 (L) 0.6 - 1.0 MG/DL    GFR est AA >60 >60 ml/min/1.73m2    GFR est non-AA >60 >60 ml/min/1.73m2    Calcium 8.8 8.3 - 10.4 MG/DL   MAGNESIUM    Collection Time: 20  3:09 AM   Result Value Ref Range    Magnesium 2.4 1.8 - 2.4 mg/dL   Martha Never    Collection Time: 20  3:09 AM   Result Value Ref Range    Vancomycin,trough 6.6 5 - 20 ug/mL   POC G3    Collection Time: 20  3:41 AM   Result Value Ref Range    Device: VENT      FIO2 (POC) 50 %    pH (POC) 7.394 7.35 - 7.45      pCO2 (POC) 47.6 (H) 35 - 45 MMHG    pO2 (POC) 194 (H) 75 - 100 MMHG    HCO3 (POC) 29.1 (H) 22 - 26 MMOL/L    sO2 (POC) 100 (H) 95 - 98 %    Base excess (POC) 4 mmol/L    Mode Pressure regulated volume control      Tidal volume 450 ml    Set Rate 16 bpm    PEEP/CPAP (POC) 8 cmH2O    Allens test (POC) NOT APPLICABLE      Inspiratory Time 0.9 sec    Site DRAWN FROM ARTERIAL LINE      Specimen type (POC) ARTERIAL      Performed by Radha     CO2, POC 31 MMOL/L    Respiratory comment: NurseNotified     Exhaled minute volume 7.90 L/min    COLLECT TIME 335         Assessment/ Plan:     Principal Problem:    AVM (arteriovenous malformation) spine (2020)    Active Problems:    Cavernoma (2/3/2020)      Acute respiratory failure with hypoxia (HCC) (2/3/2020)      Edema of spinal cord (HCC) (2/3/2020)      Acute left-sided weakness (2/3/2020)        NEURO:pt with cervical hemorrhage and edema secondary to vascular abnormality, cavernoma vs AVM. Pt remains intubated, but is alert and following commands this am. Cerebral angiogram yesterday with suspicious abnormal vessel around C3 area. Will repeat Cerebral angiogram and MRI brain next week. Pt is alert and following commands. R groin is CDI with no hematoma. Pulses are intact. RESP:PRVC @ 50%: AB.39/47/194. lungs cta. CV:-160 goal. 2D Echo: EF 55-60%, Trop neg. SCD, lovenox. Metoprolol 50mg po bid. HEME:HH 8.9/29,   NEPH:bun/cr: 21/0.58  GI: IVF NS @ 75cc/hr. Pepcid, senna. Tolerating TF well. ID:TM: [id number]: pan cx sent, neg thus far, but started on Cef/Vanc D3/10 due to continued fever. LINES:RUE: PICC. Right radial virgil.    .      Signed By: Anil Thomas NP     February 7, 2020

## 2020-02-07 NOTE — PROGRESS NOTES
PM&R Consult Progress Note      Patient: Mirtha Alcazar  Admit Date: 2/2/2020  Admit Diagnosis: AVM (arteriovenous malformation) spine [Q28.8]  Recommendations: Continue Acute Rehab Program, Coordination of rehab/medical care, Counseling of PM & R care issues management, Hospital Inpatient Rehab  -cont rehab efforts. Excellent rehab progress. D/w pt and her father and both are in agreement with [facility] when medically stable. She understands that she will be needing a f/u MRI and Cerebral angio early next week  -reintubated; hopefully extubate after procedure  -will cont to follow with you  History/Subjective/Complaint:     Patient seen and examined. Records reviewed. Sitting up in chair. Intubated. C/o left temporal throbbing headache    Pain 1  Pain Scale 1: Adult Nonverbal Pain Scale (02/07/20 0715)  Pain Intensity 1: 6 (02/07/20 0715)  Patient Stated Pain Goal: Unable to verbalize/indicate pain (02/07/20 0715)  Pain Reassessment 1: Yes (02/06/20 2115)  Pain Onset 1: acute (02/07/20 0116)  Pain Location 1: Head (02/07/20 0116)  Pain Orientation 1:  Inner;Left (02/07/20 0116)  Pain Description 1: Aching (02/07/20 0116)  Pain Intervention(s) 1: Medication (see MAR) (02/07/20 0715)     Objective:     Vitals:  Patient Vitals for the past 8 hrs:   BP Temp Pulse Resp SpO2   02/07/20 1400  99.3 °F (37.4 °C) (!) 49 15 100 %   02/07/20 1330  99.1 °F (37.3 °C) (!) 43 14 100 %   02/07/20 1300  99.1 °F (37.3 °C) (!) 53 18 100 %   02/07/20 1230  99.3 °F (37.4 °C) (!) 57 17 100 %   02/07/20 1200 134/71 99.5 °F (37.5 °C) (!) 43 9 100 %   02/07/20 1132  99.3 °F (37.4 °C) (!) 46 10 100 %   02/07/20 1102   (!) 51 10 100 %   02/07/20 1100 140/72 99.3 °F (37.4 °C) (!) 52 19 100 %   02/07/20 0945  98.9 °F (37.2 °C) (!) 57 19 100 %   02/07/20 0930  98.9 °F (37.2 °C) (!) 49 14 100 %   02/07/20 0918  98.9 °F (37.2 °C) 76 12 100 %   02/07/20 0917  98.9 °F (37.2 °C) 77 14 100 %   02/07/20 0915  98.9 °F (37.2 °C) (!) 49 14 100 % 02/07/20 0900  98.7 °F (37.1 °C) (!) 53 15 100 %   02/07/20 0830  98.7 °F (37.1 °C) (!) 45 13 100 %   02/07/20 0815  98.9 °F (37.2 °C) (!) 46 12 100 %   02/07/20 0800  98.9 °F (37.2 °C) (!) 48 10 100 %   02/07/20 0730  98.9 °F (37.2 °C) (!) 53 18 100 %   02/07/20 0704   76 16 100 %   02/07/20 0700 (!) 172/95 98.7 °F (37.1 °C) (!) 59 18 100 %      Intake and Output:  02/05 1901 - 02/07 0700  In: 7189.8 [I.V.:5568.8]  Out: 2596 [Urine:2596]    Allergies   Allergen Reactions    Ace Inhibitors Angioedema     Current Facility-Administered Medications   Medication Dose Route Frequency    vancomycin (VANCOCIN) 1250 mg in  ml infusion  1,250 mg IntraVENous Q8H    cefepime (MAXIPIME) 2 g in 0.9% sodium chloride (MBP/ADV) 100 mL  2 g IntraVENous Q8H    metoprolol tartrate (LOPRESSOR) tablet 50 mg  50 mg Per NG tube BID    NUTRITIONAL SUPPORT ELECTROLYTE PRN ORDERS   Does Not Apply PRN    senna (SENOKOT) tablet 8.6 mg  1 Tab Per NG tube DAILY    HYDROcodone-acetaminophen (NORCO) 7.5-325 mg per tablet 1 Tab  1 Tab Oral Q6H PRN    dexamethasone (DECADRON) 4 mg/mL injection 4 mg  4 mg IntraVENous Q4H    niCARdipine in Saline (CARDENE) 25 MG/250 mL infusion kit  0-15 mg/hr IntraVENous TITRATE    enoxaparin (LOVENOX) injection 40 mg  40 mg SubCUTAneous Q24H    famotidine (PEPCID) tablet 20 mg  20 mg Per NG tube DAILY    ibuprofen (ADVIL;MOTRIN) 100 mg/5 mL oral suspension 600 mg  600 mg Per NG tube Q6H PRN    sodium chloride (NS) flush 5-40 mL  5-40 mL IntraVENous PRN    fentaNYL citrate (PF) injection 50 mcg  50 mcg IntraVENous Q2H PRN    acetaminophen (TYLENOL) tablet 650 mg  650 mg Per NG tube Q4H PRN    ondansetron (ZOFRAN) injection 4 mg  4 mg IntraVENous Q4H PRN    0.9% sodium chloride infusion  75 mL/hr IntraVENous CONTINUOUS    atorvastatin (LIPITOR) tablet 40 mg  40 mg Per NG tube QHS    sodium chloride (NS) flush 30 mL  30 mL InterCATHeter Q8H    heparin (porcine) pf 900 Units  900 Units InterCATHeter Q8H    sodium chloride (NS) flush 30 mL  30 mL InterCATHeter PRN    heparin (porcine) pf 900 Units  900 Units InterCATHeter PRN    lip protectant (BLISTEX) ointment 1 Each  1 Each Topical PRN       Physical Exam:  Awake , alert, orally intubated  Follows commands well  Writing down questions and responses accurately  Improving left sided weakness and attn  + LUE drift. DF and PF simba 5/5  Sensation intact.     Functional Assessment:  Gross Assessment  AROM: Generally decreased, functional(L hip flexion) (02/04/20 1400)  PROM: Generally decreased, functional (02/04/20 1100)  Strength: Generally decreased, functional(L LE) (02/04/20 1400)  Coordination: Generally decreased, functional (02/04/20 1400)  Tone: Normal (02/04/20 1100)  Sensation: Intact (02/04/20 1400)     Gait  Speed/Linda: Slow;Shuffled (02/06/20 1400)  Step Length: Right shortened;Left shortened (02/06/20 1400)  Gait Abnormalities: Decreased step clearance;Trunk sway increased (02/06/20 1400)  Ambulation - Level of Assistance: Minimal assistance;Assist x2 (02/06/20 1400)  Distance (ft): 2 Feet (ft) (02/06/20 1400)  Assistive Device: Gait belt (02/06/20 1400)     Bed Mobility  Rolling: Minimum assistance;Assist x2 (02/06/20 1300)  Supine to Sit: Minimum assistance;Assist x2 (02/06/20 1400)  Scooting: Minimum assistance (02/06/20 1400)     Balance  Sitting: Impaired (02/06/20 1400)  Sitting - Static: Good (unsupported) (02/06/20 1400)  Sitting - Dynamic: Fair (occasional) (02/06/20 1400)  Standing: Impaired (02/06/20 1400)  Standing - Static: Fair (02/06/20 1400)  Standing - Dynamic : Fair (02/06/20 1400)                       Bed/Mat Mobility  Rolling: Minimum assistance;Assist x2 (02/06/20 1300)  Supine to Sit: Minimum assistance;Assist x2 (02/06/20 1400)  Sit to Stand: Minimum assistance;Assist x2 (02/06/20 1400)  Stand to Sit: Minimum assistance;Assist x2 (02/06/20 1400)  Bed to Chair: Minimum assistance;Assist x2 (02/06/20 1400)  Scooting: Minimum assistance (02/06/20 1400)     Labs/Studies:  Recent Results (from the past 72 hour(s))   CBC W/O DIFF    Collection Time: 02/05/20  3:04 AM   Result Value Ref Range    WBC 8.9 4.3 - 11.1 K/uL    RBC 3.51 (L) 4.05 - 5.2 M/uL    HGB 9.7 (L) 11.7 - 15.4 g/dL    HCT 31.1 (L) 35.8 - 46.3 %    MCV 88.6 79.6 - 97.8 FL    MCH 27.6 26.1 - 32.9 PG    MCHC 31.2 (L) 31.4 - 35.0 g/dL    RDW 12.8 11.9 - 14.6 %    PLATELET 853 (L) 111 - 450 K/uL    MPV 10.9 9.4 - 12.3 FL    ABSOLUTE NRBC 0.00 0.0 - 0.2 K/uL   METABOLIC PANEL, BASIC    Collection Time: 02/05/20  3:04 AM   Result Value Ref Range    Sodium 143 136 - 145 mmol/L    Potassium 3.6 3.5 - 5.1 mmol/L    Chloride 110 (H) 98 - 107 mmol/L    CO2 28 21 - 32 mmol/L    Anion gap 5 (L) 7 - 16 mmol/L    Glucose 121 (H) 65 - 100 mg/dL    BUN 18 6 - 23 MG/DL    Creatinine 0.54 (L) 0.6 - 1.0 MG/DL    GFR est AA >60 >60 ml/min/1.73m2    GFR est non-AA >60 >60 ml/min/1.73m2    Calcium 9.0 8.3 - 10.4 MG/DL   MAGNESIUM    Collection Time: 02/05/20  3:04 AM   Result Value Ref Range    Magnesium 2.3 1.8 - 2.4 mg/dL   POC G3    Collection Time: 02/05/20  3:08 AM   Result Value Ref Range    Device: VENT      FIO2 (POC) 45 %    pH (POC) 7.385 7.35 - 7.45      pCO2 (POC) 46.8 (H) 35 - 45 MMHG    pO2 (POC) 187 (H) 75 - 100 MMHG    HCO3 (POC) 28.0 (H) 22 - 26 MMOL/L    sO2 (POC) 100 (H) 95 - 98 %    Base excess (POC) 2 mmol/L    Mode VENTILATOR/SPONTANEOUS/PRESSURE SUPPORT      PEEP/CPAP (POC) 8 cmH2O    Pressure support 10 cmH2O    Allens test (POC) NOT APPLICABLE      Total resp.  rate 16      Site DRAWN FROM ARTERIAL LINE      Specimen type (POC) ARTERIAL      Performed by Radha     CO2, POC 29 MMOL/L    Respiratory comment: NurseNotified     Exhaled minute volume 7.60 L/min    COLLECT TIME 303     POC G3    Collection Time: 02/05/20  9:14 AM   Result Value Ref Range    Device: NASAL CANNULA      pH (POC) 7.195 (LL) 7.35 - 7.45      pCO2 (POC) 78.1 (HH) 35 - 45 MMHG    pO2 (POC) 119 (H) 75 - 100 MMHG    HCO3 (POC) 30.2 (H) 22 - 26 MMOL/L    sO2 (POC) 97 95 - 98 %    Base excess (POC) 1 mmol/L    Allens test (POC) NOT APPLICABLE      Site DRAWN FROM ARTERIAL LINE      Specimen type (POC) ARTERIAL      Performed by TeagueAlfredoeRT     CO2, POC 33 MMOL/L    Flow rate (POC) 4.000 L/min    Critical value read back 00:01     COLLECT TIME 911     POC G3    Collection Time: 02/05/20 11:48 AM   Result Value Ref Range    Device: VENT      FIO2 (POC) 100 %    pH (POC) 7.393 7.35 - 7.45      pCO2 (POC) 46.0 (H) 35 - 45 MMHG    pO2 (POC) 306 (H) 75 - 100 MMHG    HCO3 (POC) 28.1 (H) 22 - 26 MMOL/L    sO2 (POC) 100 (H) 95 - 98 %    Base excess (POC) 3 mmol/L    Mode Pressure regulated volume control      Tidal volume 450 ml    Set Rate 15 bpm    PEEP/CPAP (POC) 8 cmH2O    Allens test (POC) NOT APPLICABLE      Site DRAWN FROM ARTERIAL LINE      Specimen type (POC) ARTERIAL      Performed by KwameeAlfredoeRT     CO2, POC 29 MMOL/L    Critical value read back 00:01     Respiratory comment: PhysicianNotified     Exhaled minute volume 7.10 L/min    COLLECT TIME 1,147     POC G3    Collection Time: 02/06/20  2:51 AM   Result Value Ref Range    Device: VENT      FIO2 (POC) 50 %    pH (POC) 7.405 7.35 - 7.45      pCO2 (POC) 42.9 35 - 45 MMHG    pO2 (POC) 147 (H) 75 - 100 MMHG    HCO3 (POC) 26.9 (H) 22 - 26 MMOL/L    sO2 (POC) 99 (H) 95 - 98 %    Base excess (POC) 2 mmol/L    Mode Pressure regulated volume control      Tidal volume 450 ml    Set Rate 16 bpm    PEEP/CPAP (POC) 8 cmH2O    Allens test (POC) NOT APPLICABLE      Site DRAWN FROM ARTERIAL LINE      Specimen type (POC) ARTERIAL      Performed by Bunny     CO2, POC 28 MMOL/L    Critical value read back 02:53     Respiratory comment: NurseNotified     Exhaled minute volume 8.60 L/min    COLLECT TIME 245     CBC W/O DIFF    Collection Time: 02/06/20  3:43 AM   Result Value Ref Range    WBC 9.1 4.3 - 11.1 K/uL    RBC 3.43 (L) 4.05 - 5.2 M/uL    HGB 9.5 (L) 11.7 - 15.4 g/dL    HCT 30.2 (L) 35.8 - 46.3 %    MCV 88.0 79.6 - 97.8 FL    MCH 27.7 26.1 - 32.9 PG    MCHC 31.5 31.4 - 35.0 g/dL    RDW 12.3 11.9 - 14.6 %    PLATELET 020 (L) 803 - 450 K/uL    MPV 11.0 9.4 - 12.3 FL    ABSOLUTE NRBC 0.00 0.0 - 0.2 K/uL   METABOLIC PANEL, BASIC    Collection Time: 02/06/20  3:43 AM   Result Value Ref Range    Sodium 144 136 - 145 mmol/L    Potassium 4.1 3.5 - 5.1 mmol/L    Chloride 111 (H) 98 - 107 mmol/L    CO2 29 21 - 32 mmol/L    Anion gap 4 (L) 7 - 16 mmol/L    Glucose 148 (H) 65 - 100 mg/dL    BUN 15 6 - 23 MG/DL    Creatinine 0.60 0.6 - 1.0 MG/DL    GFR est AA >60 >60 ml/min/1.73m2    GFR est non-AA >60 >60 ml/min/1.73m2    Calcium 8.9 8.3 - 10.4 MG/DL   MAGNESIUM    Collection Time: 02/06/20  3:43 AM   Result Value Ref Range    Magnesium 2.4 1.8 - 2.4 mg/dL   METABOLIC PANEL, BASIC    Collection Time: 02/06/20  3:35 PM   Result Value Ref Range    Sodium 144 136 - 145 mmol/L    Potassium 4.0 3.5 - 5.1 mmol/L    Chloride 111 (H) 98 - 107 mmol/L    CO2 27 21 - 32 mmol/L    Anion gap 6 (L) 7 - 16 mmol/L    Glucose 169 (H) 65 - 100 mg/dL    BUN 19 6 - 23 MG/DL    Creatinine 0.57 (L) 0.6 - 1.0 MG/DL    GFR est AA >60 >60 ml/min/1.73m2    GFR est non-AA >60 >60 ml/min/1.73m2    Calcium 8.8 8.3 - 10.4 MG/DL   CBC W/O DIFF    Collection Time: 02/07/20  3:09 AM   Result Value Ref Range    WBC 10.7 4.3 - 11.1 K/uL    RBC 3.24 (L) 4.05 - 5.2 M/uL    HGB 8.9 (L) 11.7 - 15.4 g/dL    HCT 29.1 (L) 35.8 - 46.3 %    MCV 89.8 79.6 - 97.8 FL    MCH 27.5 26.1 - 32.9 PG    MCHC 30.6 (L) 31.4 - 35.0 g/dL    RDW 12.4 11.9 - 14.6 %    PLATELET 978 100 - 222 K/uL    MPV 11.5 9.4 - 12.3 FL    ABSOLUTE NRBC 0.00 0.0 - 0.2 K/uL   METABOLIC PANEL, BASIC    Collection Time: 02/07/20  3:09 AM   Result Value Ref Range    Sodium 146 (H) 136 - 145 mmol/L    Potassium 4.3 3.5 - 5.1 mmol/L    Chloride 113 (H) 98 - 107 mmol/L    CO2 29 21 - 32 mmol/L    Anion gap 4 (L) 7 - 16 mmol/L Glucose 145 (H) 65 - 100 mg/dL    BUN 21 6 - 23 MG/DL    Creatinine 0.58 (L) 0.6 - 1.0 MG/DL    GFR est AA >60 >60 ml/min/1.73m2    GFR est non-AA >60 >60 ml/min/1.73m2    Calcium 8.8 8.3 - 10.4 MG/DL   MAGNESIUM    Collection Time: 02/07/20  3:09 AM   Result Value Ref Range    Magnesium 2.4 1.8 - 2.4 mg/dL   Gwendel Cull    Collection Time: 02/07/20  3:09 AM   Result Value Ref Range    Vancomycin,trough 6.6 5 - 20 ug/mL   POC G3    Collection Time: 02/07/20  3:41 AM   Result Value Ref Range    Device: VENT      FIO2 (POC) 50 %    pH (POC) 7.394 7.35 - 7.45      pCO2 (POC) 47.6 (H) 35 - 45 MMHG    pO2 (POC) 194 (H) 75 - 100 MMHG    HCO3 (POC) 29.1 (H) 22 - 26 MMOL/L    sO2 (POC) 100 (H) 95 - 98 %    Base excess (POC) 4 mmol/L    Mode Pressure regulated volume control      Tidal volume 450 ml    Set Rate 16 bpm    PEEP/CPAP (POC) 8 cmH2O    Allens test (POC) NOT APPLICABLE      Inspiratory Time 0.9 sec    Site DRAWN FROM ARTERIAL LINE      Specimen type (POC) ARTERIAL      Performed by Radha     CO2, POC 31 MMOL/L    Respiratory comment: NurseNotified     Exhaled minute volume 7.90 L/min    COLLECT TIME 335          Assessment:     Principal Problem:    AVM (arteriovenous malformation) spine (2/2/2020)    Active Problems:    Cavernoma (2/3/2020)      Acute respiratory failure with hypoxia (HCC) (2/3/2020)      Edema of spinal cord (HCC) (2/3/2020)      Acute left-sided weakness (2/3/2020)        Plan:     Recommendations: Continue Acute Rehab Program  Coordination of rehab/medical care  Counseling of PM & R care issues management  Monitoring and management of medical conditions per plan of care/orders  Discussion with Family/Caregiver/Staff  Reviewed Therapies/Labs/Medications/Records

## 2020-02-08 ENCOUNTER — APPOINTMENT (OUTPATIENT)
Dept: GENERAL RADIOLOGY | Age: 45
DRG: 004 | End: 2020-02-08
Attending: NURSE PRACTITIONER
Payer: COMMERCIAL

## 2020-02-08 LAB
ANION GAP SERPL CALC-SCNC: 5 MMOL/L (ref 7–16)
ARTERIAL PATENCY WRIST A: ABNORMAL
BASE EXCESS BLD CALC-SCNC: 4 MMOL/L
BDY SITE: ABNORMAL
BUN SERPL-MCNC: 21 MG/DL (ref 6–23)
CALCIUM SERPL-MCNC: 7.9 MG/DL (ref 8.3–10.4)
CHLORIDE SERPL-SCNC: 114 MMOL/L (ref 98–107)
CO2 BLD-SCNC: 31 MMOL/L
CO2 SERPL-SCNC: 27 MMOL/L (ref 21–32)
COLLECT TIME,HTIME: 310
CREAT SERPL-MCNC: 0.48 MG/DL (ref 0.6–1)
ERYTHROCYTE [DISTWIDTH] IN BLOOD BY AUTOMATED COUNT: 12.2 % (ref 11.9–14.6)
EXHALED MINUTE VOLUME, VE: 7 L/MIN
GAS FLOW.O2 O2 DELIVERY SYS: ABNORMAL L/MIN
GAS FLOW.O2 SETTING OXYMISER: 16 BPM
GLUCOSE SERPL-MCNC: 154 MG/DL (ref 65–100)
HCO3 BLD-SCNC: 29.4 MMOL/L (ref 22–26)
HCT VFR BLD AUTO: 27.7 % (ref 35.8–46.3)
HGB BLD-MCNC: 8.7 G/DL (ref 11.7–15.4)
INSPIRATION.DURATION SETTING TIME VENT: 0.9 SEC
MAGNESIUM SERPL-MCNC: 2.2 MG/DL (ref 1.8–2.4)
MCH RBC QN AUTO: 28.2 PG (ref 26.1–32.9)
MCHC RBC AUTO-ENTMCNC: 31.4 G/DL (ref 31.4–35)
MCV RBC AUTO: 89.6 FL (ref 79.6–97.8)
NRBC # BLD: 0.02 K/UL (ref 0–0.2)
O2/TOTAL GAS SETTING VFR VENT: 50 %
PCO2 BLD: 45.8 MMHG (ref 35–45)
PEEP RESPIRATORY: 8 CMH2O
PH BLD: 7.42 [PH] (ref 7.35–7.45)
PLATELET # BLD AUTO: 179 K/UL (ref 150–450)
PMV BLD AUTO: 11.4 FL (ref 9.4–12.3)
PO2 BLD: 213 MMHG (ref 75–100)
POTASSIUM SERPL-SCNC: 3.7 MMOL/L (ref 3.5–5.1)
RBC # BLD AUTO: 3.09 M/UL (ref 4.05–5.2)
SAO2 % BLD: 100 % (ref 95–98)
SERVICE CMNT-IMP: ABNORMAL
SERVICE CMNT-IMP: ABNORMAL
SODIUM SERPL-SCNC: 146 MMOL/L (ref 136–145)
SPECIMEN TYPE: ABNORMAL
VANCOMYCIN TROUGH SERPL-MCNC: 9.2 UG/ML (ref 5–20)
VENTILATION MODE VENT: ABNORMAL
VT SETTING VENT: 450 ML
WBC # BLD AUTO: 9.3 K/UL (ref 4.3–11.1)

## 2020-02-08 PROCEDURE — 74011250636 HC RX REV CODE- 250/636: Performed by: NURSE PRACTITIONER

## 2020-02-08 PROCEDURE — 74011250637 HC RX REV CODE- 250/637: Performed by: INTERNAL MEDICINE

## 2020-02-08 PROCEDURE — 74011250636 HC RX REV CODE- 250/636: Performed by: NEUROLOGICAL SURGERY

## 2020-02-08 PROCEDURE — 71045 X-RAY EXAM CHEST 1 VIEW: CPT

## 2020-02-08 PROCEDURE — 74011250637 HC RX REV CODE- 250/637: Performed by: NEUROLOGICAL SURGERY

## 2020-02-08 PROCEDURE — 82803 BLOOD GASES ANY COMBINATION: CPT

## 2020-02-08 PROCEDURE — 83735 ASSAY OF MAGNESIUM: CPT

## 2020-02-08 PROCEDURE — 80048 BASIC METABOLIC PNL TOTAL CA: CPT

## 2020-02-08 PROCEDURE — 80202 ASSAY OF VANCOMYCIN: CPT

## 2020-02-08 PROCEDURE — 77030013794 HC KT TRNSDUC BLD EDWD -B

## 2020-02-08 PROCEDURE — 36600 WITHDRAWAL OF ARTERIAL BLOOD: CPT

## 2020-02-08 PROCEDURE — 65610000001 HC ROOM ICU GENERAL

## 2020-02-08 PROCEDURE — 85027 COMPLETE CBC AUTOMATED: CPT

## 2020-02-08 PROCEDURE — 94003 VENT MGMT INPAT SUBQ DAY: CPT

## 2020-02-08 PROCEDURE — 74011000258 HC RX REV CODE- 258: Performed by: NEUROLOGICAL SURGERY

## 2020-02-08 PROCEDURE — 74011250637 HC RX REV CODE- 250/637: Performed by: NURSE PRACTITIONER

## 2020-02-08 RX ORDER — VANCOMYCIN/0.9 % SOD CHLORIDE 1.5G/250ML
1500 PLASTIC BAG, INJECTION (ML) INTRAVENOUS EVERY 8 HOURS
Status: DISCONTINUED | OUTPATIENT
Start: 2020-02-09 | End: 2020-02-17

## 2020-02-08 RX ORDER — METOPROLOL TARTRATE 25 MG/1
25 TABLET, FILM COATED ORAL ONCE
Status: COMPLETED | OUTPATIENT
Start: 2020-02-08 | End: 2020-02-08

## 2020-02-08 RX ORDER — METOPROLOL TARTRATE 25 MG/1
25 TABLET, FILM COATED ORAL ONCE
Status: DISCONTINUED | OUTPATIENT
Start: 2020-02-08 | End: 2020-02-08

## 2020-02-08 RX ADMIN — ONDANSETRON 4 MG: 2 INJECTION INTRAMUSCULAR; INTRAVENOUS at 18:22

## 2020-02-08 RX ADMIN — Medication 30 ML: at 21:46

## 2020-02-08 RX ADMIN — ACETAMINOPHEN 650 MG: 325 TABLET, FILM COATED ORAL at 18:22

## 2020-02-08 RX ADMIN — VANCOMYCIN HYDROCHLORIDE 1250 MG: 10 INJECTION, POWDER, LYOPHILIZED, FOR SOLUTION INTRAVENOUS at 19:18

## 2020-02-08 RX ADMIN — Medication 900 UNITS: at 05:01

## 2020-02-08 RX ADMIN — VANCOMYCIN HYDROCHLORIDE 1250 MG: 10 INJECTION, POWDER, LYOPHILIZED, FOR SOLUTION INTRAVENOUS at 02:00

## 2020-02-08 RX ADMIN — ENOXAPARIN SODIUM 40 MG: 40 INJECTION SUBCUTANEOUS at 08:15

## 2020-02-08 RX ADMIN — DEXAMETHASONE SODIUM PHOSPHATE 4 MG: 4 INJECTION, SOLUTION INTRAMUSCULAR; INTRAVENOUS at 08:15

## 2020-02-08 RX ADMIN — ATORVASTATIN CALCIUM 40 MG: 40 TABLET, FILM COATED ORAL at 21:46

## 2020-02-08 RX ADMIN — SODIUM CHLORIDE 75 ML/HR: 900 INJECTION, SOLUTION INTRAVENOUS at 00:18

## 2020-02-08 RX ADMIN — Medication 900 UNITS: at 14:38

## 2020-02-08 RX ADMIN — HYDROCODONE BITARTRATE AND ACETAMINOPHEN 1 TABLET: 7.5; 325 TABLET ORAL at 18:24

## 2020-02-08 RX ADMIN — DEXAMETHASONE SODIUM PHOSPHATE 4 MG: 4 INJECTION, SOLUTION INTRAMUSCULAR; INTRAVENOUS at 16:56

## 2020-02-08 RX ADMIN — VANCOMYCIN HYDROCHLORIDE 1250 MG: 10 INJECTION, POWDER, LYOPHILIZED, FOR SOLUTION INTRAVENOUS at 11:22

## 2020-02-08 RX ADMIN — CEFEPIME 2 G: 2 INJECTION, POWDER, FOR SOLUTION INTRAVENOUS at 08:15

## 2020-02-08 RX ADMIN — FENTANYL CITRATE 50 MCG: 50 INJECTION, SOLUTION INTRAMUSCULAR; INTRAVENOUS at 03:29

## 2020-02-08 RX ADMIN — SENNOSIDES 8.6 MG: 8.6 TABLET, FILM COATED ORAL at 09:54

## 2020-02-08 RX ADMIN — METOPROLOL TARTRATE 25 MG: 25 TABLET ORAL at 09:54

## 2020-02-08 RX ADMIN — METOPROLOL TARTRATE 50 MG: 50 TABLET, FILM COATED ORAL at 18:22

## 2020-02-08 RX ADMIN — ACETAMINOPHEN 650 MG: 325 TABLET, FILM COATED ORAL at 23:01

## 2020-02-08 RX ADMIN — HYDROCODONE BITARTRATE AND ACETAMINOPHEN 1 TABLET: 7.5; 325 TABLET ORAL at 00:19

## 2020-02-08 RX ADMIN — CEFEPIME 2 G: 2 INJECTION, POWDER, FOR SOLUTION INTRAVENOUS at 16:56

## 2020-02-08 RX ADMIN — FAMOTIDINE 20 MG: 20 TABLET, FILM COATED ORAL at 09:54

## 2020-02-08 RX ADMIN — DEXAMETHASONE SODIUM PHOSPHATE 4 MG: 4 INJECTION, SOLUTION INTRAMUSCULAR; INTRAVENOUS at 03:11

## 2020-02-08 RX ADMIN — Medication 30 ML: at 05:01

## 2020-02-08 RX ADMIN — Medication 30 ML: at 14:38

## 2020-02-08 RX ADMIN — Medication 1 EACH: at 19:18

## 2020-02-08 RX ADMIN — DEXAMETHASONE SODIUM PHOSPHATE 4 MG: 4 INJECTION, SOLUTION INTRAMUSCULAR; INTRAVENOUS at 20:29

## 2020-02-08 RX ADMIN — FENTANYL CITRATE 50 MCG: 50 INJECTION, SOLUTION INTRAMUSCULAR; INTRAVENOUS at 14:41

## 2020-02-08 RX ADMIN — DEXAMETHASONE SODIUM PHOSPHATE 4 MG: 4 INJECTION, SOLUTION INTRAMUSCULAR; INTRAVENOUS at 11:22

## 2020-02-08 RX ADMIN — Medication 900 UNITS: at 21:46

## 2020-02-08 NOTE — PROGRESS NOTES
Bedside and Verbal shift change report given to Shaista Arellano RN   (oncoming nurse) by Philly Negrete RN  (offgoing nurse). Report included the following information Kardex, ED Summary, Procedure Summary, Intake/Output, MAR, Recent Results, Med Rec Status, Cardiac Rhythm NSR/SB, Alarm Parameters , Quality Measures and Dual Neuro Assessment. No neuro changes noted at this time. Pt repositioned for comfort.

## 2020-02-08 NOTE — PROGRESS NOTES
Progress Note    Patient: Sofiya Dacosta MRN: [id number]  SSN: [id number]    YOB: 1975  Age: 53 y.o. Sex: female      Admit Date: 2/2/2020    LOS: 6 days     Subjective:   No acute changes. Pt looks great OOB to chair. Family updated at bedside.        Current Facility-Administered Medications   Medication Dose Route Frequency    vanc trough reminder   Other ONCE    vancomycin (VANCOCIN) 1250 mg in  ml infusion  1,250 mg IntraVENous Q8H    cefepime (MAXIPIME) 2 g in 0.9% sodium chloride (MBP/ADV) 100 mL  2 g IntraVENous Q8H    metoprolol tartrate (LOPRESSOR) tablet 50 mg  50 mg Per NG tube BID    NUTRITIONAL SUPPORT ELECTROLYTE PRN ORDERS   Does Not Apply PRN    senna (SENOKOT) tablet 8.6 mg  1 Tab Per NG tube DAILY    HYDROcodone-acetaminophen (NORCO) 7.5-325 mg per tablet 1 Tab  1 Tab Oral Q6H PRN    dexamethasone (DECADRON) 4 mg/mL injection 4 mg  4 mg IntraVENous Q4H    niCARdipine in Saline (CARDENE) 25 MG/250 mL infusion kit  0-15 mg/hr IntraVENous TITRATE    enoxaparin (LOVENOX) injection 40 mg  40 mg SubCUTAneous Q24H    famotidine (PEPCID) tablet 20 mg  20 mg Per NG tube DAILY    ibuprofen (ADVIL;MOTRIN) 100 mg/5 mL oral suspension 600 mg  600 mg Per NG tube Q6H PRN    sodium chloride (NS) flush 5-40 mL  5-40 mL IntraVENous PRN    fentaNYL citrate (PF) injection 50 mcg  50 mcg IntraVENous Q2H PRN    acetaminophen (TYLENOL) tablet 650 mg  650 mg Per NG tube Q4H PRN    ondansetron (ZOFRAN) injection 4 mg  4 mg IntraVENous Q4H PRN    0.9% sodium chloride infusion  75 mL/hr IntraVENous CONTINUOUS    atorvastatin (LIPITOR) tablet 40 mg  40 mg Per NG tube QHS    sodium chloride (NS) flush 30 mL  30 mL InterCATHeter Q8H    heparin (porcine) pf 900 Units  900 Units InterCATHeter Q8H    sodium chloride (NS) flush 30 mL  30 mL InterCATHeter PRN    heparin (porcine) pf 900 Units  900 Units InterCATHeter PRN    lip protectant (BLISTEX) ointment 1 Each  1 Each Topical PRN       Objective:     Vitals:    02/08/20 1001 02/08/20 1002 02/08/20 1028 02/08/20 1106   BP: 163/81      Pulse: 62 73 (!) 59 64   Resp: 23 (!) 31 16 15   Temp: 99.1 °F (37.3 °C) 99.1 °F (37.3 °C) 99.1 °F (37.3 °C)    SpO2: 100% 100% 100% 100%   Weight:       Height:             Intake and Output:  Current Shift: No intake/output data recorded. Last 24 hr: 02/07 0701 - 02/08 0700  In: 4367 [I.V.:2795]  Out: 1870 [Urine:1870]     IO: +2.4L/24hr  TOTAL OVERALL: +9L     Physical Exam:   General:  Alert, cooperative. Intubated. Eyes:  PERRL, EOMs intact. Neck: Supple, symmetrical, trachea midline, no adenopathy, thyroid: no enlargment/tenderness/nodules, no carotid bruit and no JVD. Lungs:   Lungs CTA all fields. No wheezes noted. No distress. Heart:  Regular rate and rhythm, S1, S2 normal, no murmur, click, rub or gallop. Abdomen:   Soft, non-tender. Bowel sounds normal. No masses,  No organomegaly. Extremities: Extremities normal, atraumatic, no cyanosis or edema. Pulses: 2+ and symmetric all extremities. Skin: Skin color, texture, turgor normal. No rashes or lesions   Neurologic: Alert and following commands, walker, LUE with mild drift, but able to touch finger to nose bilat. Lifts LLE off bed. Remains intubated.         Lab/Data Review:    Recent Results (from the past 12 hour(s))   POC G3    Collection Time: 02/08/20  3:12 AM   Result Value Ref Range    Device: VENT      FIO2 (POC) 50 %    pH (POC) 7.417 7.35 - 7.45      pCO2 (POC) 45.8 (H) 35 - 45 MMHG    pO2 (POC) 213 (H) 75 - 100 MMHG    HCO3 (POC) 29.4 (H) 22 - 26 MMOL/L    sO2 (POC) 100 (H) 95 - 98 %    Base excess (POC) 4 mmol/L    Mode Pressure regulated volume control      Tidal volume 450 ml    Set Rate 16 bpm    PEEP/CPAP (POC) 8 cmH2O    Allens test (POC) NOT APPLICABLE      Inspiratory Time 0.9 sec    Site DRAWN FROM ARTERIAL LINE      Specimen type (POC) ARTERIAL      Performed by Emmanuel     CO2, POC 31 MMOL/L Respiratory comment: NurseNotified     Exhaled minute volume 7.00 L/min    COLLECT TIME 394     METABOLIC PANEL, BASIC    Collection Time: 20  3:37 AM   Result Value Ref Range    Sodium 146 (H) 136 - 145 mmol/L    Potassium 3.7 3.5 - 5.1 mmol/L    Chloride 114 (H) 98 - 107 mmol/L    CO2 27 21 - 32 mmol/L    Anion gap 5 (L) 7 - 16 mmol/L    Glucose 154 (H) 65 - 100 mg/dL    BUN 21 6 - 23 MG/DL    Creatinine 0.48 (L) 0.6 - 1.0 MG/DL    GFR est AA >60 >60 ml/min/1.73m2    GFR est non-AA >60 >60 ml/min/1.73m2    Calcium 7.9 (L) 8.3 - 10.4 MG/DL   MAGNESIUM    Collection Time: 20  3:37 AM   Result Value Ref Range    Magnesium 2.2 1.8 - 2.4 mg/dL   CBC W/O DIFF    Collection Time: 20  4:00 AM   Result Value Ref Range    WBC 9.3 4.3 - 11.1 K/uL    RBC 3.09 (L) 4.05 - 5.2 M/uL    HGB 8.7 (L) 11.7 - 15.4 g/dL    HCT 27.7 (L) 35.8 - 46.3 %    MCV 89.6 79.6 - 97.8 FL    MCH 28.2 26.1 - 32.9 PG    MCHC 31.4 31.4 - 35.0 g/dL    RDW 12.2 11.9 - 14.6 %    PLATELET 890 051 - 716 K/uL    MPV 11.4 9.4 - 12.3 FL    ABSOLUTE NRBC 0.02 0.0 - 0.2 K/uL       Assessment/ Plan:     Principal Problem:    AVM (arteriovenous malformation) spine (2020)    Active Problems:    Cavernoma (2/3/2020)      Acute respiratory failure with hypoxia (HCC) (2/3/2020)      Edema of spinal cord (HCC) (2/3/2020)      Acute left-sided weakness (2/3/2020)        NEURO:pt with cervical hemorrhage and edema secondary to vascular abnormality, cavernoma vs AVM. Pt remains intubated, but is alert and following commands this am. Cerebral angiogram yesterday with suspicious abnormal vessel around C3 area. Will repeat Cerebral angiogram and MRI brain next week. Pt is alert and following commands. R groin is CDI with no hematoma. Pulses are intact. Pt will have MRI in am of Cspine and repeat cerebral angiogram on 20. Family updated on poc. RESP:PRVC @ 50%: AB.4/45/213.  lungs cta. CV:-160 goal. 2D Echo: EF 55-60%, Trop neg.  SCD, lovenox. Metoprolol 50mg po bid. HEME:HH 8.7/27,   NEPH:bun/cr: 21/0.48  GI: IVF NS @ 75cc/hr. Pepcid, senna. Tolerating TF well.  9L +. Will stop IVF's today. ID:TM: 99.6: pan cx sent, neg thus far, but started on Cef/Vanc D4/10 due to continued fever. LINES:RUE: PICC. Right radial virgil.    .      Signed By: Leia Linares NP     February 8, 2020

## 2020-02-08 NOTE — PROGRESS NOTES
Ventilator check complete; patient has a #7. 0 ET tube secured at the 22 at the lip. Patient is not sedated. Patient is able to follow commands. Breath sounds are coarse. Trachea is midline, Negative for subcutaneous air, and chest excursion is symmetric. Patient is also Negative for cyanosis and is Negative for pitting edema. All alarms are set and audible. Resuscitation bag is at the head of the bed. Ventilator Settings  Mode FIO2 Rate Tidal Volume Pressure PEEP I:E Ratio   PRVC  50 % 16 450 ml    8 cm H20  1:3.13      Peak airway pressure: 17 cm H2O   Minute ventilation: 9.5 l/min     ABG:Results for Marika Griggs (MRN [id number]) as of 2/7/2020 03:41   Ref.  Range 2/7/2020 03:41   pH (POC) Latest Ref Range: 7.35 - 7.45   7.394   pCO2 (POC) Latest Ref Range: 35 - 45 MMHG 47.6 (H)   pO2 (POC) Latest Ref Range: 75 - 100 MMHG 194 (H)   HCO3 (POC) Latest Ref Range: 22 - 26 MMOL/L 29.1 (H)          Malinda Hudson

## 2020-02-08 NOTE — PROGRESS NOTES
Bedside report received from [facility]. Dual neuro assessment and NIH complete.

## 2020-02-08 NOTE — PROGRESS NOTES
02/08/20 0707   Patient Observations   Pulse (Heart Rate) 60   Resp Rate 16   O2 Sat (%) 100 %   Airway - Endotracheal Tube 02/05/20 Oral   Placement Date/Time: 02/05/20 0920   Number of Attempts: 2  Inserted By: Jin Diaz. NP  Present on Admission/Arrival: Yes  Location: Oral  Placement Verified: Auscultation;BBS  Airway Types: Endotracheal, cuffed  Airway Tube Size: 7 mm   Insertion Depth (cm) 22 cm   Line Angelo Lips   Respiratory   Respiratory (WDL) X   Patient on Vent Yes - If patient is on vent, add Doc Flowsheet Ventilator ().    Chest/Tracheal Assessment Chest expansion, symmetrical   Breath Sounds Bilateral Clear   Cough Non-productive   Vent Settings   FIO2 (%) 50 %   CMV Rate Set 16   Vt Set (ml) 450 ml   Pressure Support (cm H2O) 0 cm H2O   PEEP/VENT (cm H2O) 8 cm H20   I:E Ratio 1:3.13   Insp Time (sec) 0.9 sec   Insp Rise Time (sec) 0.05   Insp Rise Time % 1.3 %   Flow Trigger 2   Ventilator Measurements   Resp Rate Observed 16   Vt Exhaled (Machine Breath) (ml) 496 ml   Vt Spont (ml) 0 ml   Ve Observed (l/min) 9.6 l/min   PIP Observed (cm H2O) 21 cm H2O   Plateau Pressure (cm H2O) 15 cm H2O   MAP (cm H2O) 13.5   I:E Ratio Actual 1:3.13   Auto PEEP Observed (cm H2O) 0 cm H2O   Dynamic Compliance (ml/cm H20) 53 ml/cm H20   Safety & Alarms   Pressure Max 50 cm H2O   Pressure Min 2 cm H2O   Ve Min 2   Ve Max 22   RR Min 5   RR Max 50   Ambu Bag Yes   Ambu Mask Yes   Weaning Parameters   Spontaneous Breathing Trial Complete No (Comments)   Vent Method/Mode   Ventilator Mode Pressure support   Ventilator Mode ID 4

## 2020-02-09 ENCOUNTER — APPOINTMENT (OUTPATIENT)
Dept: MRI IMAGING | Age: 45
DRG: 004 | End: 2020-02-09
Attending: NURSE PRACTITIONER
Payer: COMMERCIAL

## 2020-02-09 ENCOUNTER — APPOINTMENT (OUTPATIENT)
Dept: GENERAL RADIOLOGY | Age: 45
DRG: 004 | End: 2020-02-09
Attending: NURSE PRACTITIONER
Payer: COMMERCIAL

## 2020-02-09 LAB
ARTERIAL PATENCY WRIST A: ABNORMAL
BASE EXCESS BLD CALC-SCNC: 6 MMOL/L
BDY SITE: ABNORMAL
CO2 BLD-SCNC: 32 MMOL/L
COLLECT TIME,HTIME: 312
ERYTHROCYTE [DISTWIDTH] IN BLOOD BY AUTOMATED COUNT: 11.9 % (ref 11.9–14.6)
EXHALED MINUTE VOLUME, VE: 8.9 L/MIN
GAS FLOW.O2 O2 DELIVERY SYS: ABNORMAL L/MIN
GAS FLOW.O2 SETTING OXYMISER: 16 BPM
HCO3 BLD-SCNC: 30.9 MMOL/L (ref 22–26)
HCT VFR BLD AUTO: 28.6 % (ref 35.8–46.3)
HGB BLD-MCNC: 9.2 G/DL (ref 11.7–15.4)
INSPIRATION.DURATION SETTING TIME VENT: 0.9 SEC
MCH RBC QN AUTO: 28 PG (ref 26.1–32.9)
MCHC RBC AUTO-ENTMCNC: 32.2 G/DL (ref 31.4–35)
MCV RBC AUTO: 87.2 FL (ref 79.6–97.8)
NRBC # BLD: 0 K/UL (ref 0–0.2)
O2/TOTAL GAS SETTING VFR VENT: 50 %
PCO2 BLD: 46 MMHG (ref 35–45)
PEEP RESPIRATORY: 8 CMH2O
PH BLD: 7.43 [PH] (ref 7.35–7.45)
PLATELET # BLD AUTO: 195 K/UL (ref 150–450)
PMV BLD AUTO: 11.9 FL (ref 9.4–12.3)
PO2 BLD: 206 MMHG (ref 75–100)
RBC # BLD AUTO: 3.28 M/UL (ref 4.05–5.2)
SAO2 % BLD: 100 % (ref 95–98)
SERVICE CMNT-IMP: ABNORMAL
SERVICE CMNT-IMP: ABNORMAL
SPECIMEN TYPE: ABNORMAL
VENTILATION MODE VENT: ABNORMAL
VT SETTING VENT: 450 ML
WBC # BLD AUTO: 9.9 K/UL (ref 4.3–11.1)

## 2020-02-09 PROCEDURE — 65610000001 HC ROOM ICU GENERAL

## 2020-02-09 PROCEDURE — 72156 MRI NECK SPINE W/O & W/DYE: CPT

## 2020-02-09 PROCEDURE — 36415 COLL VENOUS BLD VENIPUNCTURE: CPT

## 2020-02-09 PROCEDURE — A9575 INJ GADOTERATE MEGLUMI 0.1ML: HCPCS | Performed by: NEUROLOGICAL SURGERY

## 2020-02-09 PROCEDURE — 74011250637 HC RX REV CODE- 250/637: Performed by: NURSE PRACTITIONER

## 2020-02-09 PROCEDURE — 94003 VENT MGMT INPAT SUBQ DAY: CPT

## 2020-02-09 PROCEDURE — 71045 X-RAY EXAM CHEST 1 VIEW: CPT

## 2020-02-09 PROCEDURE — 74011250636 HC RX REV CODE- 250/636: Performed by: NEUROLOGICAL SURGERY

## 2020-02-09 PROCEDURE — 74011250637 HC RX REV CODE- 250/637: Performed by: NEUROLOGICAL SURGERY

## 2020-02-09 PROCEDURE — 74011000258 HC RX REV CODE- 258: Performed by: NEUROLOGICAL SURGERY

## 2020-02-09 PROCEDURE — 74011250636 HC RX REV CODE- 250/636: Performed by: NURSE PRACTITIONER

## 2020-02-09 PROCEDURE — 85027 COMPLETE CBC AUTOMATED: CPT

## 2020-02-09 PROCEDURE — 82803 BLOOD GASES ANY COMBINATION: CPT

## 2020-02-09 PROCEDURE — 74011000250 HC RX REV CODE- 250: Performed by: NURSE PRACTITIONER

## 2020-02-09 RX ORDER — SODIUM CHLORIDE 0.9 % (FLUSH) 0.9 %
10 SYRINGE (ML) INJECTION
Status: COMPLETED | OUTPATIENT
Start: 2020-02-09 | End: 2020-02-09

## 2020-02-09 RX ORDER — DIPHENHYDRAMINE HYDROCHLORIDE 50 MG/ML
50 INJECTION, SOLUTION INTRAMUSCULAR; INTRAVENOUS
Status: COMPLETED | OUTPATIENT
Start: 2020-02-09 | End: 2020-02-09

## 2020-02-09 RX ORDER — GADOTERATE MEGLUMINE 376.9 MG/ML
23 INJECTION INTRAVENOUS
Status: COMPLETED | OUTPATIENT
Start: 2020-02-09 | End: 2020-02-09

## 2020-02-09 RX ORDER — MIDAZOLAM HYDROCHLORIDE 1 MG/ML
1 INJECTION, SOLUTION INTRAMUSCULAR; INTRAVENOUS ONCE
Status: COMPLETED | OUTPATIENT
Start: 2020-02-09 | End: 2020-02-09

## 2020-02-09 RX ORDER — FENTANYL CITRATE 50 UG/ML
50 INJECTION, SOLUTION INTRAMUSCULAR; INTRAVENOUS ONCE
Status: COMPLETED | OUTPATIENT
Start: 2020-02-09 | End: 2020-02-09

## 2020-02-09 RX ORDER — DEXAMETHASONE SODIUM PHOSPHATE 100 MG/10ML
10 INJECTION INTRAMUSCULAR; INTRAVENOUS
Status: COMPLETED | OUTPATIENT
Start: 2020-02-09 | End: 2020-02-09

## 2020-02-09 RX ADMIN — ACETAMINOPHEN 650 MG: 325 TABLET, FILM COATED ORAL at 23:11

## 2020-02-09 RX ADMIN — FENTANYL CITRATE 50 MCG: 50 INJECTION, SOLUTION INTRAMUSCULAR; INTRAVENOUS at 15:21

## 2020-02-09 RX ADMIN — ATORVASTATIN CALCIUM 40 MG: 40 TABLET, FILM COATED ORAL at 22:31

## 2020-02-09 RX ADMIN — FAMOTIDINE 20 MG: 20 TABLET, FILM COATED ORAL at 08:34

## 2020-02-09 RX ADMIN — GADOTERATE MEGLUMINE 23 ML: 376.9 INJECTION INTRAVENOUS at 13:26

## 2020-02-09 RX ADMIN — SENNOSIDES 8.6 MG: 8.6 TABLET, FILM COATED ORAL at 08:34

## 2020-02-09 RX ADMIN — METOPROLOL TARTRATE 50 MG: 50 TABLET, FILM COATED ORAL at 08:34

## 2020-02-09 RX ADMIN — ENOXAPARIN SODIUM 40 MG: 40 INJECTION SUBCUTANEOUS at 08:34

## 2020-02-09 RX ADMIN — METOPROLOL TARTRATE 50 MG: 50 TABLET, FILM COATED ORAL at 18:09

## 2020-02-09 RX ADMIN — CEFEPIME 2 G: 2 INJECTION, POWDER, FOR SOLUTION INTRAVENOUS at 08:34

## 2020-02-09 RX ADMIN — NICARDIPINE HYDROCHLORIDE 5 MG/HR: 25 INJECTION INTRAVENOUS at 23:23

## 2020-02-09 RX ADMIN — DEXAMETHASONE SODIUM PHOSPHATE 10 MG: 10 INJECTION INTRAMUSCULAR; INTRAVENOUS at 12:03

## 2020-02-09 RX ADMIN — CEFEPIME 2 G: 2 INJECTION, POWDER, FOR SOLUTION INTRAVENOUS at 15:45

## 2020-02-09 RX ADMIN — VANCOMYCIN HYDROCHLORIDE 1500 MG: 10 INJECTION, POWDER, LYOPHILIZED, FOR SOLUTION INTRAVENOUS at 18:16

## 2020-02-09 RX ADMIN — FENTANYL CITRATE 100 MCG: 50 INJECTION INTRAMUSCULAR; INTRAVENOUS at 11:43

## 2020-02-09 RX ADMIN — DEXAMETHASONE SODIUM PHOSPHATE 4 MG: 4 INJECTION, SOLUTION INTRAMUSCULAR; INTRAVENOUS at 00:24

## 2020-02-09 RX ADMIN — DIPHENHYDRAMINE HYDROCHLORIDE 50 MG: 50 INJECTION, SOLUTION INTRAMUSCULAR; INTRAVENOUS at 12:03

## 2020-02-09 RX ADMIN — MIDAZOLAM HYDROCHLORIDE 1 MG: 1 INJECTION, SOLUTION INTRAMUSCULAR; INTRAVENOUS at 12:15

## 2020-02-09 RX ADMIN — Medication 900 UNITS: at 14:13

## 2020-02-09 RX ADMIN — Medication 900 UNITS: at 05:40

## 2020-02-09 RX ADMIN — VANCOMYCIN HYDROCHLORIDE 1500 MG: 10 INJECTION, POWDER, LYOPHILIZED, FOR SOLUTION INTRAVENOUS at 03:21

## 2020-02-09 RX ADMIN — VANCOMYCIN HYDROCHLORIDE 1500 MG: 10 INJECTION, POWDER, LYOPHILIZED, FOR SOLUTION INTRAVENOUS at 14:13

## 2020-02-09 RX ADMIN — ACETAMINOPHEN 650 MG: 325 TABLET, FILM COATED ORAL at 04:39

## 2020-02-09 RX ADMIN — CEFEPIME 2 G: 2 INJECTION, POWDER, FOR SOLUTION INTRAVENOUS at 00:24

## 2020-02-09 RX ADMIN — Medication 900 UNITS: at 22:31

## 2020-02-09 RX ADMIN — Medication 30 ML: at 14:00

## 2020-02-09 RX ADMIN — Medication 30 ML: at 05:40

## 2020-02-09 RX ADMIN — HYDROCODONE BITARTRATE AND ACETAMINOPHEN 1 TABLET: 7.5; 325 TABLET ORAL at 18:09

## 2020-02-09 RX ADMIN — Medication 30 ML: at 22:31

## 2020-02-09 RX ADMIN — Medication 10 ML: at 14:13

## 2020-02-09 NOTE — PROGRESS NOTES
Ventilator check complete; patient has a #7. 0 ET tube secured at the 22 at the lip. Patient is not sedated. Patient is able to follow commands. Breath sounds are clear and diminished. Trachea is midline, Negative for subcutaneous air, and chest excursion is symmetric. Patient is also Negative for cyanosis and is Negative for pitting edema. All alarms are set and audible. Resuscitation bag is at the head of the bed. Ventilator Settings  Mode FIO2 Rate Tidal Volume Pressure PEEP I:E Ratio   PRVC  52 %   16 450 ml   8 cm H20  1:3.13      Peak airway pressure: 20.8 cm H2O   Minute ventilation: 9.2 l/min     ABG: No results for input(s): PH, PCO2, PO2, HCO3 in the last 72 hours.       Malgorzata Broussard

## 2020-02-09 NOTE — PROGRESS NOTES
Patient was awake  Intubated  She made eye contact with  and held my hand  [facility] family at bedside    Patient and family were very receptive to having the  with them    Encouraged  Provided scripture promises  Prayer    Will follow closely    Cytnhia Woodruff, staff [address], [address] [facility]  /   [email or web address]

## 2020-02-09 NOTE — PROGRESS NOTES
02/09/20 0756   Patient Observations   Pulse (Heart Rate) (!) 53   Resp Rate 13   O2 Sat (%) 100 %   Airway - Endotracheal Tube 02/05/20 Oral   Placement Date/Time: 02/05/20 0920   Number of Attempts: 2  Inserted By: Jose Daniel Olvera. NP  Present on Admission/Arrival: Yes  Location: Oral  Placement Verified: Auscultation;BBS  Airway Types: Endotracheal, cuffed  Airway Tube Size: 7 mm   Insertion Depth (cm) 22 cm   Line Angelo Lips   Side Secured Device   Cuff Pressure 26 cmH20   Respiratory   Respiratory (WDL) X   Patient on Vent Yes - If patient is on vent, add Doc Flowsheet Ventilator (). Chest/Tracheal Assessment Chest expansion, asymmetrical   Breath Sounds Bilateral Clear   Cough Non-productive   Vent Settings   FIO2 (%) 50 %   CMV Rate Set 0   Back-Up Rate 15   Vt Set (ml) 0 ml   Pressure Support (cm H2O) 5 cm H2O   PEEP/VENT (cm H2O) 8 cm H20   Insp Rise Time (sec) 0.05   Flow Trigger 2   Ventilator Measurements   Resp Rate Observed 13   Vt Exhaled (Machine Breath) (ml) 671 ml   Ve Observed (l/min) 8.5 l/min   PIP Observed (cm H2O) 12 cm H2O   Plateau Pressure (cm H2O) 0 cm H2O   MAP (cm H2O) 10   Auto PEEP Observed (cm H2O) 0 cm H2O   Dynamic Compliance (ml/cm H20) 731.5 ml/cm H20   Safety & Alarms   Pressure Max 50 cm H2O   Pressure Min 2 cm H2O   Ve Min 2   Ve Max 22   RR Min 5   RR Max 50   Vent Method/Mode   Ventilator Mode Pressure support   Ventilator Mode ID 8      Leak test performed no leak present.

## 2020-02-09 NOTE — PROGRESS NOTES
Progress Note    Patient: Terra Lam MRN: [id number]  SSN: [id number]    YOB: 1975  Age: 53 y.o. Sex: female      Admit Date: 2/2/2020    LOS: 7 days     Subjective:   No acute changes. Pt looks great OOB to chair. Family updated at bedside.        Current Facility-Administered Medications   Medication Dose Route Frequency    vancomycin (VANCOCIN) 1500 mg in  ml infusion  1,500 mg IntraVENous Q8H    cefepime (MAXIPIME) 2 g in 0.9% sodium chloride (MBP/ADV) 100 mL  2 g IntraVENous Q8H    metoprolol tartrate (LOPRESSOR) tablet 50 mg  50 mg Per NG tube BID    NUTRITIONAL SUPPORT ELECTROLYTE PRN ORDERS   Does Not Apply PRN    senna (SENOKOT) tablet 8.6 mg  1 Tab Per NG tube DAILY    HYDROcodone-acetaminophen (NORCO) 7.5-325 mg per tablet 1 Tab  1 Tab Oral Q6H PRN    enoxaparin (LOVENOX) injection 40 mg  40 mg SubCUTAneous Q24H    famotidine (PEPCID) tablet 20 mg  20 mg Per NG tube DAILY    ibuprofen (ADVIL;MOTRIN) 100 mg/5 mL oral suspension 600 mg  600 mg Per NG tube Q6H PRN    sodium chloride (NS) flush 5-40 mL  5-40 mL IntraVENous PRN    fentaNYL citrate (PF) injection 50 mcg  50 mcg IntraVENous Q2H PRN    acetaminophen (TYLENOL) tablet 650 mg  650 mg Per NG tube Q4H PRN    ondansetron (ZOFRAN) injection 4 mg  4 mg IntraVENous Q4H PRN    atorvastatin (LIPITOR) tablet 40 mg  40 mg Per NG tube QHS    sodium chloride (NS) flush 30 mL  30 mL InterCATHeter Q8H    heparin (porcine) pf 900 Units  900 Units InterCATHeter Q8H    sodium chloride (NS) flush 30 mL  30 mL InterCATHeter PRN    heparin (porcine) pf 900 Units  900 Units InterCATHeter PRN    lip protectant (BLISTEX) ointment 1 Each  1 Each Topical PRN       Objective:     Vitals:    02/09/20 1200 02/09/20 1300 02/09/20 1400 02/09/20 1432   BP: 165/81 144/79 155/76 140/70   Pulse: 72 (!) 50 (!) 52 (!) 47   Resp: 17 20 18 16   Temp: 99.5 °F (37.5 °C)  99.6 °F (37.6 °C) 99.8 °F (37.7 °C)   SpO2: 100% 100% 100% 100% Weight:       Height:             Intake and Output:  Current Shift: 02/09 0701 - 02/09 1900  In: -   Out: 525 [Urine:525]  Last 24 hr: 02/08 0701 - 02/09 0700  In: 3403.8 [I.V.:2333.8]  Out: 5621 [Urine:2730]     IO: +637cc/24hr  TOTAL OVERALL: +9.4L     Physical Exam:   General:  Alert, cooperative. Intubated. Eyes:  PERRL, EOMs intact. Neck: Supple, symmetrical, trachea midline, no adenopathy, thyroid: no enlargment/tenderness/nodules, no carotid bruit and no JVD. Lungs:   Lungs CTA all fields. No wheezes noted. No distress. Heart:  Regular rate and rhythm, S1, S2 normal, no murmur, click, rub or gallop. Abdomen:   Soft, non-tender. Bowel sounds normal. No masses,  No organomegaly. Extremities: Extremities normal, atraumatic, no cyanosis or edema. Pulses: 2+ and symmetric all extremities. Skin: Skin color, texture, turgor normal. No rashes or lesions   Neurologic: Alert and following commands, walker, LUE with mild drift, but able to touch finger to nose bilat. Lifts LLE off bed. Remains intubated.         Lab/Data Review:    Recent Results (from the past 12 hour(s))   POC G3    Collection Time: 02/09/20  3:21 AM   Result Value Ref Range    Device: VENT      FIO2 (POC) 50 %    pH (POC) 7.435 7.35 - 7.45      pCO2 (POC) 46.0 (H) 35 - 45 MMHG    pO2 (POC) 206 (H) 75 - 100 MMHG    HCO3 (POC) 30.9 (H) 22 - 26 MMOL/L    sO2 (POC) 100 (H) 95 - 98 %    Base excess (POC) 6 mmol/L    Mode Pressure regulated volume control      Tidal volume 450 ml    Set Rate 16 bpm    PEEP/CPAP (POC) 8 cmH2O    Allens test (POC) NOT APPLICABLE      Inspiratory Time 0.90 sec    Site DRAWN FROM ARTERIAL LINE      Specimen type (POC) ARTERIAL      Performed by Gibran     CO2, POC 32 MMOL/L    Respiratory comment: NurseNotified     Exhaled minute volume 8.90 L/min    COLLECT TIME 312     CBC W/O DIFF    Collection Time: 02/09/20  5:47 AM   Result Value Ref Range    WBC 9.9 4.3 - 11.1 K/uL    RBC 3.28 (L) 4.05 - 5.2 M/uL    HGB 9.2 (L) 11.7 - 15.4 g/dL    HCT 28.6 (L) 35.8 - 46.3 %    MCV 87.2 79.6 - 97.8 FL    MCH 28.0 26.1 - 32.9 PG    MCHC 32.2 31.4 - 35.0 g/dL    RDW 11.9 11.9 - 14.6 %    PLATELET 322 492 - 032 K/uL    MPV 11.9 9.4 - 12.3 FL    ABSOLUTE NRBC 0.00 0.0 - 0.2 K/uL       Assessment/ Plan:     Principal Problem:    AVM (arteriovenous malformation) spine (2020)    Active Problems:    Cavernoma (2/3/2020)      Acute respiratory failure with hypoxia (HCC) (2/3/2020)      Edema of spinal cord ([address] (2/3/2020)      Acute left-sided weakness (2/3/2020)        NEURO:pt with cervical hemorrhage and edema secondary to vascular abnormality, cavernoma vs AVM. Pt remains intubated, but is alert and following commands this am. Cerebral angiogram yesterday with suspicious abnormal vessel around C3 area. Will repeat Cerebral angiogram and MRI brain next week. Pt is alert and following commands. R groin is CDI with no hematoma. Pulses are intact. Pt had MRI Cspine today, will have repeat cerebral angiogram Tue, 20. Family updated. RESP:PRVC @ 50%: AB.4/46/206. lungs cta. CV:-160 goal. 2D Echo: EF 55-60%, Trop neg. SCD, lovenox. Metoprolol 50mg po bid. HEME:HH 9.2/28,   NEPH:bun/cr: 21/0.48  GI: IVF NS @ 75cc/hr. Pepcid, senna. Tolerating TF well.  9L +. IVF's stopped. ID:TM: 100.4: pan cx sent, neg thus far, but started on Cef/Vanc D5/10 due to continued fever. LINES:RUE: PICC. Right radial virgil.    .      Signed By: Kenney Abdi NP     2020

## 2020-02-09 NOTE — PROGRESS NOTES
Pt not pulling adequate volumes on PRVC; Puma Mathews, NP notified. Okay to place pt on PS/ CPAP mode.

## 2020-02-10 ENCOUNTER — APPOINTMENT (OUTPATIENT)
Dept: GENERAL RADIOLOGY | Age: 45
DRG: 004 | End: 2020-02-10
Attending: NURSE PRACTITIONER
Payer: COMMERCIAL

## 2020-02-10 LAB
ANION GAP SERPL CALC-SCNC: 8 MMOL/L (ref 7–16)
ARTERIAL PATENCY WRIST A: ABNORMAL
BASE EXCESS BLD CALC-SCNC: 9 MMOL/L
BDY SITE: ABNORMAL
BUN SERPL-MCNC: 17 MG/DL (ref 6–23)
CALCIUM SERPL-MCNC: 9.2 MG/DL (ref 8.3–10.4)
CHLORIDE SERPL-SCNC: 104 MMOL/L (ref 98–107)
CO2 BLD-SCNC: 36 MMOL/L
CO2 SERPL-SCNC: 30 MMOL/L (ref 21–32)
COLLECT TIME,HTIME: 315
CREAT SERPL-MCNC: 0.51 MG/DL (ref 0.6–1)
ERYTHROCYTE [DISTWIDTH] IN BLOOD BY AUTOMATED COUNT: 11.9 % (ref 11.9–14.6)
EXHALED MINUTE VOLUME, VE: 9.8 L/MIN
GAS FLOW.O2 O2 DELIVERY SYS: ABNORMAL L/MIN
GLUCOSE SERPL-MCNC: 109 MG/DL (ref 65–100)
HCO3 BLD-SCNC: 34.1 MMOL/L (ref 22–26)
HCT VFR BLD AUTO: 34.3 % (ref 35.8–46.3)
HGB BLD-MCNC: 11.1 G/DL (ref 11.7–15.4)
MCH RBC QN AUTO: 27.5 PG (ref 26.1–32.9)
MCHC RBC AUTO-ENTMCNC: 32.4 G/DL (ref 31.4–35)
MCV RBC AUTO: 85.1 FL (ref 79.6–97.8)
NRBC # BLD: 0.02 K/UL (ref 0–0.2)
O2/TOTAL GAS SETTING VFR VENT: 50 %
PCO2 BLD: 48 MMHG (ref 35–45)
PEEP RESPIRATORY: 8 CMH2O
PH BLD: 7.46 [PH] (ref 7.35–7.45)
PLATELET # BLD AUTO: 184 K/UL (ref 150–450)
PMV BLD AUTO: 12 FL (ref 9.4–12.3)
PO2 BLD: 187 MMHG (ref 75–100)
POTASSIUM SERPL-SCNC: 3.8 MMOL/L (ref 3.5–5.1)
PRESSURE SUPPORT SETTING VENT: 5 CMH2O
RBC # BLD AUTO: 4.03 M/UL (ref 4.05–5.2)
SAO2 % BLD: 100 % (ref 95–98)
SERVICE CMNT-IMP: ABNORMAL
SERVICE CMNT-IMP: ABNORMAL
SODIUM SERPL-SCNC: 142 MMOL/L (ref 136–145)
SPECIMEN TYPE: ABNORMAL
TOTAL RESP. RATE, ITRR: 16
VENTILATION MODE VENT: ABNORMAL
WBC # BLD AUTO: 13.1 K/UL (ref 4.3–11.1)

## 2020-02-10 PROCEDURE — 77030018798 HC PMP KT ENTRL FED COVD -A

## 2020-02-10 PROCEDURE — 94003 VENT MGMT INPAT SUBQ DAY: CPT

## 2020-02-10 PROCEDURE — 74011250637 HC RX REV CODE- 250/637: Performed by: NEUROLOGICAL SURGERY

## 2020-02-10 PROCEDURE — 85027 COMPLETE CBC AUTOMATED: CPT

## 2020-02-10 PROCEDURE — 74011250636 HC RX REV CODE- 250/636: Performed by: NURSE PRACTITIONER

## 2020-02-10 PROCEDURE — 80048 BASIC METABOLIC PNL TOTAL CA: CPT

## 2020-02-10 PROCEDURE — 74011250636 HC RX REV CODE- 250/636: Performed by: NEUROLOGICAL SURGERY

## 2020-02-10 PROCEDURE — 74011000258 HC RX REV CODE- 258: Performed by: NEUROLOGICAL SURGERY

## 2020-02-10 PROCEDURE — 71045 X-RAY EXAM CHEST 1 VIEW: CPT

## 2020-02-10 PROCEDURE — 97116 GAIT TRAINING THERAPY: CPT

## 2020-02-10 PROCEDURE — 65610000001 HC ROOM ICU GENERAL

## 2020-02-10 PROCEDURE — 99232 SBSQ HOSP IP/OBS MODERATE 35: CPT | Performed by: NEUROLOGICAL SURGERY

## 2020-02-10 PROCEDURE — 99232 SBSQ HOSP IP/OBS MODERATE 35: CPT | Performed by: PHYSICAL MEDICINE & REHABILITATION

## 2020-02-10 PROCEDURE — 97110 THERAPEUTIC EXERCISES: CPT

## 2020-02-10 PROCEDURE — 82803 BLOOD GASES ANY COMBINATION: CPT

## 2020-02-10 PROCEDURE — 74011250637 HC RX REV CODE- 250/637: Performed by: NURSE PRACTITIONER

## 2020-02-10 RX ORDER — FACIAL-BODY WIPES
10 EACH TOPICAL ONCE
Status: COMPLETED | OUTPATIENT
Start: 2020-02-10 | End: 2020-02-10

## 2020-02-10 RX ORDER — ALPRAZOLAM 0.5 MG/1
0.5 TABLET ORAL 2 TIMES DAILY
Status: DISCONTINUED | OUTPATIENT
Start: 2020-02-10 | End: 2020-02-11

## 2020-02-10 RX ORDER — FENTANYL CITRATE 50 UG/ML
50 INJECTION, SOLUTION INTRAMUSCULAR; INTRAVENOUS ONCE
Status: COMPLETED | OUTPATIENT
Start: 2020-02-10 | End: 2020-02-10

## 2020-02-10 RX ADMIN — ACETAMINOPHEN 650 MG: 325 TABLET, FILM COATED ORAL at 03:28

## 2020-02-10 RX ADMIN — Medication 30 ML: at 05:18

## 2020-02-10 RX ADMIN — VANCOMYCIN HYDROCHLORIDE 1500 MG: 10 INJECTION, POWDER, LYOPHILIZED, FOR SOLUTION INTRAVENOUS at 10:59

## 2020-02-10 RX ADMIN — Medication 30 ML: at 14:28

## 2020-02-10 RX ADMIN — SENNOSIDES 8.6 MG: 8.6 TABLET, FILM COATED ORAL at 08:38

## 2020-02-10 RX ADMIN — Medication 900 UNITS: at 22:06

## 2020-02-10 RX ADMIN — METOPROLOL TARTRATE 50 MG: 50 TABLET, FILM COATED ORAL at 08:38

## 2020-02-10 RX ADMIN — FENTANYL CITRATE 50 MCG: 50 INJECTION INTRAMUSCULAR; INTRAVENOUS at 05:33

## 2020-02-10 RX ADMIN — FAMOTIDINE 20 MG: 20 TABLET, FILM COATED ORAL at 08:38

## 2020-02-10 RX ADMIN — VANCOMYCIN HYDROCHLORIDE 1500 MG: 10 INJECTION, POWDER, LYOPHILIZED, FOR SOLUTION INTRAVENOUS at 18:10

## 2020-02-10 RX ADMIN — CEFEPIME 2 G: 2 INJECTION, POWDER, FOR SOLUTION INTRAVENOUS at 08:37

## 2020-02-10 RX ADMIN — ALPRAZOLAM 0.5 MG: 0.5 TABLET ORAL at 14:28

## 2020-02-10 RX ADMIN — HYDROCODONE BITARTRATE AND ACETAMINOPHEN 1 TABLET: 7.5; 325 TABLET ORAL at 20:05

## 2020-02-10 RX ADMIN — Medication 900 UNITS: at 14:29

## 2020-02-10 RX ADMIN — ATORVASTATIN CALCIUM 40 MG: 40 TABLET, FILM COATED ORAL at 22:05

## 2020-02-10 RX ADMIN — CEFEPIME 2 G: 2 INJECTION, POWDER, FOR SOLUTION INTRAVENOUS at 16:33

## 2020-02-10 RX ADMIN — HYDROCODONE BITARTRATE AND ACETAMINOPHEN 1 TABLET: 7.5; 325 TABLET ORAL at 08:51

## 2020-02-10 RX ADMIN — VANCOMYCIN HYDROCHLORIDE 1500 MG: 10 INJECTION, POWDER, LYOPHILIZED, FOR SOLUTION INTRAVENOUS at 02:29

## 2020-02-10 RX ADMIN — Medication 900 UNITS: at 05:17

## 2020-02-10 RX ADMIN — ACETAMINOPHEN 650 MG: 325 TABLET, FILM COATED ORAL at 23:47

## 2020-02-10 RX ADMIN — FENTANYL CITRATE 50 MCG: 50 INJECTION, SOLUTION INTRAMUSCULAR; INTRAVENOUS at 05:30

## 2020-02-10 RX ADMIN — ENOXAPARIN SODIUM 40 MG: 40 INJECTION SUBCUTANEOUS at 08:38

## 2020-02-10 RX ADMIN — CEFEPIME 2 G: 2 INJECTION, POWDER, FOR SOLUTION INTRAVENOUS at 23:47

## 2020-02-10 RX ADMIN — Medication 30 ML: at 22:06

## 2020-02-10 RX ADMIN — CEFEPIME 2 G: 2 INJECTION, POWDER, FOR SOLUTION INTRAVENOUS at 00:29

## 2020-02-10 RX ADMIN — METOPROLOL TARTRATE 50 MG: 50 TABLET, FILM COATED ORAL at 18:09

## 2020-02-10 RX ADMIN — BISACODYL 10 MG: 10 SUPPOSITORY RECTAL at 16:33

## 2020-02-10 NOTE — PROGRESS NOTES
A follow up visit was made to the patient. Emotional support, spiritual presence and   prayer were provided for the patient and family members. She walked some with the help of some of the therapists.       CAMILLE Abbott

## 2020-02-10 NOTE — PROGRESS NOTES
Ventilator check complete; patient has a #7. 0 ET tube secured at the 22 at the lip. Patient is not sedated. Patient is able to follow commands. Breath sounds are diminished. Trachea is midline, Negative for subcutaneous air, and chest excursion is symmetric. Patient is also Negative for cyanosis and is Negative for pitting edema. All alarms are set and audible. Resuscitation bag is at the head of the bed.       Ventilator Settings  Mode FIO2 Rate Tidal Volume Pressure PEEP I:E Ratio   Pressure support  52 %    0 ml  5 cm H2O  8 cm H20  1:3.13      Peak airway pressure: 11.8 cm H2O   Minute ventilation: 7.7 l/min         Ahmet Zhou, RT

## 2020-02-10 NOTE — PROGRESS NOTES
Progress Note    Patient: Jemma Vernon MRN: [id number]  SSN: [id number]    YOB: 1975  Age: 53 y.o. Sex: female      Admit Date: 2/2/2020    LOS: 8 days     Subjective:   No acute changes.      Current Facility-Administered Medications   Medication Dose Route Frequency    fentaNYL citrate (PF) injection 50 mcg  50 mcg IntraVENous ONCE    [START ON 2/11/2020] vanc trough reminder   Other ONCE    niCARdipine in Saline (CARDENE) 25 MG/250 mL infusion kit  0-15 mg/hr IntraVENous TITRATE    vancomycin (VANCOCIN) 1500 mg in  ml infusion  1,500 mg IntraVENous Q8H    cefepime (MAXIPIME) 2 g in 0.9% sodium chloride (MBP/ADV) 100 mL  2 g IntraVENous Q8H    metoprolol tartrate (LOPRESSOR) tablet 50 mg  50 mg Per NG tube BID    NUTRITIONAL SUPPORT ELECTROLYTE PRN ORDERS   Does Not Apply PRN    senna (SENOKOT) tablet 8.6 mg  1 Tab Per NG tube DAILY    HYDROcodone-acetaminophen (NORCO) 7.5-325 mg per tablet 1 Tab  1 Tab Oral Q6H PRN    enoxaparin (LOVENOX) injection 40 mg  40 mg SubCUTAneous Q24H    famotidine (PEPCID) tablet 20 mg  20 mg Per NG tube DAILY    ibuprofen (ADVIL;MOTRIN) 100 mg/5 mL oral suspension 600 mg  600 mg Per NG tube Q6H PRN    sodium chloride (NS) flush 5-40 mL  5-40 mL IntraVENous PRN    fentaNYL citrate (PF) injection 50 mcg  50 mcg IntraVENous Q2H PRN    acetaminophen (TYLENOL) tablet 650 mg  650 mg Per NG tube Q4H PRN    ondansetron (ZOFRAN) injection 4 mg  4 mg IntraVENous Q4H PRN    atorvastatin (LIPITOR) tablet 40 mg  40 mg Per NG tube QHS    sodium chloride (NS) flush 30 mL  30 mL InterCATHeter Q8H    heparin (porcine) pf 900 Units  900 Units InterCATHeter Q8H    sodium chloride (NS) flush 30 mL  30 mL InterCATHeter PRN    heparin (porcine) pf 900 Units  900 Units InterCATHeter PRN    lip protectant (BLISTEX) ointment 1 Each  1 Each Topical PRN       Objective:     Vitals:    02/10/20 1037 02/10/20 1102 02/10/20 1110 02/10/20 1131   BP:  129/73 130/73   Pulse: 75 65 60 (!) 55   Resp: 17 19 11 13   Temp: 99.6 °F (37.6 °C) 99.8 °F (37.7 °C)  99.3 °F (37.4 °C)   SpO2: 100% 100% 100% 100%   Weight:       Height:             Intake and Output:  Current Shift: No intake/output data recorded. Last 24 hr: 02/09 0701 - 02/10 0700  In: 3716.6 [I.V.:1979.6]  Out: 5725 [Urine:5725]     IO: -2008cc/24hr  TOTAL OVERALL: +7.4L     Physical Exam:   General:  Alert, cooperative. Intubated. Eyes:  PERRL, EOMs intact. Neck: Supple, symmetrical, trachea midline, no adenopathy, thyroid: no enlargment/tenderness/nodules, no carotid bruit and no JVD. Lungs:   Lungs CTA all fields. No wheezes noted. No distress. Heart:  Regular rate and rhythm, S1, S2 normal, no murmur, click, rub or gallop. Abdomen:   Soft, non-tender. Bowel sounds normal. No masses,  No organomegaly. Extremities: Extremities normal, atraumatic, no cyanosis or edema. Pulses: 2+ and symmetric all extremities. Skin: Skin color, texture, turgor normal. No rashes or lesions   Neurologic: Alert and following commands, walker, LUE with mild drift, but able to touch finger to nose bilat. Lifts LLE off bed. Remains intubated. Lab/Data Review:    Recent Results (from the past 12 hour(s))   POC G3    Collection Time: 02/10/20  3:23 AM   Result Value Ref Range    Device: VENT      FIO2 (POC) 50 %    pH (POC) 7.460 (H) 7.35 - 7.45      pCO2 (POC) 48.0 (H) 35 - 45 MMHG    pO2 (POC) 187 (H) 75 - 100 MMHG    HCO3 (POC) 34.1 (H) 22 - 26 MMOL/L    sO2 (POC) 100 (H) 95 - 98 %    Base excess (POC) 9 mmol/L    Mode VENTILATOR/SPONTANEOUS/PRESSURE SUPPORT      PEEP/CPAP (POC) 8 cmH2O    Pressure support 5 cmH2O    Allens test (POC) NOT APPLICABLE      Total resp.  rate 16      Site DRAWN FROM ARTERIAL LINE      Specimen type (POC) ARTERIAL      Performed by StoJesse     CO2, POC 36 MMOL/L    Respiratory comment: NurseNotified     Exhaled minute volume 9.80 L/min    COLLECT TIME 207     METABOLIC PANEL, BASIC    Collection Time: 02/10/20  3:29 AM   Result Value Ref Range    Sodium 142 136 - 145 mmol/L    Potassium 3.8 3.5 - 5.1 mmol/L    Chloride 104 98 - 107 mmol/L    CO2 30 21 - 32 mmol/L    Anion gap 8 7 - 16 mmol/L    Glucose 109 (H) 65 - 100 mg/dL    BUN 17 6 - 23 MG/DL    Creatinine 0.51 (L) 0.6 - 1.0 MG/DL    GFR est AA >60 >60 ml/min/1.73m2    GFR est non-AA >60 >60 ml/min/1.73m2    Calcium 9.2 8.3 - 10.4 MG/DL   CBC W/O DIFF    Collection Time: 02/10/20  3:29 AM   Result Value Ref Range    WBC 13.1 (H) 4.3 - 11.1 K/uL    RBC 4.03 (L) 4.05 - 5.2 M/uL    HGB 11.1 (L) 11.7 - 15.4 g/dL    HCT 34.3 (L) 35.8 - 46.3 %    MCV 85.1 79.6 - 97.8 FL    MCH 27.5 26.1 - 32.9 PG    MCHC 32.4 31.4 - 35.0 g/dL    RDW 11.9 11.9 - 14.6 %    PLATELET 870 375 - 277 K/uL    MPV 12.0 9.4 - 12.3 FL    ABSOLUTE NRBC 0.02 0.0 - 0.2 K/uL       Assessment/ Plan:     Principal Problem:    AVM (arteriovenous malformation) spine (2020)    Active Problems:    Cavernoma (2/3/2020)      Acute respiratory failure with hypoxia (HCC) (2/3/2020)      Edema of spinal cord (HCC) (2/3/2020)      Acute left-sided weakness (2/3/2020)        NEURO:pt with cervical hemorrhage and edema secondary to vascular abnormality, cavernoma vs AVM. Pt remains intubated, but is alert and following commands this am. Cerebral angiogram yesterday with suspicious abnormal vessel around C3 area. Will repeat Cerebral angiogram and MRI brain next week. Pt is alert and following commands. R groin is CDI with no hematoma. Pulses are intact. Repeat MRI C-spine shows: 1. Intramedullary subacute hematoma extending from C1 to the C6-C7 level wth  is abundant associated cord edema and expansion. 2. T2 hypointense intramedullary lesion at the C3 level. This is likely the  origin of hemorrhage and may be an underlying vascular lesion such as an AVM. Pt will be NPO for repeat cerebral angiogram tomorrow.  updated at bedside this am.  RESP:PRVC @ 50%: AB.3180. lungs cta. CV:-160 goal. 2D Echo: EF 55-60%, Trop neg. SCD, lovenox. Metoprolol 50mg po bid. [address] 11/34,   NEPH:bun/cr: 17/0.5  GI: Pepcid, senna. Tolerating TF well. 7.4L +. IVF's stopped. NPO after midnight. ID:TM: 99.6: pan cx sent, neg thus far, but started on Cef/Vanc D6/10 due to continued fever. LINES:RUE: PICC. Right radial virgil.    .      Signed By: Antionette Carrington NP     February 10, 2020

## 2020-02-10 NOTE — PROGRESS NOTES
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 Min assist with mobility, ambulating 200 feet while pushing a wc/ on portable vent; gait belt; several rest breaks. Balance deficits noted. -remains intubated. Plans for repeat Cerebral angio tomorrow. MRI repeated of C spine 2/9; subacute hematoma C1-C6/7 with cord edema, at T3 there is an intramedullary lx suspicious for underlying vascular lesion (AVM, Cavernoma)  -excellent rehab potential. I believe she has the potential to quickly improve if no complications. Ongoing left sided deficts, mild/mod  -Tm 99.8 Day 6/10 Cef/Vanc , dumont cx neg.   -family stating trach planned for Wed? Will await documentation by Dr Talia Montanez. History/Subjective/Complaint:     Patient seen and examined. Records reviewed. Looks great. Up in chair with family members in room. Pt communicates appropriately by writing things down. Non communicative due to oral intubation.  Tolerating NGT feeds    Pain 1  Pain Scale 1: Numeric (0 - 10) (02/10/20 1009)  Pain Intensity 1: 0(nods head no to pain) (02/10/20 1009)  Patient Stated Pain Goal: 0 (02/10/20 0701)  Pain Reassessment 1: Yes (02/10/20 0701)  Pain Onset 1: acute (02/10/20 0328)  Pain Location 1: Head (02/10/20 0328)  Pain Orientation 1: Left (02/10/20 0328)  Pain Description 1: Aching (02/10/20 0328)  Pain Intervention(s) 1: Medication (see MAR) (02/10/20 0328)     Objective:     Vitals:  Patient Vitals for the past 8 hrs:   BP Temp Pulse Resp SpO2   02/10/20 1131 130/73 99.3 °F (37.4 °C) (!) 55 13 100 %   02/10/20 1110   60 11 100 %   02/10/20 1102 129/73 99.8 °F (37.7 °C) 65 19 100 %   02/10/20 1037  99.6 °F (37.6 °C) 75 17 100 %   02/10/20 1001 124/69 99.5 °F (37.5 °C) (!) 54 13 100 %   02/10/20 0901 144/79 99.1 °F (37.3 °C) 77 13 100 %   02/10/20 0831  99.3 °F (37.4 °C) 98 21 100 %   02/10/20 0801 117/72 99.3 °F (37.4 °C) (!) 56 17 100 %   02/10/20 0731 117/79 99.3 °F (37.4 °C) 69 29 100 %   02/10/20 0714   69 26 100 %   02/10/20 0701 123/76 99.3 °F (37.4 °C) 83 25 100 %   02/10/20 0700  99.3 °F (37.4 °C) 60 14 100 %   02/10/20 0645  99.3 °F (37.4 °C) (!) 57 11 100 %   02/10/20 0631  99.3 °F (37.4 °C) 61 13 100 %   02/10/20 0630  99.3 °F (37.4 °C) 64 11 100 %   02/10/20 0615  99.3 °F (37.4 °C) 61 13 100 %   02/10/20 0600  99.3 °F (37.4 °C) (!) 58 11 100 %   02/10/20 0545  99.1 °F (37.3 °C) 63 9 100 %   02/10/20 0530  98.9 °F (37.2 °C) (!) 101 13 100 %   02/10/20 0515  98.7 °F (37.1 °C) 70 15 100 %   02/10/20 0502 139/80       02/10/20 0500  98.7 °F (37.1 °C) 70 15 100 %   02/10/20 0445  98.7 °F (37.1 °C) 86 (!) 31 100 %   02/10/20 0431  98.7 °F (37.1 °C) 80 26 99 %   02/10/20 0430  98.7 °F (37.1 °C) 81 13 100 %   02/10/20 0415  98.6 °F (37 °C) 83 19 100 %   02/10/20 0400  98.7 °F (37.1 °C) 72 17 100 %      Intake and Output:  02/08 1901 - 02/10 0700  In: 7120.3 [I.V.:4313.3]  Out: 6910 [Urine:6910]    Allergies   Allergen Reactions    Ace Inhibitors Angioedema     Current Facility-Administered Medications   Medication Dose Route Frequency    fentaNYL citrate (PF) injection 50 mcg  50 mcg IntraVENous ONCE    [START ON 2/11/2020] vanc trough reminder   Other ONCE    niCARdipine in Saline (CARDENE) 25 MG/250 mL infusion kit  0-15 mg/hr IntraVENous TITRATE    vancomycin (VANCOCIN) 1500 mg in  ml infusion  1,500 mg IntraVENous Q8H    cefepime (MAXIPIME) 2 g in 0.9% sodium chloride (MBP/ADV) 100 mL  2 g IntraVENous Q8H    metoprolol tartrate (LOPRESSOR) tablet 50 mg  50 mg Per NG tube BID    NUTRITIONAL SUPPORT ELECTROLYTE PRN ORDERS   Does Not Apply PRN    senna (SENOKOT) tablet 8.6 mg  1 Tab Per NG tube DAILY    HYDROcodone-acetaminophen (NORCO) 7.5-325 mg per tablet 1 Tab  1 Tab Oral Q6H PRN    enoxaparin (LOVENOX) injection 40 mg  40 mg SubCUTAneous Q24H    famotidine (PEPCID) tablet 20 mg  20 mg Per NG tube DAILY    ibuprofen (ADVIL;MOTRIN) 100 mg/5 mL oral suspension 600 mg  600 mg Per NG tube Q6H PRN    sodium chloride (NS) flush 5-40 mL  5-40 mL IntraVENous PRN    fentaNYL citrate (PF) injection 50 mcg  50 mcg IntraVENous Q2H PRN    acetaminophen (TYLENOL) tablet 650 mg  650 mg Per NG tube Q4H PRN    ondansetron (ZOFRAN) injection 4 mg  4 mg IntraVENous Q4H PRN    atorvastatin (LIPITOR) tablet 40 mg  40 mg Per NG tube QHS    sodium chloride (NS) flush 30 mL  30 mL InterCATHeter Q8H    heparin (porcine) pf 900 Units  900 Units InterCATHeter Q8H    sodium chloride (NS) flush 30 mL  30 mL InterCATHeter PRN    heparin (porcine) pf 900 Units  900 Units InterCATHeter PRN    lip protectant (BLISTEX) ointment 1 Each  1 Each Topical PRN       Physical Exam:  Awake and alert, follows commands  Appears comfortable on vent, sitting up in chair  CV rrr no m  LUNGS cta b  ABD obese ntnd bs +  EXT no edema, RUE PICC c/d/i, right radial a-line intact  NEURO; follows commands well. Communicates by writing. Mild LUE weakness + drift; distal LEs symm. slt prox weakness with hip flexion L slt weaker than right.         Functional Assessment:  Gross Assessment  AROM: Generally decreased, functional(L hip flexion) (02/04/20 1400)  PROM: Generally decreased, functional (02/04/20 1100)  Strength: Generally decreased, functional(L LE) (02/04/20 1400)  Coordination: Generally decreased, functional (02/04/20 1400)  Tone: Normal (02/04/20 1100)  Sensation: Intact (02/04/20 1400)     Gait  Speed/Linda: Slow (02/10/20 1009)  Step Length: Left shortened;Right shortened (02/10/20 1009)  Gait Abnormalities: Decreased step clearance;Trunk sway increased (02/10/20 1009)  Ambulation - Level of Assistance: Contact guard assistance;Minimal assistance (02/10/20 1009)  Distance (ft): 200 Feet (ft)(on vent, following w/ chair, 3 standing breaks) (02/10/20 1009)  Assistive Device: Gait belt(pushing w/c) (02/10/20 1009)     Bed Mobility  Rolling: Contact guard assistance;Minimum assistance (02/10/20 1009)  Supine to Sit: Minimum assistance (02/10/20 1009)  Sit to Supine: (left up in chair) (02/10/20 1009)  Scooting: Minimum assistance(to EOB, CGA/SBA in chair) (02/10/20 1009)     Balance  Sitting: Impaired (02/10/20 1009)  Sitting - Static: Good (unsupported) (02/10/20 1009)  Sitting - Dynamic: Fair (occasional) (02/10/20 1009)  Standing: Impaired (02/10/20 1009)  Standing - Static: Good;Fair (02/10/20 1009)  Standing - Dynamic : Fair (02/10/20 1009)                       Bed/Mat Mobility  Rolling: Contact guard assistance;Minimum assistance (02/10/20 1009)  Supine to Sit: Minimum assistance (02/10/20 1009)  Sit to Supine: (left up in chair) (02/10/20 1009)  Sit to Stand: Minimum assistance(additional assist for lines) (02/10/20 1009)  Stand to Sit: Minimum assistance (02/10/20 1009)  Bed to Chair: Minimum assistance (02/10/20 1009)  Scooting: Minimum assistance(to EOB, CGA/SBA in chair) (02/10/20 1009)     Labs/Studies:  Recent Results (from the past 72 hour(s))   POC G3    Collection Time: 02/08/20  3:12 AM   Result Value Ref Range    Device: VENT      FIO2 (POC) 50 %    pH (POC) 7.417 7.35 - 7.45      pCO2 (POC) 45.8 (H) 35 - 45 MMHG    pO2 (POC) 213 (H) 75 - 100 MMHG    HCO3 (POC) 29.4 (H) 22 - 26 MMOL/L    sO2 (POC) 100 (H) 95 - 98 %    Base excess (POC) 4 mmol/L    Mode Pressure regulated volume control      Tidal volume 450 ml    Set Rate 16 bpm    PEEP/CPAP (POC) 8 cmH2O    Allens test (POC) NOT APPLICABLE      Inspiratory Time 0.9 sec    Site DRAWN FROM ARTERIAL LINE      Specimen type (POC) ARTERIAL      Performed by Emmanuel     CO2, POC 31 MMOL/L    Respiratory comment: NurseNotified     Exhaled minute volume 7.00 L/min    COLLECT TIME 286     METABOLIC PANEL, BASIC    Collection Time: 02/08/20  3:37 AM   Result Value Ref Range    Sodium 146 (H) 136 - 145 mmol/L    Potassium 3.7 3.5 - 5.1 mmol/L    Chloride 114 (H) 98 - 107 mmol/L    CO2 27 21 - 32 mmol/L    Anion gap 5 (L) 7 - 16 mmol/L    Glucose 154 (H) 65 - 100 mg/dL    BUN 21 6 - 23 MG/DL    Creatinine 0.48 (L) 0.6 - 1.0 MG/DL    GFR est AA >60 >60 ml/min/1.73m2    GFR est non-AA >60 >60 ml/min/1.73m2    Calcium 7.9 (L) 8.3 - 10.4 MG/DL   MAGNESIUM    Collection Time: 02/08/20  3:37 AM   Result Value Ref Range    Magnesium 2.2 1.8 - 2.4 mg/dL   CBC W/O DIFF    Collection Time: 02/08/20  4:00 AM   Result Value Ref Range    WBC 9.3 4.3 - 11.1 K/uL    RBC 3.09 (L) 4.05 - 5.2 M/uL    HGB 8.7 (L) 11.7 - 15.4 g/dL    HCT 27.7 (L) 35.8 - 46.3 %    MCV 89.6 79.6 - 97.8 FL    MCH 28.2 26.1 - 32.9 PG    MCHC 31.4 31.4 - 35.0 g/dL    RDW 12.2 11.9 - 14.6 %    PLATELET 381 198 - 667 K/uL    MPV 11.4 9.4 - 12.3 FL    ABSOLUTE NRBC 0.02 0.0 - 0.2 K/uL   VANCOMYCIN, TROUGH    Collection Time: 02/08/20  7:10 PM   Result Value Ref Range    Vancomycin,trough 9.2 5 - 20 ug/mL   POC G3    Collection Time: 02/09/20  3:21 AM   Result Value Ref Range    Device: VENT      FIO2 (POC) 50 %    pH (POC) 7.435 7.35 - 7.45      pCO2 (POC) 46.0 (H) 35 - 45 MMHG    pO2 (POC) 206 (H) 75 - 100 MMHG    HCO3 (POC) 30.9 (H) 22 - 26 MMOL/L    sO2 (POC) 100 (H) 95 - 98 %    Base excess (POC) 6 mmol/L    Mode Pressure regulated volume control      Tidal volume 450 ml    Set Rate 16 bpm    PEEP/CPAP (POC) 8 cmH2O    Allens test (POC) NOT APPLICABLE      Inspiratory Time 0.90 sec    Site DRAWN FROM ARTERIAL LINE      Specimen type (POC) ARTERIAL      Performed by Gibran SHABAZZ, POC 32 MMOL/L    Respiratory comment: NurseNotified     Exhaled minute volume 8.90 L/min    COLLECT TIME 312     CBC W/O DIFF    Collection Time: 02/09/20  5:47 AM   Result Value Ref Range    WBC 9.9 4.3 - 11.1 K/uL    RBC 3.28 (L) 4.05 - 5.2 M/uL    HGB 9.2 (L) 11.7 - 15.4 g/dL    HCT 28.6 (L) 35.8 - 46.3 %    MCV 87.2 79.6 - 97.8 FL    MCH 28.0 26.1 - 32.9 PG    MCHC 32.2 31.4 - 35.0 g/dL    RDW 11.9 11.9 - 14.6 %    PLATELET 321 559 - 308 K/uL    MPV 11.9 9.4 - 12.3 FL    ABSOLUTE NRBC 0.00 0.0 - 0.2 K/uL   POC G3    Collection Time: 02/10/20  3:23 AM   Result Value Ref Range    Device: VENT      FIO2 (POC) 50 %    pH (POC) 7.460 (H) 7.35 - 7.45      pCO2 (POC) 48.0 (H) 35 - 45 MMHG    pO2 (POC) 187 (H) 75 - 100 MMHG    HCO3 (POC) 34.1 (H) 22 - 26 MMOL/L    sO2 (POC) 100 (H) 95 - 98 %    Base excess (POC) 9 mmol/L    Mode VENTILATOR/SPONTANEOUS/PRESSURE SUPPORT      PEEP/CPAP (POC) 8 cmH2O    Pressure support 5 cmH2O    Allens test (POC) NOT APPLICABLE      Total resp.  rate 16      Site DRAWN FROM ARTERIAL LINE      Specimen type (POC) ARTERIAL      Performed by Nicho     CO2, POC 36 MMOL/L    Respiratory comment: NurseNotified     Exhaled minute volume 9.80 L/min    COLLECT TIME 382     METABOLIC PANEL, BASIC    Collection Time: 02/10/20  3:29 AM   Result Value Ref Range    Sodium 142 136 - 145 mmol/L    Potassium 3.8 3.5 - 5.1 mmol/L    Chloride 104 98 - 107 mmol/L    CO2 30 21 - 32 mmol/L    Anion gap 8 7 - 16 mmol/L    Glucose 109 (H) 65 - 100 mg/dL    BUN 17 6 - 23 MG/DL    Creatinine 0.51 (L) 0.6 - 1.0 MG/DL    GFR est AA >60 >60 ml/min/1.73m2    GFR est non-AA >60 >60 ml/min/1.73m2    Calcium 9.2 8.3 - 10.4 MG/DL   CBC W/O DIFF    Collection Time: 02/10/20  3:29 AM   Result Value Ref Range    WBC 13.1 (H) 4.3 - 11.1 K/uL    RBC 4.03 (L) 4.05 - 5.2 M/uL    HGB 11.1 (L) 11.7 - 15.4 g/dL    HCT 34.3 (L) 35.8 - 46.3 %    MCV 85.1 79.6 - 97.8 FL    MCH 27.5 26.1 - 32.9 PG    MCHC 32.4 31.4 - 35.0 g/dL    RDW 11.9 11.9 - 14.6 %    PLATELET 809 951 - 352 K/uL    MPV 12.0 9.4 - 12.3 FL    ABSOLUTE NRBC 0.02 0.0 - 0.2 K/uL        Assessment:     Principal Problem:    AVM (arteriovenous malformation) spine (2/2/2020)    Active Problems: Cavernoma (2/3/2020)      Acute respiratory failure with hypoxia (HCC) (2/3/2020)      Edema of spinal cord ([address] (2/3/2020)      Acute left-sided weakness (2/3/2020)        Plan:     Recommendations: Continue Acute Rehab Program  Coordination of rehab/medical care  Counseling of PM & R care issues management  Monitoring and management of medical conditions per plan of care/orders  Discussion with Family/Caregiver/Staff  Reviewed Therapies/Labs/Medications/Records

## 2020-02-10 NOTE — PROGRESS NOTES
Bedside and Verbal shift change report given to Jennifer Diego  (oncoming nurse) by Tierra Couch RN  (offgoing nurse). Report included the following information SBAR, Kardex, OR Summary, Procedure Summary, Intake/Output, MAR, Recent Results, Med Rec Status, Cardiac Rhythm A Fib and Alarm Parameters .

## 2020-02-10 NOTE — PROGRESS NOTES
Nutrition F/U:  TF Management for Dr. David Simon, NP. Assessment:  Weight 112.3 kg (ICU bed 2/6/2020), edema - 1+-2+ all extremities. The patient remains intubated, ventilated and TF-dependent. Good GI tolerance is reported with low gastric residuals, however no bowel movement has been reported yet despite being fed for the last 6-7 days and receiving Senna for the last 4 days . Labs are unremarkable. Enteral Access:             NGT. Macronutrient Needs (admission BW 98 kg):  Estimated calorie needs - 7398-9176 kenny/day (11-14 kenny/kg/day)  Estimated protein needs - >110 gm pro/day (>2 gm pro/kgIBW/day) IBW - 54.5 kg  Max CHO/day - 172 gm CHO/day (50% kenny/day)   Fluid/day - 1.1-1.4 liters/day (1 ml/kenny/day)  Intake/Comparative Standards:  Current intake of TF (Promote @ 50 ml/hr with a 10 ml/hr water flush via NGT and 2 pouches Pro[employer] twice daily via NGT with a 50 ml water flush before and after protein) provides 1360 calories/day (100% calorie goal), 120 grams protein/day (100% protein goal), 156 grams CHO/day (does not exceed max CHO limit) and 1448 ml water/day (100% fluid goal). Intervention:   Meals and Snacks: NPO. Enteral Nutrition: Continue current TF, water flushes and liquid protein. Mineral Supplement Therapy: Nutrition Support Orders/Electrolyte Management Replacement Protocols are active on the [facility] for potassium and magnesium only. Nutrition-Related Medication Management: Dulcolax suppository x 1. Coordination of Nutrition Care by a Nutrition Professional: AM ICU rounds, collaboration with Fox Lobo RN. Nutrition Discharge Plan: Too soon to determine. [address].  Melba Marroquin  [phone]

## 2020-02-10 NOTE — PROGRESS NOTES
Pt ambulated with assistance of PT, RT, ICU staff. Pt returned to recliner in room. ETT verified 22cm at the lips. Pt tolerated ambulation well.

## 2020-02-10 NOTE — PROGRESS NOTES
SPEECH PATHOLOGY NOTE:    Patient remains intubated and unable to participate in dysphagia evaluation. Will continue to follow.      Eloise [address] CCC-SLP

## 2020-02-10 NOTE — PROGRESS NOTES
Problem: Ventilator Management  Goal: *Adequate oxygenation and ventilation  Outcome: Progressing Towards Goal  Goal: *Patient maintains clear airway/free of aspiration  Outcome: Progressing Towards Goal  Goal: *Absence of infection signs and symptoms  Outcome: Progressing Towards Goal  Goal: *Normal spontaneous ventilation  Outcome: Progressing Towards Goal     Problem: Patient Education: Go to Patient Education Activity  Goal: Patient/Family Education  Outcome: Progressing Towards Goal     Problem: Non-Violent Restraints  Goal: *Removal from restraints as soon as assessed to be safe  Outcome: Progressing Towards Goal  Goal: *No harm/injury to patient while restraints in use  Outcome: Progressing Towards Goal  Goal: *Patient's dignity will be maintained  Outcome: Progressing Towards Goal  Goal: *Patient Specific Goal (EDIT GOAL, INSERT TEXT)  Outcome: Progressing Towards Goal  Goal: Non-violent Restaints:Standard Interventions  Outcome: Progressing Towards Goal  Goal: Non-violent Restraints:Patient Interventions  Outcome: Progressing Towards Goal  Goal: Patient/Family Education  Outcome: Progressing Towards Goal     Problem: Pressure Injury - Risk of  Goal: *Prevention of pressure injury  Description  Document Bahman Scale and appropriate interventions in the flowsheet.   Outcome: Progressing Towards Goal  Note: Pressure Injury Interventions:  Sensory Interventions: Assess changes in LOC, Assess need for specialty bed, Avoid rigorous massage over bony prominences, Chair cushion, Check visual cues for pain, Discuss PT/OT consult with provider, Float heels, Keep linens dry and wrinkle-free, Maintain/enhance activity level    Moisture Interventions: Absorbent underpads, Apply protective barrier, creams and emollients, Assess need for specialty bed, Check for incontinence Q2 hours and as needed, Contain wound drainage, Internal/External urinary devices, Limit adult briefs, Maintain skin hydration (lotion/cream), Minimize layers    Activity Interventions: Assess need for specialty bed, Chair cushion, Increase time out of bed, Pressure redistribution bed/mattress(bed type), PT/OT evaluation    Mobility Interventions: Assess need for specialty bed, Chair cushion, Pressure redistribution bed/mattress (bed type), PT/OT evaluation, Turn and reposition approx. every two hours(pillow and wedges)    Nutrition Interventions: Document food/fluid/supplement intake, Discuss nutritional consult with provider    Friction and Shear Interventions: Apply protective barrier, creams and emollients, Feet elevated on foot rest, Foam dressings/transparent film/skin sealants, Lift sheet, Lift team/patient mobility team                Problem: Patient Education: Go to Patient Education Activity  Goal: Patient/Family Education  Outcome: Progressing Towards Goal     Problem: Falls - Risk of  Goal: *Absence of Falls  Description  Document [place] Fall Risk and appropriate interventions in the flowsheet.   Outcome: Progressing Towards Goal  Note: Fall Risk Interventions:  Mobility Interventions: Assess mobility with egress test, Bed/chair exit alarm, OT consult for ADLs, PT Consult for mobility concerns, PT Consult for assist device competence, Strengthening exercises (ROM-active/passive), Utilize walker, cane, or other assistive device, Patient to call before getting OOB         Medication Interventions: Assess postural VS orthostatic hypotension, Bed/chair exit alarm, Evaluate medications/consider consulting pharmacy, Patient to call before getting OOB    Elimination Interventions: Bed/chair exit alarm, Call light in reach, Elevated toilet seat, Toileting schedule/hourly rounds              Problem: Patient Education: Go to Patient Education Activity  Goal: Patient/Family Education  Outcome: Progressing Towards Goal     Problem: Delirium Treatment  Goal: *Level of consciousness restored to baseline  Outcome: Progressing Towards Goal  Goal: *Level of environmental perceptions restored to baseline  Outcome: Progressing Towards Goal  Goal: *Sensory perception restored to baseline  Outcome: Progressing Towards Goal  Goal: *Emotional stability restored to baseline  Outcome: Progressing Towards Goal  Goal: *Functional assessment restored to baseline  Outcome: Progressing Towards Goal  Goal: *Absence of falls  Outcome: Progressing Towards Goal  Goal: *Will remain free of delirium, CAM Score negative  Outcome: Progressing Towards Goal  Goal: *Cognitive status will be restored to baseline  Outcome: Progressing Towards Goal  Goal: Interventions  Outcome: Progressing Towards Goal     Problem: Patient Education: Go to Patient Education Activity  Goal: Patient/Family Education  Outcome: Progressing Towards Goal     Problem: Patient Education: Go to Patient Education Activity  Goal: Patient/Family Education  Outcome: Progressing Towards Goal     Problem: Hemorrhagic Stroke: Admission Day (0-3 hours)  Goal: Off Pathway (Use only if patient is Off Pathway)  Outcome: Progressing Towards Goal     Problem: Hemorrhagic Stroke:  3-24 hours  Goal: Off Pathway (Use only if patient is Off Pathway)  Outcome: Progressing Towards Goal     Problem: Hemorrhagic Stroke: Day 2  Goal: Off Pathway (Use only if patient is Off Pathway)  Outcome: Progressing Towards Goal     Problem: Hemorrhagic Stroke: Day 3  Goal: Off Pathway (Use only if patient is Off Pathway)  Outcome: Progressing Towards Goal     Problem: Hemorrhagic Stroke: Day 4  Goal: Off Pathway (Use only if patient is Off Pathway)  Outcome: Progressing Towards Goal     Problem: Hemorrhagic Stroke: Day 5 through Discharge  Goal: Off Pathway (Use only if patient is Off Pathway)  Outcome: Progressing Towards Goal  Goal: Activity/Safety  Outcome: Progressing Towards Goal  Goal: Consults, if ordered  Outcome: Progressing Towards Goal  Goal: Diagnostic Test/Procedures  Outcome: Progressing Towards Goal  Goal: Nutrition/Diet  Outcome: Progressing Towards Goal  Goal: Medications  Outcome: Progressing Towards Goal  Goal: Respiratory  Outcome: Progressing Towards Goal  Goal: Treatments/Interventions/Procedures  Outcome: Progressing Towards Goal  Goal: Psychosocial  Outcome: Progressing Towards Goal  Goal: *Hemodynamically stable  Outcome: Progressing Towards Goal  Goal: *Verbalizes anxiety and depression are reduced or absent  Outcome: Progressing Towards Goal  Goal: *Absence of aspiration  Outcome: Progressing Towards Goal  Goal: *Absence of signs and symptoms of DVT  Outcome: Progressing Towards Goal  Goal: *Optimal pain control at patient's stated goal  Outcome: Progressing Towards Goal  Goal: *Tolerating diet  Outcome: Progressing Towards Goal  Goal: *Progressive mobility and function  Outcome: Progressing Towards Goal  Goal: *Rehabilitation readiness  Outcome: Progressing Towards Goal     Problem: Hemorrhagic Stroke: Discharge Outcomes  Goal: *Verbalizes anxiety and depression are reduced or absent  Outcome: Progressing Towards Goal  Goal: *Verbalize understanding of risk factor modification(Stroke Metric)  Outcome: Progressing Towards Goal  Goal: *Optimal pain control at patient's stated goal  Outcome: Progressing Towards Goal  Goal: *Hemodynamically stable  Outcome: Progressing Towards Goal  Goal: *Absence of aspiration pneumonia  Outcome: Progressing Towards Goal  Goal: *Aware of needed dietary changes  Outcome: Progressing Towards Goal  Goal: *Verbalizes understanding and describes medication purposes and frequencies  Outcome: Progressing Towards Goal  Goal: *Tolerating diet  Outcome: Progressing Towards Goal  Goal: *Absence of signs and symptoms of DVT  Outcome: Progressing Towards Goal  Goal: *Absence of aspiration  Outcome: Progressing Towards Goal  Goal: *Progressive mobility and function  Outcome: Progressing Towards Goal  Goal: *Home safety concerns addressed  Outcome: Progressing Towards Goal

## 2020-02-10 NOTE — PROGRESS NOTES
Chart reviewed as pt remains ICU intubated/vent. Plans for repeat cerebral angiogram Tue, 2-11-20 per MD notes. Dr. Quan Travis continues to follow for possible STR at [facility].  CM will continue to follow for any assist and d/c POC when stable per MD.

## 2020-02-10 NOTE — PROGRESS NOTES
Problem: Mobility Impaired (Adult and Pediatric)  Goal: *Acute Goals and Plan of Care (Insert Text)  Description  LTG:  (1.)Ms. Lisa Ross will move from supine to sit and sit to supine , scoot up and down and roll side to side in bed with MODIFIED INDEPENDENCE within 7 treatment day(s). (2.)Ms. Lisa Ross will transfer from bed to chair and chair to bed with STAND BY ASSIST using the least restrictive device within 7 treatment day(s). (3.)Ms. Lisa Ross will ambulate with STAND BY ASSIST for 85 feet with the least restrictive device within 7 treatment day(s). (4.)Ms. Lisa Ross will perform standing static and dynamic balance activities x 15 minutes with STAND BY ASSIST to improve safety within 7 treatment day(s). ________________________________________________________________________________________________      Outcome: Progressing Towards Goal     PHYSICAL THERAPY: Daily Note and AM 2/10/2020  INPATIENT: PT Visit Days : 4  Payor: [employer] / Plan: [place] [employer] / Product Type: PPO /       NAME/AGE/GENDER: Jill Osorio is a 53 y.o. 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 Lisa Ross is a 53 y.o. female in the hospital for an AVM with edema noted around the third cervical vertebrae. Per EV note, pt's SBP should be kept within 100-160 range. 2/10-pt supine on arrival, intubated on vent. Pt with multiple lines/monitors, art line, catheter, NG tube. Pt alert and agreeable to mobility, nods head no to pain. Pt with MIN A to EOB, MIN A for scooting for feet to floor placement. Pt demo good static sitting balance at EOB while, PT, RN managing lines for mobility.  Pt placed on portable vent for ambulation. Pt CGA to MIN A transfers, ambulated into hallway pushing w/c with RT, RN, followed by recliner chair for safety. Pt with several standing rest breaks with ambulation. Pt ambulated with CGA to [address], slight lean to L side at times, increased trunk sway. Pt returned to room, seated in chair. Pt then performed several seated there ex B LE in chair with SBA to CGA. Pt overall doing well with activity, improvement noted with ambulation, activity tolerance. Pt making great progress today toward goals. Cont PT POC, will benefit from rehab at discharge. This section established at most recent assessment   PROBLEM LIST (Impairments causing functional limitations):  1. Decreased Strength  2. Decreased ADL/Functional Activities  3. Decreased Transfer Abilities  4. Decreased Ambulation Ability/Technique  5. Decreased Balance  6. Decreased Activity Tolerance  7. Increased Fatigue   INTERVENTIONS PLANNED: (Benefits and precautions of physical therapy have been discussed with the patient.)  1. Balance Exercise  2. Bed Mobility  3. Family Education  4. Gait Training  5. Neuromuscular Re-education/Strengthening  6. Therapeutic Activites  7. Therapeutic Exercise/Strengthening  8. Transfer Training     TREATMENT PLAN: Frequency/Duration: 3 times a week for duration of hospital stay  Rehabilitation Potential For Stated Goals: Excellent     REHAB RECOMMENDATIONS (at time of discharge pending progress):    Placement: It is my opinion, based on this patient's performance to date, that Ms. Tiffany Washington may benefit from intensive therapy at an [address] [place] [address] after discharge due to a probable need for multiple therapy disciplines and potential to make ongoing and sustainable functional improvement that is of practical value. .  Equipment:    None at this time              HISTORY:   History of Present Injury/Illness (Reason for Referral):   AVM  Past Medical History/Comorbidities:   Ms. Kenny Bar  has no past medical history on file. Ms. Kenny Bar  has no past surgical history on file. Social History/Living Environment:   Home Environment: Private residence  # Steps to Enter: 2  Rails to Enter: No  One/Two Story Residence: One story  Living Alone: No  Support Systems: Family member(s)  Patient Expects to be Discharged to[de-identified] Rehabilitation facility  Current DME Used/Available at Home: None  Tub or Shower Type: Tub/Shower combination  Prior Level of Function/Work/Activity:  Lives with spouse and child. PTA independent and working for [employer]. Number of Personal Factors/Comorbidities that affect the Plan of Care: 0: LOW COMPLEXITY   EXAMINATION:   Most Recent Physical Functioning:   Gross Assessment:                  Posture:  Posture (WDL): Exceptions to WDL  Posture Assessment: Rounded shoulders  Balance:  Sitting: Impaired  Sitting - Static: Good (unsupported)  Sitting - Dynamic: Fair (occasional)  Standing: Impaired  Standing - Static: Good;Fair  Standing - Dynamic : Fair Bed Mobility:  Rolling: Contact guard assistance;Minimum assistance  Supine to Sit: Minimum assistance  Sit to Supine: (left up in chair)  Scooting: Minimum assistance(to EOB, CGA/SBA in chair)  Wheelchair Mobility:     Transfers:  Sit to Stand: Minimum assistance(additional assist for lines)  Stand to Sit: Minimum assistance  Bed to Chair: Minimum assistance  Gait:     Speed/Linda: Slow  Step Length: Left shortened;Right shortened  Gait Abnormalities: Decreased step clearance;Trunk sway increased  Distance (ft): 200 Feet (ft)(on vent, following w/ chair, 3 standing breaks)  Assistive Device: Gait belt(pushing w/c)  Ambulation - Level of Assistance: Contact guard assistance;Minimal assistance      Body Structures Involved:  1. Nerves  2. Lungs  3. Muscles Body Functions Affected:  1. Mental  2. Sensory/Pain  3. Respiratory  4. Neuromusculoskeletal  5. Movement Related Activities and Participation Affected:  1.  General Tasks and Demands  2. Mobility  3. Self Care  4. Domestic Life  5. Community, Social and [place]   Number of elements that affect the Plan of Care: 4+: HIGH COMPLEXITY   CLINICAL PRESENTATION:   Presentation: Evolving clinical presentation with changing clinical characteristics: MODERATE COMPLEXITY   CLINICAL DECISION MAKIN [facility] Mobility Inpatient Short Form  How much difficulty does the patient currently have. .. Unable A Lot A Little None   1. Turning over in bed (including adjusting bedclothes, sheets and blankets)? [] 1   [x] 2   [] 3   [] 4   2. Sitting down on and standing up from a chair with arms ( e.g., wheelchair, bedside commode, etc.)   [] 1   [] 2   [x] 3   [] 4   3. Moving from lying on back to sitting on the side of the bed? [] 1   [x] 2   [] 3   [] 4   How much help from another person does the patient currently need. .. Total A Lot A Little None   4. Moving to and from a bed to a chair (including a wheelchair)? [] 1   [x] 2   [] 3   [] 4   5. Need to walk in hospital room? [] 1   [] 2   [x] 3   [] 4   6. Climbing 3-5 steps with a railing? [] 1   [x] 2   [] 3   [] 4   © , Trustees of [address] [place] [address] [address] [address], under license to [employer]. All rights reserved      Score:  Initial: 14 Most Recent: X (Date: -- )    Interpretation of Tool:  Represents activities that are increasingly more difficult (i.e. Bed mobility, Transfers, Gait). Medical Necessity:     · Patient demonstrates   · excellent  ·  rehab potential due to higher previous functional level. Reason for Services/Other Comments:  · Patient continues to require skilled intervention due to   · Decreased balance and functional mobility   · .    Use of outcome tool(s) and clinical judgement create a POC that gives a: Questionable prediction of patient's progress: MODERATE COMPLEXITY            TREATMENT:   (In addition to Assessment/Re-Assessment sessions the following treatments were rendered)   Pre-treatment Symptoms/Complaints:  Unable to verbalize, intubated, nodding head yes/no for answers  Pain: Initial:   Pain Intensity 1: 0(nods head no to pain)  Post Session:  0     Gait Training (  23 minutes):  Gait training to improve and/or restore physical functioning as related to mobility, strength, balance, coordination and posture. Ambulated 200 Feet (ft)(on vent, following w/ chair, 3 standing breaks) with Contact guard assistance;Minimal assistance using a Gait belt(pushing w/c) and minimal cuing   related to their stride length and activity pacing to promote proper body mechanics and safety. Therapeutic Exercise: (  8 min):  Exercises per grid below to improve mobility, strength, balance and coordination. Required minimal visual and verbal cues to promote proper body alignment, promote proper body posture and promote proper body mechanics. Progressed repetitions as indicated. Date:  2/10/20 Date:   Date:     Activity/Exercise Parameters Parameters Parameters   Seated LAQ X 10 B A     Seated AP X 10 B A     Seated marching  X 10 B A     Seated hip abd/add X 10 B A                           Braces/Orthotics/Lines/Etc:   · IV  · floyd catheter  · nasogastric tube  · arterial line  · O2 Device: Endotracheal tube, Ventilator  Treatment/Session Assessment:    · Response to Treatment:  Tolerated well  · Interdisciplinary Collaboration:   o Physical Therapist  o Registered Nurse  o Respiratory Therapist  · After treatment position/precautions:   o Up in chair  o Bed alarm/tab alert on  o Bed/Chair-wheels locked  o Call light within reach  o RN notified  o Visitors at bedside   · Compliance with Program/Exercises: Will assess as treatment progresses  · Recommendations/Intent for next treatment session: \"Next visit will focus on advancements to more challenging activities and reduction in assistance provided\".   Total Treatment Duration:  PT Patient Time In/Time Out  Time In: 1009  Time Out: [address]

## 2020-02-10 NOTE — PROGRESS NOTES
Ventilator check complete; patient has a #7. 0 ET tube secured at the 22 at the lip. Patient is not sedated. Patient is able to follow commands. Breath sounds are diminished. Trachea is midline, Negative for subcutaneous air, and chest excursion is symmetric. Patient is also Negative for cyanosis and is Negative for pitting edema. All alarms are set and audible. Resuscitation bag is at the head of the bed. Ventilator Settings  Mode FIO2 Rate Tidal Volume Pressure PEEP I:E Ratio   Pressure support  52 %    5 cm H2O  8 cm H20  1:3.13      Peak airway pressure: 11.8 cm H2O   Minute ventilation: 6.1 l/min     ABG: No results for input(s): PH, PCO2, PO2, HCO3 in the last 72 hours.       Lucila Villeda

## 2020-02-10 NOTE — PROGRESS NOTES
Pt ambulated from recliner to bed with assistance x 2. ETT in place, 22cm at the lips, after transfer.

## 2020-02-11 ENCOUNTER — APPOINTMENT (OUTPATIENT)
Dept: GENERAL RADIOLOGY | Age: 45
DRG: 004 | End: 2020-02-11
Attending: NURSE PRACTITIONER
Payer: COMMERCIAL

## 2020-02-11 ENCOUNTER — APPOINTMENT (OUTPATIENT)
Dept: OTHER | Age: 45
DRG: 004 | End: 2020-02-11
Attending: NURSE PRACTITIONER
Payer: COMMERCIAL

## 2020-02-11 LAB
ANION GAP SERPL CALC-SCNC: 5 MMOL/L (ref 7–16)
ARTERIAL PATENCY WRIST A: ABNORMAL
BASE EXCESS BLD CALC-SCNC: 8 MMOL/L
BDY SITE: ABNORMAL
BUN SERPL-MCNC: 18 MG/DL (ref 6–23)
CALCIUM SERPL-MCNC: 8.6 MG/DL (ref 8.3–10.4)
CHLORIDE SERPL-SCNC: 104 MMOL/L (ref 98–107)
CO2 BLD-SCNC: 35 MMOL/L
CO2 SERPL-SCNC: 32 MMOL/L (ref 21–32)
COLLECT TIME,HTIME: 325
CREAT SERPL-MCNC: 0.43 MG/DL (ref 0.6–1)
ERYTHROCYTE [DISTWIDTH] IN BLOOD BY AUTOMATED COUNT: 12.2 % (ref 11.9–14.6)
EXHALED MINUTE VOLUME, VE: 5.8 L/MIN
GAS FLOW.O2 O2 DELIVERY SYS: ABNORMAL L/MIN
GLUCOSE SERPL-MCNC: 98 MG/DL (ref 65–100)
HCO3 BLD-SCNC: 33.5 MMOL/L (ref 22–26)
HCT VFR BLD AUTO: 33.2 % (ref 35.8–46.3)
HGB BLD-MCNC: 10.6 G/DL (ref 11.7–15.4)
MCH RBC QN AUTO: 27.7 PG (ref 26.1–32.9)
MCHC RBC AUTO-ENTMCNC: 31.9 G/DL (ref 31.4–35)
MCV RBC AUTO: 86.9 FL (ref 79.6–97.8)
NRBC # BLD: 0 K/UL (ref 0–0.2)
O2/TOTAL GAS SETTING VFR VENT: 50 %
PCO2 BLD: 50.7 MMHG (ref 35–45)
PEEP RESPIRATORY: 8 CMH2O
PH BLD: 7.43 [PH] (ref 7.35–7.45)
PLATELET # BLD AUTO: 220 K/UL (ref 150–450)
PMV BLD AUTO: 10.9 FL (ref 9.4–12.3)
PO2 BLD: 229 MMHG (ref 75–100)
POTASSIUM SERPL-SCNC: 3.5 MMOL/L (ref 3.5–5.1)
RBC # BLD AUTO: 3.82 M/UL (ref 4.05–5.2)
SAO2 % BLD: 100 % (ref 95–98)
SERVICE CMNT-IMP: ABNORMAL
SERVICE CMNT-IMP: ABNORMAL
SODIUM SERPL-SCNC: 141 MMOL/L (ref 136–145)
SPECIMEN TYPE: ABNORMAL
TOTAL RESP. RATE, ITRR: 14
VANCOMYCIN TROUGH SERPL-MCNC: 17.5 UG/ML (ref 5–20)
WBC # BLD AUTO: 10.5 K/UL (ref 4.3–11.1)

## 2020-02-11 PROCEDURE — 74011000250 HC RX REV CODE- 250: Performed by: NEUROLOGICAL SURGERY

## 2020-02-11 PROCEDURE — 71045 X-RAY EXAM CHEST 1 VIEW: CPT

## 2020-02-11 PROCEDURE — C1769 GUIDE WIRE: HCPCS

## 2020-02-11 PROCEDURE — 77030004521 HC CATH ANGI DX COOK -B

## 2020-02-11 PROCEDURE — 76376 3D RENDER W/INTRP POSTPROCES: CPT | Performed by: NEUROLOGICAL SURGERY

## 2020-02-11 PROCEDURE — 74011250637 HC RX REV CODE- 250/637: Performed by: NEUROLOGICAL SURGERY

## 2020-02-11 PROCEDURE — 36226 PLACE CATH VERTEBRAL ART: CPT | Performed by: NEUROLOGICAL SURGERY

## 2020-02-11 PROCEDURE — 85027 COMPLETE CBC AUTOMATED: CPT

## 2020-02-11 PROCEDURE — 80202 ASSAY OF VANCOMYCIN: CPT

## 2020-02-11 PROCEDURE — B31G1ZZ FLUOROSCOPY OF BILATERAL VERTEBRAL ARTERIES USING LOW OSMOLAR CONTRAST: ICD-10-PCS | Performed by: NEUROLOGICAL SURGERY

## 2020-02-11 PROCEDURE — B3121ZZ FLUOROSCOPY OF LEFT SUBCLAVIAN ARTERY USING LOW OSMOLAR CONTRAST: ICD-10-PCS | Performed by: NEUROLOGICAL SURGERY

## 2020-02-11 PROCEDURE — 77030031476 HC EXCH HEAT MOISTW FLTR HALY -A

## 2020-02-11 PROCEDURE — 77030018798 HC PMP KT ENTRL FED COVD -A

## 2020-02-11 PROCEDURE — 94003 VENT MGMT INPAT SUBQ DAY: CPT

## 2020-02-11 PROCEDURE — 99231 SBSQ HOSP IP/OBS SF/LOW 25: CPT | Performed by: PHYSICAL MEDICINE & REHABILITATION

## 2020-02-11 PROCEDURE — 74011250636 HC RX REV CODE- 250/636: Performed by: NURSE PRACTITIONER

## 2020-02-11 PROCEDURE — 74011636320 HC RX REV CODE- 636/320: Performed by: NEUROLOGICAL SURGERY

## 2020-02-11 PROCEDURE — 80048 BASIC METABOLIC PNL TOTAL CA: CPT

## 2020-02-11 PROCEDURE — 82803 BLOOD GASES ANY COMBINATION: CPT

## 2020-02-11 PROCEDURE — C1894 INTRO/SHEATH, NON-LASER: HCPCS

## 2020-02-11 PROCEDURE — 77030012468 HC VLV BLEEDBK CNTRL ABBT -B

## 2020-02-11 PROCEDURE — 74011250637 HC RX REV CODE- 250/637: Performed by: NURSE PRACTITIONER

## 2020-02-11 PROCEDURE — 74011000258 HC RX REV CODE- 258: Performed by: NEUROLOGICAL SURGERY

## 2020-02-11 PROCEDURE — 99232 SBSQ HOSP IP/OBS MODERATE 35: CPT | Performed by: NEUROLOGICAL SURGERY

## 2020-02-11 PROCEDURE — 36226 PLACE CATH VERTEBRAL ART: CPT

## 2020-02-11 PROCEDURE — C1760 CLOSURE DEV, VASC: HCPCS

## 2020-02-11 PROCEDURE — 74011250636 HC RX REV CODE- 250/636: Performed by: NEUROLOGICAL SURGERY

## 2020-02-11 PROCEDURE — 65610000001 HC ROOM ICU GENERAL

## 2020-02-11 RX ORDER — LIDOCAINE HYDROCHLORIDE 20 MG/ML
20-200 INJECTION, SOLUTION INFILTRATION; PERINEURAL
Status: DISCONTINUED | OUTPATIENT
Start: 2020-02-11 | End: 2020-02-11 | Stop reason: ALTCHOICE

## 2020-02-11 RX ORDER — ALPRAZOLAM 0.25 MG/1
0.25 TABLET ORAL 2 TIMES DAILY
Status: DISCONTINUED | OUTPATIENT
Start: 2020-02-11 | End: 2020-02-18 | Stop reason: HOSPADM

## 2020-02-11 RX ORDER — SODIUM CHLORIDE 9 MG/ML
25 INJECTION, SOLUTION INTRAVENOUS ONCE
Status: COMPLETED | OUTPATIENT
Start: 2020-02-11 | End: 2020-02-11

## 2020-02-11 RX ORDER — ROCURONIUM BROMIDE 10 MG/ML
50 INJECTION, SOLUTION INTRAVENOUS
Status: COMPLETED | OUTPATIENT
Start: 2020-02-11 | End: 2020-02-11

## 2020-02-11 RX ORDER — HEPARIN SODIUM 200 [USP'U]/100ML
1000 INJECTION, SOLUTION INTRAVENOUS
Status: DISCONTINUED | OUTPATIENT
Start: 2020-02-11 | End: 2020-02-11 | Stop reason: ALTCHOICE

## 2020-02-11 RX ORDER — MIDAZOLAM HYDROCHLORIDE 1 MG/ML
.25-2 INJECTION, SOLUTION INTRAMUSCULAR; INTRAVENOUS
Status: DISCONTINUED | OUTPATIENT
Start: 2020-02-11 | End: 2020-02-11 | Stop reason: ALTCHOICE

## 2020-02-11 RX ORDER — FENTANYL CITRATE 50 UG/ML
25-100 INJECTION, SOLUTION INTRAMUSCULAR; INTRAVENOUS
Status: DISCONTINUED | OUTPATIENT
Start: 2020-02-11 | End: 2020-02-11 | Stop reason: ALTCHOICE

## 2020-02-11 RX ADMIN — HEPARIN SODIUM 2000 UNITS: 200 INJECTION, SOLUTION INTRAVENOUS at 13:41

## 2020-02-11 RX ADMIN — FENTANYL CITRATE 50 MCG: 50 INJECTION, SOLUTION INTRAMUSCULAR; INTRAVENOUS at 21:34

## 2020-02-11 RX ADMIN — FENTANYL CITRATE 100 MCG: 50 INJECTION, SOLUTION INTRAMUSCULAR; INTRAVENOUS at 13:30

## 2020-02-11 RX ADMIN — Medication 30 ML: at 21:24

## 2020-02-11 RX ADMIN — Medication 30 ML: at 06:00

## 2020-02-11 RX ADMIN — VANCOMYCIN HYDROCHLORIDE 1500 MG: 10 INJECTION, POWDER, LYOPHILIZED, FOR SOLUTION INTRAVENOUS at 10:27

## 2020-02-11 RX ADMIN — Medication 30 ML: at 15:01

## 2020-02-11 RX ADMIN — FENTANYL CITRATE 100 MCG: 50 INJECTION, SOLUTION INTRAMUSCULAR; INTRAVENOUS at 14:02

## 2020-02-11 RX ADMIN — IOPAMIDOL 150 ML: 612 INJECTION, SOLUTION INTRAVENOUS at 14:38

## 2020-02-11 RX ADMIN — ATORVASTATIN CALCIUM 40 MG: 40 TABLET, FILM COATED ORAL at 21:24

## 2020-02-11 RX ADMIN — CEFEPIME 2 G: 2 INJECTION, POWDER, FOR SOLUTION INTRAVENOUS at 16:27

## 2020-02-11 RX ADMIN — FAMOTIDINE 20 MG: 20 TABLET, FILM COATED ORAL at 08:34

## 2020-02-11 RX ADMIN — MIDAZOLAM 2 MG: 1 INJECTION INTRAMUSCULAR; INTRAVENOUS at 13:30

## 2020-02-11 RX ADMIN — ROCURONIUM BROMIDE 50 MG: 50 INJECTION, SOLUTION INTRAVENOUS at 13:58

## 2020-02-11 RX ADMIN — ACETAMINOPHEN 650 MG: 325 TABLET, FILM COATED ORAL at 08:34

## 2020-02-11 RX ADMIN — CEFEPIME 2 G: 2 INJECTION, POWDER, FOR SOLUTION INTRAVENOUS at 23:05

## 2020-02-11 RX ADMIN — ENOXAPARIN SODIUM 40 MG: 40 INJECTION SUBCUTANEOUS at 08:33

## 2020-02-11 RX ADMIN — ALPRAZOLAM 0.5 MG: 0.5 TABLET ORAL at 08:33

## 2020-02-11 RX ADMIN — Medication 900 UNITS: at 06:00

## 2020-02-11 RX ADMIN — LIDOCAINE HYDROCHLORIDE 100 MG: 20 INJECTION, SOLUTION INFILTRATION; PERINEURAL at 13:42

## 2020-02-11 RX ADMIN — Medication 900 UNITS: at 21:24

## 2020-02-11 RX ADMIN — METOPROLOL TARTRATE 50 MG: 50 TABLET, FILM COATED ORAL at 17:40

## 2020-02-11 RX ADMIN — Medication 900 UNITS: at 15:03

## 2020-02-11 RX ADMIN — ALPRAZOLAM 0.25 MG: 0.25 TABLET ORAL at 17:40

## 2020-02-11 RX ADMIN — FENTANYL CITRATE 100 MCG: 50 INJECTION, SOLUTION INTRAMUSCULAR; INTRAVENOUS at 13:58

## 2020-02-11 RX ADMIN — MIDAZOLAM 2 MG: 1 INJECTION INTRAMUSCULAR; INTRAVENOUS at 13:57

## 2020-02-11 RX ADMIN — ONDANSETRON 4 MG: 2 INJECTION INTRAMUSCULAR; INTRAVENOUS at 02:31

## 2020-02-11 RX ADMIN — FENTANYL CITRATE 100 MCG: 50 INJECTION, SOLUTION INTRAMUSCULAR; INTRAVENOUS at 13:39

## 2020-02-11 RX ADMIN — METOPROLOL TARTRATE 50 MG: 50 TABLET, FILM COATED ORAL at 08:34

## 2020-02-11 RX ADMIN — VANCOMYCIN HYDROCHLORIDE 1500 MG: 10 INJECTION, POWDER, LYOPHILIZED, FOR SOLUTION INTRAVENOUS at 02:33

## 2020-02-11 RX ADMIN — SENNOSIDES 8.6 MG: 8.6 TABLET, FILM COATED ORAL at 08:33

## 2020-02-11 RX ADMIN — HEPARIN SODIUM 2000 UNITS: 200 INJECTION, SOLUTION INTRAVENOUS at 13:45

## 2020-02-11 RX ADMIN — CEFEPIME 2 G: 2 INJECTION, POWDER, FOR SOLUTION INTRAVENOUS at 08:29

## 2020-02-11 RX ADMIN — HEPARIN SODIUM 2000 UNITS: 200 INJECTION, SOLUTION INTRAVENOUS at 13:40

## 2020-02-11 RX ADMIN — VANCOMYCIN HYDROCHLORIDE 1500 MG: 10 INJECTION, POWDER, LYOPHILIZED, FOR SOLUTION INTRAVENOUS at 17:41

## 2020-02-11 RX ADMIN — SODIUM CHLORIDE 25 ML/HR: 900 INJECTION, SOLUTION INTRAVENOUS at 13:32

## 2020-02-11 RX ADMIN — HEPARIN SODIUM 2000 UNITS: 200 INJECTION, SOLUTION INTRAVENOUS at 13:50

## 2020-02-11 NOTE — PROGRESS NOTES
Bedside and Verbal shift change report given to Genoveva ADAME (oncoming nurse) by Ace ADAME (offgoing nurse). Report included the following information SBAR, Kardex, ED Summary, Procedure Summary, Intake/Output, MAR, Recent Results, Cardiac Rhythm NSR and Alarm Parameters . Dual neuro assessment completed: NIH 2, mild sensory loss LUE and LLE, mild drift to LUE. Patient is intubated but nods appropriately, mouths words, and writes comprehensible sentences- oriented x4. Pupils 3 mm, brisk, and round. On PS 50% on vent, SpO2 100 %. NSR on monitor. BP maintaining with SBP mainly 130s-150s via art line, goal 100-160. Jalloh draining and patent.  NGT clamped for planned procedure today

## 2020-02-11 NOTE — PROGRESS NOTES
Progress Note    Patient: Sofiya Dacosta MRN: [id number]  SSN: [id number]    YOB: 1975  Age: 53 y.o. Sex: female      Admit Date: 2/2/2020    LOS: 9 days     Subjective:   No acute changes.      Current Facility-Administered Medications   Medication Dose Route Frequency    ALPRAZolam (XANAX) tablet 0.5 mg  0.5 mg Oral BID    vancomycin (VANCOCIN) 1500 mg in  ml infusion  1,500 mg IntraVENous Q8H    cefepime (MAXIPIME) 2 g in 0.9% sodium chloride (MBP/ADV) 100 mL  2 g IntraVENous Q8H    metoprolol tartrate (LOPRESSOR) tablet 50 mg  50 mg Per NG tube BID    NUTRITIONAL SUPPORT ELECTROLYTE PRN ORDERS   Does Not Apply PRN    senna (SENOKOT) tablet 8.6 mg  1 Tab Per NG tube DAILY    HYDROcodone-acetaminophen (NORCO) 7.5-325 mg per tablet 1 Tab  1 Tab Oral Q6H PRN    enoxaparin (LOVENOX) injection 40 mg  40 mg SubCUTAneous Q24H    famotidine (PEPCID) tablet 20 mg  20 mg Per NG tube DAILY    ibuprofen (ADVIL;MOTRIN) 100 mg/5 mL oral suspension 600 mg  600 mg Per NG tube Q6H PRN    sodium chloride (NS) flush 5-40 mL  5-40 mL IntraVENous PRN    fentaNYL citrate (PF) injection 50 mcg  50 mcg IntraVENous Q2H PRN    acetaminophen (TYLENOL) tablet 650 mg  650 mg Per NG tube Q4H PRN    ondansetron (ZOFRAN) injection 4 mg  4 mg IntraVENous Q4H PRN    atorvastatin (LIPITOR) tablet 40 mg  40 mg Per NG tube QHS    sodium chloride (NS) flush 30 mL  30 mL InterCATHeter Q8H    heparin (porcine) pf 900 Units  900 Units InterCATHeter Q8H    sodium chloride (NS) flush 30 mL  30 mL InterCATHeter PRN    heparin (porcine) pf 900 Units  900 Units InterCATHeter PRN    lip protectant (BLISTEX) ointment 1 Each  1 Each Topical PRN       Objective:     Vitals:    02/11/20 0917 02/11/20 0929 02/11/20 0930 02/11/20 0945   BP:       Pulse: 75 68 66 80   Resp: 12 14 14 15   Temp: 99.8 °F (37.7 °C) 99.8 °F (37.7 °C) 99.8 °F (37.7 °C) 100 °F (37.8 °C)   SpO2: 100% 100% 100% 100%   Weight:       Height: Intake and Output:  Current Shift: 02/11 0701 - 02/11 1900  In: 60   Out: 625 [Urine:625]  Last 24 hr: 02/10 0701 - 02/11 0700  In: 2028 [I.V.:800]  Out: 9083 [Urine:4265]     IO: -2237cc/24hr  TOTAL OVERALL: +5.2L     Physical Exam:   General:  Alert, cooperative. Intubated. Eyes:  PERRL, EOMs intact. Neck: Supple, symmetrical, trachea midline, no adenopathy, thyroid: no enlargment/tenderness/nodules, no carotid bruit and no JVD. Lungs:   Lungs CTA all fields. No wheezes noted. No distress. Heart:  Regular rate and rhythm, S1, S2 normal, no murmur, click, rub or gallop. Abdomen:   Soft, non-tender. Bowel sounds normal. No masses,  No organomegaly. Extremities: Extremities normal, atraumatic, no cyanosis or edema. Pulses: 2+ and symmetric all extremities. Skin: Skin color, texture, turgor normal. No rashes or lesions   Neurologic: Alert and following commands, walker, LUE with mild drift, but able to touch finger to nose bilat. Lifts LLE off bed. Remains intubated.         Lab/Data Review:    Recent Results (from the past 12 hour(s))   VANCOMYCIN, TROUGH    Collection Time: 02/11/20 12:35 AM   Result Value Ref Range    Vancomycin,trough 17.5 5 - 20 ug/mL   METABOLIC PANEL, BASIC    Collection Time: 02/11/20  3:29 AM   Result Value Ref Range    Sodium 141 136 - 145 mmol/L    Potassium 3.5 3.5 - 5.1 mmol/L    Chloride 104 98 - 107 mmol/L    CO2 32 21 - 32 mmol/L    Anion gap 5 (L) 7 - 16 mmol/L    Glucose 98 65 - 100 mg/dL    BUN 18 6 - 23 MG/DL    Creatinine 0.43 (L) 0.6 - 1.0 MG/DL    GFR est AA >60 >60 ml/min/1.73m2    GFR est non-AA >60 >60 ml/min/1.73m2    Calcium 8.6 8.3 - 10.4 MG/DL   CBC W/O DIFF    Collection Time: 02/11/20  3:29 AM   Result Value Ref Range    WBC 10.5 4.3 - 11.1 K/uL    RBC 3.82 (L) 4.05 - 5.2 M/uL    HGB 10.6 (L) 11.7 - 15.4 g/dL    HCT 33.2 (L) 35.8 - 46.3 %    MCV 86.9 79.6 - 97.8 FL    MCH 27.7 26.1 - 32.9 PG    MCHC 31.9 31.4 - 35.0 g/dL    RDW 12.2 11.9 - 14.6 % PLATELET 609 381 - 855 K/uL    MPV 10.9 9.4 - 12.3 FL    ABSOLUTE NRBC 0.00 0.0 - 0.2 K/uL   POC G3    Collection Time: 20  3:30 AM   Result Value Ref Range    Device: CPAP      FIO2 (POC) 50 %    pH (POC) 7.428 7.35 - 7.45      pCO2 (POC) 50.7 (H) 35 - 45 MMHG    pO2 (POC) 229 (H) 75 - 100 MMHG    HCO3 (POC) 33.5 (H) 22 - 26 MMOL/L    sO2 (POC) 100 (H) 95 - 98 %    Base excess (POC) 8 mmol/L    PEEP/CPAP (POC) 8 cmH2O    Allens test (POC) NOT APPLICABLE      Total resp. rate 14      Site DRAWN FROM ARTERIAL LINE      Specimen type (POC) ARTERIAL      Performed by Duy     CO2, POC 35 MMOL/L    Respiratory comment: NurseNotified     Exhaled minute volume 5.80 L/min    COLLECT TIME 325         Assessment/ Plan:     Principal Problem:    AVM (arteriovenous malformation) spine (2020)    Active Problems:    Cavernoma (2/3/2020)      Acute respiratory failure with hypoxia (HCC) (2/3/2020)      Edema of spinal cord ([address] (2/3/2020)      Acute left-sided weakness (2/3/2020)        NEURO:pt with cervical hemorrhage and edema secondary to vascular abnormality, cavernoma vs AVM. Pt remains intubated, but is alert and following commands this am. Cerebral angiogram yesterday with suspicious abnormal vessel around C3 area. Will repeat Cerebral angiogram and MRI brain next week. Pt is alert and following commands. R groin is CDI with no hematoma. Pulses are intact. Repeat MRI C-spine shows: 1. Intramedullary subacute hematoma extending from C1 to the C6-C7 level wth  is abundant associated cord edema and expansion. 2. T2 hypointense intramedullary lesion at the C3 level. This is likely the  origin of hemorrhage and may be an underlying vascular lesion such as an AVM. Pt will be NPO for repeat cerebral angiogram today. RESP:PRVC @ 50%: AB.4/50/229. lungs cta. OETT advaned 2cm. Repeat cxr pending. No acute distress. CV:-160 goal. 2D Echo: EF 55-60%, Trop neg. SCD, lovenox.  Metoprolol 50mg po bid. [address] 10/33,   NEPH:bun/cr: 18/0.4  GI: Pepcid, senna. Tolerating TF well. 5.2L +. IVF's stopped. NPO for procedure today. ID:[id number]: [id number]: pan cx sent, neg thus far, but started on Cef/Vanc D7/10 due to continued fever. LINES:RUE: PICC. Right radial virgil.    .      Signed By: Danielle Parham NP     February 11, 2020

## 2020-02-11 NOTE — PROGRESS NOTES
A follow up visit was made to the patient. Emotional support, spiritual presence and   prayer were provided for the patient and her family.       CAMILLE Fuentes

## 2020-02-11 NOTE — OP NOTES
Procedure: Cerebral angiogram  Surgeon: Dr. Cindy Broussard  Assistant: Ambika Rascon NP  Pre-op Dx: Left spinal AVF being supplied by left Vertebral artery  Post-op Dx: same  Anesthesia: Conscious sedation with fentanyl and versed  Complications: None  Findings: Left spinal AVF being supplied by left vertebral artery.

## 2020-02-11 NOTE — PROGRESS NOTES
Ventilator check complete; patient has a #7. 0 ET tube secured at the 22 at the lip. Patient is not sedated. Patient is able to follow commands. Breath sounds are diminished. Trachea is midline, Negative for subcutaneous air, and chest excursion is symmetric. Patient is also Negative for cyanosis and is Negative for pitting edema. All alarms are set and audible. Resuscitation bag is at the head of the bed.       Ventilator Settings  Mode FIO2 Rate Tidal Volume Pressure PEEP I:E Ratio   Pressure support  52 %    0 ml  5 cm H2O  8 cm H20  1:3.13      Peak airway pressure: 12 cm H2O   Minute ventilation: 4.9 l/min         Ahmet Zhou, RT

## 2020-02-11 NOTE — PROGRESS NOTES
PT note: Pt going for angiogram later today per RN, bed rest only today. Will hold today and attempt therapy treatment at later time and date as schedule allows. Thank you.    Amy Hawkins, PT  2/11/2020

## 2020-02-11 NOTE — PROGRESS NOTES
OT NOTE:   Pt going for angiogram later today per RN, bed rest only today. Will hold today and attempt therapy treatment at later time and date as schedule allows. Thank you.    Corin Hunter, OTR/L, CLT

## 2020-02-11 NOTE — ADT AUTH CERT NOTES
Systemic or Infectious Condition GRG - Care Day 9 (2/10/2020) by Silvia Noel RN  
 
   
Review Status Review Entered Completed 2/10/2020 16:55  
   
Criteria Review Care Day: 9 Care Date: 2/10/2020 Level of Care: ICU Guideline Day 3 Level Of Care ( ) * Activity level acceptable Clinical Status ( ) * Hemodynamic stability ( ) * Respiratory status acceptable ( ) * Neurologic status acceptable ( ) * Temperature status acceptable ( ) * No infection, or status acceptable ( ) * No neutropenia, or status acceptable ( ) * Special infection or injury situations absent   
( ) * Electrolyte status acceptable ( ) * General Discharge Criteria met Interventions ( ) * Intake acceptable ( ) * No inpatient interventions needed * Milestone Additional Notes 2/10  
  
99.6 74 13 100% 131/80 vent 2/10/2020 03:29 WBC 13.1 (H) RBC 4.03 (L) HGB 11.1 (L) HCT 34.3 (L)  
  
  
2/10/2020 03:29 Glucose 109 (H) Creatinine 0.51 (L)  
  
  
2/10/2020 03:23  
pH (POC) 7.460 (H)  
pCO2 (POC) 48.0 (H)  
pO2 (POC) 187 (H) HCO3 (POC) 34.1 (H)  
sO2 (POC) 100 (H) [facility] Donning No acute cardiopulmonary disease. Orders IP ICU, Full Code, VS, IP Stroke Coordinator, CM Consult, Rehab Medicine, Fall Precautions, SCDS, TUBE CARE, NG insertion, Jalloh, Non Violent Restraints, PICC Insertion, NPO, Meds NS @ 75ml/hr IV cont, Tylenol 650 mg per NG tube x2,  Duoneb x2, Lipitor 80 mg per NG tube x1, Fentanyl 50 mcg IV x 1 Toradol 15 mg IV x2, Zofran 4 mg IV x1, Zofran 8 mg IV x1 Decadron 4 mg IV x7, Cardene 5 mg /hr IV cont, Fentanyl 100 mcg IV x1, Versed 1 mg IV x1, Decadron 10 mg IV x1, Maxipime 2 gms IV x4, Vanc 150 0mg IV x3 Lovenox 40 mg sc x1, Norco 7.5/325 mg po x3, Lopressor 25mg per NG x1, Lopressor 50 mg per NG x2, Benadryl 50 mg IV x1, Xanax 0.5 mg po x1, Neuro surgery Note No acute changes.   
   
 Physical Exam: General: Alert, cooperative. Intubated. Eyes: PERRL, EOMs intact. Neck: Supple, symmetrical, trachea midline, no adenopathy, thyroid: no enlargment/tenderness/nodules, no carotid bruit and no JVD. Lungs:   Lungs CTA all fields. No wheezes noted. No distress. Heart: Regular rate and rhythm, S1, S2 normal, no murmur, click, rub or gallop. Abdomen:   Soft, non-tender. Bowel sounds normal. No masses,  No organomegaly. Extremities: Extremities normal, atraumatic, no cyanosis or edema. Pulses: 2+ and symmetric all extremities. Skin: Skin color, texture, turgor normal. No rashes or lesions Neurologic: Alert and following commands, walker, LUE with mild drift, but able to touch finger to nose bilat. Lifts LLE off bed. Remains intubated.   
   
  
  
Principal Problem:  
  AVM (arteriovenous malformation) spine (2020)  
  Active Problems:  
  Cavernoma (2/3/2020)    
  Acute respiratory failure with hypoxia ([address] (2/3/2020)    
  Edema of spinal cord ([address] (2/3/2020)    
  Acute left-sided weakness (2/3/2020)  
   
  
NEURO:pt with cervical hemorrhage and edema secondary to vascular abnormality, cavernoma vs AVM. Pt remains intubated, but is alert and following commands this am. Cerebral angiogram yesterday with suspicious abnormal vessel around C3 area. Will repeat Cerebral angiogram and MRI brain next week.  Pt is alert and following commands. R groin is CDI with no hematoma. Pulses are intact. Repeat MRI C-spine shows: 1. Intramedullary subacute hematoma extending from C1 to the C6-C7 level wth  
is abundant associated cord edema and expansion. 2. T2 hypointense intramedullary lesion at the C3 level. This is likely the  
origin of hemorrhage and may be an underlying vascular lesion such as an AVM. Pt will be NPO for repeat cerebral angiogram tomorrow.  updated at bedside this am.  
RESP:PRVC @ 50%: AB.3/180.  lungs cta. CV:-160 goal. 2D Echo: EF 55-60%, Trop neg. SCD, lovenox. Metoprolol 50mg po bid. [address] 11/34,  NEPH:bun/cr: 17/0.5 GI: Pepcid, senna. Tolerating TF well.  7.4L +. IVF's stopped. NPO after midnight. ID:TM: [id number]: pan cx sent, neg thus far, but started on Cef/Vanc D6/10 due to continued fever. LINES:RUE: PICC. Right radial virgil. .  
   
  
I have seen and examined the patient and agree with the history and exam in the progress note.   
   
NEURO: Patient with cervical hemorrhage and edema secondary to AVF. Cerebral angiogram showed suspicious vessels at C3 level that is likely an AVF. Repeat MRI C-spine which I reviewed shows Intramedullary subacute hematoma extending from C1 to the C6-C7 level with cord edema. There is also a T2 hypointense intramedullary lesion at the C3 level which is likely the AVF as seen on the angiogram. Pt will be NPO for repeat cerebral angiogram tomorrow.  updated at bedside this am. I discussed the cerebral angiogram procedure tomorrow and consent was obtained. Exam remains stable. RESP:PRVC @ 50%: AB.3/38/180.  lungs cta. CXR clear. Will need trach due to cervical spine edema. Will consult general surgery. CV:-160 goal. 2D Echo: EF 55-60%, Trop neg. SCD, lovenox. Metoprolol 50mg po bid. [address] 11/34,  NEPH:bun/cr: 17/0.5 GI: Pepcid, senna. Tolerating TF well.  7.4L +. IVF's stopped. NPO after midnight. ID:TM: [id number]: pan cx sent, neg thus far, but started on Cef/Vanc D6/10 due to continued fever. LINES:RUE: PICC. Right radial virgil  
  
  
  
  
  
   
 RT NOTE Ventilator check complete; patient has a #7. 0 ET tube secured at the 22 at the lip.  Patient is not sedated.  Patient is able to follow commands.  Breath sounds are diminished. Anika Lather is midline, Negative for subcutaneous air, and chest excursion is symmetric.  Patient is also Negative for cyanosis and is Negative for pitting edema.  All alarms are set and audible.  Resuscitation bag is at the head of the bed.    
   
Ventilator Settings Mode FIO2 Rate Tidal Volume Pressure PEEP I:E Ratio Pressure support 52 %   0 ml 5 cm H2O 8 cm H20 1:3.13   
   
Peak airway pressure: 11.8 cm H2O Minute ventilation: 7.7 l/min CM NOTE Chart reviewed as pt remains ICU intubated/vent. Plans for repeat cerebral angiogram Tue, 2-11-20 per MD notes. Dr. Sinan Stiles continues to follow for possible STR at [facility]. CM will continue to follow for any assist and d/c POC when stable per MD  
PT NOTE Ms. Silas Murillo is a 53 y.o. female in the hospital for an AVM with edema noted around the third cervical vertebrae.  Per EV note, pt's SBP should be kept within 100-160 range.  
   
2/10-pt supine on arrival, intubated on vent. Pt with multiple lines/monitors, art line, catheter, NG tube. Pt alert and agreeable to mobility, nods head no to pain. Pt with MIN A to EOB, MIN A for scooting for feet to floor placement. Pt demo good static sitting balance at EOB while, PT, RN managing lines for mobility. Pt placed on portable vent for ambulation. Pt CGA to MIN A transfers, ambulated into hallway pushing w/c with RT, RN, followed by recliner chair for safety. Pt with several standing rest breaks with ambulation. Pt ambulated with CGA to [address], slight lean to L side at times, increased trunk sway. Pt returned to room, seated in chair. Pt then performed several seated there ex B LE in chair with SBA to CGA. Pt overall doing well with activity, improvement noted with ambulation, activity tolerance. Pt making great progress today toward goals. Cont PT POC, will benefit from rehab at discharge.   
   
     
  
This section established at most recent assessment PROBLEM LIST (Impairments causing functional limitations): 1. Decreased Strength 2. Decreased ADL/Functional Activities 3. Decreased Transfer Abilities 4. Decreased Ambulation Ability/Technique 5. Decreased Balance 6. Decreased Activity Tolerance 7. Increased Fatigue INTERVENTIONS PLANNED: (Benefits and precautions of physical therapy have been discussed with the patient.) 1. Balance Exercise 2. Bed Mobility 3. Family Education 4. Gait Training 5. Neuromuscular Re-education/Strengthening 6. Therapeutic Activites 7. Therapeutic Exercise/Strengthening 8. Transfer Training  
   
TREATMENT PLAN: Frequency/Duration: 3 times a week for duration of hospital stay Rehabilitation Potential For Stated Goals: Excellent  
   
REHAB RECOMMENDATIONS (at time of discharge pending progress):    
Placement: It is my opinion, based on this patient's performance to date, that Ms. Taras Win may benefit from intensive therapy at an [address] [place] [address] after discharge due to a probable need for multiple therapy disciplines and potential to make ongoing and sustainable functional improvement that is of practical value. [facility] Equipment:   
\" None at this time Rehab medicine Principal Problem:  
  AVM (arteriovenous malformation) spine (2/2/2020)  
  Active Problems:  
  Cavernoma (2/3/2020)    
  Acute respiratory failure with hypoxia ([address] (2/3/2020)    
  Edema of spinal cord ([address] (2/3/2020)    
  Acute left-sided weakness (2/3/2020)  
  Recommendations: Continue Acute Rehab Program  
Coordination of rehab/medical care Counseling of PM & R care issues management Monitoring and management of medical conditions per plan of care/orders Discussion with Family/Caregiver/Staff Reviewed Therapies/Labs/Medications/Records  
  
   
Systemic or Infectious Condition GRG - Care Day 8 (2/9/2020) by Douglas Rodriguez RN  
 
   
Review Status Review Entered Completed 2/10/2020 13:37  
   
Criteria Review Care Day: 8 Care Date: 2/9/2020 Level of Care: ICU Guideline Day 3 Level Of Care ( ) * Activity level acceptable Clinical Status ( ) * Hemodynamic stability ( ) * Respiratory status acceptable ( ) * Neurologic status acceptable ( ) * Temperature status acceptable ( ) * No infection, or status acceptable ( ) * No neutropenia, or status acceptable ( ) * Special infection or injury situations absent   
( ) * Electrolyte status acceptable ( ) * General Discharge Criteria met Interventions ( ) * Intake acceptable ( ) * No inpatient interventions needed * Milestone Additional Notes  
 2/9  
  
98.9 48 53 30 100% 153/74 on vent 2/9/2020 05:47 RBC 3.28 (L) HGB 9.2 (L) HCT 28.6 (L)  
  
  
2/9/2020 03:21  
pCO2 (POC) 46.0 (H)  
pO2 (POC) 206 (H) HCO3 (POC) 30.9 (H)  
sO2 (POC) 100 (H) [facility] No acute cardiopulmonary disease. MRI BRAIN  
  
1. Intramedullary subacute hematoma extending from C1 to the C6-C7 level. There  
is abundant associated cord edema and expansion. 2. T2 hypointense intramedullary lesion at the C3 level. This is likely the  
origin of hemorrhage and may be an underlying vascular lesion such as an AVM. 3. Mild to moderate spinal stenosis at C6-C7. Orders IP ICU, Full Code, VS, IP Stroke Coordinator, CM Consult, Rehab Medicine, Fall Precautions, SCDS, TUBE CARE, NG insertion, Jalloh, Non Violent Restraints, PICC Insertion, NPO, Meds NS @ 75ml/hr IV cont, Tylenol 650 mg per NG tube x2,  Duoneb x2, Lipitor 80 mg per NG tube x1, Fentanyl 50 mcg IV x 1 Toradol 15 mg IV x2, Zofran 4 mg IV x1, Zofran 8 mg IV x1 Decadron 4 mg IV x7, Cardene 5 mg /hr IV cont, Fentanyl 100 mcg IV x1, Versed 1 mg IV x1, Decadron 10 mg IV x1, Maxipime 2 gms IV x4, Vanc 150 0mg IV x3 Lovenox 40 mg sc x1, Norco 7.5/325 mg po x3, Lopressor 25mg per NG x1, Lopressor 50 mg per NG x2, Benadryl 50 mg IV x1, Neuro surgery Note No acute changes. Pt looks great OOB to chair. Family updated at bedside.   
   
 Physical Exam:   
General: Alert, cooperative. Intubated. Eyes: PERRL, EOMs intact. Neck: Supple, symmetrical, trachea midline, no adenopathy, thyroid: no enlargment/tenderness/nodules, no carotid bruit and no JVD. Lungs:   Lungs CTA all fields. No wheezes noted. No distress. Heart: Regular rate and rhythm, S1, S2 normal, no murmur, click, rub or gallop. Abdomen:   Soft, non-tender. Bowel sounds normal. No masses,  No organomegaly. Extremities: Extremities normal, atraumatic, no cyanosis or edema. Pulses: 2+ and symmetric all extremities. Skin: Skin color, texture, turgor normal. No rashes or lesions Neurologic: Alert and following commands, wlaker, LUE with mild drift, but able to touch finger to nose bilat. Lifts LLE off bed. Remains intubated.   
   
  
  
Principal Problem:  
  AVM (arteriovenous malformation) spine (2020)  
  Active Problems:  
  Cavernoma (2/3/2020)    
  Acute respiratory failure with hypoxia ([address] (2/3/2020)    
  Edema of spinal cord ([address] (2/3/2020)    
  Acute left-sided weakness (2/3/2020)  
   
  
 NEURO:pt with cervical hemorrhage and edema secondary to vascular abnormality, cavernoma vs AVM. Pt remains intubated, but is alert and following commands this am. Cerebral angiogram yesterday with suspicious abnormal vessel around C3 area. Will repeat Cerebral angiogram and MRI brain next week.  Pt is alert and following commands. R groin is CDI with no hematoma. Pulses are intact. Pt had MRI Cspine today, will have repeat cerebral angiogram Tue, 20. Family updated. RESP:PRVC @ 50%: AB.4/46/206.  lungs cta. CV:-160 goal. 2D Echo: EF 55-60%, Trop neg. SCD, lovenox. Metoprolol 50mg po bid. HEME:HH 9.2/28,  NEPH:bun/cr: 21/0.48 GI: IVF NS @ 75cc/hr. Pepcid, senna. Tolerating TF well.  9L +. IVF's stopped. ID:TM: 100.4: pan cx sent, neg thus far, but started on Cef/Vanc D5/10 due to continued fever. LINES:RUE: PICC. Right radial virgil. .  
 RT NOTE Ventilator check complete; patient has a #7. 0 ET tube secured at the 22 at the lip.  Patient is not sedated.  Patient is able to follow commands.  Breath sounds are diminished. Inetta Kiss is midline, Negative for subcutaneous air, and chest excursion is symmetric. Patient is also Negative for cyanosis and is Negative for pitting edema.  All alarms are set and audible.  Resuscitation bag is at the head of the bed.    
   
Ventilator Settings Mode FIO2 Rate Tidal Volume Pressure PEEP I:E Ratio Pressure support 52 %   
  5 cm H2O 8 cm H20 1:3.13   
   
Peak airway pressure: 11.8 cm H2O Minute ventilation: 6.1 l/min   
   
ABG: No results for input(s): PH, PCO2, PO2, HCO3 in the last 72 hours.  
  
   
Systemic or Infectious Condition GRG - Care Day 7 (2/8/2020) by Bety Medina RN  
 
   
Review Status Review Entered Completed 2/10/2020 13:32  
   
Criteria Review Care Day: 7 Care Date: 2/8/2020 Level of Care: ICU Guideline Day 3 Level Of Care ( ) * Activity level acceptable Clinical Status ( ) * Hemodynamic stability ( ) * Respiratory status acceptable ( ) * Neurologic status acceptable ( ) * Temperature status acceptable ( ) * No infection, or status acceptable ( ) * No neutropenia, or status acceptable ( ) * Special infection or injury situations absent   
( ) * Electrolyte status acceptable ( ) * General Discharge Criteria met Interventions ( ) * Intake acceptable ( ) * No inpatient interventions needed * Milestone Additional Notes 2/8  
  
100.4 149/91 14 100% 63 on vent 2/8/2020 04:00  
RBC 3.09 (L) HGB 8.7 (L) HCT 27.7 (L)  
  
  
2/8/2020 03:37 Sodium 146 (H) Chloride 114 (H) Anion gap 5 (L) Glucose 154 (H) Creatinine 0.48 (L) Calcium 7.9 (L)  
  
  
2/8/2020 03:12  
pCO2 (POC) 45.8 (H)  
pO2 (POC) 213 (H) HCO3 (POC) 29.4 (H)  
sO2 (POC) 100 (H) [facility]low Ruby Normal chest.  
  
 Orders IP ICU, Full Code, VS, IP Stroke Coordinator, CM Consult, Rehab Medicine, Fall Precautions, SCDS, TUBE CARE, NG insertion, Jalloh, Non Violent Restraints, PICC Insertion, NPO, Meds NS @ 75ml/hr IV cont, Tylenol 650 mg per NG tube x2,  Duoneb x2, Lipitor 80 mg per NG tube x1, Fentanyl 50 mcg IV x 1 Toradol 15 mg IV x2, Zofran 4 mg IV x1, Zofran 8 mg IV [id number] 5mg/hr IV cont, Versed 2 mg IV x1 Decadron 4 mg IV x7, Cardene 5 mg /hr IV cont, Decadron 10 mg IV x1, Maxipime 2 gms IV x4, Vanc 1250 mg IV x3 Lovenox 40 mg sc x1, Norco 7.5/325 mg po x3, Lopressor 25mg per NG x1, Lopressor 50 mg per NG x2, Neuro surgery Note No acute changes. Pt looks great OOB to chair. Family updated at bedside.   
   
   
Physical Exam:   
General: Alert, cooperative. Extubated this am.   
Eyes: PERRL, EOMs intact. Neck: Supple, symmetrical, trachea midline, no adenopathy, thyroid: no enlargment/tenderness/nodules, no carotid bruit and no JVD. Lungs:   Rhonchi bilat/coarse. Heart: Regular rate and rhythm, S1, S2 normal, no murmur, click, rub or gallop. Abdomen:   Soft, non-tender. Bowel sounds normal. No masses,  No organomegaly. Extremities: Extremities normal, atraumatic, no cyanosis or edema. Moving Left side better today, can hold LUE/LLE off bed to command. Pulses: 2+ and symmetric all extremities. Skin: Skin color, texture, turgor normal. No rashes or lesions Neurologic: Alert and following commands, post extubation pt with coarse lungs, increased WOB. Cont to follow commands Principal Problem:  
  AVM (arteriovenous malformation) spine (2/2/2020)  
  Active Problems:  
  Cavernoma (2/3/2020)    
  Acute respiratory failure with hypoxia ([address] (2/3/2020)    
  Edema of spinal cord ([address] (2/3/2020)    
  Acute left-sided weakness (2/3/2020)    
   
NEURO:pt with cervical hemorrhage and edema secondary to vascular abnormality, cavernoma vs AVM. Pt remains intubated, but is alert and following commands this am. Cerebral angiogram yesterday with suspicious abnormal vessel around C3 area. Will repeat Cerebral angiogram and MRI brain next week.  Pt is alert and following commands. R groin is CDI with no hematoma. Pulses are intact. Pt will have MRI in am of Cspine and repeat cerebral angiogram on 20. Family updated on poc. RESP:PRVC @ 50%: AB.4/45/213.  lungs cta. CV:-160 goal. 2D Echo: EF 55-60%, Trop neg. SCD, lovenox. Metoprolol 50mg po bid. HEME:HH 8.7/27,  NEPH:bun/cr: 21/0.48 GI: IVF NS @ 75cc/hr. Pepcid, senna. Tolerating TF well.  9L +. Will stop IVF's today. ID:TM: 99.6: pan cx sent, neg thus far, but started on Cef/Vanc D4/10 due to continued fever. LINES:RUE: PICC. Right radial virgil. .  
 RT NOTE Ventilator check complete; patient has a #7. 0 ET tube secured at the 22 at the lip.  Patient is not sedated.  Patient is able to follow commands.  Breath sounds are clear and diminished. Sean Jet is midline, Negative for subcutaneous air, and chest excursion is symmetric. Patient is also Negative for cyanosis and is Negative for pitting edema.  All alarms are set and audible.  Resuscitation bag is at the head of the bed.    
   
Ventilator Settings Mode FIO2 Rate Tidal Volume Pressure PEEP I:E Ratio PRVC 52 %  16 450 ml 8 cm H20 1:3.13   
   
Peak airway pressure: 20.8 cm H2O Minute ventilation: 9.2 l/min   
   
ABG: No results for input(s): PH, PCO2, PO2, HCO3 in the last 72 hours.

## 2020-02-11 NOTE — PROGRESS NOTES
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 Family present. We discussed rehab goals and fact that pt has very good rehab potential. They are appreciative of the care they have received in the ICU  - temp 100 TM, cxs remain neg. Wbc 10. 5. continues on Cef/Vanc D7/10   -tolerating tube feeds well.   -plans for trach 2/12; will continue to follow. IRC pending medical clearance . Will need insurance precert  History/Subjective/Complaint:     Patient seen and examined. Records reviewed. Orally intubated. Writes responses appropriately.  Ox 3    Pain 1  Pain Scale 1: Numeric (0 - 10) (02/11/20 0901)  Pain Intensity 1: 0 (02/11/20 1347)  Patient Stated Pain Goal: 0 (02/11/20 0901)  Pain Reassessment 1: Yes (02/11/20 0901)  Pain Onset 1: acute (02/11/20 0834)  Pain Location 1: Head (02/11/20 0834)  Pain Orientation 1: Mid (02/11/20 8962)  Pain Description 1: Aching (02/11/20 0834)  Pain Intervention(s) 1: Medication (see MAR) (02/11/20 0834)     Objective:     Vitals:  Patient Vitals for the past 8 hrs:   BP Temp Pulse Resp SpO2   02/11/20 1415  98.6 °F (37 °C) (!) 58 16 100 %   02/11/20 1409  98.9 °F (37.2 °C) (!) 59 16 100 %   02/11/20 1406  99.1 °F (37.3 °C) 64 16 100 %   02/11/20 1403  99.1 °F (37.3 °C) 64 16 100 %   02/11/20 1359  (!) 33.8 °F (1 °C) 62 14 100 %   02/11/20 1353  99.8 °F (37.7 °C) (!) 54 16 100 %   02/11/20 1347  99.8 °F (37.7 °C) (!) 51 15 100 %   02/11/20 1336  100 °F (37.8 °C) (!) 54 16 98 %   02/11/20 1330  99.8 °F (37.7 °C) 77 16 100 %   02/11/20 1328  99.8 °F (37.7 °C) 74 16 100 %   02/11/20 1314  99.8 °F (37.7 °C) 69 14 100 %   02/11/20 1300  99.8 °F (37.7 °C) 67 14 100 %   02/11/20 1216  99.6 °F (37.6 °C) 68 17 100 %   02/11/20 1200  99.6 °F (37.6 °C) 68 15 100 %   02/11/20 1145  99.8 °F (37.7 °C) (!) 56 15 100 %   02/11/20 1130  99.6 °F (37.6 °C) (!) 57 14 100 %   02/11/20 1115  99.8 °F (37.7 °C) 63 15 100 %   02/11/20 1109   63 14 100 %   02/11/20 1100  99.8 °F (37.7 °C) 68 17 100 %   02/11/20 1058  99.8 °F (37.7 °C) 63 20 100 %   02/11/20 1057  99.8 °F (37.7 °C) 65 15 100 %   02/11/20 1056  99.8 °F (37.7 °C) 67 23 100 %   02/11/20 1045  99.8 °F (37.7 °C) 68 13 100 %   02/11/20 1030  99.8 °F (37.7 °C) 69 13 100 %   02/11/20 1015  100 °F (37.8 °C) 74 20 100 %   02/11/20 1000  100 °F (37.8 °C) 71 15 100 %   02/11/20 0945  100 °F (37.8 °C) 80 15 100 %   02/11/20 0930  99.8 °F (37.7 °C) 66 14 100 %   02/11/20 0929  99.8 °F (37.7 °C) 68 14 100 %   02/11/20 0917  99.8 °F (37.7 °C) 75 12 100 %   02/11/20 0901  99.6 °F (37.6 °C) 81 20 100 %   02/11/20 0900  99.6 °F (37.6 °C) 80 14 100 %   02/11/20 0845  99.6 °F (37.6 °C) 89 12 100 %   02/11/20 0830  99.6 °F (37.6 °C) 86 21 100 %   02/11/20 0815  99.6 °F (37.6 °C) 63 16 100 %   02/11/20 0800  99.5 °F (37.5 °C) 66 13 100 %   02/11/20 0745  99.5 °F (37.5 °C) 72 10 100 %   02/11/20 0730  99.3 °F (37.4 °C) 74 13 100 %   02/11/20 0715  99.1 °F (37.3 °C) 74 12 100 %   02/11/20 0712   70 10 100 %   02/11/20 0701 131/70 99.1 °F (37.3 °C) 75 14 100 %   02/11/20 0700  99.1 °F (37.3 °C) 70 12 100 %      Intake and Output:  02/09 1901 - 02/11 0700  In: 3621.6 [I.V.:1579.6]  Out: 7820 [Urine:7820]    Allergies   Allergen Reactions    Ace Inhibitors Angioedema     Current Facility-Administered Medications   Medication Dose Route Frequency    iopamidoL (ISOVUE 300) 61 % contrast injection 5-200 mL  5-200 mL IntraarTERial RAD ONCE    heparin (PF) 2 units/ml in NS infusion 2,000 Units  1,000 mL Irrigation Multiple    fentaNYL citrate (PF) injection  mcg   mcg IntraVENous Multiple    midazolam (VERSED) injection 0.25-2 mg  0.25-2 mg IntraVENous Multiple    lidocaine (XYLOCAINE) 20 mg/mL (2 %) injection  mg   mg SubCUTAneous Rad Multiple    ALPRAZolam (XANAX) tablet 0.5 mg  0.5 mg Oral BID    vancomycin (VANCOCIN) 1500 mg in  ml infusion  1,500 mg IntraVENous Q8H    cefepime (MAXIPIME) 2 g in 0.9% sodium chloride (MBP/ADV) 100 mL  2 g IntraVENous Q8H    metoprolol tartrate (LOPRESSOR) tablet 50 mg  50 mg Per NG tube BID    NUTRITIONAL SUPPORT ELECTROLYTE PRN ORDERS   Does Not Apply PRN    senna (SENOKOT) tablet 8.6 mg  1 Tab Per NG tube DAILY    HYDROcodone-acetaminophen (NORCO) 7.5-325 mg per tablet 1 Tab  1 Tab Oral Q6H PRN    enoxaparin (LOVENOX) injection 40 mg  40 mg SubCUTAneous Q24H    famotidine (PEPCID) tablet 20 mg  20 mg Per NG tube DAILY    ibuprofen (ADVIL;MOTRIN) 100 mg/5 mL oral suspension 600 mg  600 mg Per NG tube Q6H PRN    sodium chloride (NS) flush 5-40 mL  5-40 mL IntraVENous PRN    fentaNYL citrate (PF) injection 50 mcg  50 mcg IntraVENous Q2H PRN    acetaminophen (TYLENOL) tablet 650 mg  650 mg Per NG tube Q4H PRN    ondansetron (ZOFRAN) injection 4 mg  4 mg IntraVENous Q4H PRN    atorvastatin (LIPITOR) tablet 40 mg  40 mg Per NG tube QHS    sodium chloride (NS) flush 30 mL  30 mL InterCATHeter Q8H    heparin (porcine) pf 900 Units  900 Units InterCATHeter Q8H    sodium chloride (NS) flush 30 mL  30 mL InterCATHeter PRN    heparin (porcine) pf 900 Units  900 Units InterCATHeter PRN    lip protectant (BLISTEX) ointment 1 Each  1 Each Topical PRN       Physical Exam:  General:  Alert, cooperative. Intubated. Family at bedside. NAD   Eyes:  PERRL, EOMs intact. Neck: Supple, symmetrical, trachea midline, no adenopathy, thyroid: no enlargment/tenderness/nodules, no carotid bruit and no JVD. Lungs:   Lungs CTA all fields. No wheezes/rales/rhonchi noted. No distress.    Heart:  Regular rate and rhythm, S1, S2 normal, no murmur, click, rub or gallop. Abdomen:   Soft, non-tender. Bowel sounds normal. No masses,  No organomegaly. obese   Extremities: Extremities normal, atraumatic, no cyanosis or edema. Pulses: 2+ and symmetric all extremities. Skin: Skin color, texture, turgor normal. No rashes or lesions   Neurologic: Alert and following commands, walker, LUE with mild drift, but able to touch finger to nose bilat.   LEs with symm strength  Sensation intact  Non verbal due to oral intubation         Functional Assessment:  Gross Assessment  AROM: Generally decreased, functional(L hip flexion) (02/04/20 1400)  PROM: Generally decreased, functional (02/04/20 1100)  Strength: Generally decreased, functional(L LE) (02/04/20 1400)  Coordination: Generally decreased, functional (02/04/20 1400)  Tone: Normal (02/04/20 1100)  Sensation: Intact (02/04/20 1400)     Gait  Speed/Linda: Slow (02/10/20 1009)  Step Length: Left shortened;Right shortened (02/10/20 1009)  Gait Abnormalities: Decreased step clearance;Trunk sway increased (02/10/20 1009)  Ambulation - Level of Assistance: Contact guard assistance;Minimal assistance (02/10/20 1009)  Distance (ft): 200 Feet (ft)(on vent, following w/ chair, 3 standing breaks) (02/10/20 1009)  Assistive Device: Gait belt(pushing w/c) (02/10/20 1009)     Bed Mobility  Rolling: Contact guard assistance;Minimum assistance (02/10/20 1009)  Supine to Sit: Minimum assistance (02/10/20 1009)  Sit to Supine: (left up in chair) (02/10/20 1009)  Scooting: Minimum assistance(to EOB, CGA/SBA in chair) (02/10/20 1009)     Balance  Sitting: Impaired (02/10/20 1009)  Sitting - Static: Good (unsupported) (02/10/20 1009)  Sitting - Dynamic: Fair (occasional) (02/10/20 1009)  Standing: Impaired (02/10/20 1009)  Standing - Static: Good;Fair (02/10/20 1009)  Standing - Dynamic : Fair (02/10/20 1009)                       Bed/Mat Mobility  Rolling: Contact guard assistance;Minimum assistance (02/10/20 1009)  Supine to Sit: Minimum assistance (02/10/20 1009)  Sit to Supine: (left up in chair) (02/10/20 1009)  Sit to Stand: Minimum assistance(additional assist for lines) (02/10/20 1009)  Stand to Sit: Minimum assistance (02/10/20 1009)  Bed to Chair: Minimum assistance (02/10/20 1009)  Scooting: Minimum assistance(to EOB, CGA/SBA in chair) (02/10/20 1009)     Labs/Studies:  Recent Results (from the past 72 hour(s))   VANCOMYCIN, TROUGH    Collection Time: 02/08/20  7:10 PM   Result Value Ref Range    Vancomycin,trough 9.2 5 - 20 ug/mL   POC G3    Collection Time: 02/09/20  3:21 AM   Result Value Ref Range    Device: VENT      FIO2 (POC) 50 %    pH (POC) 7.435 7.35 - 7.45      pCO2 (POC) 46.0 (H) 35 - 45 MMHG    pO2 (POC) 206 (H) 75 - 100 MMHG    HCO3 (POC) 30.9 (H) 22 - 26 MMOL/L    sO2 (POC) 100 (H) 95 - 98 %    Base excess (POC) 6 mmol/L    Mode Pressure regulated volume control      Tidal volume 450 ml    Set Rate 16 bpm    PEEP/CPAP (POC) 8 cmH2O    Allens test (POC) NOT APPLICABLE      Inspiratory Time 0.90 sec    Site DRAWN FROM ARTERIAL LINE      Specimen type (POC) ARTERIAL      Performed by Gibran     CO2, POC 32 MMOL/L    Respiratory comment: NurseNotified     Exhaled minute volume 8.90 L/min    COLLECT TIME 312     CBC W/O DIFF    Collection Time: 02/09/20  5:47 AM   Result Value Ref Range    WBC 9.9 4.3 - 11.1 K/uL    RBC 3.28 (L) 4.05 - 5.2 M/uL    HGB 9.2 (L) 11.7 - 15.4 g/dL    HCT 28.6 (L) 35.8 - 46.3 %    MCV 87.2 79.6 - 97.8 FL    MCH 28.0 26.1 - 32.9 PG    MCHC 32.2 31.4 - 35.0 g/dL    RDW 11.9 11.9 - 14.6 %    PLATELET 561 693 - 746 K/uL    MPV 11.9 9.4 - 12.3 FL    ABSOLUTE NRBC 0.00 0.0 - 0.2 K/uL   POC G3    Collection Time: 02/10/20  3:23 AM   Result Value Ref Range    Device: VENT      FIO2 (POC) 50 %    pH (POC) 7.460 (H) 7.35 - 7.45      pCO2 (POC) 48.0 (H) 35 - 45 MMHG    pO2 (POC) 187 (H) 75 - 100 MMHG    HCO3 (POC) 34.1 (H) 22 - 26 MMOL/L    sO2 (POC) 100 (H) 95 - 98 %    Base excess (POC) 9 mmol/L    Mode VENTILATOR/SPONTANEOUS/PRESSURE SUPPORT      PEEP/CPAP (POC) 8 cmH2O    Pressure support 5 cmH2O    Allens test (POC) NOT APPLICABLE      Total resp.  rate 16      Site DRAWN FROM ARTERIAL LINE      Specimen type (POC) ARTERIAL      Performed by Nicho     CO2, POC 36 MMOL/L    Respiratory comment: NurseNotified     Exhaled minute volume 9.80 L/min    COLLECT TIME 738     METABOLIC PANEL, BASIC    Collection Time: 02/10/20  3:29 AM   Result Value Ref Range    Sodium 142 136 - 145 mmol/L    Potassium 3.8 3.5 - 5.1 mmol/L    Chloride 104 98 - 107 mmol/L    CO2 30 21 - 32 mmol/L    Anion gap 8 7 - 16 mmol/L    Glucose 109 (H) 65 - 100 mg/dL    BUN 17 6 - 23 MG/DL    Creatinine 0.51 (L) 0.6 - 1.0 MG/DL    GFR est AA >60 >60 ml/min/1.73m2    GFR est non-AA >60 >60 ml/min/1.73m2    Calcium 9.2 8.3 - 10.4 MG/DL   CBC W/O DIFF    Collection Time: 02/10/20  3:29 AM   Result Value Ref Range    WBC 13.1 (H) 4.3 - 11.1 K/uL    RBC 4.03 (L) 4.05 - 5.2 M/uL    HGB 11.1 (L) 11.7 - 15.4 g/dL    HCT 34.3 (L) 35.8 - 46.3 %    MCV 85.1 79.6 - 97.8 FL    MCH 27.5 26.1 - 32.9 PG    MCHC 32.4 31.4 - 35.0 g/dL    RDW 11.9 11.9 - 14.6 %    PLATELET 446 059 - 573 K/uL    MPV 12.0 9.4 - 12.3 FL    ABSOLUTE NRBC 0.02 0.0 - 0.2 K/uL   VANCOMYCIN, TROUGH    Collection Time: 02/11/20 12:35 AM   Result Value Ref Range    Vancomycin,trough 17.5 5 - 20 ug/mL   METABOLIC PANEL, BASIC    Collection Time: 02/11/20  3:29 AM   Result Value Ref Range    Sodium 141 136 - 145 mmol/L    Potassium 3.5 3.5 - 5.1 mmol/L    Chloride 104 98 - 107 mmol/L    CO2 32 21 - 32 mmol/L    Anion gap 5 (L) 7 - 16 mmol/L    Glucose 98 65 - 100 mg/dL    BUN 18 6 - 23 MG/DL    Creatinine 0.43 (L) 0.6 - 1.0 MG/DL    GFR est AA >60 >60 ml/min/1.73m2    GFR est non-AA >60 >60 ml/min/1.73m2    Calcium 8.6 8.3 - 10.4 MG/DL   CBC W/O DIFF    Collection Time: 02/11/20  3:29 AM   Result Value Ref Range    WBC 10.5 4.3 - 11.1 K/uL    RBC 3.82 (L) 4.05 - 5.2 M/uL    HGB 10.6 (L) 11.7 - 15.4 g/dL    HCT 33.2 (L) 35.8 - 46.3 %    MCV 86.9 79.6 - 97.8 FL    MCH 27.7 26.1 - 32.9 PG    MCHC 31.9 31.4 - 35.0 g/dL    RDW 12.2 11.9 - 14.6 %    PLATELET 331 143 - 610 K/uL    MPV 10.9 9.4 - 12.3 FL    ABSOLUTE NRBC 0.00 0.0 - 0.2 K/uL   POC G3    Collection Time: 02/11/20  3:30 AM   Result Value Ref Range    Device: CPAP      FIO2 (POC) 50 %    pH (POC) 7.428 7.35 - 7.45      pCO2 (POC) 50.7 (H) 35 - 45 MMHG    pO2 (POC) 229 (H) 75 - 100 MMHG    HCO3 (POC) 33.5 (H) 22 - 26 MMOL/L    sO2 (POC) 100 (H) 95 - 98 %    Base excess (POC) 8 mmol/L    PEEP/CPAP (POC) 8 cmH2O    Allens test (POC) NOT APPLICABLE      Total resp.  rate 14      Site DRAWN FROM ARTERIAL LINE      Specimen type (POC) ARTERIAL      Performed by Duy     CO2, POC 35 MMOL/L    Respiratory comment: NurseNotified     Exhaled minute volume 5.80 L/min    COLLECT TIME 325          Assessment:     Principal Problem:    AVM (arteriovenous malformation) spine (2/2/2020)    Active Problems:    Cavernoma (2/3/2020)      Acute respiratory failure with hypoxia (HCC) (2/3/2020)      Edema of spinal cord ([address] (2/3/2020)      Acute left-sided weakness (2/3/2020)        Plan:     Recommendations: Continue Acute Rehab Program  Coordination of rehab/medical care  Counseling of PM & R care issues management  Monitoring and management of medical conditions per plan of care/orders  Discussion with Family/Caregiver/Staff  Reviewed Therapies/Labs/Medications/Records

## 2020-02-11 NOTE — PROGRESS NOTES
Pharmacokinetic Consult to Pharmacist    emmett Devon is a 53 y.o. female being treated for VAP with vancomycin. Height: 5' 4\" (162.6 cm)  Weight: 110.6 kg (243 lb 13.3 oz)  Lab Results   Component Value Date/Time    BUN 18 02/11/2020 03:29 AM    Creatinine 0.43 (L) 02/11/2020 03:29 AM    WBC 10.5 02/11/2020 03:29 AM      Estimated Creatinine Clearance: 122.2 mL/min (A) (by C-G formula based on SCr of 0.43 mg/dL (L)). Lab Results   Component Value Date/Time    Vancomycin,trough 17.5 02/11/2020 12:35 AM       Day 7 of vancomycin. Goal trough is 15-20. Trough was therapeutic at 17.5. Will continue 1500 mg q 8 hours  Will continue to follow patient.       Thank you,  Lauren Brody, PharmD  Clinical Pharmacy PGY1 Resident   [phone]

## 2020-02-11 NOTE — PROGRESS NOTES
Patient arrived to room post angio. R groin soft with angioseal, 4x4, and tegaderm in place- no oozing noted. R pedal and post tibial pulses present. Knee immobilizer placed. Patient to remain flat until 1630. NIH 3- patient drowsy, post sedation during procedure, and LUE drift and L decreased sensation persists. Pupils 3 mm, brisk, and round. Dual assessed with Zheng Pool RN.

## 2020-02-11 NOTE — PROGRESS NOTES
Ventilator check complete; patient has a #7. 0 ET tube secured at the 22 at the lip. Patient is not sedated. Patient is able to follow commands. Breath sounds are coarse and diminished. Trachea is midline, Negative for subcutaneous air, and chest excursion is symmetric. Patient is also Negative for cyanosis and is Negative for pitting edema. All alarms are set and audible. Resuscitation bag is at the head of the bed. Ventilator Settings  Mode FIO2 Rate Tidal Volume Pressure PEEP I:E Ratio   Pressure support  52 %     5 cm H2O  8 cm H20  1:3.13      Peak airway pressure: 12.6 cm H2O   Minute ventilation: 9.5 l/min     ABG: No results for input(s): PH, PCO2, PO2, HCO3 in the last 72 hours.       Lucille Ellington

## 2020-02-12 ENCOUNTER — APPOINTMENT (OUTPATIENT)
Dept: GENERAL RADIOLOGY | Age: 45
DRG: 004 | End: 2020-02-12
Attending: NURSE PRACTITIONER
Payer: COMMERCIAL

## 2020-02-12 LAB
ANION GAP SERPL CALC-SCNC: 4 MMOL/L (ref 7–16)
ARTERIAL PATENCY WRIST A: YES
BASE EXCESS BLD CALC-SCNC: 4 MMOL/L
BDY SITE: ABNORMAL
BUN SERPL-MCNC: 20 MG/DL (ref 6–23)
CALCIUM SERPL-MCNC: 8.3 MG/DL (ref 8.3–10.4)
CHLORIDE SERPL-SCNC: 106 MMOL/L (ref 98–107)
CO2 BLD-SCNC: 30 MMOL/L
CO2 SERPL-SCNC: 31 MMOL/L (ref 21–32)
COLLECT TIME,HTIME: 350
CREAT SERPL-MCNC: 0.52 MG/DL (ref 0.6–1)
ERYTHROCYTE [DISTWIDTH] IN BLOOD BY AUTOMATED COUNT: 12.4 % (ref 11.9–14.6)
EXHALED MINUTE VOLUME, VE: 7.5 L/MIN
GAS FLOW.O2 O2 DELIVERY SYS: ABNORMAL L/MIN
GAS FLOW.O2 SETTING OXYMISER: 16 BPM
GLUCOSE SERPL-MCNC: 128 MG/DL (ref 65–100)
HCO3 BLD-SCNC: 29 MMOL/L (ref 22–26)
HCT VFR BLD AUTO: 30.7 % (ref 35.8–46.3)
HGB BLD-MCNC: 9.9 G/DL (ref 11.7–15.4)
INSPIRATION.DURATION SETTING TIME VENT: 0.9 SEC
MCH RBC QN AUTO: 28 PG (ref 26.1–32.9)
MCHC RBC AUTO-ENTMCNC: 32.2 G/DL (ref 31.4–35)
MCV RBC AUTO: 86.7 FL (ref 79.6–97.8)
NRBC # BLD: 0 K/UL (ref 0–0.2)
O2/TOTAL GAS SETTING VFR VENT: 50 %
PCO2 BLD: 43.5 MMHG (ref 35–45)
PEEP RESPIRATORY: 8 CMH2O
PH BLD: 7.43 [PH] (ref 7.35–7.45)
PLATELET # BLD AUTO: 209 K/UL (ref 150–450)
PMV BLD AUTO: 10.5 FL (ref 9.4–12.3)
PO2 BLD: 209 MMHG (ref 75–100)
POTASSIUM SERPL-SCNC: 3.6 MMOL/L (ref 3.5–5.1)
RBC # BLD AUTO: 3.54 M/UL (ref 4.05–5.2)
SAO2 % BLD: 100 % (ref 95–98)
SERVICE CMNT-IMP: ABNORMAL
SERVICE CMNT-IMP: ABNORMAL
SODIUM SERPL-SCNC: 141 MMOL/L (ref 136–145)
SPECIMEN TYPE: ABNORMAL
VENTILATION MODE VENT: ABNORMAL
VT SETTING VENT: 450 ML
WBC # BLD AUTO: 10.2 K/UL (ref 4.3–11.1)

## 2020-02-12 PROCEDURE — 99232 SBSQ HOSP IP/OBS MODERATE 35: CPT | Performed by: NURSE PRACTITIONER

## 2020-02-12 PROCEDURE — 77030018798 HC PMP KT ENTRL FED COVD -A

## 2020-02-12 PROCEDURE — 77030005513 HC CATH URETH FOL11 MDII -B

## 2020-02-12 PROCEDURE — 82803 BLOOD GASES ANY COMBINATION: CPT

## 2020-02-12 PROCEDURE — 80048 BASIC METABOLIC PNL TOTAL CA: CPT

## 2020-02-12 PROCEDURE — 97530 THERAPEUTIC ACTIVITIES: CPT

## 2020-02-12 PROCEDURE — 74011250636 HC RX REV CODE- 250/636: Performed by: NEUROLOGICAL SURGERY

## 2020-02-12 PROCEDURE — 74011250637 HC RX REV CODE- 250/637: Performed by: NEUROLOGICAL SURGERY

## 2020-02-12 PROCEDURE — 74011000258 HC RX REV CODE- 258: Performed by: NEUROLOGICAL SURGERY

## 2020-02-12 PROCEDURE — 36600 WITHDRAWAL OF ARTERIAL BLOOD: CPT

## 2020-02-12 PROCEDURE — 65610000001 HC ROOM ICU GENERAL

## 2020-02-12 PROCEDURE — 77030040830 HC CATH URETH FOL MDII -A

## 2020-02-12 PROCEDURE — 85027 COMPLETE CBC AUTOMATED: CPT

## 2020-02-12 PROCEDURE — 74011250637 HC RX REV CODE- 250/637: Performed by: NURSE PRACTITIONER

## 2020-02-12 PROCEDURE — 94003 VENT MGMT INPAT SUBQ DAY: CPT

## 2020-02-12 PROCEDURE — 71045 X-RAY EXAM CHEST 1 VIEW: CPT

## 2020-02-12 PROCEDURE — 74011250636 HC RX REV CODE- 250/636: Performed by: NURSE PRACTITIONER

## 2020-02-12 RX ORDER — FAMOTIDINE 20 MG/1
20 TABLET, FILM COATED ORAL EVERY 12 HOURS
Status: DISCONTINUED | OUTPATIENT
Start: 2020-02-12 | End: 2020-02-18 | Stop reason: HOSPADM

## 2020-02-12 RX ADMIN — ATORVASTATIN CALCIUM 40 MG: 40 TABLET, FILM COATED ORAL at 22:07

## 2020-02-12 RX ADMIN — CEFEPIME 2 G: 2 INJECTION, POWDER, FOR SOLUTION INTRAVENOUS at 08:10

## 2020-02-12 RX ADMIN — ALTEPLASE 1 MG: KIT at 22:08

## 2020-02-12 RX ADMIN — METOPROLOL TARTRATE 50 MG: 50 TABLET, FILM COATED ORAL at 08:10

## 2020-02-12 RX ADMIN — ALPRAZOLAM 0.25 MG: 0.25 TABLET ORAL at 18:03

## 2020-02-12 RX ADMIN — VANCOMYCIN HYDROCHLORIDE 1500 MG: 10 INJECTION, POWDER, LYOPHILIZED, FOR SOLUTION INTRAVENOUS at 02:03

## 2020-02-12 RX ADMIN — HYDROCODONE BITARTRATE AND ACETAMINOPHEN 1 TABLET: 7.5; 325 TABLET ORAL at 20:10

## 2020-02-12 RX ADMIN — METOPROLOL TARTRATE 50 MG: 50 TABLET, FILM COATED ORAL at 18:03

## 2020-02-12 RX ADMIN — FENTANYL CITRATE 50 MCG: 50 INJECTION, SOLUTION INTRAMUSCULAR; INTRAVENOUS at 15:56

## 2020-02-12 RX ADMIN — ALTEPLASE 1 MG: KIT at 22:07

## 2020-02-12 RX ADMIN — FENTANYL CITRATE 50 MCG: 50 INJECTION, SOLUTION INTRAMUSCULAR; INTRAVENOUS at 04:45

## 2020-02-12 RX ADMIN — FAMOTIDINE 20 MG: 20 TABLET, FILM COATED ORAL at 08:10

## 2020-02-12 RX ADMIN — Medication 30 ML: at 23:26

## 2020-02-12 RX ADMIN — SENNOSIDES 8.6 MG: 8.6 TABLET, FILM COATED ORAL at 08:10

## 2020-02-12 RX ADMIN — ENOXAPARIN SODIUM 40 MG: 40 INJECTION SUBCUTANEOUS at 08:10

## 2020-02-12 RX ADMIN — Medication 900 UNITS: at 13:37

## 2020-02-12 RX ADMIN — CEFEPIME 2 G: 2 INJECTION, POWDER, FOR SOLUTION INTRAVENOUS at 23:27

## 2020-02-12 RX ADMIN — Medication 30 ML: at 05:08

## 2020-02-12 RX ADMIN — CEFEPIME 2 G: 2 INJECTION, POWDER, FOR SOLUTION INTRAVENOUS at 15:56

## 2020-02-12 RX ADMIN — Medication 30 ML: at 13:37

## 2020-02-12 RX ADMIN — Medication 900 UNITS: at 05:08

## 2020-02-12 RX ADMIN — ALPRAZOLAM 0.25 MG: 0.25 TABLET ORAL at 08:10

## 2020-02-12 RX ADMIN — FAMOTIDINE 20 MG: 20 TABLET, FILM COATED ORAL at 20:10

## 2020-02-12 RX ADMIN — Medication 900 UNITS: at 22:07

## 2020-02-12 RX ADMIN — VANCOMYCIN HYDROCHLORIDE 1500 MG: 10 INJECTION, POWDER, LYOPHILIZED, FOR SOLUTION INTRAVENOUS at 17:59

## 2020-02-12 RX ADMIN — VANCOMYCIN HYDROCHLORIDE 1500 MG: 10 INJECTION, POWDER, LYOPHILIZED, FOR SOLUTION INTRAVENOUS at 09:50

## 2020-02-12 NOTE — PROGRESS NOTES
Bedside, Verbal and Written shift change report given to St. noel RN (oncoming nurse) by Yesica Cortes RN (offgoing nurse). Report included the following information SBAR, Kardex, Procedure Summary, Intake/Output, MAR, Cardiac Rhythm sb/sr and Alarm Parameters . Freq neuro and groin site assessment completed.

## 2020-02-12 NOTE — PROGRESS NOTES
Problem: Mobility Impaired (Adult and Pediatric)  Goal: *Acute Goals and Plan of Care (Insert Text)  Description  LTG:  (1.)Ms. Melita Rivera will move from supine to sit and sit to supine , scoot up and down and roll side to side in bed with MODIFIED INDEPENDENCE within 7 treatment day(s). (2.)Ms. Melita Rivera will transfer from bed to chair and chair to bed with STAND BY ASSIST using the least restrictive device within 7 treatment day(s). (3.)Ms. Melita Rivera will ambulate with STAND BY ASSIST for 85 feet with the least restrictive device within 7 treatment day(s). (4.)Ms. Melita Rivera will perform standing static and dynamic balance activities x 15 minutes with STAND BY ASSIST to improve safety within 7 treatment day(s). ________________________________________________________________________________________________      Outcome: Progressing Towards Goal     PHYSICAL THERAPY: Daily Note and PM 2/12/2020  INPATIENT: PT Visit Days : 5  Payor: [address] / Plan: [address] / Product Type: PPO /       NAME/AGE/GENDER: Ct Hennessy is a 53 y.o. 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 Melita Rivera is a 53 y.o. female in the hospital for an AVM with edema noted around the third cervical vertebrae. Per EV note, pt's SBP should be kept within 100-160 range. 2/10-pt supine on arrival, intubated on vent. Pt with multiple lines/monitors, art line, catheter, NG tube. Pt alert and agreeable to ambulating. She was given Miguel for bed mobility and scooting to EOB. Pt appeared more drowsy today but following commands. Pt placed on portable vent by RT and stood with CGA/RW.   Pt ambulated in nina with CGA/RW and chair follow. She has increased trunk sway and required min-modA for negotiating objects on L side. She returned to room and transferred to chair with CGA. Pt performed several seated exercises from recliner. Pt progressed in functional mobility today. This section established at most recent assessment   PROBLEM LIST (Impairments causing functional limitations):  1. Decreased Strength  2. Decreased ADL/Functional Activities  3. Decreased Transfer Abilities  4. Decreased Ambulation Ability/Technique  5. Decreased Balance  6. Decreased Activity Tolerance  7. Increased Fatigue   INTERVENTIONS PLANNED: (Benefits and precautions of physical therapy have been discussed with the patient.)  1. Balance Exercise  2. Bed Mobility  3. Family Education  4. Gait Training  5. Neuromuscular Re-education/Strengthening  6. Therapeutic Activites  7. Therapeutic Exercise/Strengthening  8. Transfer Training     TREATMENT PLAN: Frequency/Duration: 3 times a week for duration of hospital stay  Rehabilitation Potential For Stated Goals: Excellent     REHAB RECOMMENDATIONS (at time of discharge pending progress):    Placement: It is my opinion, based on this patient's performance to date, that Ms. Giovanni Schwab may benefit from intensive therapy at an [address] [place] [address] after discharge due to a probable need for multiple therapy disciplines and potential to make ongoing and sustainable functional improvement that is of practical value. .  Equipment:    None at this time              HISTORY:   History of Present Injury/Illness (Reason for Referral): AVM  Past Medical History/Comorbidities:   Ms. Giovanni Schwab  has no past medical history on file. Ms. Giovanni Schwab  has no past surgical history on file.   Social History/Living Environment:   Home Environment: Private residence  # Steps to Enter: 2  Rails to Enter: No  One/Two Story Residence: One story  Living Alone: No  Support Systems: Family member(s)  Patient Expects to be Discharged to[de-identified] Rehabilitation facility  Current DME Used/Available at Home: None  Tub or Shower Type: Tub/Shower combination  Prior Level of Function/Work/Activity:  Lives with spouse and child. PTA independent and working for [employer]. Number of Personal Factors/Comorbidities that affect the Plan of Care: 0: LOW COMPLEXITY   EXAMINATION:   Most Recent Physical Functioning:   Gross Assessment:  AROM: Generally decreased, functional(L hip flexion)  Strength: Generally decreased, functional(L LE)  Coordination: Generally decreased, functional  Sensation: Intact               Posture:     Balance:  Sitting: Impaired  Sitting - Static: Good (unsupported)  Sitting - Dynamic: Fair (occasional)  Standing: Impaired  Standing - Static: Good  Standing - Dynamic : Fair Bed Mobility:  Supine to Sit: Minimum assistance  Scooting: Minimum assistance  Interventions: Manual cues; Safety awareness training;Verbal cues; Visual cues  Wheelchair Mobility:     Transfers:  Sit to Stand: Contact guard assistance  Stand to Sit: Contact guard assistance  Bed to Chair: Contact guard assistance  Interventions: Safety awareness training;Verbal cues  Gait:     Speed/Linda: Slow  Step Length: Right shortened;Left shortened  Gait Abnormalities: Decreased step clearance;Trunk sway increased  Distance (ft): 100 Feet (ft)(x 2)  Assistive Device: Walker, rolling;Gait belt  Ambulation - Level of Assistance: Contact guard assistance      Body Structures Involved:  1. Nerves  2. Lungs  3. Muscles Body Functions Affected:  1. Mental  2. Sensory/Pain  3. Respiratory  4. Neuromusculoskeletal  5. Movement Related Activities and Participation Affected:  1. General Tasks and Demands  2. Mobility  3. Self Care  4. Domestic Life  5.  Community, Social and [place]   Number of elements that affect the Plan of Care: 4+: HIGH COMPLEXITY   CLINICAL PRESENTATION:   Presentation: Evolving clinical presentation with changing clinical characteristics: MODERATE COMPLEXITY   CLINICAL DECISION MAKIN[address] [place] [address] AM-PAC 6 Clicks   Basic Mobility Inpatient Short Form  How much difficulty does the patient currently have. .. Unable A Lot A Little None   1. Turning over in bed (including adjusting bedclothes, sheets and blankets)? [] 1   [x] 2   [] 3   [] 4   2. Sitting down on and standing up from a chair with arms ( e.g., wheelchair, bedside commode, etc.)   [] 1   [] 2   [x] 3   [] 4   3. Moving from lying on back to sitting on the side of the bed? [] 1   [x] 2   [] 3   [] 4   How much help from another person does the patient currently need. .. Total A Lot A Little None   4. Moving to and from a bed to a chair (including a wheelchair)? [] 1   [x] 2   [] 3   [] 4   5. Need to walk in hospital room? [] 1   [] 2   [x] 3   [] 4   6. Climbing 3-5 steps with a railing? [] 1   [x] 2   [] 3   [] 4   © , Trustees of [address] [place] [id number], under license to [employer]. All rights reserved      Score:  Initial: 14 Most Recent: X (Date: -- )    Interpretation of Tool:  Represents activities that are increasingly more difficult (i.e. Bed mobility, Transfers, Gait). Medical Necessity:     · Patient demonstrates   · excellent  ·  rehab potential due to higher previous functional level. Reason for Services/Other Comments:  · Patient continues to require skilled intervention due to   · Decreased balance and functional mobility   · .    Use of outcome tool(s) and clinical judgement create a POC that gives a: Questionable prediction of patient's progress: MODERATE COMPLEXITY            TREATMENT:   (In addition to Assessment/Re-Assessment sessions the following treatments were rendered)   Pre-treatment Symptoms/Complaints:  Unable to verbalize, intubated, nodding head yes/no for answers  Pain: Initial:   Pain Intensity 1: 0  Post Session:  0     Gait Training ( ):  Gait training to improve and/or restore physical functioning as related to mobility, strength, balance, coordination and posture. Ambulated 100 Feet (ft)(x 2) with Contact guard assistance using a Walker, rolling;Gait belt and minimal cuing   related to their stride length and activity pacing to promote proper body mechanics and safety. Therapeutic Exercise: (  ):  Exercises per grid below to improve mobility, strength, balance and coordination. Required minimal visual and verbal cues to promote proper body alignment, promote proper body posture and promote proper body mechanics. Progressed repetitions as indicated. Date:  2/10/20 Date:   Date:     Activity/Exercise Parameters Parameters Parameters   Seated LAQ X 10 B A     Seated AP X 10 B A     Seated marching  X 10 B A     Seated hip abd/add X 10 B A                         Therapeutic Activity: (    38 minutes): Therapeutic activities including Bed transfers, Chair transfers, Ambulation on level ground and seated chair exercises to improve mobility, strength, balance, coordination and activity tolerance. Required minimal   to promote static and dynamic balance in standing and safe use of RW while ambulating. .      Date:  2/12/20 Date:   Date:     Activity/Exercise Parameters Parameters Parameters   APs 1 x 10 B     Heel slides 1 x 5 B                                         Braces/Orthotics/Lines/Etc:   · IV  · floyd catheter  · nasogastric tube  · arterial line  · O2 Device: Endotracheal tube, Ventilator  Treatment/Session Assessment:    · Response to Treatment: See above  · Interdisciplinary Collaboration:   o Physical Therapist  o Registered Nurse  o Respiratory Therapist  · After treatment position/precautions:   o Up in chair  o Bed alarm/tab alert on  o Bed/Chair-wheels locked  o Call light within reach  o RN notified  o Family at bedside  o Restraints   · Compliance with Program/Exercises: Will assess as treatment progresses  · Recommendations/Intent for next treatment session:   \"Next visit will focus on advancements to more challenging activities and reduction in assistance provided\".   Total Treatment Duration:  PT Patient Time In/Time Out  Time In: 1353  Time Out: [address] Lenna Canavan, PT, DPT

## 2020-02-12 NOTE — PROGRESS NOTES
Bedside and Verbal shift change report given to [facility] (oncoming nurse) by Gil Torres RN (offgoing nurse). Report included the following information SBAR, Kardex, ED Summary, Intake/Output, MAR, Recent Results, Cardiac Rhythm NSR, Alarm Parameters  and Dual Neuro Assessment. Dual NIH assessment completed at bedside with Gil Torres RN. R groin site is CDI, no bleeding/hematoma present.

## 2020-02-12 NOTE — PROGRESS NOTES
Patient walked and up to chair with PT/RT/RN. Patient tolerating well. Will continue to monitor closely.

## 2020-02-12 NOTE — PROGRESS NOTES
Ventilator check complete; patient has a #7. 0 ET tube secured at the 22 at the teeth. Patient is not sedated. Patient is able to follow commands. Breath sounds are coarse. Trachea is midline, Negative for subcutaneous air, and chest excursion is symmetric. Patient is also Negative for cyanosis and is Negative for pitting edema. All alarms are set and audible. Resuscitation bag is at the head of the bed.       Ventilator Settings  Mode FIO2 Rate Tidal Volume Pressure PEEP I:E Ratio   PRVC  67 % 16 450 ml  5 cm H2O  8 cm H20  1:3.13      Peak airway pressure: 16.3 cm H2O   Minute ventilation: 9 l/min     ABG: Results for Eugene Dave (MRN [id number]) as of 2/12/2020 07:39   2/12/2020 03:58   pH (POC) 7.431   pCO2 (POC) 43.5   pO2 (POC) 209 (H)   HCO3 (POC) 29.0 (H)   sO2 (POC) 100 (H)   Base excess (POC) 4   FIO2 (POC) 50   Specimen type (POC) ARTERIAL   Set Rate 16   Site LEFT RADIAL   Device: VENT   Mode Pressure regulated volume control   Tidal volume 450   PEEP/CPAP (POC) 8   Allens test (POC) YES   Respiratory comment: PhysicianNotified       Trina Valdes

## 2020-02-12 NOTE — PROGRESS NOTES
Ventilator check complete; patient has a #7. 0 ET tube secured at the 22 at the teeth. Breath sounds are coarse. Trachea is midline, Negative for subcutaneous air, and chest excursion is symmetric. Patient is also Negative for cyanosis and is Negative for pitting edema. All alarms are set and audible. Resuscitation bag is at the head of the bed. Ventilator Settings  Mode FIO2 Rate Tidal Volume Pressure PEEP I:E Ratio   PRVC  52 %   16 450 ml  5 cm H2O  8 cm H20  1:3.13      Peak airway pressure: 18.5 cm H2O   Minute ventilation: 7.4 l/min     ABG: No results for input(s): PH, PCO2, PO2, HCO3 in the last 72 hours.       Papito Wallace, RT

## 2020-02-12 NOTE — PROGRESS NOTES
A follow up visit was made to the patient. Emotional support, spiritual presence and   prayer were provided for the patient and her father.       CAMILLE Villa

## 2020-02-12 NOTE — PROGRESS NOTES
Progress Note    Patient: Christiane Garcia MRN: [id number]  SSN: [id number]    YOB: 1975  Age: 53 y.o. Sex: female      Admit Date: 2/2/2020    LOS: 10 days     Subjective:   No acute changes.      Current Facility-Administered Medications   Medication Dose Route Frequency    [START ON 2/13/2020] Vancomycin Trough Reminder   Other ONCE    famotidine (PEPCID) tablet 20 mg  20 mg Per NG tube Q12H    ALPRAZolam (XANAX) tablet 0.25 mg  0.25 mg Oral BID    vancomycin (VANCOCIN) 1500 mg in  ml infusion  1,500 mg IntraVENous Q8H    cefepime (MAXIPIME) 2 g in 0.9% sodium chloride (MBP/ADV) 100 mL  2 g IntraVENous Q8H    metoprolol tartrate (LOPRESSOR) tablet 50 mg  50 mg Per NG tube BID    NUTRITIONAL SUPPORT ELECTROLYTE PRN ORDERS   Does Not Apply PRN    senna (SENOKOT) tablet 8.6 mg  1 Tab Per NG tube DAILY    HYDROcodone-acetaminophen (NORCO) 7.5-325 mg per tablet 1 Tab  1 Tab Oral Q6H PRN    enoxaparin (LOVENOX) injection 40 mg  40 mg SubCUTAneous Q24H    ibuprofen (ADVIL;MOTRIN) 100 mg/5 mL oral suspension 600 mg  600 mg Per NG tube Q6H PRN    sodium chloride (NS) flush 5-40 mL  5-40 mL IntraVENous PRN    fentaNYL citrate (PF) injection 50 mcg  50 mcg IntraVENous Q2H PRN    acetaminophen (TYLENOL) tablet 650 mg  650 mg Per NG tube Q4H PRN    ondansetron (ZOFRAN) injection 4 mg  4 mg IntraVENous Q4H PRN    atorvastatin (LIPITOR) tablet 40 mg  40 mg Per NG tube QHS    sodium chloride (NS) flush 30 mL  30 mL InterCATHeter Q8H    heparin (porcine) pf 900 Units  900 Units InterCATHeter Q8H    sodium chloride (NS) flush 30 mL  30 mL InterCATHeter PRN    heparin (porcine) pf 900 Units  900 Units InterCATHeter PRN    lip protectant (BLISTEX) ointment 1 Each  1 Each Topical PRN       Objective:     Vitals:    02/12/20 1210 02/12/20 1230 02/12/20 1250 02/12/20 1300   BP:       Pulse: 68 67 70 66   Resp: 17 16 16 16   Temp: 100.2 °F (37.9 °C) 100.2 °F (37.9 °C) 100.4 °F (38 °C) 100.2 °F (37.9 °C)   SpO2: 100% 100% 100% 100%   Weight:       Height:             Intake and Output:  Current Shift: 02/12 0701 - 02/12 1900  In: 220   Out: 450 [Urine:450]  Last 24 hr: 02/11 0701 - 02/12 0700  In: 4510 [I.V.:600]  Out: 3101 [Urine:3610]     IO: -1976cc/24hr  TOTAL OVERALL: +3.2L     Physical Exam:   General:  Alert, cooperative. Intubated. Eyes:  PERRL, EOMs intact. Neck: Supple, symmetrical, trachea midline, no adenopathy, thyroid: no enlargment/tenderness/nodules, no carotid bruit and no JVD. Lungs:   Lungs CTA all fields. No wheezes noted. No distress. Heart:  Regular rate and rhythm, S1, S2 normal, no murmur, click, rub or gallop. Abdomen:   Soft, non-tender. Bowel sounds normal. No masses,  No organomegaly. Extremities: Extremities normal, atraumatic, no cyanosis or edema. Pulses: 2+ and symmetric all extremities. Skin: Skin color, texture, turgor normal. No rashes or lesions   Neurologic: Alert and following commands, walker, LUE with mild drift, but able to touch finger to nose bilat. Lifts LLE off bed. Remains intubated.         Lab/Data Review:    Recent Results (from the past 12 hour(s))   METABOLIC PANEL, BASIC    Collection Time: 02/12/20  3:23 AM   Result Value Ref Range    Sodium 141 136 - 145 mmol/L    Potassium 3.6 3.5 - 5.1 mmol/L    Chloride 106 98 - 107 mmol/L    CO2 31 21 - 32 mmol/L    Anion gap 4 (L) 7 - 16 mmol/L    Glucose 128 (H) 65 - 100 mg/dL    BUN 20 6 - 23 MG/DL    Creatinine 0.52 (L) 0.6 - 1.0 MG/DL    GFR est AA >60 >60 ml/min/1.73m2    GFR est non-AA >60 >60 ml/min/1.73m2    Calcium 8.3 8.3 - 10.4 MG/DL   CBC W/O DIFF    Collection Time: 02/12/20  3:23 AM   Result Value Ref Range    WBC 10.2 4.3 - 11.1 K/uL    RBC 3.54 (L) 4.05 - 5.2 M/uL    HGB 9.9 (L) 11.7 - 15.4 g/dL    HCT 30.7 (L) 35.8 - 46.3 %    MCV 86.7 79.6 - 97.8 FL    MCH 28.0 26.1 - 32.9 PG    MCHC 32.2 31.4 - 35.0 g/dL    RDW 12.4 11.9 - 14.6 %    PLATELET 111 892 - 542 K/uL    MPV 10.5 9.4 - 12.3 FL    ABSOLUTE NRBC 0.00 0.0 - 0.2 K/uL   POC G3    Collection Time: 20  3:58 AM   Result Value Ref Range    Device: VENT      FIO2 (POC) 50 %    pH (POC) 7.431 7.35 - 7.45      pCO2 (POC) 43.5 35 - 45 MMHG    pO2 (POC) 209 (H) 75 - 100 MMHG    HCO3 (POC) 29.0 (H) 22 - 26 MMOL/L    sO2 (POC) 100 (H) 95 - 98 %    Base excess (POC) 4 mmol/L    Mode Pressure regulated volume control      Tidal volume 450 ml    Set Rate 16 bpm    PEEP/CPAP (POC) 8 cmH2O    Allens test (POC) YES      Inspiratory Time 0.9 sec    Site LEFT RADIAL      Specimen type (POC) ARTERIAL      Performed by [email or web address]     CO2, POC 30 MMOL/L    Respiratory comment: PhysicianNotified     Exhaled minute volume 7.50 L/min    COLLECT TIME 350         Assessment/ Plan:     Principal Problem:    AVM (arteriovenous malformation) spine (2020)    Active Problems:    Cavernoma (2/3/2020)      Acute respiratory failure with hypoxia (HCC) (2/3/2020)      Edema of spinal cord (HCC) (2/3/2020)      Acute left-sided weakness (2/3/2020)        NEURO:pt with cervical hemorrhage and edema secondary to vascular abnormality, cavernoma vs AVM. Pt remains intubated, but is alert and following commands this am. Cerebral angiogram yesterday with suspicious abnormal vessel around C3 area. Will repeat Cerebral angiogram and MRI brain next week. Pt is alert and following commands. R groin is CDI with no hematoma. Pulses are intact. Repeat Cerebral angiogram shows pt AV fistula at level of C3. R groin is CDI. RESP:PRVC @ 50%: AB.4/209. lungs cta. OETT advaned 2cm. Will consult Gen surgery for Trach in am.   CV:-160 goal. 2D Echo: EF 55-60%, Trop neg. SCD, lovenox. Metoprolol 50mg po bid. HEME:HH 9.9/30,   NEPH:bun/cr: 20/0.5  GI: Pepcid, senna. Tolerating TF well.  +3.2L  ID:TM: 99.6: pan cx sent, neg thus far, but started on Cef/Vanc D8/10 due to continued fever. LINES:RUE: PICC. Right radial virgil.    .      Signed By: Florentino Greenwood Brenden Key NP     February 12, 2020

## 2020-02-13 ENCOUNTER — ANESTHESIA EVENT (OUTPATIENT)
Dept: SURGERY | Age: 45
DRG: 004 | End: 2020-02-13
Payer: COMMERCIAL

## 2020-02-13 ENCOUNTER — APPOINTMENT (OUTPATIENT)
Dept: GENERAL RADIOLOGY | Age: 45
DRG: 004 | End: 2020-02-13
Attending: NURSE PRACTITIONER
Payer: COMMERCIAL

## 2020-02-13 LAB
ANION GAP SERPL CALC-SCNC: 2 MMOL/L (ref 7–16)
ARTERIAL PATENCY WRIST A: ABNORMAL
BASE EXCESS BLD CALC-SCNC: 4 MMOL/L
BDY SITE: ABNORMAL
BUN SERPL-MCNC: 15 MG/DL (ref 6–23)
CALCIUM SERPL-MCNC: 8.3 MG/DL (ref 8.3–10.4)
CHLORIDE SERPL-SCNC: 107 MMOL/L (ref 98–107)
CO2 BLD-SCNC: 31 MMOL/L
CO2 SERPL-SCNC: 32 MMOL/L (ref 21–32)
COLLECT TIME,HTIME: 342
CREAT SERPL-MCNC: 0.52 MG/DL (ref 0.6–1)
ERYTHROCYTE [DISTWIDTH] IN BLOOD BY AUTOMATED COUNT: 12.5 % (ref 11.9–14.6)
EXHALED MINUTE VOLUME, VE: 7.2 L/MIN
GAS FLOW.O2 O2 DELIVERY SYS: ABNORMAL L/MIN
GAS FLOW.O2 SETTING OXYMISER: 16 BPM
GLUCOSE SERPL-MCNC: 117 MG/DL (ref 65–100)
HCO3 BLD-SCNC: 29.4 MMOL/L (ref 22–26)
HCT VFR BLD AUTO: 28.5 % (ref 35.8–46.3)
HGB BLD-MCNC: 9.1 G/DL (ref 11.7–15.4)
INR PPP: 1
INSPIRATION.DURATION SETTING TIME VENT: 0.9 SEC
MCH RBC QN AUTO: 28.2 PG (ref 26.1–32.9)
MCHC RBC AUTO-ENTMCNC: 31.9 G/DL (ref 31.4–35)
MCV RBC AUTO: 88.2 FL (ref 79.6–97.8)
NRBC # BLD: 0 K/UL (ref 0–0.2)
O2/TOTAL GAS SETTING VFR VENT: 50 %
PCO2 BLD: 46.9 MMHG (ref 35–45)
PEEP RESPIRATORY: 8 CMH2O
PH BLD: 7.41 [PH] (ref 7.35–7.45)
PLATELET # BLD AUTO: 181 K/UL (ref 150–450)
PMV BLD AUTO: 10.5 FL (ref 9.4–12.3)
PO2 BLD: 219 MMHG (ref 75–100)
POTASSIUM SERPL-SCNC: 3.7 MMOL/L (ref 3.5–5.1)
PROTHROMBIN TIME: 13.7 SEC (ref 12–14.7)
RBC # BLD AUTO: 3.23 M/UL (ref 4.05–5.2)
SAO2 % BLD: 100 % (ref 95–98)
SERVICE CMNT-IMP: ABNORMAL
SERVICE CMNT-IMP: ABNORMAL
SODIUM SERPL-SCNC: 141 MMOL/L (ref 136–145)
SPECIMEN TYPE: ABNORMAL
VANCOMYCIN TROUGH SERPL-MCNC: 16 UG/ML (ref 5–20)
VENTILATION MODE VENT: ABNORMAL
VT SETTING VENT: 450 ML
WBC # BLD AUTO: 9.8 K/UL (ref 4.3–11.1)

## 2020-02-13 PROCEDURE — 74011250636 HC RX REV CODE- 250/636: Performed by: NEUROLOGICAL SURGERY

## 2020-02-13 PROCEDURE — 74011000258 HC RX REV CODE- 258: Performed by: NEUROLOGICAL SURGERY

## 2020-02-13 PROCEDURE — 74011250637 HC RX REV CODE- 250/637: Performed by: NEUROLOGICAL SURGERY

## 2020-02-13 PROCEDURE — 74011250636 HC RX REV CODE- 250/636: Performed by: NURSE PRACTITIONER

## 2020-02-13 PROCEDURE — 80048 BASIC METABOLIC PNL TOTAL CA: CPT

## 2020-02-13 PROCEDURE — 82803 BLOOD GASES ANY COMBINATION: CPT

## 2020-02-13 PROCEDURE — 65610000001 HC ROOM ICU GENERAL

## 2020-02-13 PROCEDURE — 80202 ASSAY OF VANCOMYCIN: CPT

## 2020-02-13 PROCEDURE — 97116 GAIT TRAINING THERAPY: CPT

## 2020-02-13 PROCEDURE — 99232 SBSQ HOSP IP/OBS MODERATE 35: CPT | Performed by: NEUROLOGICAL SURGERY

## 2020-02-13 PROCEDURE — 85027 COMPLETE CBC AUTOMATED: CPT

## 2020-02-13 PROCEDURE — 85610 PROTHROMBIN TIME: CPT

## 2020-02-13 PROCEDURE — 94003 VENT MGMT INPAT SUBQ DAY: CPT

## 2020-02-13 PROCEDURE — 74011250636 HC RX REV CODE- 250/636: Performed by: SURGERY

## 2020-02-13 PROCEDURE — 97112 NEUROMUSCULAR REEDUCATION: CPT

## 2020-02-13 PROCEDURE — 71045 X-RAY EXAM CHEST 1 VIEW: CPT

## 2020-02-13 PROCEDURE — 74011250637 HC RX REV CODE- 250/637: Performed by: NURSE PRACTITIONER

## 2020-02-13 RX ORDER — ENOXAPARIN SODIUM 100 MG/ML
40 INJECTION SUBCUTANEOUS EVERY 24 HOURS
Status: DISCONTINUED | OUTPATIENT
Start: 2020-02-15 | End: 2020-02-13

## 2020-02-13 RX ORDER — SODIUM CHLORIDE, SODIUM LACTATE, POTASSIUM CHLORIDE, CALCIUM CHLORIDE 600; 310; 30; 20 MG/100ML; MG/100ML; MG/100ML; MG/100ML
100 INJECTION, SOLUTION INTRAVENOUS CONTINUOUS
Status: DISCONTINUED | OUTPATIENT
Start: 2020-02-13 | End: 2020-02-14

## 2020-02-13 RX ORDER — ENOXAPARIN SODIUM 100 MG/ML
40 INJECTION SUBCUTANEOUS EVERY 24 HOURS
Status: DISCONTINUED | OUTPATIENT
Start: 2020-02-15 | End: 2020-02-14

## 2020-02-13 RX ADMIN — CEFEPIME 2 G: 2 INJECTION, POWDER, FOR SOLUTION INTRAVENOUS at 23:33

## 2020-02-13 RX ADMIN — ATORVASTATIN CALCIUM 40 MG: 40 TABLET, FILM COATED ORAL at 21:14

## 2020-02-13 RX ADMIN — HYDROCODONE BITARTRATE AND ACETAMINOPHEN 1 TABLET: 7.5; 325 TABLET ORAL at 21:14

## 2020-02-13 RX ADMIN — ENOXAPARIN SODIUM 40 MG: 40 INJECTION SUBCUTANEOUS at 09:11

## 2020-02-13 RX ADMIN — SODIUM CHLORIDE, SODIUM LACTATE, POTASSIUM CHLORIDE, AND CALCIUM CHLORIDE 100 ML/HR: 600; 310; 30; 20 INJECTION, SOLUTION INTRAVENOUS at 23:33

## 2020-02-13 RX ADMIN — FENTANYL CITRATE 50 MCG: 50 INJECTION, SOLUTION INTRAMUSCULAR; INTRAVENOUS at 02:16

## 2020-02-13 RX ADMIN — Medication 30 ML: at 14:56

## 2020-02-13 RX ADMIN — Medication 30 ML: at 05:02

## 2020-02-13 RX ADMIN — FAMOTIDINE 20 MG: 20 TABLET, FILM COATED ORAL at 21:14

## 2020-02-13 RX ADMIN — VANCOMYCIN HYDROCHLORIDE 1500 MG: 10 INJECTION, POWDER, LYOPHILIZED, FOR SOLUTION INTRAVENOUS at 17:58

## 2020-02-13 RX ADMIN — Medication 900 UNITS: at 14:57

## 2020-02-13 RX ADMIN — VANCOMYCIN HYDROCHLORIDE 1500 MG: 10 INJECTION, POWDER, LYOPHILIZED, FOR SOLUTION INTRAVENOUS at 10:40

## 2020-02-13 RX ADMIN — ALPRAZOLAM 0.25 MG: 0.25 TABLET ORAL at 09:11

## 2020-02-13 RX ADMIN — CEFEPIME 2 G: 2 INJECTION, POWDER, FOR SOLUTION INTRAVENOUS at 08:02

## 2020-02-13 RX ADMIN — METOPROLOL TARTRATE 50 MG: 50 TABLET, FILM COATED ORAL at 09:11

## 2020-02-13 RX ADMIN — FAMOTIDINE 20 MG: 20 TABLET, FILM COATED ORAL at 09:11

## 2020-02-13 RX ADMIN — VANCOMYCIN HYDROCHLORIDE 1500 MG: 10 INJECTION, POWDER, LYOPHILIZED, FOR SOLUTION INTRAVENOUS at 02:16

## 2020-02-13 RX ADMIN — METOPROLOL TARTRATE 50 MG: 50 TABLET, FILM COATED ORAL at 17:58

## 2020-02-13 RX ADMIN — ALPRAZOLAM 0.25 MG: 0.25 TABLET ORAL at 17:58

## 2020-02-13 RX ADMIN — ONDANSETRON 4 MG: 2 INJECTION INTRAMUSCULAR; INTRAVENOUS at 09:49

## 2020-02-13 RX ADMIN — SENNOSIDES 8.6 MG: 8.6 TABLET, FILM COATED ORAL at 09:11

## 2020-02-13 RX ADMIN — Medication 900 UNITS: at 21:14

## 2020-02-13 RX ADMIN — HYDROCODONE BITARTRATE AND ACETAMINOPHEN 1 TABLET: 7.5; 325 TABLET ORAL at 05:02

## 2020-02-13 RX ADMIN — FENTANYL CITRATE 50 MCG: 50 INJECTION, SOLUTION INTRAMUSCULAR; INTRAVENOUS at 14:58

## 2020-02-13 RX ADMIN — CEFEPIME 2 G: 2 INJECTION, POWDER, FOR SOLUTION INTRAVENOUS at 16:56

## 2020-02-13 RX ADMIN — Medication 900 UNITS: at 05:02

## 2020-02-13 RX ADMIN — Medication 30 ML: at 21:14

## 2020-02-13 RX ADMIN — FENTANYL CITRATE 50 MCG: 50 INJECTION, SOLUTION INTRAMUSCULAR; INTRAVENOUS at 23:33

## 2020-02-13 NOTE — PROGRESS NOTES
Progress Note    Patient: Terra Lam MRN: [id number]  SSN: [id number]    YOB: 1975  Age: 53 y.o. Sex: female      Admit Date: 2/2/2020    LOS: 11 days     Subjective:   No acute changes.      Current Facility-Administered Medications   Medication Dose Route Frequency    famotidine (PEPCID) tablet 20 mg  20 mg Per NG tube Q12H    ALPRAZolam (XANAX) tablet 0.25 mg  0.25 mg Oral BID    vancomycin (VANCOCIN) 1500 mg in  ml infusion  1,500 mg IntraVENous Q8H    cefepime (MAXIPIME) 2 g in 0.9% sodium chloride (MBP/ADV) 100 mL  2 g IntraVENous Q8H    metoprolol tartrate (LOPRESSOR) tablet 50 mg  50 mg Per NG tube BID    NUTRITIONAL SUPPORT ELECTROLYTE PRN ORDERS   Does Not Apply PRN    senna (SENOKOT) tablet 8.6 mg  1 Tab Per NG tube DAILY    HYDROcodone-acetaminophen (NORCO) 7.5-325 mg per tablet 1 Tab  1 Tab Oral Q6H PRN    enoxaparin (LOVENOX) injection 40 mg  40 mg SubCUTAneous Q24H    ibuprofen (ADVIL;MOTRIN) 100 mg/5 mL oral suspension 600 mg  600 mg Per NG tube Q6H PRN    sodium chloride (NS) flush 5-40 mL  5-40 mL IntraVENous PRN    fentaNYL citrate (PF) injection 50 mcg  50 mcg IntraVENous Q2H PRN    acetaminophen (TYLENOL) tablet 650 mg  650 mg Per NG tube Q4H PRN    ondansetron (ZOFRAN) injection 4 mg  4 mg IntraVENous Q4H PRN    atorvastatin (LIPITOR) tablet 40 mg  40 mg Per NG tube QHS    sodium chloride (NS) flush 30 mL  30 mL InterCATHeter Q8H    heparin (porcine) pf 900 Units  900 Units InterCATHeter Q8H    sodium chloride (NS) flush 30 mL  30 mL InterCATHeter PRN    heparin (porcine) pf 900 Units  900 Units InterCATHeter PRN    lip protectant (BLISTEX) ointment 1 Each  1 Each Topical PRN       Objective:     Vitals:    02/13/20 1100 02/13/20 1129 02/13/20 1130 02/13/20 1200   BP:       Pulse: 67 85 82 73   Resp:  18 10 8   Temp:       SpO2: 100% 100% 100% 100%   Weight:       Height:             Intake and Output:  Current Shift: No intake/output data recorded. Last 24 hr: 02/12 0701 - 02/13 0700  In: 3292 [I.V.:1800]  Out: 3991 [Urine:2225]       Physical Exam:   General:  Alert, cooperative. Intubated. Eyes:  PERRL, EOMs intact. Neck: Supple, symmetrical, trachea midline, no adenopathy, thyroid: no enlargment/tenderness/nodules, no carotid bruit and no JVD. Lungs:   Lungs CTA all fields. No wheezes noted. No distress. Heart:  Regular rate and rhythm, S1, S2 normal, no murmur, click, rub or gallop. Abdomen:   Soft, non-tender. Bowel sounds normal. No masses,  No organomegaly. Extremities: Extremities normal, atraumatic, no cyanosis or edema. Pulses: 2+ and symmetric all extremities. Skin: Skin color, texture, turgor normal. No rashes or lesions   Neurologic: Alert and following commands, maew. Remains intubated. Neuro intact.        Lab/Data Review:    Recent Results (from the past 12 hour(s))   VANCOMYCIN, TROUGH    Collection Time: 02/13/20  1:09 AM   Result Value Ref Range    Vancomycin,trough 16.0 5 - 20 ug/mL   POC G3    Collection Time: 02/13/20  3:48 AM   Result Value Ref Range    Device: VENT      FIO2 (POC) 50 %    pH (POC) 7.406 7.35 - 7.45      pCO2 (POC) 46.9 (H) 35 - 45 MMHG    pO2 (POC) 219 (H) 75 - 100 MMHG    HCO3 (POC) 29.4 (H) 22 - 26 MMOL/L    sO2 (POC) 100 (H) 95 - 98 %    Base excess (POC) 4 mmol/L    Mode Pressure regulated volume control      Tidal volume 450 ml    Set Rate 16 bpm    PEEP/CPAP (POC) 8 cmH2O    Allens test (POC) NOT APPLICABLE      Inspiratory Time 0.9 sec    Site DRAWN FROM ARTERIAL LINE      Specimen type (POC) ARTERIAL      Performed by Nicho     CO2, POC 31 MMOL/L    Respiratory comment: NurseNotified     Exhaled minute volume 7.20 L/min    COLLECT TIME 441     METABOLIC PANEL, BASIC    Collection Time: 02/13/20  4:26 AM   Result Value Ref Range    Sodium 141 136 - 145 mmol/L    Potassium 3.7 3.5 - 5.1 mmol/L    Chloride 107 98 - 107 mmol/L    CO2 32 21 - 32 mmol/L    Anion gap 2 (L) 7 - 16 mmol/L    Glucose 117 (H) 65 - 100 mg/dL    BUN 15 6 - 23 MG/DL    Creatinine 0.52 (L) 0.6 - 1.0 MG/DL    GFR est AA >60 >60 ml/min/1.73m2    GFR est non-AA >60 >60 ml/min/1.73m2    Calcium 8.3 8.3 - 10.4 MG/DL   CBC W/O DIFF    Collection Time: 20  4:26 AM   Result Value Ref Range    WBC 9.8 4.3 - 11.1 K/uL    RBC 3.23 (L) 4.05 - 5.2 M/uL    HGB 9.1 (L) 11.7 - 15.4 g/dL    HCT 28.5 (L) 35.8 - 46.3 %    MCV 88.2 79.6 - 97.8 FL    MCH 28.2 26.1 - 32.9 PG    MCHC 31.9 31.4 - 35.0 g/dL    RDW 12.5 11.9 - 14.6 %    PLATELET 085 869 - 678 K/uL    MPV 10.5 9.4 - 12.3 FL    ABSOLUTE NRBC 0.00 0.0 - 0.2 K/uL       Assessment/ Plan:     Principal Problem:    AVM (arteriovenous malformation) spine (2020)    Active Problems:    Cavernoma (2/3/2020)      Acute respiratory failure with hypoxia (HCC) (2/3/2020)      Edema of spinal cord (HCC) (2/3/2020)      Acute left-sided weakness (2/3/2020)        NEURO:pt with cervical hemorrhage and edema secondary to vascular abnormality, cavernoma vs AVM. Pt remains intubated, but is alert and following commands this am. Cerebral angiogram yesterday with suspicious abnormal vessel around C3 area. Will repeat Cerebral angiogram and MRI brain next week. Pt is alert and following commands. R groin is CDI with no hematoma. Pulses are intact. Repeat Cerebral angiogram shows pt AV fistula at level of C3. R groin is CDI. gen surgery consulted today for trach placement. RESP:PRVC @ 50%: AB.446/219.  lungs cta. OETT advaned 2cm. TRACH PENDING. CV:-160 goal. 2D Echo: EF 55-60%, Trop neg. SCD, lovenox. Metoprolol 50mg po bid. HEME:HH 9.28,   NEPH:bun/cr: 15/0.5  GI: Pepcid, senna. Tolerating TF well. ID:TM: [id number]: pan cx sent, neg thus far, but started on Cef/Vanc D9/10 due to continued fever. LINES:RUE: PICC. Right radial virgil.    .      Signed By: Arti Rivera NP     2020

## 2020-02-13 NOTE — PROGRESS NOTES
Problem: Mobility Impaired (Adult and Pediatric)  Goal: *Acute Goals and Plan of Care (Insert Text)  Description  LTG:  (1.)Ms. Zarina Marquez will move from supine to sit and sit to supine , scoot up and down and roll side to side in bed with MODIFIED INDEPENDENCE within 7 treatment day(s). (2.)Ms. Zarina Marquez will transfer from bed to chair and chair to bed with STAND BY ASSIST using the least restrictive device within 7 treatment day(s). (3.)Ms. Zarina Marquez will ambulate with STAND BY ASSIST for 85 feet with the least restrictive device within 7 treatment day(s). (4.)Ms. Zarina Marquez will perform standing static and dynamic balance activities x 15 minutes with STAND BY ASSIST to improve safety within 7 treatment day(s). ________________________________________________________________________________________________      Outcome: Progressing Towards Goal     PHYSICAL THERAPY: Daily Note and PM 2/13/2020  INPATIENT: PT Visit Days : 6  Payor: [address] [place] [address] / Plan: [address] / Product Type: PPO /       NAME/AGE/GENDER: Ryan Blanco is a 53 y.o. 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 Zarina Marquez is a 53 y.o. female in the hospital for an AVM with edema noted around the third cervical vertebrae. Per EV note, pt's SBP should be kept within 100-160 range. 2/13- Pt supine in bed on ventilator upon arrival and agreeable to PT. Co-treatment performed with OT given complexity of pt and decreased activity tolerance. Pt performed bed mobility today with CGA-Miguel and additional time. She appeared somewhat diaphoretic sitting EOB and BP recorded at 122/80.   Pt performed STS transfers and ambulation in nina with CGA/RW. Pt also required chair follow. She appeared more fatigued today and requested sitting rest break. Pt wheeled back to room and RN notified of pt's complaints of L calf pain. Upon examination no warmth felt but pt did have tenderness to palpitation and with passive DF ROM. Pt transferred to chair and performed several seated exercises. Her BP was checked at 120/70 following walk. No significant progress today. This section established at most recent assessment   PROBLEM LIST (Impairments causing functional limitations):  1. Decreased Strength  2. Decreased ADL/Functional Activities  3. Decreased Transfer Abilities  4. Decreased Ambulation Ability/Technique  5. Decreased Balance  6. Decreased Activity Tolerance  7. Increased Fatigue   INTERVENTIONS PLANNED: (Benefits and precautions of physical therapy have been discussed with the patient.)  1. Balance Exercise  2. Bed Mobility  3. Family Education  4. Gait Training  5. Neuromuscular Re-education/Strengthening  6. Therapeutic Activites  7. Therapeutic Exercise/Strengthening  8. Transfer Training     TREATMENT PLAN: Frequency/Duration: 3 times a week for duration of hospital stay  Rehabilitation Potential For Stated Goals: Excellent     REHAB RECOMMENDATIONS (at time of discharge pending progress):    Placement: It is my opinion, based on this patient's performance to date, that Ms. Vicky Hastings may benefit from intensive therapy at an [address] [place] [address] after discharge due to a probable need for multiple therapy disciplines and potential to make ongoing and sustainable functional improvement that is of practical value. .  Equipment:    None at this time              HISTORY:   History of Present Injury/Illness (Reason for Referral): AVM  Past Medical History/Comorbidities:   Ms. Vicky Hastings  has no past medical history on file. Ms. Vicky Hastings  has no past surgical history on file.   Social History/Living Environment:   Home Environment: Private residence  # Steps to Enter: 2  Rails to Enter: No  One/Two Story Residence: One story  Living Alone: No  Support Systems: Family member(s)  Patient Expects to be Discharged to[de-identified] Rehabilitation facility  Current DME Used/Available at Home: None  Tub or Shower Type: Tub/Shower combination  Prior Level of Function/Work/Activity:  Lives with spouse and child. PTA independent and working for [employer]. Number of Personal Factors/Comorbidities that affect the Plan of Care: 0: LOW COMPLEXITY   EXAMINATION:   Most Recent Physical Functioning:   Gross Assessment:  AROM: Generally decreased, functional(L hip flexion)  Strength: Generally decreased, functional(L LE)  Coordination: Generally decreased, functional  Sensation: Intact               Posture:     Balance:  Sitting: Impaired  Sitting - Static: Good (unsupported)  Sitting - Dynamic: Fair (occasional)  Standing: Impaired  Standing - Static: Good  Standing - Dynamic : Fair(+) Bed Mobility:  Rolling: Contact guard assistance  Supine to Sit: Minimum assistance  Scooting: Minimum assistance  Interventions: Manual cues; Safety awareness training;Verbal cues; Visual cues  Wheelchair Mobility:     Transfers:  Sit to Stand: Contact guard assistance  Stand to Sit: Contact guard assistance  Bed to Chair: Contact guard assistance  Interventions: Verbal cues; Visual cues; Safety awareness training  Gait:     Speed/Linda: Slow  Step Length: Left shortened;Right shortened  Gait Abnormalities: Decreased step clearance;Trunk sway increased  Distance (ft): 80 Feet (ft)  Assistive Device: Walker, rolling;Gait belt; Other (comment)(chair follow)  Ambulation - Level of Assistance: Contact guard assistance;Minimal assistance      Body Structures Involved:  1. Nerves  2. Lungs  3. Muscles Body Functions Affected:  1. Mental  2. Sensory/Pain  3. Respiratory  4. Neuromusculoskeletal  5. Movement Related Activities and Participation Affected:  1.  General Tasks and Demands  2. Mobility  3. Self Care  4. Domestic Life  5. Community, Social and Guadalupe Sigel   Number of elements that affect the Plan of Care: 4+: HIGH COMPLEXITY   CLINICAL PRESENTATION:   Presentation: Evolving clinical presentation with changing clinical characteristics: MODERATE COMPLEXITY   CLINICAL DECISION MAKIN [facility] Mobility Inpatient Short Form  How much difficulty does the patient currently have. .. Unable A Lot A Little None   1. Turning over in bed (including adjusting bedclothes, sheets and blankets)? [] 1   [x] 2   [] 3   [] 4   2. Sitting down on and standing up from a chair with arms ( e.g., wheelchair, bedside commode, etc.)   [] 1   [] 2   [x] 3   [] 4   3. Moving from lying on back to sitting on the side of the bed? [] 1   [x] 2   [] 3   [] 4   How much help from another person does the patient currently need. .. Total A Lot A Little None   4. Moving to and from a bed to a chair (including a wheelchair)? [] 1   [x] 2   [] 3   [] 4   5. Need to walk in hospital room? [] 1   [] 2   [x] 3   [] 4   6. Climbing 3-5 steps with a railing? [] 1   [x] 2   [] 3   [] 4   © , Trustees of [address] [place] [address] [address] [id number], under license to [employer]. All rights reserved      Score:  Initial: 14 Most Recent: X (Date: -- )    Interpretation of Tool:  Represents activities that are increasingly more difficult (i.e. Bed mobility, Transfers, Gait). Medical Necessity:     · Patient demonstrates   · excellent  ·  rehab potential due to higher previous functional level. Reason for Services/Other Comments:  · Patient continues to require skilled intervention due to   · Decreased balance and functional mobility   · .    Use of outcome tool(s) and clinical judgement create a POC that gives a: Questionable prediction of patient's progress: MODERATE COMPLEXITY            TREATMENT:   (In addition to Assessment/Re-Assessment sessions the following treatments were rendered)   Pre-treatment Symptoms/Complaints:  Unable to verbalize  Pain: Initial:   Pain Intensity 1: 0  Post Session:  FLACC 1       Today's treatment session addressed Decreased Strength, Decreased ADL/Functional Activities, Decreased Transfer Abilities, Decreased Ambulation Ability/Technique, Decreased Balance, Decreased Activity Tolerance, Increased Fatigue and Increased Shortness of Breath to progress towards achieving goal(s) . During this session, Occupational Therapy addressed neuromuscular re-education to progress towards their discipline specific goal(s). Co-treatment was necessary to improve patient's ability to increase activity demands and ability to return to normal functional activity. Gait Training ( ):  Gait training to improve and/or restore physical functioning as related to mobility, strength, balance, coordination and posture. Ambulated 80 Feet (ft) with Contact guard assistance;Minimal assistance using a Walker, rolling;Gait belt; Other (comment)(chair follow) and minimal cuing   related to their stride length and activity pacing to promote proper body mechanics and safety. Therapeutic Exercise: (  23 minutes):  Exercises/activities including bed mobility, STS transfers, standing/sitting activities, and ambulation on level ground to improve mobility, strength, balance and coordination. Required minimal visual and verbal cues to promote proper body alignment, promote proper body posture and promote proper body mechanics. Progressed repetitions as indicated.      Date:  2/10/20 Date:  2/13/20 Date:     Activity/Exercise Parameters Parameters Parameters   Seated LAQ X 10 B A     Seated AP X 10 B A 1 x 20 B    Seated marching  X 10 B A     Seated hip abd/add X 10 B A     Ambulation  80 ft                  Therapeutic Activity: (    ):  Therapeutic activities including Bed transfers, Chair transfers, Ambulation on level ground and seated chair exercises to improve mobility, strength, balance, coordination and activity tolerance. Required minimal   to promote static and dynamic balance in standing and safe use of RW while ambulating. .      Date:  2/12/20 Date:   Date:     Activity/Exercise Parameters Parameters Parameters   APs 1 x 10 B     Heel slides 1 x 5 B                                         Braces/Orthotics/Lines/Etc:   · IV  · floyd catheter  · nasogastric tube  · arterial line  · O2 Device: Endotracheal tube, Ventilator  Treatment/Session Assessment:    · Response to Treatment: See above  · Interdisciplinary Collaboration:   o Physical Therapist  o Occupational Therapist  o Registered Nurse  o Respiratory Therapist  · After treatment position/precautions:   o Up in chair  o Bed/Chair-wheels locked  o Bed in low position  o Call light within reach  o RN notified  o Family at bedside  o RN declined chair alarm   · Compliance with Program/Exercises: Will assess as treatment progresses  · Recommendations/Intent for next treatment session: \"Next visit will focus on advancements to more challenging activities and reduction in assistance provided\".   Total Treatment Duration:  PT Patient Time In/Time Out  Time In: 1250  Time Out: Fatimah Cartagena [address] Lucas DPT

## 2020-02-13 NOTE — ANESTHESIA PREPROCEDURE EVALUATION
Relevant Problems   No relevant active problems       Anesthetic History          Comments: Intubated     Review of Systems / Medical History  Patient summary reviewed and pertinent labs reviewed    Pulmonary                Comments: Respiratory Failure 2/2 inability to protect airway from C3 lesion   Neuro/Psych       CVA (Cervical AVM with Cavernoma C3. Acute left sided weakness )       Cardiovascular  Within defined limits                Exercise tolerance: >4 METS  Comments: Echo 2/20: Grossly wnl with preserved EF and no valvular abnormalilties   GI/Hepatic/Renal  Within defined limits              Endo/Other  Within defined limits           Other Findings   Comments: Awake and following commands while intubated.  Unable to extubate 2/2 swelling and laryngospasm with subsequent reintubation    PICC in place    Hgb 9 stable    Lytes intact           Physical Exam    Airway          Intubated   Cardiovascular    Rhythm: regular  Rate: normal         Dental         Pulmonary  Breath sounds clear to auscultation               Abdominal         Other Findings            Anesthetic Plan    ASA: 3  Anesthesia type: general          Induction: Inhalational  Anesthetic plan and risks discussed with: Family and Patient

## 2020-02-13 NOTE — PROGRESS NOTES
Head to assessment completed on pt this PM.    Neuro: Pt is alert - able to mouth orientation around ETT and write comprehensible words - oriented x 4. PERRLA 3 - 4 mm and brisk to react. LUE and LLE with weakness and altered sensation. Able to follow commands and GUTIÉRREZ purposefully. Strong cough. Resp: PRVC FiO2 50%. Coarse upper / diminished lower breath sounds. Copious oral secretions. Cardiac: NSR - SBP goal 100 - 160. GI: NGT - patent and infusing. TF at goal, minimal residual. Hypoactive bowel sounds. : Jalloh - draining and patent yellow UOP. Skin: R radial ART. R PICC. Allevyn intact. SCDs present. Tattoos. Skin CDI.

## 2020-02-13 NOTE — PROGRESS NOTES
Problem: Patient Education: Go to Patient Education Activity  Goal: Patient/Family Education  Description  1. Patient will complete upper body bathing and dressing with mod A and adaptive equipment as needed. 2. Patient will complete toileting with mod A.   3. Patient will tolerate 25 minutes of OT treatment with 1-2 rest breaks to increase activity tolerance for ADLs. 4. Patient will complete functional transfers with min A and adaptive equipment as needed. 5. Patient will tolerate 20 minutes functional activity while seated edge of bed unsupported with SBA and adaptive equipment as needed. 6. Patient will complete self-grooming with MOD I and adaptive equipment as needed. 7. Patient will tolerate 15 minutes therapeutic exercise with LUE to increase use during BADLs. 8. Patient complete self-feeding with use of LUE with min A after set-up. Timeframe: 7 visits        Outcome: Progressing Towards Goal      OCCUPATIONAL THERAPY: Daily Note and PM 2/13/2020  INPATIENT: OT Visit Days: 4  Pay[place]: [place] [place] / [place] / Product Type: PPO /      NAME/AGE/GENDER: Luis Hector is a 53 y.o. 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 Donna Khan presents in the ICU for AVM s/p angiogram. Upon arrival, pt supine in bed, currently intubated, however alert. Father at bedside to provide background information. At baseline, pt was living with  and daughter in a 1-story home with 2 steps to enter. Pt was independent with ADLs, IADLs, functional mobility and work tasks; still drives. No hx of falls.      2/7/20: Pt was supine in bed with  at bedsude Pt is alert and appropriate for her age. Pt is still intubated. Multiple team members at bedside to help with management of lines. PT/OT co-treatment to increase activity demands. Pt completed functional bed transfers with CGA. Pt demonstrated good awareness and carry over from previous session to work on pushing up through L UE to provide strengthening/weightbearing. Static sitting balance is intact, however occasional support provided during unsupported dynamic balance. Pt attempted to initiate donning socks however unable this session. Pt able to wipe face free of secretions. Pt stood with CGA and ambulated in hallway for household distances. Fair dynamic standing balance noted with CGA x RW provided to maintain upright posture. Upon ambulation, pt requiring suction; pt subsequently returned to room. Pt transferred to sitting upright in bedside chair and left with needs met, call light within reach, RN notified and family at bedside. Good progress towards goals today. Will continue to address OT goals and plan of care. This section established at most recent assessment   PROBLEM LIST (Impairments causing functional limitations):  1. Decreased Strength  2. Decreased ADL/Functional Activities  3. Decreased Transfer Abilities  4. Decreased Ambulation Ability/Technique  5. Decreased Balance  6. Decreased Activity Tolerance   INTERVENTIONS PLANNED: (Benefits and precautions of occupational therapy have been discussed with the patient.)  1. Activities of daily living training  2. Adaptive equipment training  3. Balance training  4. Clothing management  5. Community reintergration  6. Donning&doffing training  7. Neuromuscular re-eduation  8. Re-evaluation  9. Therapeutic activity  10. Therapeutic exercise     TREATMENT PLAN: Frequency/Duration: Follow patient 3x/week to address above goals. Rehabilitation Potential For Stated Goals: Good     REHAB RECOMMENDATIONS (at time of discharge pending progress):    Placement:   It is my opinion, based on this patient's performance to date, that Ms. Giovanni Schwab may benefit from intensive therapy at an [address] [place] [address] after discharge due to a probable need for multiple therapy disciplines and potential to make ongoing and sustainable functional improvement that is of practical value. .  Equipment:    TBD              OCCUPATIONAL PROFILE AND HISTORY:   History of Present Injury/Illness (Reason for Referral):  See H&P  Past Medical History/Comorbidities:   Ms. Giovanni Schwab  has no past medical history on file. Ms. Giovanni Schwab  has no past surgical history on file. Social History/Living Environment:   Home Environment: Private residence  # Steps to Enter: 2  Rails to Enter: No  One/Two Story Residence: One story  Living Alone: No  Support Systems: Family member(s)  Patient Expects to be Discharged to[de-identified] Rehabilitation facility  Current DME Used/Available at Home: None  Tub or Shower Type: Tub/Shower combination  Prior Level of Function/Work/Activity:  Independent with ADLs, IADLs, work tasks, functional mobility and driving. Personal Factors:          Sex:  female        Age:  53 y.o. Other factors that influence how disability is experienced by the patient:  Multiple co-morbidities    Number of Personal Factors/Comorbidities that affect the Plan of Care: Expanded review of therapy/medical records (1-2):  MODERATE COMPLEXITY   ASSESSMENT OF OCCUPATIONAL PERFORMANCE[de-identified]   Activities of Daily Living:   Basic ADLs (From Assessment) Complex ADLs (From Assessment)   Feeding: Total assistance  Oral Facial Hygiene/Grooming: Maximum assistance  Bathing: Moderate assistance, Maximum assistance  Upper Body Dressing: Maximum assistance  Lower Body Dressing: Maximum assistance  Toileting: Maximum assistance Instrumental ADL  Meal Preparation: Total assistance  Homemaking:  Total assistance   Grooming/Bathing/Dressing Activities of Daily Living     Cognitive Retraining  Safety/Judgement: Awareness of environment; Fall prevention                       Bed/Mat Mobility  Rolling: Contact guard assistance  Supine to Sit: Minimum assistance  Sit to Stand: Contact guard assistance  Stand to Sit: Contact guard assistance  Bed to Chair: Contact guard assistance  Scooting: Minimum assistance     Most Recent Physical Functioning:   Gross Assessment:  AROM: Generally decreased, functional  PROM: Generally decreased, functional  Strength: Generally decreased, functional  Coordination: Generally decreased, functional  Tone: Normal  Sensation: Impaired               Posture:  Posture (WDL): Exceptions to WDL  Posture Assessment: Rounded shoulders  Balance:  Sitting: Impaired  Sitting - Static: Good (unsupported)  Sitting - Dynamic: Fair (occasional)  Standing: Impaired  Standing - Static: Good  Standing - Dynamic : Fair(+) Bed Mobility:  Rolling: Contact guard assistance  Supine to Sit: Minimum assistance  Scooting: Minimum assistance  Interventions: Manual cues; Safety awareness training;Verbal cues; Visual cues  Wheelchair Mobility:     Transfers:  Sit to Stand: Contact guard assistance  Stand to Sit: Contact guard assistance  Bed to Chair: Contact guard assistance  Interventions: Verbal cues; Visual cues; Safety awareness training            Patient Vitals for the past 6 hrs:   BP SpO2 Pulse   02/13/20 1000  100 % 63   02/13/20 1030  100 % 61   02/13/20 1100  100 % 67   02/13/20 1129  100 % 85   02/13/20 1130  100 % 82   02/13/20 1200  100 % 73   02/13/20 1253  100 % 72   02/13/20 1324 120/70 100 % 86   02/13/20 1400  100 % 79   02/13/20 1402 111/68 100 % 79   02/13/20 1500  100 % 82   02/13/20 1502 126/59 100 % 72   02/13/20 1530  100 % 72   02/13/20 1544  100 % 75       Mental Status  Neurologic State: Alert  Orientation Level: Unable to verbalize  Cognition: Follows commands  Perception: Appears intact  Perseveration: No perseveration noted  Safety/Judgement: Awareness of environment, Fall prevention                          Physical Skills Involved:  1. Balance  2. Strength  3. Activity Tolerance  4. Sensation  5. Fine Motor Control  6. Gross Motor Control Cognitive Skills Affected (resulting in the inability to perform in a timely and safe manner): 1. none Psychosocial Skills Affected:  1. Habits/Routines  2. Environmental Adaptation   Number of elements that affect the Plan of Care: 5+:  HIGH COMPLEXITY   CLINICAL DECISION MAKIN[address] [place] [address] AM-PAC 6 Clicks   Daily Activity Inpatient Short Form  How much help from another person does the patient currently need. .. Total A Lot A Little None   1. Putting on and taking off regular lower body clothing? [x] 1   [] 2   [] 3   [] 4   2. Bathing (including washing, rinsing, drying)? [] 1   [x] 2   [] 3   [] 4   3. Toileting, which includes using toilet, bedpan or urinal?   [x] 1   [] 2   [] 3   [] 4   4. Putting on and taking off regular upper body clothing? [x] 1   [] 2   [] 3   [] 4   5. Taking care of personal grooming such as brushing teeth? [x] 1   [] 2   [] 3   [] 4   6. Eating meals? [x] 1   [] 2   [] 3   [] 4   © , Trustees of [address] [place] [address], under license to [employer]. All rights reserved      Score:  Initial: 7 Most Recent: X (Date: -- )    Interpretation of Tool:  Represents activities that are increasingly more difficult (i.e. Bed mobility, Transfers, Gait). Medical Necessity:     · Patient demonstrates good rehab potential due to higher previous functional level. Reason for Services/Other Comments:  · Patient continues to require skilled intervention due to medical complications and patient unable to attend/participate in therapy as expected.    Use of outcome tool(s) and clinical judgement create a POC that gives a: MODERATE COMPLEXITY         TREATMENT:   (In addition to Assessment/Re-Assessment sessions the following treatments were rendered)     Pre-treatment Symptoms/Complaints:  Unable to verbalize  Pain: Initial:   Pain Intensity 1: 0  Pain Location 1: Head  Pain Intervention(s) 1: Emotional support  Post Session:  same     Today's treatment session addressed Decreased Strength, Decreased ADL/Functional Activities, Decreased Transfer Abilities, Decreased Ambulation Ability/Technique, Decreased Balance, Decreased Activity Tolerance, Decreased Flexibility/Joint Mobility and Decreased [place] with Home Exercise Program to progress towards achieving goal(s) stated above. During this session,  Physical Therapy addressed  Ambulation to progress towards their discipline specific goal(s). Co-treatment was necessary to improve patient's ability to increase activity demands and ability to return to normal functional activity. Neuromuscular Re-education: ( 30 minutes):  Exercise/activities per grid below to improve balance, coordination, posture and proprioception. Required minimal visual, verbal, manual and tactile cues to promote dynamic balance in standing, promote coordination of left, upper extremity(s) and promote motor control of left, upper extremity(s). Static sitting balance is intact, however occasional support provided during unsupported dynamic balance. Fair dynamic standing balance noted with CGA x RW provided to maintain upright posture. Upon ambulation, pt requiring suction; pt subsequently returned to room.     Date:  2/6/20 Date:  2/7/20 Date:     Activity/Exercise Parameters Parameters Parameters   Sit to stand weightbearing throughout L UE 4-5 reps Multiple reps with patient able to initiate on her own     Reaching back with L UE  Multiple reps with minimal assistance to find armrests    Scooting  Weight shifting side to side and using L UE to assist  Weight shifting side to side and using L UE to assist     Weightbearing  Standing through R UE on table x 12 reps      Dynamic reaching with grasp and rotation into supination  15 reps      Table Slides 12 reps with additional time to promote shoulder flexion      Balloon Punches  15 reps with pt facilitated into shoulder flexion with elbow extension      Dynamic reaching w/ B UE with facilitation for forward flexion in L UE  ~12 reps with moderate facilitation at L shoulder         Braces/Orthotics/Lines/Etc:   · IV  · art line; ICU monitors  · O2 Device: Endotracheal tube, Ventilator  Treatment/Session Assessment:    · Response to Treatment:  Tolerated well with no issues noted. · Interdisciplinary Collaboration:   o Physical Therapist  o Occupational Therapist  o Registered Nurse  · After treatment position/precautions:   o Up in chair  o Bed alarm/tab alert on  o Bed/Chair-wheels locked  o Call light within reach  o RN notified  o Family at bedside   · Compliance with Program/Exercises: Compliant all of the time. · Recommendations/Intent for next treatment session: \"Next visit will focus on advancements to more challenging activities and reduction in assistance provided\".   Total Treatment Duration:  OT Patient Time In/Time Out  Time In: 1250  Time Out: Virgilio Ragsdale

## 2020-02-13 NOTE — PROGRESS NOTES
Pharmacokinetic Consult to Pharmacist    Sushant Day is a 53 y.o. female being treated for VAP with vanc. Height: 5' 4\" (162.6 cm)  Weight: 105.8 kg (233 lb 4 oz)  Lab Results   Component Value Date/Time    BUN 20 02/12/2020 03:23 AM    Creatinine 0.52 (L) 02/12/2020 03:23 AM    WBC 10.2 02/12/2020 03:23 AM      Estimated Creatinine Clearance: 119.1 mL/min (A) (by C-G formula based on SCr of 0.52 mg/dL (L)). CULTURES:  BC ngtd    Lab Results   Component Value Date/Time    Vancomycin,trough 16.0 02/13/2020 01:09 AM       Day 9 of vancomycin. Goal trough is 15-20. Will continue with present regimen of Vanc 1.5gm q8hr. Will continue to follow patient.       Thank you,  Mel Motley, PharmD

## 2020-02-13 NOTE — PROGRESS NOTES
Ventilator check complete; patient has a #7. 0 ET tube secured at the 22 at the teeth. Patient is not sedated. Patient is able to follow commands. Breath sounds are coarse. Trachea is midline, Negative for subcutaneous air, and chest excursion is symmetric. Patient is also Negative for cyanosis and is Negative for pitting edema. All alarms are set and audible. Resuscitation bag is at the head of the bed.       Ventilator Settings  Mode FIO2 Rate Tidal Volume Pressure PEEP I:E Ratio   PRVC  50 %    450 ml  5 cm H2O  8 cm H20  1:3.13      Peak airway pressure: 18 cm H2O   Minute ventilation: 9.1 l/min       James Wilson, RT

## 2020-02-13 NOTE — H&P
H&P/Consult Note/Progress Note/Office Note:   Jayshree Martinez  MRN: [id number]  RLV:7/36/2704  Age:44 y.o.    HPI: Jayshree Martinez is a 53 y.o. female who originally presented at an outside hospital in [address] with left neck pain and left arm weakness. CT was normal initially. She was transported to [facility] for MRI of the brain and had acute respiratory failure with bradycardia and required intubation. She was moved to [facility] and admitted on 2/2/20 with a cervical spine hemorrhage at C3 accompanied by edema secondary to vascular abnormality, cavernoma versus AVM. CTA of the head and neck showed a questionable large vessel at the C3 level. MRI of the C-spine showed an area of hemorrhage at C3 which was suspicious for a cavernoma. Her angiogram showed a very small abnormal vessel coming off the anterior spinal artery at C3. She had no definite AVM or aVF. General surgery was consulted for tracheostomy. The patient had no prior tracheostomy. She was able to write notes and indicated her biggest respiratory concern was with her secretions. No past medical history on file. No past surgical history on file.   Current Facility-Administered Medications   Medication Dose Route Frequency    lactated Ringers infusion  100 mL/hr IntraVENous CONTINUOUS    [START ON 2/15/2020] enoxaparin (LOVENOX) injection 40 mg  40 mg SubCUTAneous Q24H    famotidine (PEPCID) tablet 20 mg  20 mg Per NG tube Q12H    ALPRAZolam (XANAX) tablet 0.25 mg  0.25 mg Oral BID    vancomycin (VANCOCIN) 1500 mg in  ml infusion  1,500 mg IntraVENous Q8H    cefepime (MAXIPIME) 2 g in 0.9% sodium chloride (MBP/ADV) 100 mL  2 g IntraVENous Q8H    metoprolol tartrate (LOPRESSOR) tablet 50 mg  50 mg Per NG tube BID    NUTRITIONAL SUPPORT ELECTROLYTE PRN ORDERS   Does Not Apply PRN    senna (SENOKOT) tablet 8.6 mg  1 Tab Per NG tube DAILY    HYDROcodone-acetaminophen (NORCO) 7.5-325 mg per tablet 1 Tab  1 Tab Oral Q6H PRN    ibuprofen (ADVIL;MOTRIN) 100 mg/5 mL oral suspension 600 mg  600 mg Per NG tube Q6H PRN    sodium chloride (NS) flush 5-40 mL  5-40 mL IntraVENous PRN    fentaNYL citrate (PF) injection 50 mcg  50 mcg IntraVENous Q2H PRN    acetaminophen (TYLENOL) tablet 650 mg  650 mg Per NG tube Q4H PRN    ondansetron (ZOFRAN) injection 4 mg  4 mg IntraVENous Q4H PRN    atorvastatin (LIPITOR) tablet 40 mg  40 mg Per NG tube QHS    sodium chloride (NS) flush 30 mL  30 mL InterCATHeter Q8H    heparin (porcine) pf 900 Units  900 Units InterCATHeter Q8H    sodium chloride (NS) flush 30 mL  30 mL InterCATHeter PRN    heparin (porcine) pf 900 Units  900 Units InterCATHeter PRN    lip protectant (BLISTEX) ointment 1 Each  1 Each Topical PRN     Ace inhibitors  Social History     Patient does not qualify to have social determinant information on file (likely too young). Socioeconomic History    Marital status:      Spouse name: Not on file    Number of children: Not on file    Years of education: Not on file    Highest education level: Not on file     Social History     Tobacco Use   Smoking Status Not on file     No family history on file. ROS: The patient has no difficulty with chest pain or shortness of breath. No fever or chills. Comprehensive review of systems was otherwise unremarkable except as noted above.     Physical Exam:   Visit Vitals  /59   Pulse 75   Temp 98.7 °F (37.1 °C)   Resp 16   Ht 5' 4\" (1.626 m)   Wt 233 lb 4 oz (105.8 kg)   SpO2 100%   BMI 40.04 kg/m²     Vitals:    02/13/20 1500 02/13/20 1502 02/13/20 1530 02/13/20 1544   BP:  126/59     Pulse: 82 72 72 75   Resp: 13 16 16 16   Temp:       SpO2: 100% 100% 100% 100%   Weight:       Height:         02/13 0701 - 02/13 1900  In: 140   Out: 9927 [Urine:1650]  02/11 1901 - 02/13 0700  In: 6883 [I.V.:2400]  Out: 6663 [Urine:3415]    Constitutional: Alert, oriented, cooperative patient on vent; appears stated age Eyes:Sclera are clear. EOMs intact  ENMT: no external lesions gross hearing normal; no obvious neck masses, no ear or lip lesions, nares normal; +ET tube and nasal Dobhoff  CV: RRR. Normal perfusion  Resp: On vent No JVD. Breathing is non-labored    GI: soft and non-distended     Musculoskeletal: No embolic signs or cyanosis.    Neuro:  Oriented and interactive  Psychiatric: blunted affect      Recent vitals (if inpt):  Patient Vitals for the past 24 hrs:   BP Temp Pulse Resp SpO2   02/13/20 1544   75 16 100 %   02/13/20 1530   72 16 100 %   02/13/20 1502 126/59  72 16 100 %   02/13/20 1500   82 13 100 %   02/13/20 1402 111/68  79 13 100 %   02/13/20 1400   79 16 100 %   02/13/20 1324 120/70  86 29 100 %   02/13/20 1253   72 18 100 %   02/13/20 1200  98.7 °F (37.1 °C) 73 8 100 %   02/13/20 1130   82 10 100 %   02/13/20 1129   85 18 100 %   02/13/20 1100   67  100 %   02/13/20 1030   61  100 %   02/13/20 1000   63 16 100 %   02/13/20 0945   71 16 100 %   02/13/20 0930   72 8 100 %   02/13/20 0915   67 16 100 %   02/13/20 0900   69 16 100 %   02/13/20 0845   71 17 100 %   02/13/20 0830   73 16 100 %   02/13/20 0815   65 16 100 %   02/13/20 0800   65 16 100 %   02/13/20 0748   67 16 100 %   02/13/20 0745   76 16 100 %   02/13/20 0730   76 16 100 %   02/13/20 0715 119/63 98.8 °F (37.1 °C) 73 18 100 %   02/13/20 0700   72 16 100 %   02/13/20 0645   66 11 100 %   02/13/20 0630   65 16 100 %   02/13/20 0616   64 13 100 %   02/13/20 0615   65 (!) 6 100 %   02/13/20 0600   67 8 100 %   02/13/20 0546   70 10 100 %   02/13/20 0529   67 16 100 %   02/13/20 0518   71 16 100 %   02/13/20 0500   68 16 100 %   02/13/20 0447   76 17 100 %   02/13/20 0440   76  100 %   02/13/20 0430   72 16 100 %   02/13/20 0415   70 17 100 %   02/13/20 0404 126/70 98.8 °F (37.1 °C) 78 25 100 %   02/13/20 0345   69 17 100 %   02/13/20 0339   69 16 100 %   02/13/20 0315   71 16 100 %   02/13/20 0259   68 16 100 %   02/13/20 0243   66 16 100 %   02/13/20 0230   65 16 100 %   02/13/20 0215   68 16 100 %   02/13/20 0200   74  100 %   02/13/20 0145   71  100 %   02/13/20 0130   68  100 %   02/13/20 0115   70 16 100 %   02/13/20 0100   70 17 100 %   02/13/20 0045   69 17 100 %   02/13/20 0030   69 16 100 %   02/13/20 0015   68 17 100 %   02/12/20 2359 103/51 98.8 °F (37.1 °C) 71 16 100 %   02/12/20 2346   75 17 100 %   02/12/20 2330   69  100 %   02/12/20 2315   71 16 100 %   02/12/20 2300   72  100 %   02/12/20 2245   72 16 100 %   02/12/20 2231   83 8 100 %   02/12/20 2215   83 17 99 %   02/12/20 2200   76 17 100 %   02/12/20 2146   74 17 100 %   02/12/20 2129   82 12 100 %   02/12/20 2118   76 15 100 %   02/12/20 2117   74 10 100 %   02/12/20 2112   75 12 100 %   02/12/20 2100   76 15 100 %   02/12/20 2046   79 12 100 %   02/12/20 2036   80 17 100 %   02/12/20 2021   82 10 100 %   02/12/20 2015   86 16 100 %   02/12/20 1957   84 11 100 %   02/12/20 1946   82 16 100 %   02/12/20 1945   79 12 100 %   02/12/20 1927   85 (!) 31 100 %   02/12/20 1915   79 16 100 %   02/12/20 1900 (!) 116/108 99.4 °F (37.4 °C) 81 17 100 %   02/12/20 1859   77 17 100 %   02/12/20 1845   82 9 100 %   02/12/20 1815  100.2 °F (37.9 °C) 82  100 %   02/12/20 1800   79 24 100 %   02/12/20 1750   83 24 100 %   02/12/20 1700  100.4 °F (38 °C) 70 23 100 %       Labs:  Recent Labs     02/13/20  0426   WBC 9.8   HGB 9.1*         K 3.7      CO2 32   BUN 15   CREA 0.52*   *       Lab Results   Component Value Date/Time    WBC 9.8 02/13/2020 04:26 AM    HGB 9.1 (L) 02/13/2020 04:26 AM    PLATELET 751 19/12/3469 04:26 AM    Sodium 141 02/13/2020 04:26 AM    Potassium 3.7 02/13/2020 04:26 AM    Chloride 107 02/13/2020 04:26 AM    CO2 32 02/13/2020 04:26 AM    BUN 15 02/13/2020 04:26 AM    Creatinine 0.52 (L) 02/13/2020 04:26 AM    Glucose 117 (H) 02/13/2020 04:26 AM    Troponin-I, Qt. <0.02 (L) 02/02/2020 03:58 AM       CT Results  (Last 48 hours)    None        chest X-ray      I reviewed recent labs, recent radiologic studies, and pertinent records including other doctor notes if needed. I independently reviewed radiology images for studies I described above or studies I have ordered. Admission date (for inpatients): 2/2/2020   * No surgery date entered *  Procedure(s):  OPEN TRACHEOSTOMY ICU  3103    ASSESSMENT/PLAN:  Problem List  Never Reviewed          Codes Class Noted    Cavernoma ICD-10-CM: D18.00  ICD-9-CM: 228.00  2/3/2020        Acute respiratory failure with hypoxia ([facility] [address] ICD-10-CM: J96.01  ICD-9-CM: 518.81  2/3/2020        Edema of spinal cord (HCC) ICD-10-CM: G95.19  ICD-9-CM: 336.1  2/3/2020        Acute left-sided weakness ICD-10-CM: R53.1  ICD-9-CM: 728.87  2/3/2020        * (Principal) AVM (arteriovenous malformation) spine ICD-10-CM: Q28.8  ICD-9-CM: 747.82  2/2/2020            Principal Problem:    AVM (arteriovenous malformation) spine (2/2/2020)    Active Problems:    Cavernoma (2/3/2020)      Acute respiratory failure with hypoxia (HCC) (2/3/2020)      Edema of spinal cord (HCC) (2/3/2020)      Acute left-sided weakness (2/3/2020)       She has respiratory failure and neurosurgery has requested tracheostomy. Discussed tracheostomy with she and her  at the bedside. We discussed procedural risks including bleeding, infection, pneumonia, blood clots, the need for further procedures, failure to close the tracheostomy site once the tracheostomy is removed. We discussed risks and benefits of leaving her existing endotracheal tube in place. All her questions and all her 's questions were answered. She nods consent to proceed in the morning. Her  is also in agreement with proceeding with tracheostomy. I spoke to Dr. Queta Irby as well about the case.       I will see her in the early morning. Assuming there are no changes we will proceed with open tracheostomy at approximately 7:30 AM tomorrow. Npo. orders have been written. I will check her INR pre-op  We will hold a.m. Lovenox. He is on standing vancomycin. I have personally performed a face-to-face diagnostic evaluation and management  service on this patient. I have independently seen the patient. I have independently obtained the above history from the patient/family. I have independently examined the patient with above findings. I have independently reviewed data/labs for this patient and developed the above plan of care (MDM). Signed: Yonathan Urrutia.  Salomon Brar MD, FACS

## 2020-02-13 NOTE — PROGRESS NOTES
Chart reviewed. Pt remains ICU intubated/vent. Continues to work with PT/OT and ambulating around unit with staff. Dr. Hi Zurita following for possible [facility] needs. CM available for any assist and d/c POC when stable.

## 2020-02-13 NOTE — PROGRESS NOTES
Bedside and verbal shift report received from Tyler, [address] [place] [address]. Dual neuro assessment and full NIH performed at bedside.

## 2020-02-13 NOTE — PROGRESS NOTES
Ventilator check complete; patient has a #7.5 ET tube secured at the 22 at the teeth. Patient is not sedated. Patient is able to follow commands. Breath sounds are coarse. Trachea is midline, Negative for subcutaneous air, and chest excursion is symmetric. Patient is also Negative for cyanosis and is Negative for pitting edema. All alarms are set and audible. Resuscitation bag is at the head of the bed.       Ventilator Settings  Mode FIO2 Rate Tidal Volume Pressure PEEP I:E Ratio   PRVC  52 % 16 450 ml  5 cm H2O  8 cm H20  1:3.13      Peak airway pressure: 18.7 cm H2O   Minute ventilation: 7.2 l/min     ABG: Results for Faustino Benites (MRN [id number]) as of 2/13/2020 07:56   2/13/2020 03:48   pH (POC) 7.406   pCO2 (POC) 46.9 (H)   pO2 (POC) 219 (H)   HCO3 (POC) 29.4 (H)   sO2 (POC) 100 (H)   Base excess (POC) 4   FIO2 (POC) 50   Specimen type (POC) ARTERIAL   Set Rate 16   Site DRAWN FROM ARTERIAL LINE   Device: VENT   Mode Pressure regulated volume control   Tidal volume 450   PEEP/CPAP (POC) 8   Allens test (POC) NOT APPLICABLE   Respiratory comment: NurseNotified   Inspiratory Time 0.9       Olinda Bland

## 2020-02-13 NOTE — PROGRESS NOTES
Bedside and Verbal shift change report given to Pascale Travis  (oncoming nurse) by Aroldo Vazquez RN  (offgoing nurse). Report included the following information SBAR, Kardex, Procedure Summary, Intake/Output, MAR, Recent Results, Med Rec Status, Cardiac Rhythm SR, Procedure Verification, Quality Measures and Dual Neuro Assessment.

## 2020-02-13 NOTE — PROGRESS NOTES
A follow up visit was made to the patient. Emotional support, spiritual presence and   prayer were provided for the patient.       Reji Pascal MDIV

## 2020-02-13 NOTE — PROGRESS NOTES
Upon assessment of PICC line on RUE, slight more swelling to right extremity than left extremity which is different from yesterday. Pt states having no pain to right arm or right shoulder. Primary RN Almyra Castleman notified.

## 2020-02-13 NOTE — PROGRESS NOTES
Pharmacokinetic Consult to Pharmacist    Gonzalez Kyra is a 53 y.o. female being treated for VAP with vancomycin. Height: 5' 4\" (162.6 cm)  Weight: 105.8 kg (233 lb 4 oz)  Lab Results   Component Value Date/Time    BUN 15 02/13/2020 04:26 AM    Creatinine 0.52 (L) 02/13/2020 04:26 AM    WBC 9.8 02/13/2020 04:26 AM      Estimated Creatinine Clearance: 119.1 mL/min (A) (by C-G formula based on SCr of 0.52 mg/dL (L)). Lab Results   Component Value Date/Time    Vancomycin,trough 16.0 02/13/2020 01:09 AM       Day 9 of vancomycin. Goal trough is 15-20. Therapeutic trough. Continue 1500 mg q 8 hours  Will continue to follow patient.       Thank you,  Zaki Schilling, PharmD  Clinical Pharmacy PGY1 Resident   [phone]

## 2020-02-14 ENCOUNTER — APPOINTMENT (OUTPATIENT)
Dept: GENERAL RADIOLOGY | Age: 45
DRG: 004 | End: 2020-02-14
Attending: NURSE PRACTITIONER
Payer: COMMERCIAL

## 2020-02-14 ENCOUNTER — APPOINTMENT (OUTPATIENT)
Dept: GENERAL RADIOLOGY | Age: 45
DRG: 004 | End: 2020-02-14
Attending: SURGERY
Payer: COMMERCIAL

## 2020-02-14 ENCOUNTER — ANESTHESIA (OUTPATIENT)
Dept: SURGERY | Age: 45
DRG: 004 | End: 2020-02-14
Payer: COMMERCIAL

## 2020-02-14 LAB
ANION GAP SERPL CALC-SCNC: 5 MMOL/L (ref 7–16)
ARTERIAL PATENCY WRIST A: ABNORMAL
BASE EXCESS BLD CALC-SCNC: 4 MMOL/L
BDY SITE: ABNORMAL
BUN SERPL-MCNC: 14 MG/DL (ref 6–23)
CALCIUM SERPL-MCNC: 8.7 MG/DL (ref 8.3–10.4)
CHLORIDE SERPL-SCNC: 107 MMOL/L (ref 98–107)
CO2 BLD-SCNC: 30 MMOL/L
CO2 SERPL-SCNC: 29 MMOL/L (ref 21–32)
COLLECT TIME,HTIME: 317
CREAT SERPL-MCNC: 0.39 MG/DL (ref 0.6–1)
ERYTHROCYTE [DISTWIDTH] IN BLOOD BY AUTOMATED COUNT: 12.6 % (ref 11.9–14.6)
EXHALED MINUTE VOLUME, VE: 7.1 L/MIN
GAS FLOW.O2 O2 DELIVERY SYS: ABNORMAL L/MIN
GAS FLOW.O2 SETTING OXYMISER: 16 BPM
GLUCOSE SERPL-MCNC: 105 MG/DL (ref 65–100)
HCO3 BLD-SCNC: 29 MMOL/L (ref 22–26)
HCT VFR BLD AUTO: 28.2 % (ref 35.8–46.3)
HGB BLD-MCNC: 8.8 G/DL (ref 11.7–15.4)
INSPIRATION.DURATION SETTING TIME VENT: 0.9 SEC
MCH RBC QN AUTO: 27.7 PG (ref 26.1–32.9)
MCHC RBC AUTO-ENTMCNC: 31.2 G/DL (ref 31.4–35)
MCV RBC AUTO: 88.7 FL (ref 79.6–97.8)
NRBC # BLD: 0 K/UL (ref 0–0.2)
O2/TOTAL GAS SETTING VFR VENT: 50 %
PCO2 BLD: 44 MMHG (ref 35–45)
PEEP RESPIRATORY: 8 CMH2O
PH BLD: 7.43 [PH] (ref 7.35–7.45)
PLATELET # BLD AUTO: 168 K/UL (ref 150–450)
PMV BLD AUTO: 10.5 FL (ref 9.4–12.3)
PO2 BLD: 243 MMHG (ref 75–100)
POTASSIUM SERPL-SCNC: 3.8 MMOL/L (ref 3.5–5.1)
RBC # BLD AUTO: 3.18 M/UL (ref 4.05–5.2)
SAO2 % BLD: 100 % (ref 95–98)
SERVICE CMNT-IMP: ABNORMAL
SODIUM SERPL-SCNC: 141 MMOL/L (ref 136–145)
SPECIMEN TYPE: ABNORMAL
VENTILATION MODE VENT: ABNORMAL
VT SETTING VENT: 450 ML
WBC # BLD AUTO: 8.9 K/UL (ref 4.3–11.1)

## 2020-02-14 PROCEDURE — 82803 BLOOD GASES ANY COMBINATION: CPT

## 2020-02-14 PROCEDURE — 74011250636 HC RX REV CODE- 250/636: Performed by: SURGERY

## 2020-02-14 PROCEDURE — 65610000001 HC ROOM ICU GENERAL

## 2020-02-14 PROCEDURE — 77030014008 HC SPNG HEMSTAT J&J -C: Performed by: SURGERY

## 2020-02-14 PROCEDURE — 74011000250 HC RX REV CODE- 250: Performed by: REGISTERED NURSE

## 2020-02-14 PROCEDURE — 77030003029 HC SUT VCRL J&J -B: Performed by: SURGERY

## 2020-02-14 PROCEDURE — 76010000161 HC OR TIME 1 TO 1.5 HR INTENSV-TIER 1: Performed by: SURGERY

## 2020-02-14 PROCEDURE — 77030019908 HC STETH ESOPH SIMS -A: Performed by: ANESTHESIOLOGY

## 2020-02-14 PROCEDURE — 74011250637 HC RX REV CODE- 250/637: Performed by: NURSE PRACTITIONER

## 2020-02-14 PROCEDURE — 0B110F4 BYPASS TRACHEA TO CUTANEOUS WITH TRACHEOSTOMY DEVICE, OPEN APPROACH: ICD-10-PCS | Performed by: SURGERY

## 2020-02-14 PROCEDURE — 77030008793 HC TU TRACH CUF COVD -B: Performed by: SURGERY

## 2020-02-14 PROCEDURE — 74011250636 HC RX REV CODE- 250/636: Performed by: REGISTERED NURSE

## 2020-02-14 PROCEDURE — 74011250637 HC RX REV CODE- 250/637: Performed by: SURGERY

## 2020-02-14 PROCEDURE — 74011000258 HC RX REV CODE- 258: Performed by: NEUROLOGICAL SURGERY

## 2020-02-14 PROCEDURE — 99232 SBSQ HOSP IP/OBS MODERATE 35: CPT | Performed by: NEUROLOGICAL SURGERY

## 2020-02-14 PROCEDURE — 77030002916 HC SUT ETHLN J&J -A: Performed by: SURGERY

## 2020-02-14 PROCEDURE — 77030040361 HC SLV COMPR DVT MDII -B: Performed by: SURGERY

## 2020-02-14 PROCEDURE — 74011000258 HC RX REV CODE- 258: Performed by: SURGERY

## 2020-02-14 PROCEDURE — 80048 BASIC METABOLIC PNL TOTAL CA: CPT

## 2020-02-14 PROCEDURE — 71045 X-RAY EXAM CHEST 1 VIEW: CPT

## 2020-02-14 PROCEDURE — 76060000033 HC ANESTHESIA 1 TO 1.5 HR: Performed by: SURGERY

## 2020-02-14 PROCEDURE — 77030008793 HC TU TRACH CUF COVD -B

## 2020-02-14 PROCEDURE — 85027 COMPLETE CBC AUTOMATED: CPT

## 2020-02-14 PROCEDURE — 99231 SBSQ HOSP IP/OBS SF/LOW 25: CPT | Performed by: PHYSICAL MEDICINE & REHABILITATION

## 2020-02-14 PROCEDURE — 77030018836 HC SOL IRR NACL ICUM -A: Performed by: SURGERY

## 2020-02-14 PROCEDURE — 77030002986 HC SUT PROL J&J -A: Performed by: SURGERY

## 2020-02-14 PROCEDURE — C1751 CATH, INF, PER/CENT/MIDLINE: HCPCS

## 2020-02-14 PROCEDURE — 94003 VENT MGMT INPAT SUBQ DAY: CPT

## 2020-02-14 PROCEDURE — 74011250636 HC RX REV CODE- 250/636: Performed by: NEUROLOGICAL SURGERY

## 2020-02-14 PROCEDURE — 74011250637 HC RX REV CODE- 250/637: Performed by: NEUROLOGICAL SURGERY

## 2020-02-14 PROCEDURE — 74011250636 HC RX REV CODE- 250/636: Performed by: NURSE PRACTITIONER

## 2020-02-14 DEVICE — TRACHEOSTOMY TUBE CUFFED WITH DISPOSABLE INNER CANNULA
Type: IMPLANTABLE DEVICE | Site: NECK | Status: FUNCTIONAL
Brand: SHILEY

## 2020-02-14 RX ORDER — ROCURONIUM BROMIDE 10 MG/ML
INJECTION, SOLUTION INTRAVENOUS AS NEEDED
Status: DISCONTINUED | OUTPATIENT
Start: 2020-02-14 | End: 2020-02-14 | Stop reason: HOSPADM

## 2020-02-14 RX ORDER — ONDANSETRON 2 MG/ML
INJECTION INTRAMUSCULAR; INTRAVENOUS AS NEEDED
Status: DISCONTINUED | OUTPATIENT
Start: 2020-02-14 | End: 2020-02-14 | Stop reason: HOSPADM

## 2020-02-14 RX ORDER — ENOXAPARIN SODIUM 100 MG/ML
40 INJECTION SUBCUTANEOUS EVERY 24 HOURS
Status: DISCONTINUED | OUTPATIENT
Start: 2020-02-15 | End: 2020-02-18 | Stop reason: HOSPADM

## 2020-02-14 RX ORDER — FENTANYL CITRATE 50 UG/ML
INJECTION, SOLUTION INTRAMUSCULAR; INTRAVENOUS AS NEEDED
Status: DISCONTINUED | OUTPATIENT
Start: 2020-02-14 | End: 2020-02-14 | Stop reason: HOSPADM

## 2020-02-14 RX ORDER — PROPOFOL 10 MG/ML
INJECTION, EMULSION INTRAVENOUS AS NEEDED
Status: DISCONTINUED | OUTPATIENT
Start: 2020-02-14 | End: 2020-02-14 | Stop reason: HOSPADM

## 2020-02-14 RX ADMIN — PHENYLEPHRINE HYDROCHLORIDE 100 MCG: 10 INJECTION INTRAVENOUS at 07:50

## 2020-02-14 RX ADMIN — PHENYLEPHRINE HYDROCHLORIDE 100 MCG: 10 INJECTION INTRAVENOUS at 08:00

## 2020-02-14 RX ADMIN — ROCURONIUM BROMIDE 10 MG: 10 INJECTION, SOLUTION INTRAVENOUS at 08:02

## 2020-02-14 RX ADMIN — PHENYLEPHRINE HYDROCHLORIDE 80 MCG: 10 INJECTION INTRAVENOUS at 08:19

## 2020-02-14 RX ADMIN — FAMOTIDINE 20 MG: 20 TABLET, FILM COATED ORAL at 09:21

## 2020-02-14 RX ADMIN — FENTANYL CITRATE 50 MCG: 50 INJECTION, SOLUTION INTRAMUSCULAR; INTRAVENOUS at 14:54

## 2020-02-14 RX ADMIN — ALPRAZOLAM 0.25 MG: 0.25 TABLET ORAL at 18:01

## 2020-02-14 RX ADMIN — VANCOMYCIN HYDROCHLORIDE 1500 MG: 10 INJECTION, POWDER, LYOPHILIZED, FOR SOLUTION INTRAVENOUS at 02:00

## 2020-02-14 RX ADMIN — ALPRAZOLAM 0.25 MG: 0.25 TABLET ORAL at 09:19

## 2020-02-14 RX ADMIN — PHENYLEPHRINE HYDROCHLORIDE 200 MCG: 10 INJECTION INTRAVENOUS at 07:44

## 2020-02-14 RX ADMIN — Medication 30 ML: at 05:50

## 2020-02-14 RX ADMIN — Medication 30 ML: at 14:00

## 2020-02-14 RX ADMIN — FAMOTIDINE 20 MG: 20 TABLET, FILM COATED ORAL at 22:15

## 2020-02-14 RX ADMIN — FENTANYL CITRATE 50 MCG: 50 INJECTION INTRAMUSCULAR; INTRAVENOUS at 07:35

## 2020-02-14 RX ADMIN — VANCOMYCIN HYDROCHLORIDE 1500 MG: 10 INJECTION, POWDER, LYOPHILIZED, FOR SOLUTION INTRAVENOUS at 18:28

## 2020-02-14 RX ADMIN — ATORVASTATIN CALCIUM 40 MG: 40 TABLET, FILM COATED ORAL at 22:15

## 2020-02-14 RX ADMIN — PHENYLEPHRINE HYDROCHLORIDE 100 MCG: 10 INJECTION INTRAVENOUS at 07:43

## 2020-02-14 RX ADMIN — Medication 900 UNITS: at 05:50

## 2020-02-14 RX ADMIN — FENTANYL CITRATE 50 MCG: 50 INJECTION, SOLUTION INTRAMUSCULAR; INTRAVENOUS at 04:01

## 2020-02-14 RX ADMIN — Medication 30 ML: at 22:15

## 2020-02-14 RX ADMIN — HYDROCODONE BITARTRATE AND ACETAMINOPHEN 1 TABLET: 7.5; 325 TABLET ORAL at 10:34

## 2020-02-14 RX ADMIN — VANCOMYCIN HYDROCHLORIDE 1500 MG: 10 INJECTION, POWDER, LYOPHILIZED, FOR SOLUTION INTRAVENOUS at 10:37

## 2020-02-14 RX ADMIN — PHENYLEPHRINE HYDROCHLORIDE 100 MCG: 10 INJECTION INTRAVENOUS at 08:25

## 2020-02-14 RX ADMIN — CEFEPIME 2 G: 2 INJECTION, POWDER, FOR SOLUTION INTRAVENOUS at 09:19

## 2020-02-14 RX ADMIN — ACETAMINOPHEN 650 MG: 325 TABLET, FILM COATED ORAL at 16:44

## 2020-02-14 RX ADMIN — METOPROLOL TARTRATE 50 MG: 50 TABLET, FILM COATED ORAL at 18:00

## 2020-02-14 RX ADMIN — FENTANYL CITRATE 50 MCG: 50 INJECTION INTRAMUSCULAR; INTRAVENOUS at 07:53

## 2020-02-14 RX ADMIN — ROCURONIUM BROMIDE 40 MG: 10 INJECTION, SOLUTION INTRAVENOUS at 07:35

## 2020-02-14 RX ADMIN — PROPOFOL 30 MG: 10 INJECTION, EMULSION INTRAVENOUS at 08:28

## 2020-02-14 RX ADMIN — SENNOSIDES 8.6 MG: 8.6 TABLET, FILM COATED ORAL at 09:19

## 2020-02-14 RX ADMIN — Medication 900 UNITS: at 22:15

## 2020-02-14 RX ADMIN — FENTANYL CITRATE 50 MCG: 50 INJECTION, SOLUTION INTRAMUSCULAR; INTRAVENOUS at 22:15

## 2020-02-14 RX ADMIN — Medication 900 UNITS: at 14:00

## 2020-02-14 RX ADMIN — FENTANYL CITRATE 50 MCG: 50 INJECTION, SOLUTION INTRAMUSCULAR; INTRAVENOUS at 09:08

## 2020-02-14 RX ADMIN — CEFEPIME 2 G: 2 INJECTION, POWDER, FOR SOLUTION INTRAVENOUS at 16:51

## 2020-02-14 RX ADMIN — HYDROCODONE BITARTRATE AND ACETAMINOPHEN 1 TABLET: 7.5; 325 TABLET ORAL at 19:37

## 2020-02-14 RX ADMIN — PROPOFOL 30 MG: 10 INJECTION, EMULSION INTRAVENOUS at 08:35

## 2020-02-14 RX ADMIN — METOPROLOL TARTRATE 50 MG: 50 TABLET, FILM COATED ORAL at 09:21

## 2020-02-14 RX ADMIN — ONDANSETRON 4 MG: 2 INJECTION INTRAMUSCULAR; INTRAVENOUS at 08:00

## 2020-02-14 NOTE — PROGRESS NOTES
Progress Note    Patient: Mirtha Alcazar MRN: [id number]  SSN: [id number]    YOB: 1975  Age: 53 y.o. Sex: female      Admit Date: 2/2/2020    LOS: 12 days     Subjective:   No acute changes. Post trach placement this am: pt is alert and following commands. Trach intact.      Current Facility-Administered Medications   Medication Dose Route Frequency    [START ON 2/15/2020] enoxaparin (LOVENOX) injection 40 mg  40 mg SubCUTAneous Q24H    famotidine (PEPCID) tablet 20 mg  20 mg Per NG tube Q12H    ALPRAZolam (XANAX) tablet 0.25 mg  0.25 mg Oral BID    vancomycin (VANCOCIN) 1500 mg in  ml infusion  1,500 mg IntraVENous Q8H    cefepime (MAXIPIME) 2 g in 0.9% sodium chloride (MBP/ADV) 100 mL  2 g IntraVENous Q8H    metoprolol tartrate (LOPRESSOR) tablet 50 mg  50 mg Per NG tube BID    NUTRITIONAL SUPPORT ELECTROLYTE PRN ORDERS   Does Not Apply PRN    senna (SENOKOT) tablet 8.6 mg  1 Tab Per NG tube DAILY    HYDROcodone-acetaminophen (NORCO) 7.5-325 mg per tablet 1 Tab  1 Tab Oral Q6H PRN    ibuprofen (ADVIL;MOTRIN) 100 mg/5 mL oral suspension 600 mg  600 mg Per NG tube Q6H PRN    sodium chloride (NS) flush 5-40 mL  5-40 mL IntraVENous PRN    fentaNYL citrate (PF) injection 50 mcg  50 mcg IntraVENous Q2H PRN    acetaminophen (TYLENOL) tablet 650 mg  650 mg Per NG tube Q4H PRN    ondansetron (ZOFRAN) injection 4 mg  4 mg IntraVENous Q4H PRN    atorvastatin (LIPITOR) tablet 40 mg  40 mg Per NG tube QHS    sodium chloride (NS) flush 30 mL  30 mL InterCATHeter Q8H    heparin (porcine) pf 900 Units  900 Units InterCATHeter Q8H    sodium chloride (NS) flush 30 mL  30 mL InterCATHeter PRN    heparin (porcine) pf 900 Units  900 Units InterCATHeter PRN    lip protectant (BLISTEX) ointment 1 Each  1 Each Topical PRN       Objective:     Vitals:    02/14/20 1000 02/14/20 1100 02/14/20 1131 02/14/20 1200   BP: 144/80 139/81  120/68   Pulse: 71 64 68 61   Resp: 10 16 12 10   Temp: 99.6 °F (37.6 °C) 99.9 °F (37.7 °C)  99.8 °F (37.7 °C)   SpO2: 100% 100% 100% 100%   Weight:       Height:             Intake and Output:  Current Shift: 02/14 0701 - 02/14 1900  In: 233 [I.V.:650]  Out: 7841 [Urine:2780]  Last 24 hr: 02/13 0701 - 02/14 0700  In: 4347.3 [I.V.:2428.3]  Out: 2294 [Urine:3105]       Physical Exam:   General:  Alert, cooperative. trach    Eyes:  PERRL, EOMs intact. Neck: Supple, symmetrical, trachea midline, no adenopathy, thyroid: no enlargment/tenderness/nodules, no carotid bruit and no JVD. Lungs:   Lungs CTA all fields. No wheezes noted. No distress. Heart:  Regular rate and rhythm, S1, S2 normal, no murmur, click, rub or gallop. Abdomen:   Soft, non-tender. Bowel sounds normal. No masses,  No organomegaly. Extremities: Extremities normal, atraumatic, no cyanosis or edema. Pulses: 2+ and symmetric all extremities. Skin: Skin color, texture, turgor normal. Trach intact. Neurologic: Alert and following commands, maew. Remains intubated. Neuro intact.        Lab/Data Review:    Recent Results (from the past 12 hour(s))   METABOLIC PANEL, BASIC    Collection Time: 02/14/20  3:19 AM   Result Value Ref Range    Sodium 141 136 - 145 mmol/L    Potassium 3.8 3.5 - 5.1 mmol/L    Chloride 107 98 - 107 mmol/L    CO2 29 21 - 32 mmol/L    Anion gap 5 (L) 7 - 16 mmol/L    Glucose 105 (H) 65 - 100 mg/dL    BUN 14 6 - 23 MG/DL    Creatinine 0.39 (L) 0.6 - 1.0 MG/DL    GFR est AA >60 >60 ml/min/1.73m2    GFR est non-AA >60 >60 ml/min/1.73m2    Calcium 8.7 8.3 - 10.4 MG/DL   CBC W/O DIFF    Collection Time: 02/14/20  3:19 AM   Result Value Ref Range    WBC 8.9 4.3 - 11.1 K/uL    RBC 3.18 (L) 4.05 - 5.2 M/uL    HGB 8.8 (L) 11.7 - 15.4 g/dL    HCT 28.2 (L) 35.8 - 46.3 %    MCV 88.7 79.6 - 97.8 FL    MCH 27.7 26.1 - 32.9 PG    MCHC 31.2 (L) 31.4 - 35.0 g/dL    RDW 12.6 11.9 - 14.6 %    PLATELET 428 390 - 187 K/uL    MPV 10.5 9.4 - 12.3 FL    ABSOLUTE NRBC 0.00 0.0 - 0.2 K/uL   POC G3 Collection Time: 20  3:20 AM   Result Value Ref Range    Device: VENT      FIO2 (POC) 50 %    pH (POC) 7.427 7.35 - 7.45      pCO2 (POC) 44.0 35 - 45 MMHG    pO2 (POC) 243 (H) 75 - 100 MMHG    HCO3 (POC) 29.0 (H) 22 - 26 MMOL/L    sO2 (POC) 100 (H) 95 - 98 %    Base excess (POC) 4 mmol/L    Mode Pressure regulated volume control      Tidal volume 450 ml    Set Rate 16 bpm    PEEP/CPAP (POC) 8 cmH2O    Allens test (POC) NOT APPLICABLE      Inspiratory Time 0.9 sec    Site DRAWN FROM ARTERIAL LINE      Specimen type (POC) ARTERIAL      Performed by Terese     CO2, POC 30 MMOL/L    Critical value read back 00:02     Respiratory comment: NurseNotified     Exhaled minute volume 7.10 L/min    COLLECT TIME 317         Assessment/ Plan:     Principal Problem:    AVM (arteriovenous malformation) spine (2020)    Active Problems:    Cavernoma (2/3/2020)      Acute respiratory failure with hypoxia (HCC) (2/3/2020)      Edema of spinal cord (HCC) (2/3/2020)      Acute left-sided weakness (2/3/2020)        NEURO:pt with cervical hemorrhage and edema secondary to vascular abnormality, cavernoma vs AVM. Pt remains intubated, but is alert and following commands this am. Cerebral angiogram yesterday with suspicious abnormal vessel around C3 area. Will repeat Cerebral angiogram and MRI brain next week. Pt is alert and following commands. R groin is CDI with no hematoma. Pulses are intact. Repeat Cerebral angiogram shows pt AV fistula at level of C3. R groin is CDI. gen surgery consulted today for trach placement. RESP:PRVC @ 50%: AB.4/44/243. Pt POD 0 trach this am. CDI. Pt in nad. .   CV:-160 goal. 2D Echo: EF 55-60%, Trop neg. SCD, lovenox. Metoprolol 50mg po bid. HEME:HH 8.8/28,   NEPH:bun/cr: 14/0.3  GI: Pepcid, senna. Restart TF post trach. ID:TM: 99.6: pan cx sent, neg thus far, but started on Cef/Vanc D10/10 due to continued fever. LINES:RUE: PICC. Right radial virgil. .      Signed By: Damon Gold NP     February 14, 2020

## 2020-02-14 NOTE — PROGRESS NOTES
Bedside and verbal shift report received from [employer], [facility].      Dual neuro assessment and full NIH performed at bedside.

## 2020-02-14 NOTE — PROGRESS NOTES
Pt taken down to OR for tracheostomy procedure. Taken with OR transport on monitor, VSS, and family present/waiting in ICU waiting room. Consent verified and OR team given report on pt condition and history. Bagged by RT during transport.

## 2020-02-14 NOTE — PROGRESS NOTES
Head to assessment completed on pt this PM.     Neuro: Pt is drowsy/ eyes open to voice - able to mouth orientation around ETT and write comprehensible words - oriented x 4. PERRLA 3 - 4 mm and brisk to react. LUE and LLE with weakness and altered sensation. Able to follow commands and GUTIÉRREZ purposefully. Strong cough. Resp: PRVC FiO2 50%. Coarse upper / diminished lower breath sounds. Copious oral secretions. Cardiac: NSR - SBP goal 100 - 160. GI: NGT - patent and infusing. TF at goal, minimal residual. Hypoactive bowel sounds. : Jalloh - draining and patent yellow UOP. Skin: R radial ART. R PICC. R groin cite - CDI. Allevyn intact. SCDs present. Tattoos. Skin CDI.

## 2020-02-14 NOTE — PERIOP NOTES
TRANSFER - OUT REPORT:    Verbal report given to Jocelinecristhian on [place]  being transferred to ICU for ordered procedure       Report consisted of patients Situation, Background, Assessment and   Recommendations(SBAR). Information from the following report(s) OR Summary was reviewed with the receiving nurse. Lines:   PICC Triple Lumen 32/30/46 Right;Basilic (Active)   Central Line Being Utilized Yes 2/14/2020  7:00 AM   Criteria for Appropriate Use Long term IV/antibiotic administration 2/14/2020  7:00 AM   Site Assessment Clean, dry, & intact 2/14/2020  7:00 AM   Phlebitis Assessment 0 2/14/2020  7:00 AM   Infiltration Assessment 0 2/14/2020  7:00 AM   Arm Circumference (cm) 37 cm 2/2/2020 10:30 AM   Date of Last Dressing Change 02/10/20 2/13/2020  8:00 PM   Dressing Status Clean, dry, & intact 2/14/2020  7:00 AM   External Catheter Length (cm) 0 centimeters 2/10/2020 12:00 AM   Dressing Type Disk with Chlorhexadine gluconate (CHG); Transparent;Tape 2/14/2020  7:00 AM   Action Taken Other (comment) 2/13/2020  9:40 AM   Hub Color/Line Status Flushed;Patent 2/14/2020  7:00 AM   Positive Blood Return (Site #1) Yes 2/14/2020  7:00 AM   Hub Color/Line Status Flushed;Patent 2/14/2020  7:00 AM   Positive Blood Return (Site #2) Yes 2/14/2020  7:00 AM   Hub Color/Line Status Flushed;Patent; Infusing 2/14/2020  7:00 AM   Positive Blood Return (Site #3) Yes 2/14/2020  7:00 AM   Alcohol Cap Used No 2/13/2020  8:00 PM       Arterial Line 02/03/20 Right Radial artery (Active)   Site Assessment Clean, dry, & intact 2/14/2020  7:00 AM   Dressing Status Clean, dry, & intact 2/14/2020  7:00 AM   Dressing Type Transparent;Tape 2/14/2020  7:00 AM   Line Status Intact and in place 2/14/2020  7:00 AM   Treatment Zeroed or re-zeroed 2/14/2020  7:00 AM   Affected Extremity/Extremities Color distal to insertion site pink (or appropriate for race); Pulses palpable;Range of motion performed 2/14/2020  7:00 AM        Opportunity for questions and clarification was provided.       Patient transported with:   Monitor  O2 @ 15 liters  Registered Nurse

## 2020-02-14 NOTE — PROGRESS NOTES
H&P/Consult Note/Progress Note/Office Note:   Lulú Thomas  MRN: [id number]  [facility]:[birth year]  Age:44 y.o.    HPI: Lulú Thomas is a 53 y.o. female who is s/p open trach on 2/14/20        She originally presented at an outside hospital in [facility] with left neck pain and left arm weakness. CT was normal initially. She was transported to [facility] for MRI of the brain and had acute respiratory failure with bradycardia and required intubation. She was moved to [facility] and admitted on 2/2/20 with a cervical spine hemorrhage at C3 accompanied by edema secondary to vascular abnormality, cavernoma versus AVM. CTA of the head and neck showed a questionable large vessel at the C3 level. MRI of the C-spine showed an area of hemorrhage at C3 which was suspicious for a cavernoma. Her angiogram showed a very small abnormal vessel coming off the anterior spinal artery at C3. She had no definite AVM or aVF. General surgery was consulted for tracheostomy. The patient had no prior tracheostomy. She was able to write notes and indicated her biggest respiratory concern was with her secretions. No past medical history on file. No past surgical history on file.   Current Facility-Administered Medications   Medication Dose Route Frequency    [START ON 2/15/2020] enoxaparin (LOVENOX) injection 40 mg  40 mg SubCUTAneous Q24H    famotidine (PEPCID) tablet 20 mg  20 mg Per NG tube Q12H    ALPRAZolam (XANAX) tablet 0.25 mg  0.25 mg Oral BID    vancomycin (VANCOCIN) 1500 mg in  ml infusion  1,500 mg IntraVENous Q8H    cefepime (MAXIPIME) 2 g in 0.9% sodium chloride (MBP/ADV) 100 mL  2 g IntraVENous Q8H    metoprolol tartrate (LOPRESSOR) tablet 50 mg  50 mg Per NG tube BID    NUTRITIONAL SUPPORT ELECTROLYTE PRN ORDERS   Does Not Apply PRN    senna (SENOKOT) tablet 8.6 mg  1 Tab Per NG tube DAILY    HYDROcodone-acetaminophen (NORCO) 7.5-325 mg per tablet 1 Tab  1 Tab Oral Q6H PRN    ibuprofen (ADVIL;MOTRIN) 100 mg/5 mL oral suspension 600 mg  600 mg Per NG tube Q6H PRN    sodium chloride (NS) flush 5-40 mL  5-40 mL IntraVENous PRN    fentaNYL citrate (PF) injection 50 mcg  50 mcg IntraVENous Q2H PRN    acetaminophen (TYLENOL) tablet 650 mg  650 mg Per NG tube Q4H PRN    ondansetron (ZOFRAN) injection 4 mg  4 mg IntraVENous Q4H PRN    atorvastatin (LIPITOR) tablet 40 mg  40 mg Per NG tube QHS    sodium chloride (NS) flush 30 mL  30 mL InterCATHeter Q8H    heparin (porcine) pf 900 Units  900 Units InterCATHeter Q8H    sodium chloride (NS) flush 30 mL  30 mL InterCATHeter PRN    heparin (porcine) pf 900 Units  900 Units InterCATHeter PRN    lip protectant (BLISTEX) ointment 1 Each  1 Each Topical PRN     Ace inhibitors  Social History     Patient does not qualify to have social determinant information on file (likely too young). Socioeconomic History    Marital status:      Spouse name: Not on file    Number of children: Not on file    Years of education: Not on file    Highest education level: Not on file     Social History     Tobacco Use   Smoking Status Not on file     No family history on file. ROS: The patient has no difficulty with chest pain or shortness of breath. No fever or chills. Comprehensive review of systems was otherwise unremarkable except as noted above.     Physical Exam:   Visit Vitals  /81 (BP 1 Location: Right arm, BP Patient Position: At rest;Head of bed elevated (Comment degrees))   Pulse 64   Temp 99.9 °F (37.7 °C)   Resp 16   Ht 5' 4\" (1.626 m)   Wt 233 lb 4 oz (105.8 kg)   SpO2 100%   BMI 40.04 kg/m²     Vitals:    02/14/20 0900 02/14/20 0919 02/14/20 1000 02/14/20 1100   BP: (!) 164/93 (!) 179/104 144/80 139/81   Pulse: 84 95 71 64   Resp: 18  10 16   Temp: 99.1 °F (37.3 °C)  99.6 °F (37.6 °C) 99.9 °F (37.7 °C)   SpO2: 100%  100% 100%   Weight:       Height:         02/14 0701 - 02/14 1900  In: 616 [I.V.:650]  Out: 8276 [Urine:2630]  02/12 1901 - 02/14 0700  In: 7419.3 [I.V.:4228.3]  Out: 4246 [Urine:4730]    Constitutional: Alert, oriented, cooperative patient on vent; appears stated age    Eyes:Sclera are clear. EOMs intact  ENMT: no external lesions gross hearing normal; no obvious neck masses, no ear or lip lesions, nares normal; nasal Dobhoff  She is able to extend her neck actively without any assistance and with no pain. CV: RRR. Normal perfusion; trach dressing dry  Resp: On vent No JVD. Breathing is non-labored    GI: soft and non-distended     Musculoskeletal: No embolic signs or cyanosis. Neuro:  Oriented and interactive;   Slow, but moves all 4  Psychiatric: blunted affect      Recent vitals (if inpt):  Patient Vitals for the past 24 hrs:   BP Temp Pulse Resp SpO2   02/14/20 1100 139/81 99.9 °F (37.7 °C) 64 16 100 %   02/14/20 1000 144/80 99.6 °F (37.6 °C) 71 10 100 %   02/14/20 0919 (!) 179/104  95     02/14/20 0900 (!) 164/93 99.1 °F (37.3 °C) 84 18 100 %   02/14/20 0839 165/81 98 °F (36.7 °C) 81 16 100 %   02/14/20 0700 141/80 98.9 °F (37.2 °C) 76 28 100 %   02/14/20 0645   62 15 100 %   02/14/20 0630   65 16 100 %   02/14/20 0615   64 16 100 %   02/14/20 0600   67 17 100 %   02/14/20 0545   65 16 100 %   02/14/20 0530   63 16 100 %   02/14/20 0515   67 15 100 %   02/14/20 0501   62 16 100 %   02/14/20 0445   63 17 100 %   02/14/20 0431   68 18 100 %   02/14/20 0415   65 16 100 %   02/14/20 0404  98.7 °F (37.1 °C) 66 16 100 %   02/14/20 0344   66 16 100 %   02/14/20 0342   66 16 100 %   02/14/20 0330   61 16 100 %   02/14/20 0315   65 16 100 %   02/14/20 0308   65 16 100 %   02/14/20 0257   64 16 100 %   02/14/20 0245   63 16 100 %   02/14/20 0230   63 16 100 %   02/14/20 0221   62 16 100 %   02/14/20 0213   63 16 100 %   02/14/20 0209   62 16 100 %   02/14/20 0200   70 16 100 %   02/14/20 0145   68 16 100 %   02/14/20 0130   63 16 100 %   02/14/20 0115   62 16 100 %   02/14/20 0100   62 17 100 %   02/14/20 0045   65 17 100 %   02/14/20 0030   66 15 100 %   02/14/20 0015   65 16 100 %   02/14/20 0004   67 16 100 %   02/13/20 2359   65 16 100 %   02/13/20 2345   63 17 100 %   02/13/20 2330  98.8 °F (37.1 °C) 65 16 100 %   02/13/20 2315   74 18 100 %   02/13/20 2300   92 17 100 %   02/13/20 2245   69 17 100 %   02/13/20 2230   66 16 100 %   02/13/20 2215   70 11 100 %   02/13/20 2200   75 16 100 %   02/13/20 2145   71 13 100 %   02/13/20 2130   72 17 100 %   02/13/20 2115   73 13 100 %   02/13/20 2100   74 17 100 %   02/13/20 2045   78 15 100 %   02/13/20 2030   73 16 100 %   02/13/20 2015   75 16 100 %   02/13/20 2000 118/65 99.5 °F (37.5 °C) 71 16 100 %   02/13/20 1945   81 20 100 %   02/13/20 1930   74 16 100 %   02/13/20 1915   76 16 100 %   02/13/20 1900   75 17 100 %   02/13/20 1845   76 16 100 %   02/13/20 1830   75 16 100 %   02/13/20 1801 97/54  77 11 100 %   02/13/20 1731   85 17 100 %   02/13/20 1701 106/56  74  100 %   02/13/20 1630   78 16 100 %   02/13/20 1602 107/59 98.9 °F (37.2 °C) 74 16 100 %   02/13/20 1544   75 16 100 %   02/13/20 1530   72 16 100 %   02/13/20 1502 126/59  72 16 100 %   02/13/20 1500   82 13 100 %   02/13/20 1402 111/68  79 13 100 %   02/13/20 1400   79 16 100 %   02/13/20 1324 120/70  86 29 100 %   02/13/20 1253   72 18 100 %   02/13/20 1200  98.7 °F (37.1 °C) 73 8 100 %   02/13/20 1130   82 10 100 %   02/13/20 1129   85 18 100 %       Labs:  Recent Labs     02/14/20  0319 02/13/20  1725   WBC 8.9  --    HGB 8.8*  --      --      --    K 3.8  --      --    CO2 29  --    BUN 14  --    CREA 0.39*  --    *  --    PTP  --  13.7   INR  --  1.0       Lab Results   Component Value Date/Time    WBC 8.9 02/14/2020 03:19 AM    HGB 8.8 (L) 02/14/2020 03:19 AM    PLATELET 236 37/03/9804 03:19 AM    Sodium 141 02/14/2020 03:19 AM    Potassium 3.8 02/14/2020 03:19 AM    Chloride 107 02/14/2020 03:19 AM    CO2 29 02/14/2020 03:19 AM    BUN 14 02/14/2020 03:19 AM    Creatinine 0.39 (L) 02/14/2020 03:19 AM    Glucose 105 (H) 02/14/2020 03:19 AM    INR 1.0 02/13/2020 05:25 PM    Troponin-I, Qt. <0.02 (L) 02/02/2020 03:58 AM       CT Results  (Last 48 hours)    None        chest X-ray      I reviewed recent labs, recent radiologic studies, and pertinent records including other doctor notes if needed. I independently reviewed radiology images for studies I described above or studies I have ordered. Admission date (for inpatients): 2/2/2020   * No surgery date entered *  Procedure(s):  OPEN TRACHEOSTOMY    ASSESSMENT/PLAN:  Problem List  Date Reviewed: 2/14/2020          Codes Class Noted    Cavernoma ICD-10-CM: D18.00  ICD-9-CM: 228.00  2/3/2020        Acute respiratory failure with hypoxia ([facility] [address] ICD-10-CM: J96.01  ICD-9-CM: 518.81  2/3/2020        Edema of spinal cord (HCC) ICD-10-CM: G95.19  ICD-9-CM: 336.1  2/3/2020        Acute left-sided weakness ICD-10-CM: R53.1  ICD-9-CM: 728.87  2/3/2020        * (Principal) AVM (arteriovenous malformation) spine ICD-10-CM: Q28.8  ICD-9-CM: 747.82  2/2/2020            Principal Problem:    AVM (arteriovenous malformation) spine (2/2/2020)    Active Problems:    Cavernoma (2/3/2020)      Acute respiratory failure with hypoxia ([address] (2/3/2020)      Edema of spinal cord (HCC) (2/3/2020)      Acute left-sided weakness (2/3/2020)           She is s/p tracheostomy on 2/14/20 for resp failure  Post-op CXR with no PTx    [facility]    Surgery will sign off  Call if needed            I have personally performed a face-to-face diagnostic evaluation and management  service on this patient. I have independently seen the patient. I have independently obtained the above history from the patient/family.     I have independently examined the patient with above findings. I have independently reviewed data/labs for this patient and developed the above plan of care (MDM). Signed: Zia Ramirez.  Eliazar Nolasco MD, FACS

## 2020-02-14 NOTE — OP NOTES
[address]  [phone]    OPERATVE REPORT    Name: Mono Sands     Date of Surgery: 2/14/2020  Med Record Number: [id number]   Age: 53 y.o. Sex: female   Pre-operative Diagnosis:   Resp Failure    Cervical Spinal hemorrhage  Post-operative Diagnosis: same  Procedure:  Open fenestrated tracheostomy with skin flaps (8 DCT Shiley); (CPT 50640)  Surgeon: Rusty Gonzales MD  Asistants: none  Anesthesia:  General  Complications: none  Specimen: none  Estimated Blood Loss: <30cc    OPERATIVE PROCEDURE: The patient  was taken to the operating room,    placed in the supine position with shoulder roll and only minimal neck extension, and after adequate anesthesia was given, the anterior neck was prepped and draped in a sterile    fashion with multiple layers of Betadine scrub and Betadine    Solution. A transverse incision was made just above the sternal  notch. The strap muscles were divided in the midline. There was a bulky thyroid isthmus which had to be divided for exposure to the trachea. Bleeding was controlled with cautery and surgicell. The   anterior trachea was exposed. We placed    interrupted 0 Prolene sutures through the second and third    tracheal rings. The transverse tracheotomy was performed and    curved Foley scissors were used to divide the third tracheal    cartilage laterally on each side to create an inferior trap door and fenestration. The    endotracheal tube was backed up under direct vision. A #8 cuffed    Shiley tracheostomy was inserted through the tracheotomy and the    balloon was inflated. Good CO2 return was obtained. The    retractors were removed. 2-0 nylon was used to close the lateral aspects of the    tracheotomy incision and the flange of the tracheostomy was    secured to the skin with nylon suture.  The Prolene sutures had been brought out through the skin    flaps superiorly and inferiorly to create fenestrations, and    they were secured at the level of the skin by knotting the    Prolene sutures and then placing Steri-Strips across them for    retention. A velcro Brooke was used    through the flange and around the back of the neck to secure it    in place. A sterile dressing was placed.   The patient tolerated the    procedure well and was taken back to the intensive    care unit.         Chasity Benito MD, FACS

## 2020-02-14 NOTE — PROGRESS NOTES
H&P/Consult Note/Progress Note/Office Note:   Silvio Fraser  MRN: [id number]  RKQ:4[birth year]  Age:44 y.o.    HPI: Silvio Fraser is a 53 y.o. female who originally presented at an outside hospital in [facility]. with left neck pain and left arm weakness. CT was normal initially. She was transported to [facility] for MRI of the brain and had acute respiratory failure with bradycardia and required intubation. She was moved to [facility] and admitted on 2/2/20 with a cervical spine hemorrhage at C3 accompanied by edema secondary to vascular abnormality, cavernoma versus AVM. CTA of the head and neck showed a questionable large vessel at the C3 level. MRI of the C-spine showed an area of hemorrhage at C3 which was suspicious for a cavernoma. Her angiogram showed a very small abnormal vessel coming off the anterior spinal artery at C3. She had no definite AVM or aVF. General surgery was consulted for tracheostomy. The patient had no prior tracheostomy. She was able to write notes and indicated her biggest respiratory concern was with her secretions. No past medical history on file. No past surgical history on file.   Current Facility-Administered Medications   Medication Dose Route Frequency    lactated Ringers infusion  100 mL/hr IntraVENous CONTINUOUS    [START ON 2/15/2020] enoxaparin (LOVENOX) injection 40 mg  40 mg SubCUTAneous Q24H    famotidine (PEPCID) tablet 20 mg  20 mg Per NG tube Q12H    ALPRAZolam (XANAX) tablet 0.25 mg  0.25 mg Oral BID    vancomycin (VANCOCIN) 1500 mg in  ml infusion  1,500 mg IntraVENous Q8H    cefepime (MAXIPIME) 2 g in 0.9% sodium chloride (MBP/ADV) 100 mL  2 g IntraVENous Q8H    metoprolol tartrate (LOPRESSOR) tablet 50 mg  50 mg Per NG tube BID    NUTRITIONAL SUPPORT ELECTROLYTE PRN ORDERS   Does Not Apply PRN    senna (SENOKOT) tablet 8.6 mg  1 Tab Per NG tube DAILY    HYDROcodone-acetaminophen (NORCO) 7.5-325 mg per tablet 1 Tab  1 Tab Oral Q6H PRN    ibuprofen (ADVIL;MOTRIN) 100 mg/5 mL oral suspension 600 mg  600 mg Per NG tube Q6H PRN    sodium chloride (NS) flush 5-40 mL  5-40 mL IntraVENous PRN    fentaNYL citrate (PF) injection 50 mcg  50 mcg IntraVENous Q2H PRN    acetaminophen (TYLENOL) tablet 650 mg  650 mg Per NG tube Q4H PRN    ondansetron (ZOFRAN) injection 4 mg  4 mg IntraVENous Q4H PRN    atorvastatin (LIPITOR) tablet 40 mg  40 mg Per NG tube QHS    sodium chloride (NS) flush 30 mL  30 mL InterCATHeter Q8H    heparin (porcine) pf 900 Units  900 Units InterCATHeter Q8H    sodium chloride (NS) flush 30 mL  30 mL InterCATHeter PRN    heparin (porcine) pf 900 Units  900 Units InterCATHeter PRN    lip protectant (BLISTEX) ointment 1 Each  1 Each Topical PRN     Ace inhibitors  Social History     Patient does not qualify to have social determinant information on file (likely too young). Socioeconomic History    Marital status:      Spouse name: Not on file    Number of children: Not on file    Years of education: Not on file    Highest education level: Not on file     Social History     Tobacco Use   Smoking Status Not on file     No family history on file. ROS: The patient has no difficulty with chest pain or shortness of breath. No fever or chills. Comprehensive review of systems was otherwise unremarkable except as noted above. Physical Exam:   Visit Vitals  /65 (BP 1 Location: Right arm, BP Patient Position: At rest;Head of bed elevated (Comment degrees))   Pulse 62   Temp 98.7 °F (37.1 °C)   Resp 15   Ht 5' 4\" (1.626 m)   Wt 233 lb 4 oz (105.8 kg)   SpO2 100%   BMI 40.04 kg/m²     Vitals:    02/14/20 0600 02/14/20 0615 02/14/20 0630 02/14/20 0645   BP:       Pulse: 67 64 65 62   Resp: 17 16 16 15   Temp:       SpO2: 100% 100% 100% 100%   Weight:       Height:         No intake/output data recorded.   02/12 1901 - 02/14 0700  In: 7419.3 [I.V.:4228.3]  Out: 5096 [Urine:4730]    Constitutional: Alert, oriented, cooperative patient on vent; appears stated age    Eyes:Sclera are clear. EOMs intact  ENMT: no external lesions gross hearing normal; no obvious neck masses, no ear or lip lesions, nares normal; +ET tube and nasal Dobhoff  She is able to extend her neck actively without any assistance and with no pain. CV: RRR. Normal perfusion  Resp: On vent No JVD. Breathing is non-labored    GI: soft and non-distended     Musculoskeletal: No embolic signs or cyanosis. Neuro:  Oriented and interactive; Slow, but can move left hand and left leg.   Psychiatric: blunted affect      Recent vitals (if inpt):  Patient Vitals for the past 24 hrs:   BP Temp Pulse Resp SpO2   02/14/20 0645   62 15 100 %   02/14/20 0630   65 16 100 %   02/14/20 0615   64 16 100 %   02/14/20 0600   67 17 100 %   02/14/20 0545   65 16 100 %   02/14/20 0530   63 16 100 %   02/14/20 0515   67 15 100 %   02/14/20 0501   62 16 100 %   02/14/20 0445   63 17 100 %   02/14/20 0431   68 18 100 %   02/14/20 0415   65 16 100 %   02/14/20 0404  98.7 °F (37.1 °C) 66 16 100 %   02/14/20 0344   66 16 100 %   02/14/20 0342   66 16 100 %   02/14/20 0330   61 16 100 %   02/14/20 0315   65 16 100 %   02/14/20 0308   65 16 100 %   02/14/20 0257   64 16 100 %   02/14/20 0245   63 16 100 %   02/14/20 0230   63 16 100 %   02/14/20 0221   62 16 100 %   02/14/20 0213   63 16 100 %   02/14/20 0209   62 16 100 %   02/14/20 0200   70 16 100 %   02/14/20 0145   68 16 100 %   02/14/20 0130   63 16 100 %   02/14/20 0115   62 16 100 %   02/14/20 0100   62 17 100 %   02/14/20 0045   65 17 100 %   02/14/20 0030   66 15 100 %   02/14/20 0015   65 16 100 %   02/14/20 0004   67 16 100 %   02/13/20 2359   65 16 100 %   02/13/20 2345   63 17 100 %   02/13/20 2330  98.8 °F (37.1 °C) 65 16 100 %   02/13/20 2315   74 18 100 %   02/13/20 2300   92 17 100 %   02/13/20 2245   69 17 100 %   02/13/20 2230   66 16 100 %   02/13/20 2215   70 11 100 %   02/13/20 2200   75 16 100 %   02/13/20 2145   71 13 100 %   02/13/20 2130   72 17 100 %   02/13/20 2115   73 13 100 %   02/13/20 2100   74 17 100 %   02/13/20 2045   78 15 100 %   02/13/20 2030   73 16 100 %   02/13/20 2015   75 16 100 %   02/13/20 2000 118/65 99.5 °F (37.5 °C) 71 16 100 %   02/13/20 1945   81 20 100 %   02/13/20 1930   74 16 100 %   02/13/20 1915   76 16 100 %   02/13/20 1900   75 17 100 %   02/13/20 1845   76 16 100 %   02/13/20 1830   75 16 100 %   02/13/20 1801 97/54  77 11 100 %   02/13/20 1731   85 17 100 %   02/13/20 1701 106/56  74  100 %   02/13/20 1630   78 16 100 %   02/13/20 1602 107/59 98.9 °F (37.2 °C) 74 16 100 %   02/13/20 1544   75 16 100 %   02/13/20 1530   72 16 100 %   02/13/20 1502 126/59  72 16 100 %   02/13/20 1500   82 13 100 %   02/13/20 1402 111/68  79 13 100 %   02/13/20 1400   79 16 100 %   02/13/20 1324 120/70  86 29 100 %   02/13/20 1253   72 18 100 %   02/13/20 1200  98.7 °F (37.1 °C) 73 8 100 %   02/13/20 1130   82 10 100 %   02/13/20 1129   85 18 100 %   02/13/20 1100   67  100 %   02/13/20 1030   61  100 %   02/13/20 1000   63 16 100 %   02/13/20 0945   71 16 100 %   02/13/20 0930   72 8 100 %   02/13/20 0915   67 16 100 %   02/13/20 0900   69 16 100 %   02/13/20 0845   71 17 100 %   02/13/20 0830   73 16 100 %   02/13/20 0815   65 16 100 %   02/13/20 0800   65 16 100 %   02/13/20 0748   67 16 100 %   02/13/20 0745   76 16 100 %   02/13/20 0730   76 16 100 %   02/13/20 0715 119/63 98.8 °F (37.1 °C) 73 18 100 %       Labs:  Recent Labs     02/14/20  0319 02/13/20  1725   WBC 8.9  --    HGB 8.8*  --      --      --    K 3.8  --      --    CO2 29  --    BUN 14  --    CREA 0.39*  --    *  --    PTP  --  13.7   INR  --  1.0       Lab Results   Component Value Date/Time    WBC 8.9 02/14/2020 03:19 AM    HGB 8.8 (L) 02/14/2020 03:19 AM    PLATELET 862 16/73/6114 03:19 AM    Sodium 141 02/14/2020 03:19 AM    Potassium 3.8 02/14/2020 03:19 AM    Chloride 107 02/14/2020 03:19 AM    CO2 29 02/14/2020 03:19 AM    BUN 14 02/14/2020 03:19 AM    Creatinine 0.39 (L) 02/14/2020 03:19 AM    Glucose 105 (H) 02/14/2020 03:19 AM    INR 1.0 02/13/2020 05:25 PM    Troponin-I, Qt. <0.02 (L) 02/02/2020 03:58 AM       CT Results  (Last 48 hours)    None        chest X-ray      I reviewed recent labs, recent radiologic studies, and pertinent records including other doctor notes if needed. I independently reviewed radiology images for studies I described above or studies I have ordered. Admission date (for inpatients): 2/2/2020   * No surgery date entered *  Procedure(s):  OPEN TRACHEOSTOMY ICU  3103    ASSESSMENT/PLAN:  Problem List  Date Reviewed: 2/14/2020          Codes Class Noted    Cavernoma ICD-10-CM: D18.00  ICD-9-CM: 228.00  2/3/2020        Acute respiratory failure with hypoxia ([facility] [address] ICD-10-CM: J96.01  ICD-9-CM: 518.81  2/3/2020        Edema of spinal cord (HCC) ICD-10-CM: G95.19  ICD-9-CM: 336.1  2/3/2020        Acute left-sided weakness ICD-10-CM: R53.1  ICD-9-CM: 728.87  2/3/2020        * (Principal) AVM (arteriovenous malformation) spine ICD-10-CM: Q28.8  ICD-9-CM: 747.82  2/2/2020            Principal Problem:    AVM (arteriovenous malformation) spine (2/2/2020)    Active Problems:    Cavernoma (2/3/2020)      Acute respiratory failure with hypoxia ([address] (2/3/2020)      Edema of spinal cord (HCC) (2/3/2020)      Acute left-sided weakness (2/3/2020)       She has respiratory failure and neurosurgery has requested tracheostomy. Discussed tracheostomy with she and her  at the bedside. We discussed procedural risks including bleeding, infection, pneumonia, blood clots, the need for further procedures, failure to close the tracheostomy site once the tracheostomy is removed.   We discussed risks and benefits of leaving her existing endotracheal tube in place. All her questions and all her 's questions were answered. She nods consent to proceed in the morning. Her  is also in agreement with proceeding with tracheostomy. I spoke to Dr. Collette Jordan as well about the case. No significant interval changes overnight. We decided to proceed with the open tracheostomy. OR will get her soon. I discussed the procedure once again with family members and answered all their questions. a.m. Lovenox held  She is on standing vancomycin. I have personally performed a face-to-face diagnostic evaluation and management  service on this patient. I have independently seen the patient. I have independently obtained the above history from the patient/family. I have independently examined the patient with above findings. I have independently reviewed data/labs for this patient and developed the above plan of care (MDM). Signed: Dana Gonzalez.  Vicky Varner MD, FACS

## 2020-02-14 NOTE — PROGRESS NOTES
A follow up visit was made to the patient. Emotional support, spiritual presence and   prayer were provided for the patient, her father and her mother.       CAMILLE Abbott

## 2020-02-14 NOTE — ANESTHESIA POSTPROCEDURE EVALUATION
Procedure(s):  OPEN TRACHEOSTOMY. general    Anesthesia Post Evaluation      Multimodal analgesia: multimodal analgesia not used between 6 hours prior to anesthesia start to PACU discharge  Patient location during evaluation: ICU  Patient participation: complete - patient participated  Level of consciousness: sleepy but conscious  Pain management: satisfactory to patient  Airway patency: patent (trachestomy functioning well)  Anesthetic complications: no  Cardiovascular status: hemodynamically stable  Respiratory status: spontaneous ventilation  Hydration status: euvolemic  Post anesthesia nausea and vomiting:  none      Vitals Value Taken Time   BP     Temp     Pulse 79 2/14/2020  2:40 PM   Resp 27 2/14/2020  2:40 PM   SpO2 100 % 2/14/2020  2:40 PM   Vitals shown include unvalidated device data.

## 2020-02-14 NOTE — PROGRESS NOTES
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 Still with NGT for feeding. Pt has been progressing well but will most certainly require further rehabilitation in an inpt rehab center to maximize strength and endurace as well as self care and executive cogn dysxn  -if off vent by [facility] 2/17, will begine precert with insurance. Cont therapy efforts. History/Subjective/Complaint:     Patient seen and examined. Records reviewed. No pain. Nods y/n appropriately. seems down. Father at bedside telling her she will need to diet to prevent further issues. Provided education regarding risk factors and appropriate mgt    Pain 1  Pain Scale 1: Numeric (0 - 10) (02/14/20 1200)  Pain Intensity 1: 0 (02/14/20 1200)  Patient Stated Pain Goal: 0 (02/14/20 1200)  Pain Reassessment 1: Yes (02/14/20 1100)  Pain Onset 1: acute (02/14/20 0906)  Pain Location 1: Neck (02/14/20 0906)  Pain Orientation 1:  Inner (02/14/20 8556)  Pain Description 1: Aching (02/11/20 0834)  Pain Intervention(s) 1: Medication (see MAR) (02/14/20 1000)     Objective:     Vitals:  Patient Vitals for the past 8 hrs:   BP Temp Pulse Resp SpO2   02/14/20 1540   76 15 100 %   02/14/20 1400 127/74 99.8 °F (37.7 °C) 65 14 100 %   02/14/20 1300 127/73 99.4 °F (37.4 °C) 65 12 100 %   02/14/20 1200 120/68 99.8 °F (37.7 °C) 61 10 100 %   02/14/20 1131   68 12 100 %   02/14/20 1100 139/81 99.9 °F (37.7 °C) 64 16 100 %   02/14/20 1000 144/80 99.6 °F (37.6 °C) 71 10 100 %   02/14/20 0919 (!) 179/104  95     02/14/20 0900 (!) 164/93 99.1 °F (37.3 °C) 84 18 100 %   02/14/20 0839 165/81 98 °F (36.7 °C) 81 16 100 %      Intake and Output:  02/12 1901 - 02/14 0700  In: 7419.3 [I.V.:4228.3]  Out: 4730 [Urine:4730]    Allergies   Allergen Reactions    Ace Inhibitors Angioedema     Current Facility-Administered Medications   Medication Dose Route Frequency    [START ON 2/15/2020] enoxaparin (LOVENOX) injection 40 mg  40 mg SubCUTAneous Q24H    famotidine (PEPCID) tablet 20 mg  20 mg Per NG tube Q12H    ALPRAZolam (XANAX) tablet 0.25 mg  0.25 mg Oral BID    vancomycin (VANCOCIN) 1500 mg in  ml infusion  1,500 mg IntraVENous Q8H    cefepime (MAXIPIME) 2 g in 0.9% sodium chloride (MBP/ADV) 100 mL  2 g IntraVENous Q8H    metoprolol tartrate (LOPRESSOR) tablet 50 mg  50 mg Per NG tube BID    NUTRITIONAL SUPPORT ELECTROLYTE PRN ORDERS   Does Not Apply PRN    senna (SENOKOT) tablet 8.6 mg  1 Tab Per NG tube DAILY    HYDROcodone-acetaminophen (NORCO) 7.5-325 mg per tablet 1 Tab  1 Tab Oral Q6H PRN    ibuprofen (ADVIL;MOTRIN) 100 mg/5 mL oral suspension 600 mg  600 mg Per NG tube Q6H PRN    sodium chloride (NS) flush 5-40 mL  5-40 mL IntraVENous PRN    fentaNYL citrate (PF) injection 50 mcg  50 mcg IntraVENous Q2H PRN    acetaminophen (TYLENOL) tablet 650 mg  650 mg Per NG tube Q4H PRN    ondansetron (ZOFRAN) injection 4 mg  4 mg IntraVENous Q4H PRN    atorvastatin (LIPITOR) tablet 40 mg  40 mg Per NG tube QHS    sodium chloride (NS) flush 30 mL  30 mL InterCATHeter Q8H    heparin (porcine) pf 900 Units  900 Units InterCATHeter Q8H    sodium chloride (NS) flush 30 mL  30 mL InterCATHeter PRN    heparin (porcine) pf 900 Units  900 Units InterCATHeter PRN    lip protectant (BLISTEX) ointment 1 Each  1 Each Topical PRN       Physical Exam:  Awake ,alert, vcs  mild left sided weakness and small degree of neglect  fcs well  Neck supple, no LAD, no bruits; midline trach with bloody dressings  CV rrr no m  LUNGS cta b  ABD obese, ntnd bs +  EXT no edema      Incision(s)/Wound(s):    Wound Neck (Active)   Dressing Status Clean, dry, and intact 2/14/2020  8:15 AM   Dressing Type 4 x 4;Adhesive wound closure strips (Steri-Strips); Special tape (comment) 2/14/2020  8:15 AM   Number of days: 0          Functional Assessment:  Gross Assessment  AROM: Generally decreased, functional (02/13/20 1300)  PROM: Generally decreased, functional (02/13/20 1300)  Strength: Generally decreased, functional (02/13/20 1300)  Coordination: Generally decreased, functional (02/13/20 1300)  Tone: Normal (02/13/20 1300)  Sensation: Impaired (02/13/20 1300)     Gait  Speed/Linda: Slow (02/13/20 1500)  Step Length: Left shortened;Right shortened (02/13/20 1500)  Gait Abnormalities: Decreased step clearance;Trunk sway increased (02/13/20 1500)  Ambulation - Level of Assistance: Contact guard assistance;Minimal assistance (02/13/20 1500)  Distance (ft): 80 Feet (ft) (02/13/20 1500)  Assistive Device: Walker, rolling;Gait belt; Other (comment)(chair follow) (02/13/20 1500)     Bed Mobility  Rolling: Contact guard assistance (02/13/20 1500)  Supine to Sit: Minimum assistance (02/13/20 1500)  Sit to Supine: (left up in chair) (02/10/20 1009)  Scooting: Minimum assistance (02/13/20 1500)     Balance  Sitting: Impaired (02/13/20 1500)  Sitting - Static: Good (unsupported) (02/13/20 1500)  Sitting - Dynamic: Fair (occasional) (02/13/20 1500)  Standing: Impaired (02/13/20 1500)  Standing - Static: Good (02/13/20 1500)  Standing - Dynamic : Fair(+) (02/13/20 1500)                       Bed/Mat Mobility  Rolling: Contact guard assistance (02/13/20 1500)  Supine to Sit: Minimum assistance (02/13/20 1500)  Sit to Supine: (left up in chair) (02/10/20 1009)  Sit to Stand: Contact guard assistance (02/13/20 1500)  Stand to Sit: Contact guard assistance (02/13/20 1500)  Bed to Chair: Contact guard assistance (02/13/20 1500)  Scooting: Minimum assistance (02/13/20 1500)     Labs/Studies:  Recent Results (from the past 72 hour(s))   METABOLIC PANEL, BASIC    Collection Time: 02/12/20  3:23 AM   Result Value Ref Range    Sodium 141 136 - 145 mmol/L    Potassium 3.6 3.5 - 5.1 mmol/L    Chloride 106 98 - 107 mmol/L    CO2 31 21 - 32 mmol/L    Anion gap 4 (L) 7 - 16 mmol/L    Glucose 128 (H) 65 - 100 mg/dL    BUN 20 6 - 23 MG/DL    Creatinine 0.52 (L) 0.6 - 1.0 MG/DL    GFR est AA >60 >60 ml/min/1.73m2    GFR est non-AA >60 >60 ml/min/1.73m2    Calcium 8.3 8.3 - 10.4 MG/DL   CBC W/O DIFF    Collection Time: 02/12/20  3:23 AM   Result Value Ref Range    WBC 10.2 4.3 - 11.1 K/uL    RBC 3.54 (L) 4.05 - 5.2 M/uL    HGB 9.9 (L) 11.7 - 15.4 g/dL    HCT 30.7 (L) 35.8 - 46.3 %    MCV 86.7 79.6 - 97.8 FL    MCH 28.0 26.1 - 32.9 PG    MCHC 32.2 31.4 - 35.0 g/dL    RDW 12.4 11.9 - 14.6 %    PLATELET 886 983 - 166 K/uL    MPV 10.5 9.4 - 12.3 FL    ABSOLUTE NRBC 0.00 0.0 - 0.2 K/uL   POC G3    Collection Time: 02/12/20  3:58 AM   Result Value Ref Range    Device: VENT      FIO2 (POC) 50 %    pH (POC) 7.431 7.35 - 7.45      pCO2 (POC) 43.5 35 - 45 MMHG    pO2 (POC) 209 (H) 75 - 100 MMHG    HCO3 (POC) 29.0 (H) 22 - 26 MMOL/L    sO2 (POC) 100 (H) 95 - 98 %    Base excess (POC) 4 mmol/L    Mode Pressure regulated volume control      Tidal volume 450 ml    Set Rate 16 bpm    PEEP/CPAP (POC) 8 cmH2O    Allens test (POC) YES      Inspiratory Time 0.9 sec    Site LEFT RADIAL      Specimen type (POC) ARTERIAL      Performed by [place] FFTAlden     CO2, POC 30 MMOL/L    Respiratory comment: PhysicianNotified     Exhaled minute volume 7.50 L/min    COLLECT TIME 350     VANCOMYCIN, TROUGH    Collection Time: 02/13/20  1:09 AM   Result Value Ref Range    Vancomycin,trough 16.0 5 - 20 ug/mL   POC G3    Collection Time: 02/13/20  3:48 AM   Result Value Ref Range    Device: VENT      FIO2 (POC) 50 %    pH (POC) 7.406 7.35 - 7.45      pCO2 (POC) 46.9 (H) 35 - 45 MMHG    pO2 (POC) 219 (H) 75 - 100 MMHG    HCO3 (POC) 29.4 (H) 22 - 26 MMOL/L    sO2 (POC) 100 (H) 95 - 98 %    Base excess (POC) 4 mmol/L    Mode Pressure regulated volume control      Tidal volume 450 ml    Set Rate 16 bpm    PEEP/CPAP (POC) 8 cmH2O    Allens test (POC) NOT APPLICABLE      Inspiratory Time 0.9 sec    Site DRAWN FROM ARTERIAL LINE      Specimen type (POC) ARTERIAL      Performed by Nicho     CO2, POC 31 MMOL/L    Respiratory comment: NurseNotified     Exhaled minute volume 7.20 L/min    COLLECT TIME 709     METABOLIC PANEL, BASIC    Collection Time: 02/13/20  4:26 AM   Result Value Ref Range    Sodium 141 136 - 145 mmol/L    Potassium 3.7 3.5 - 5.1 mmol/L    Chloride 107 98 - 107 mmol/L    CO2 32 21 - 32 mmol/L    Anion gap 2 (L) 7 - 16 mmol/L    Glucose 117 (H) 65 - 100 mg/dL    BUN 15 6 - 23 MG/DL    Creatinine 0.52 (L) 0.6 - 1.0 MG/DL    GFR est AA >60 >60 ml/min/1.73m2    GFR est non-AA >60 >60 ml/min/1.73m2    Calcium 8.3 8.3 - 10.4 MG/DL   CBC W/O DIFF    Collection Time: 02/13/20  4:26 AM   Result Value Ref Range    WBC 9.8 4.3 - 11.1 K/uL    RBC 3.23 (L) 4.05 - 5.2 M/uL    HGB 9.1 (L) 11.7 - 15.4 g/dL    HCT 28.5 (L) 35.8 - 46.3 %    MCV 88.2 79.6 - 97.8 FL    MCH 28.2 26.1 - 32.9 PG    MCHC 31.9 31.4 - 35.0 g/dL    RDW 12.5 11.9 - 14.6 %    PLATELET 561 550 - 704 K/uL    MPV 10.5 9.4 - 12.3 FL    ABSOLUTE NRBC 0.00 0.0 - 0.2 K/uL   PROTHROMBIN TIME + INR    Collection Time: 02/13/20  5:25 PM   Result Value Ref Range    Prothrombin time 13.7 12.0 - 14.7 sec    INR 1.0     METABOLIC PANEL, BASIC    Collection Time: 02/14/20  3:19 AM   Result Value Ref Range    Sodium 141 136 - 145 mmol/L    Potassium 3.8 3.5 - 5.1 mmol/L    Chloride 107 98 - 107 mmol/L    CO2 29 21 - 32 mmol/L    Anion gap 5 (L) 7 - 16 mmol/L    Glucose 105 (H) 65 - 100 mg/dL    BUN 14 6 - 23 MG/DL    Creatinine 0.39 (L) 0.6 - 1.0 MG/DL    GFR est AA >60 >60 ml/min/1.73m2    GFR est non-AA >60 >60 ml/min/1.73m2    Calcium 8.7 8.3 - 10.4 MG/DL   CBC W/O DIFF    Collection Time: 02/14/20  3:19 AM   Result Value Ref Range    WBC 8.9 4.3 - 11.1 K/uL    RBC 3.18 (L) 4.05 - 5.2 M/uL    HGB 8.8 (L) 11.7 - 15.4 g/dL    HCT 28.2 (L) 35.8 - 46.3 %    MCV 88.7 79.6 - 97.8 FL    MCH 27.7 26.1 - 32.9 PG    MCHC 31.2 (L) 31.4 - 35.0 g/dL    RDW 12.6 11.9 - 14.6 %    PLATELET 225 343 - 759 K/uL    MPV 10.5 9.4 - 12.3 FL    ABSOLUTE NRBC 0.00 0.0 - 0.2 K/uL   POC G3    Collection Time: 02/14/20  3:20 AM   Result Value Ref Range    Device: VENT      FIO2 (POC) 50 %    pH (POC) 7.427 7.35 - 7.45      pCO2 (POC) 44.0 35 - 45 MMHG    pO2 (POC) 243 (H) 75 - 100 MMHG    HCO3 (POC) 29.0 (H) 22 - 26 MMOL/L    sO2 (POC) 100 (H) 95 - 98 %    Base excess (POC) 4 mmol/L    Mode Pressure regulated volume control      Tidal volume 450 ml    Set Rate 16 bpm    PEEP/CPAP (POC) 8 cmH2O    Allens test (POC) NOT APPLICABLE      Inspiratory Time 0.9 sec    Site DRAWN FROM ARTERIAL LINE      Specimen type (POC) ARTERIAL      Performed by Terese     CO2, POC 30 MMOL/L    Critical value read back 00:02     Respiratory comment: NurseNotified     Exhaled minute volume 7.10 L/min    COLLECT TIME 317          Assessment:     Principal Problem:    AVM (arteriovenous malformation) spine (2/2/2020)    Active Problems:    Cavernoma (2/3/2020)      Acute respiratory failure with hypoxia (HCC) (2/3/2020)      Edema of spinal cord (HCC) (2/3/2020)      Acute left-sided weakness (2/3/2020)        Plan:     Recommendations: Continue Acute Rehab Program  Coordination of rehab/medical care  Counseling of PM & R care issues management  Monitoring and management of medical conditions per plan of care/orders  Discussion with Family/Caregiver/Staff  Reviewed Therapies/Labs/Medications/Records

## 2020-02-14 NOTE — PROGRESS NOTES
Ventilator check complete; patient has a #7.5 ET tube secured at the 22 at the teeth. Patient is not sedated. Patient is able to follow commands. Breath sounds are coarse. Trachea is midline, Negative for subcutaneous air, and chest excursion is symmetric. Patient is also Negative for cyanosis and is Negative for pitting edema. All alarms are set and audible. Resuscitation bag is at the head of the bed.       Ventilator Settings  Mode FIO2 Rate Tidal Volume Pressure PEEP I:E Ratio   PRVC  50 %    450 ml  5 cm H2O  8 cm H20  1:3.13      Peak airway pressure: 18.2 cm H2O   Minute ventilation: 7.3 l/min       Justo Gonzales, RT

## 2020-02-14 NOTE — PROGRESS NOTES
Pt back from OR for tracheostomy. Site clean/intact, no signs of bleeding. Pt placed back on ventilator by RT. Expressed pain 7/10, fentanyl given PRN. Pt lethargic, calm, with excessive secretions. Slight blood tinged sputum suctioned from trachea. Family updated and brought back to bedside.

## 2020-02-15 ENCOUNTER — APPOINTMENT (OUTPATIENT)
Dept: GENERAL RADIOLOGY | Age: 45
DRG: 004 | End: 2020-02-15
Attending: NURSE PRACTITIONER
Payer: COMMERCIAL

## 2020-02-15 LAB
ANION GAP SERPL CALC-SCNC: 7 MMOL/L (ref 7–16)
ARTERIAL PATENCY WRIST A: ABNORMAL
BASE EXCESS BLD CALC-SCNC: 6 MMOL/L
BDY SITE: ABNORMAL
BUN SERPL-MCNC: 13 MG/DL (ref 6–23)
CALCIUM SERPL-MCNC: 8.5 MG/DL (ref 8.3–10.4)
CHLORIDE SERPL-SCNC: 104 MMOL/L (ref 98–107)
CO2 BLD-SCNC: 32 MMOL/L
CO2 SERPL-SCNC: 28 MMOL/L (ref 21–32)
COLLECT TIME,HTIME: 332
CREAT SERPL-MCNC: 0.43 MG/DL (ref 0.6–1)
ERYTHROCYTE [DISTWIDTH] IN BLOOD BY AUTOMATED COUNT: 12.4 % (ref 11.9–14.6)
GAS FLOW.O2 O2 DELIVERY SYS: ABNORMAL L/MIN
GLUCOSE SERPL-MCNC: 99 MG/DL (ref 65–100)
HCO3 BLD-SCNC: 30.7 MMOL/L (ref 22–26)
HCT VFR BLD AUTO: 28.5 % (ref 35.8–46.3)
HGB BLD-MCNC: 9 G/DL (ref 11.7–15.4)
MCH RBC QN AUTO: 28.2 PG (ref 26.1–32.9)
MCHC RBC AUTO-ENTMCNC: 31.6 G/DL (ref 31.4–35)
MCV RBC AUTO: 89.3 FL (ref 79.6–97.8)
NRBC # BLD: 0 K/UL (ref 0–0.2)
O2/TOTAL GAS SETTING VFR VENT: 50 %
PCO2 BLD: 43.9 MMHG (ref 35–45)
PEEP RESPIRATORY: 8 CMH2O
PH BLD: 7.45 [PH] (ref 7.35–7.45)
PLATELET # BLD AUTO: 161 K/UL (ref 150–450)
PMV BLD AUTO: 11.1 FL (ref 9.4–12.3)
PO2 BLD: 201 MMHG (ref 75–100)
POTASSIUM SERPL-SCNC: 4 MMOL/L (ref 3.5–5.1)
PRESSURE SUPPORT SETTING VENT: 10 CMH2O
RBC # BLD AUTO: 3.19 M/UL (ref 4.05–5.2)
SAO2 % BLD: 100 % (ref 95–98)
SERVICE CMNT-IMP: ABNORMAL
SERVICE CMNT-IMP: ABNORMAL
SODIUM SERPL-SCNC: 139 MMOL/L (ref 136–145)
SPECIMEN TYPE: ABNORMAL
TOTAL RESP. RATE, ITRR: 15
VENTILATION MODE VENT: ABNORMAL
WBC # BLD AUTO: 8.5 K/UL (ref 4.3–11.1)

## 2020-02-15 PROCEDURE — 74011250636 HC RX REV CODE- 250/636: Performed by: SURGERY

## 2020-02-15 PROCEDURE — 74011250636 HC RX REV CODE- 250/636: Performed by: NURSE PRACTITIONER

## 2020-02-15 PROCEDURE — 77030018836 HC SOL IRR NACL ICUM -A

## 2020-02-15 PROCEDURE — 85027 COMPLETE CBC AUTOMATED: CPT

## 2020-02-15 PROCEDURE — 74011250637 HC RX REV CODE- 250/637: Performed by: SURGERY

## 2020-02-15 PROCEDURE — 74011250637 HC RX REV CODE- 250/637: Performed by: NURSE PRACTITIONER

## 2020-02-15 PROCEDURE — 74011000258 HC RX REV CODE- 258: Performed by: SURGERY

## 2020-02-15 PROCEDURE — 71045 X-RAY EXAM CHEST 1 VIEW: CPT

## 2020-02-15 PROCEDURE — 99232 SBSQ HOSP IP/OBS MODERATE 35: CPT | Performed by: NURSE PRACTITIONER

## 2020-02-15 PROCEDURE — 94640 AIRWAY INHALATION TREATMENT: CPT

## 2020-02-15 PROCEDURE — 65610000001 HC ROOM ICU GENERAL

## 2020-02-15 PROCEDURE — 77030018798 HC PMP KT ENTRL FED COVD -A

## 2020-02-15 PROCEDURE — 80048 BASIC METABOLIC PNL TOTAL CA: CPT

## 2020-02-15 PROCEDURE — 82803 BLOOD GASES ANY COMBINATION: CPT

## 2020-02-15 RX ADMIN — Medication 1 SPRAY: at 16:18

## 2020-02-15 RX ADMIN — ACETAMINOPHEN 650 MG: 325 TABLET, FILM COATED ORAL at 05:17

## 2020-02-15 RX ADMIN — FENTANYL CITRATE 50 MCG: 50 INJECTION, SOLUTION INTRAMUSCULAR; INTRAVENOUS at 05:02

## 2020-02-15 RX ADMIN — SENNOSIDES 8.6 MG: 8.6 TABLET, FILM COATED ORAL at 08:26

## 2020-02-15 RX ADMIN — Medication 30 ML: at 22:00

## 2020-02-15 RX ADMIN — FENTANYL CITRATE 50 MCG: 50 INJECTION, SOLUTION INTRAMUSCULAR; INTRAVENOUS at 02:54

## 2020-02-15 RX ADMIN — VANCOMYCIN HYDROCHLORIDE 1500 MG: 10 INJECTION, POWDER, LYOPHILIZED, FOR SOLUTION INTRAVENOUS at 10:05

## 2020-02-15 RX ADMIN — VANCOMYCIN HYDROCHLORIDE 1500 MG: 10 INJECTION, POWDER, LYOPHILIZED, FOR SOLUTION INTRAVENOUS at 01:53

## 2020-02-15 RX ADMIN — ATORVASTATIN CALCIUM 40 MG: 40 TABLET, FILM COATED ORAL at 21:22

## 2020-02-15 RX ADMIN — FENTANYL CITRATE 50 MCG: 50 INJECTION, SOLUTION INTRAMUSCULAR; INTRAVENOUS at 09:58

## 2020-02-15 RX ADMIN — CEFEPIME 2 G: 2 INJECTION, POWDER, FOR SOLUTION INTRAVENOUS at 08:26

## 2020-02-15 RX ADMIN — METOPROLOL TARTRATE 50 MG: 50 TABLET, FILM COATED ORAL at 08:26

## 2020-02-15 RX ADMIN — ENOXAPARIN SODIUM 40 MG: 40 INJECTION SUBCUTANEOUS at 12:01

## 2020-02-15 RX ADMIN — CEFEPIME 2 G: 2 INJECTION, POWDER, FOR SOLUTION INTRAVENOUS at 00:40

## 2020-02-15 RX ADMIN — ALPRAZOLAM 0.25 MG: 0.25 TABLET ORAL at 18:17

## 2020-02-15 RX ADMIN — HYDROCODONE BITARTRATE AND ACETAMINOPHEN 1 TABLET: 7.5; 325 TABLET ORAL at 14:04

## 2020-02-15 RX ADMIN — ALPRAZOLAM 0.25 MG: 0.25 TABLET ORAL at 08:26

## 2020-02-15 RX ADMIN — SODIUM CHLORIDE 1000 ML: 900 INJECTION, SOLUTION INTRAVENOUS at 19:00

## 2020-02-15 RX ADMIN — Medication 900 UNITS: at 14:00

## 2020-02-15 RX ADMIN — FAMOTIDINE 20 MG: 20 TABLET, FILM COATED ORAL at 21:22

## 2020-02-15 RX ADMIN — FAMOTIDINE 20 MG: 20 TABLET, FILM COATED ORAL at 08:26

## 2020-02-15 RX ADMIN — FENTANYL CITRATE 50 MCG: 50 INJECTION, SOLUTION INTRAMUSCULAR; INTRAVENOUS at 16:17

## 2020-02-15 RX ADMIN — Medication 30 ML: at 14:05

## 2020-02-15 RX ADMIN — FENTANYL CITRATE 50 MCG: 50 INJECTION, SOLUTION INTRAMUSCULAR; INTRAVENOUS at 21:22

## 2020-02-15 RX ADMIN — Medication 900 UNITS: at 05:03

## 2020-02-15 RX ADMIN — ACETAMINOPHEN 650 MG: 325 TABLET, FILM COATED ORAL at 22:16

## 2020-02-15 RX ADMIN — Medication 900 UNITS: at 21:59

## 2020-02-15 RX ADMIN — CEFEPIME 2 G: 2 INJECTION, POWDER, FOR SOLUTION INTRAVENOUS at 16:18

## 2020-02-15 RX ADMIN — Medication 30 ML: at 05:03

## 2020-02-15 RX ADMIN — HYDROCODONE BITARTRATE AND ACETAMINOPHEN 1 TABLET: 7.5; 325 TABLET ORAL at 08:26

## 2020-02-15 RX ADMIN — VANCOMYCIN HYDROCHLORIDE 1500 MG: 10 INJECTION, POWDER, LYOPHILIZED, FOR SOLUTION INTRAVENOUS at 20:01

## 2020-02-15 NOTE — PROGRESS NOTES
Bedside and verbal shift report received from [facility].      Dual neuro assessment and full NIH performed at bedside.

## 2020-02-15 NOTE — PROGRESS NOTES
Ventilator check complete; patient has a #8.0 tracheostomy. Patient is sedated. Patient is able to follow commands. Breath sounds are coarse. Trachea is midline, Negative for subcutaneous air, and chest excursion is symmetric. Patient is also Negative for cyanosis and is Negative for pitting edema. All alarms are set and audible. Resuscitation bag is at the head of the bed.       Ventilator Settings  Mode FIO2 Rate Tidal Volume Pressure PEEP I:E Ratio   Pressure support  50%     10 cm H2O  8 cm H20  1:3.4      Peak airway pressure: 16.4 cm H2O   Minute ventilation: 6.2 l/min       Venkat Dick

## 2020-02-15 NOTE — PROGRESS NOTES
Progress Note    Patient: Carlos Rae MRN: [id number]  SSN: [id number]    YOB: 1975  Age: 53 y.o. Sex: female      Admit Date: 2/2/2020    LOS: 13 days     Subjective:   No acute changes. POD 1: 8.0 Trach in place  Pt complains of pain to left ear: evaluated and no obvious infection/drainage or source noted. Wean vent as tolerated.     Current Facility-Administered Medications   Medication Dose Route Frequency    enoxaparin (LOVENOX) injection 40 mg  40 mg SubCUTAneous Q24H    famotidine (PEPCID) tablet 20 mg  20 mg Per NG tube Q12H    ALPRAZolam (XANAX) tablet 0.25 mg  0.25 mg Oral BID    vancomycin (VANCOCIN) 1500 mg in  ml infusion  1,500 mg IntraVENous Q8H    cefepime (MAXIPIME) 2 g in 0.9% sodium chloride (MBP/ADV) 100 mL  2 g IntraVENous Q8H    metoprolol tartrate (LOPRESSOR) tablet 50 mg  50 mg Per NG tube BID    NUTRITIONAL SUPPORT ELECTROLYTE PRN ORDERS   Does Not Apply PRN    senna (SENOKOT) tablet 8.6 mg  1 Tab Per NG tube DAILY    HYDROcodone-acetaminophen (NORCO) 7.5-325 mg per tablet 1 Tab  1 Tab Oral Q6H PRN    ibuprofen (ADVIL;MOTRIN) 100 mg/5 mL oral suspension 600 mg  600 mg Per NG tube Q6H PRN    sodium chloride (NS) flush 5-40 mL  5-40 mL IntraVENous PRN    fentaNYL citrate (PF) injection 50 mcg  50 mcg IntraVENous Q2H PRN    acetaminophen (TYLENOL) tablet 650 mg  650 mg Per NG tube Q4H PRN    ondansetron (ZOFRAN) injection 4 mg  4 mg IntraVENous Q4H PRN    atorvastatin (LIPITOR) tablet 40 mg  40 mg Per NG tube QHS    sodium chloride (NS) flush 30 mL  30 mL InterCATHeter Q8H    heparin (porcine) pf 900 Units  900 Units InterCATHeter Q8H    sodium chloride (NS) flush 30 mL  30 mL InterCATHeter PRN    heparin (porcine) pf 900 Units  900 Units InterCATHeter PRN    lip protectant (BLISTEX) ointment 1 Each  1 Each Topical PRN       Objective:     Vitals:    02/15/20 0732 02/15/20 0800 02/15/20 0900 02/15/20 1023   BP:       Pulse: 81 69 67 61 Resp: 30 15 17 14   Temp:       SpO2: 99% 100% 100% 100%   Weight:       Height:             Intake and Output:  Current Shift: 02/15 0701 - 02/15 1900  In: 400 [I.V.:400]  Out: 140 [Urine:140]  Last 24 hr: 02/14 0701 - 02/15 0700  In: 650 [I.V.:650]  Out: 4503 [Urine:4500]       Physical Exam:   General:  Alert, cooperative. trach    Eyes:  PERRL, EOMs intact. Neck: Supple, symmetrical, trachea midline, no adenopathy, thyroid: no enlargment/tenderness/nodules, no carotid bruit and no JVD. Lungs:   Lungs CTA all fields. No wheezes noted. No distress. Heart:  Regular rate and rhythm, S1, S2 normal, no murmur, click, rub or gallop. Abdomen:   Soft, non-tender. Bowel sounds normal. No masses,  No organomegaly. Extremities: Extremities normal, atraumatic, no cyanosis or edema. Pulses: 2+ and symmetric all extremities. Skin: Skin color, texture, turgor normal. Trach intact. Neurologic: Alert and following commands, maew. Remains intubated. Neuro intact.        Lab/Data Review:    Recent Results (from the past 12 hour(s))   CBC W/O DIFF    Collection Time: 02/15/20  3:29 AM   Result Value Ref Range    WBC 8.5 4.3 - 11.1 K/uL    RBC 3.19 (L) 4.05 - 5.2 M/uL    HGB 9.0 (L) 11.7 - 15.4 g/dL    HCT 28.5 (L) 35.8 - 46.3 %    MCV 89.3 79.6 - 97.8 FL    MCH 28.2 26.1 - 32.9 PG    MCHC 31.6 31.4 - 35.0 g/dL    RDW 12.4 11.9 - 14.6 %    PLATELET 336 122 - 011 K/uL    MPV 11.1 9.4 - 12.3 FL    ABSOLUTE NRBC 0.00 0.0 - 0.2 K/uL   METABOLIC PANEL, BASIC    Collection Time: 02/15/20  3:29 AM   Result Value Ref Range    Sodium 139 136 - 145 mmol/L    Potassium 4.0 3.5 - 5.1 mmol/L    Chloride 104 98 - 107 mmol/L    CO2 28 21 - 32 mmol/L    Anion gap 7 7 - 16 mmol/L    Glucose 99 65 - 100 mg/dL    BUN 13 6 - 23 MG/DL    Creatinine 0.43 (L) 0.6 - 1.0 MG/DL    GFR est AA >60 >60 ml/min/1.73m2    GFR est non-AA >60 >60 ml/min/1.73m2    Calcium 8.5 8.3 - 10.4 MG/DL   POC G3    Collection Time: 02/15/20  3:33 AM   Result Value Ref Range    Device: VENT      FIO2 (POC) 50 %    pH (POC) 7.452 (H) 7.35 - 7.45      pCO2 (POC) 43.9 35 - 45 MMHG    pO2 (POC) 201 (H) 75 - 100 MMHG    HCO3 (POC) 30.7 (H) 22 - 26 MMOL/L    sO2 (POC) 100 (H) 95 - 98 %    Base excess (POC) 6 mmol/L    Mode VENTILATOR/SPONTANEOUS/PRESSURE SUPPORT      PEEP/CPAP (POC) 8 cmH2O    Pressure support 10 cmH2O    Allens test (POC) NOT APPLICABLE      Total resp. rate 15      Site DRAWN FROM ARTERIAL LINE      Specimen type (POC) ARTERIAL      Performed by Gina     CO2, POC 32 MMOL/L    Respiratory comment: NurseNotified     COLLECT TIME 332         Assessment/ Plan:     Principal Problem:    AVM (arteriovenous malformation) spine (2020)    Active Problems:    Cavernoma (2/3/2020)      Acute respiratory failure with hypoxia (HCC) (2/3/2020)      Edema of spinal cord ([address] (2/3/2020)      Acute left-sided weakness (2/3/2020)        NEURO:pt with cervical hemorrhage and edema secondary to vascular abnormality, cavernoma vs AVM. Pt remains intubated, but is alert and following commands this am. Cerebral angiogram yesterday with suspicious abnormal vessel around C3 area. Will repeat Cerebral angiogram and MRI brain next week. Pt is alert and following commands. R groin is CDI with no hematoma. Pulses are intact. Repeat Cerebral angiogram shows pt AV fistula at level of C3. Trach intact POD1. RESP:PS/CPAP @ 50%: AB.4/43/201. Pt POD1 trach. CDI. Pt in nad. .   CV:-160 goal. 2D Echo: EF 55-60%, Trop neg. SCD, lovenox. Metoprolol 50mg po bid. [address] ,   NEPH:bun/cr: 13/0.4  GI: Pepcid, senna. TF infusing wo difficulty. ID:TM: 100.7: pan cx sent, neg, completed 10D course of Cef/Vanc 20  LINES:RUE: PICC. Right radial virgil. Will wean vent as tolerated and plan for [facility] asap.       Signed By: Lucho Turcios NP     February 15, 2020

## 2020-02-15 NOTE — PROGRESS NOTES
Head to assessment completed on pt this PM.     Neuro: Pt is drowsy/ eyes open to voice - able to mouth orientation and write comprehensible words - oriented x 4. PERRLA 3 - 4 mm and brisk to react. LUE and LLE with weakness and altered sensation. Able to follow commands and GUTIÉRREZ purposefully. Strong cough. Resp: #8 Shiley trach - cuffed. PS/ CPAP FiO2 50%. Clear upper / diminished lower breath sounds. Copious oral secretions. Cardiac: NSR - SBP goal 100 - 160. GI: NGT - clamped. Hypoactive bowel sounds. : Jalloh - draining and patent yellow UOP. Skin: R radial ART. R PICC. Allevyn intact. SCDs present. Tattoos.  Skin CDI.

## 2020-02-16 ENCOUNTER — APPOINTMENT (OUTPATIENT)
Dept: GENERAL RADIOLOGY | Age: 45
DRG: 004 | End: 2020-02-16
Attending: NURSE PRACTITIONER
Payer: COMMERCIAL

## 2020-02-16 LAB
ANION GAP SERPL CALC-SCNC: 5 MMOL/L (ref 7–16)
ARTERIAL PATENCY WRIST A: YES
BASE EXCESS BLD CALC-SCNC: 2 MMOL/L
BDY SITE: ABNORMAL
BUN SERPL-MCNC: 12 MG/DL (ref 6–23)
CALCIUM SERPL-MCNC: 8.5 MG/DL (ref 8.3–10.4)
CHLORIDE SERPL-SCNC: 106 MMOL/L (ref 98–107)
CO2 BLD-SCNC: 28 MMOL/L
CO2 SERPL-SCNC: 29 MMOL/L (ref 21–32)
COLLECT TIME,HTIME: 605
CREAT SERPL-MCNC: 0.47 MG/DL (ref 0.6–1)
ERYTHROCYTE [DISTWIDTH] IN BLOOD BY AUTOMATED COUNT: 12.7 % (ref 11.9–14.6)
GAS FLOW.O2 O2 DELIVERY SYS: ABNORMAL L/MIN
GLUCOSE SERPL-MCNC: 129 MG/DL (ref 65–100)
HCO3 BLD-SCNC: 27 MMOL/L (ref 22–26)
HCT VFR BLD AUTO: 29.1 % (ref 35.8–46.3)
HGB BLD-MCNC: 9 G/DL (ref 11.7–15.4)
MCH RBC QN AUTO: 27.5 PG (ref 26.1–32.9)
MCHC RBC AUTO-ENTMCNC: 30.9 G/DL (ref 31.4–35)
MCV RBC AUTO: 89 FL (ref 79.6–97.8)
NRBC # BLD: 0 K/UL (ref 0–0.2)
O2/TOTAL GAS SETTING VFR VENT: 40 %
PCO2 BLD: 41.8 MMHG (ref 35–45)
PH BLD: 7.42 [PH] (ref 7.35–7.45)
PLATELET # BLD AUTO: 170 K/UL (ref 150–450)
PMV BLD AUTO: 10.5 FL (ref 9.4–12.3)
PO2 BLD: 113 MMHG (ref 75–100)
POTASSIUM SERPL-SCNC: 3.8 MMOL/L (ref 3.5–5.1)
RBC # BLD AUTO: 3.27 M/UL (ref 4.05–5.2)
SAO2 % BLD: 98 % (ref 95–98)
SERVICE CMNT-IMP: ABNORMAL
SODIUM SERPL-SCNC: 140 MMOL/L (ref 136–145)
SPECIMEN TYPE: ABNORMAL
WBC # BLD AUTO: 6.5 K/UL (ref 4.3–11.1)

## 2020-02-16 PROCEDURE — 77030018798 HC PMP KT ENTRL FED COVD -A

## 2020-02-16 PROCEDURE — 74011250636 HC RX REV CODE- 250/636: Performed by: NEUROLOGICAL SURGERY

## 2020-02-16 PROCEDURE — 77030008771 HC TU NG SALEM SUMP -A

## 2020-02-16 PROCEDURE — 77010033678 HC OXYGEN DAILY

## 2020-02-16 PROCEDURE — 97116 GAIT TRAINING THERAPY: CPT

## 2020-02-16 PROCEDURE — 77030038269 HC DRN EXT URIN PURWCK BARD -A

## 2020-02-16 PROCEDURE — 85027 COMPLETE CBC AUTOMATED: CPT

## 2020-02-16 PROCEDURE — 74011250636 HC RX REV CODE- 250/636: Performed by: SURGERY

## 2020-02-16 PROCEDURE — 71045 X-RAY EXAM CHEST 1 VIEW: CPT

## 2020-02-16 PROCEDURE — 74011000258 HC RX REV CODE- 258: Performed by: SURGERY

## 2020-02-16 PROCEDURE — 74011250637 HC RX REV CODE- 250/637: Performed by: SURGERY

## 2020-02-16 PROCEDURE — 74018 RADEX ABDOMEN 1 VIEW: CPT

## 2020-02-16 PROCEDURE — 36592 COLLECT BLOOD FROM PICC: CPT

## 2020-02-16 PROCEDURE — 74011250637 HC RX REV CODE- 250/637: Performed by: NEUROLOGICAL SURGERY

## 2020-02-16 PROCEDURE — 97530 THERAPEUTIC ACTIVITIES: CPT

## 2020-02-16 PROCEDURE — 94003 VENT MGMT INPAT SUBQ DAY: CPT

## 2020-02-16 PROCEDURE — 65610000001 HC ROOM ICU GENERAL

## 2020-02-16 PROCEDURE — 36600 WITHDRAWAL OF ARTERIAL BLOOD: CPT

## 2020-02-16 PROCEDURE — 80048 BASIC METABOLIC PNL TOTAL CA: CPT

## 2020-02-16 PROCEDURE — 77030006998

## 2020-02-16 PROCEDURE — 99232 SBSQ HOSP IP/OBS MODERATE 35: CPT | Performed by: NEUROLOGICAL SURGERY

## 2020-02-16 PROCEDURE — 82803 BLOOD GASES ANY COMBINATION: CPT

## 2020-02-16 RX ORDER — FACIAL-BODY WIPES
10 EACH TOPICAL DAILY PRN
Status: DISCONTINUED | OUTPATIENT
Start: 2020-02-16 | End: 2020-02-18 | Stop reason: HOSPADM

## 2020-02-16 RX ORDER — FUROSEMIDE 10 MG/ML
20 INJECTION INTRAMUSCULAR; INTRAVENOUS ONCE
Status: COMPLETED | OUTPATIENT
Start: 2020-02-16 | End: 2020-02-16

## 2020-02-16 RX ADMIN — METOPROLOL TARTRATE 50 MG: 50 TABLET, FILM COATED ORAL at 08:19

## 2020-02-16 RX ADMIN — Medication 30 ML: at 22:08

## 2020-02-16 RX ADMIN — FAMOTIDINE 20 MG: 20 TABLET, FILM COATED ORAL at 08:19

## 2020-02-16 RX ADMIN — Medication 30 ML: at 06:55

## 2020-02-16 RX ADMIN — FAMOTIDINE 20 MG: 20 TABLET, FILM COATED ORAL at 22:00

## 2020-02-16 RX ADMIN — ACETAMINOPHEN 650 MG: 325 TABLET, FILM COATED ORAL at 19:19

## 2020-02-16 RX ADMIN — HYDROCODONE BITARTRATE AND ACETAMINOPHEN 1 TABLET: 7.5; 325 TABLET ORAL at 08:19

## 2020-02-16 RX ADMIN — METOPROLOL TARTRATE 50 MG: 50 TABLET, FILM COATED ORAL at 17:56

## 2020-02-16 RX ADMIN — ALPRAZOLAM 0.25 MG: 0.25 TABLET ORAL at 17:56

## 2020-02-16 RX ADMIN — Medication 900 UNITS: at 14:00

## 2020-02-16 RX ADMIN — VANCOMYCIN HYDROCHLORIDE 1500 MG: 10 INJECTION, POWDER, LYOPHILIZED, FOR SOLUTION INTRAVENOUS at 11:19

## 2020-02-16 RX ADMIN — Medication 30 ML: at 14:00

## 2020-02-16 RX ADMIN — CEFEPIME 2 G: 2 INJECTION, POWDER, FOR SOLUTION INTRAVENOUS at 08:19

## 2020-02-16 RX ADMIN — Medication 900 UNITS: at 22:08

## 2020-02-16 RX ADMIN — FUROSEMIDE 20 MG: 10 INJECTION, SOLUTION INTRAMUSCULAR; INTRAVENOUS at 11:35

## 2020-02-16 RX ADMIN — VANCOMYCIN HYDROCHLORIDE 1500 MG: 10 INJECTION, POWDER, LYOPHILIZED, FOR SOLUTION INTRAVENOUS at 01:32

## 2020-02-16 RX ADMIN — ATORVASTATIN CALCIUM 40 MG: 40 TABLET, FILM COATED ORAL at 22:00

## 2020-02-16 RX ADMIN — VANCOMYCIN HYDROCHLORIDE 1500 MG: 10 INJECTION, POWDER, LYOPHILIZED, FOR SOLUTION INTRAVENOUS at 18:02

## 2020-02-16 RX ADMIN — Medication 900 UNITS: at 06:55

## 2020-02-16 RX ADMIN — CEFEPIME 2 G: 2 INJECTION, POWDER, FOR SOLUTION INTRAVENOUS at 00:09

## 2020-02-16 RX ADMIN — SENNOSIDES 8.6 MG: 8.6 TABLET, FILM COATED ORAL at 08:19

## 2020-02-16 RX ADMIN — HYDROCODONE BITARTRATE AND ACETAMINOPHEN 1 TABLET: 7.5; 325 TABLET ORAL at 00:09

## 2020-02-16 RX ADMIN — ENOXAPARIN SODIUM 40 MG: 40 INJECTION SUBCUTANEOUS at 11:35

## 2020-02-16 RX ADMIN — FENTANYL CITRATE 50 MCG: 50 INJECTION, SOLUTION INTRAMUSCULAR; INTRAVENOUS at 03:30

## 2020-02-16 RX ADMIN — FENTANYL CITRATE 50 MCG: 50 INJECTION, SOLUTION INTRAMUSCULAR; INTRAVENOUS at 19:19

## 2020-02-16 RX ADMIN — BISACODYL 10 MG: 10 SUPPOSITORY RECTAL at 20:52

## 2020-02-16 RX ADMIN — ALPRAZOLAM 0.25 MG: 0.25 TABLET ORAL at 08:19

## 2020-02-16 RX ADMIN — CEFEPIME 2 G: 2 INJECTION, POWDER, FOR SOLUTION INTRAVENOUS at 17:52

## 2020-02-16 RX ADMIN — ACETAMINOPHEN 650 MG: 325 TABLET, FILM COATED ORAL at 12:51

## 2020-02-16 NOTE — PROGRESS NOTES
Problem: Mobility Impaired (Adult and Pediatric)  Goal: *Acute Goals and Plan of Care (Insert Text)  Description  LTG:  (1.)Ms. Pamela Bergeron will move from supine to sit and sit to supine , scoot up and down and roll side to side in bed with MODIFIED INDEPENDENCE within 7 treatment day(s). (2.)Ms. Pamela Bergeron will transfer from bed to chair and chair to bed with STAND BY ASSIST using the least restrictive device within 7 treatment day(s). (3.)Ms. Pamela Bergeron will ambulate with STAND BY ASSIST for 85 feet with the least restrictive device within 7 treatment day(s). (4.)Ms. Pamela Bergeron will perform standing static and dynamic balance activities x 15 minutes with STAND BY ASSIST to improve safety within 7 treatment day(s). ________________________________________________________________________________________________      Outcome: Progressing Towards Goal     PHYSICAL THERAPY: Daily Note and PM 2/16/2020  INPATIENT: PT Visit Days : 7  Payor: [address] [facility] / Plan: [address] / Product Type: PPO /       NAME/AGE/GENDER: Dao Peter is a 53 y.o. 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 Pamela Bergeron is a 53 y.o. female in the hospital for an AVM with edema noted around the third cervical vertebrae. Per EV note, pt's SBP should be kept within 100-160 range. Patient presents in supine with 1 nurse and 1 respiratory nurse at the bedside to assist with mobility today. Patient had family present at the bedside as well. She participated in BLE AROM exercises while in supine. Supine to sit was minimal assist with good static sitting at the EOB.   Sit to stand was CGA with use of the r/walker for UE support. Nursing coordinated all the lines/monitoring and tubes to allow mobility with the r/walker. She ambulated 80' with the r/walker with CGA with nursing assisting with her O2/monitoring and lines. She needed suctioning 1/2 way through the walk and we returned to her room where she was provided her suction device. She then continued to ambulate and completed the 110' gait. She elected to return to the bedside recliner and sit up for a while and nursing assisted to get her chair set up and positioned. She was then seated in the bedside chair for comfort and was positioned back in the room and nursing was tending to reconnect her to the bedside monitoring and O2. She was very pleasant and cooperative to work with and she and her family appear pleased with her progress. PT will continue to follow and progress as indicated. This section established at most recent assessment   PROBLEM LIST (Impairments causing functional limitations):  1. Decreased Strength  2. Decreased ADL/Functional Activities  3. Decreased Transfer Abilities  4. Decreased Ambulation Ability/Technique  5. Decreased Balance  6. Decreased Activity Tolerance  7. Increased Fatigue   INTERVENTIONS PLANNED: (Benefits and precautions of physical therapy have been discussed with the patient.)  1. Balance Exercise  2. Bed Mobility  3. Family Education  4. Gait Training  5. Neuromuscular Re-education/Strengthening  6. Therapeutic Activites  7. Therapeutic Exercise/Strengthening  8. Transfer Training     TREATMENT PLAN: Frequency/Duration: 3 times a week for duration of hospital stay  Rehabilitation Potential For Stated Goals: Excellent     REHAB RECOMMENDATIONS (at time of discharge pending progress):    Placement: It is my opinion, based on this patient's performance to date, that Ms. Silas Murillo may benefit from intensive therapy at an [address] [place] [address] after discharge due to a probable need for multiple therapy disciplines and potential to make ongoing and sustainable functional improvement that is of practical value. .  Equipment:    None at this time              HISTORY:   History of Present Injury/Illness (Reason for Referral): AVM  Past Medical History/Comorbidities:   Ms. Saritha Marroquin  has no past medical history on file. Ms. Saritha Marroquin  has no past surgical history on file. Social History/Living Environment:   Home Environment: Private residence  # Steps to Enter: 2  Rails to Enter: No  One/Two Story Residence: One story  Living Alone: No  Support Systems: Family member(s)  Patient Expects to be Discharged to[de-identified] Rehabilitation facility  Current DME Used/Available at Home: None  Tub or Shower Type: Tub/Shower combination  Prior Level of Function/Work/Activity:  Lives with spouse and child. PTA independent and working for [employer]. Number of Personal Factors/Comorbidities that affect the Plan of Care: 0: LOW COMPLEXITY   EXAMINATION:   Most Recent Physical Functioning:   Gross Assessment:                  Posture:     Balance:  Sitting: Impaired  Sitting - Static: Good (unsupported)  Sitting - Dynamic: Fair (occasional)  Standing: Impaired  Standing - Static: Good;Constant support  Standing - Dynamic : Fair Bed Mobility:  Rolling: Contact guard assistance  Supine to Sit: Minimum assistance  Scooting: Contact guard assistance;Minimum assistance  Interventions: Manual cues; Safety awareness training  Wheelchair Mobility:     Transfers:  Sit to Stand: Contact guard assistance  Stand to Sit: Contact guard assistance  Stand Pivot Transfers: Contact guard assistance  Bed to Chair: Contact guard assistance  Interventions: Verbal cues; Visual cues  Gait:     Step Length: Left shortened;Right shortened  Distance (ft): 110 Feet (ft)  Assistive Device: Walker, rolling  Ambulation - Level of Assistance: Contact guard assistance      Body Structures Involved:  1. Nerves  2. Lungs  3.  Muscles Body Functions Affected:  1. Mental  2. Sensory/Pain  3. Respiratory  4. Neuromusculoskeletal  5. Movement Related Activities and Participation Affected:  1. General Tasks and Demands  2. Mobility  3. Self Care  4. Domestic Life  5. Community, Social and [place]   Number of elements that affect the Plan of Care: 4+: HIGH COMPLEXITY   CLINICAL PRESENTATION:   Presentation: Evolving clinical presentation with changing clinical characteristics: MODERATE COMPLEXITY   CLINICAL DECISION MAKIN [facility] Inpatient Short Form  How much difficulty does the patient currently have. .. Unable A Lot A Little None   1. Turning over in bed (including adjusting bedclothes, sheets and blankets)? [] 1   [x] 2   [] 3   [] 4   2. Sitting down on and standing up from a chair with arms ( e.g., wheelchair, bedside commode, etc.)   [] 1   [] 2   [x] 3   [] 4   3. Moving from lying on back to sitting on the side of the bed? [] 1   [x] 2   [] 3   [] 4   How much help from another person does the patient currently need. .. Total A Lot A Little None   4. Moving to and from a bed to a chair (including a wheelchair)? [] 1   [x] 2   [] 3   [] 4   5. Need to walk in hospital room? [] 1   [] 2   [x] 3   [] 4   6. Climbing 3-5 steps with a railing? [] 1   [x] 2   [] 3   [] 4   © , Trustees of [address] [place] [address], under license to [employer]. All rights reserved      Score:  Initial: 14 Most Recent: X (Date: -- )    Interpretation of Tool:  Represents activities that are increasingly more difficult (i.e. Bed mobility, Transfers, Gait). Medical Necessity:     · Patient demonstrates   · excellent  ·  rehab potential due to higher previous functional level. Reason for Services/Other Comments:  · Patient continues to require skilled intervention due to   · Decreased balance and functional mobility   · .    Use of outcome tool(s) and clinical judgement create a POC that gives a: Questionable prediction of patient's progress: MODERATE COMPLEXITY            TREATMENT:   (In addition to Assessment/Re-Assessment sessions the following treatments were rendered)   Pre-treatment Symptoms/Complaints:  Unable to verbalize but can indicate her needs. Pain: Initial:   Pain Intensity 1: 0  Post Session:  FLACC 1     Gait Training ( 21):  Gait training to improve and/or restore physical functioning as related to mobility, strength, balance, coordination and posture. Ambulated 110 Feet (ft) with Contact guard assistance using a Walker, rolling and minimal cuing   related to their stride length and activity pacing to promote proper body mechanics and safety. Therapeutic Exercise: (  10 minutes):  Exercises/activities including bed mobility, STS transfers, standing/sitting activities, and ambulation on level ground to improve mobility, strength, balance and coordination. Required minimal visual and verbal cues to promote proper body alignment, promote proper body posture and promote proper body mechanics. Progressed repetitions as indicated.      Date:  2/10/20 Date:  2/13/20 Date:  2/16/20   Activity/Exercise Parameters Parameters Parameters   Seated LAQ X 10 B A     Seated AP X 10 B A 1 x 20 B    Seated marching  X 10 B A     Seated hip abd/add X 10 B A     Ambulation  80 ft 110'   Heel slides in supine   10 B   Ankle pumps in supine   10 B   Hip flex/ext in suipine   10 B            Date:  2/12/20 Date:   Date:     Activity/Exercise Parameters Parameters Parameters   APs 1 x 10 B     Heel slides 1 x 5 B                                     Braces/Orthotics/Lines/Etc:   · IV  · floyd catheter  · nasogastric tube  · arterial line  · O2 Device: Endotracheal tube, Ventilator  Treatment/Session Assessment:    · Response to Treatment: See above  · Interdisciplinary Collaboration:   o Physical Therapist  o Registered Nurse  o Respiratory Therapist  · After treatment position/precautions:   o Up in chair  o Bed/Chair-wheels locked  o Call light within reach  o RN notified  o Family at bedside  · Compliance with Program/Exercises: Will assess as treatment progresses  · Recommendations/Intent for next treatment session: \"Next visit will focus on advancements to more challenging activities and reduction in assistance provided\".   Total Treatment Duration:  PT Patient Time In/Time Out  Time In: 1256  Time Out: 1527 Vicki Sorto

## 2020-02-16 NOTE — PROGRESS NOTES
Arterial line removed per Dr. Marlin Muir NP. Pressure applied for 10 minutes, no signs of bleeding, hematoma, etc. Tape/gauze dressing applied.

## 2020-02-16 NOTE — PROGRESS NOTES
Progress Note    Patient: Irving Maldonado MRN: [id number]  SSN: [id number]    YOB: 1975  Age: 53 y.o. Sex: female      Admit Date: 2/2/2020    LOS: 14 days     Subjective:   Nothing acute overnight. Steve Fresh went well. On trach collar.     Current Facility-Administered Medications   Medication Dose Route Frequency    furosemide (LASIX) injection 20 mg  20 mg IntraVENous ONCE    phenol throat spray (CHLORASEPTIC) 1 Spray  1 Spray Oral PRN    enoxaparin (LOVENOX) injection 40 mg  40 mg SubCUTAneous Q24H    famotidine (PEPCID) tablet 20 mg  20 mg Per NG tube Q12H    ALPRAZolam (XANAX) tablet 0.25 mg  0.25 mg Oral BID    vancomycin (VANCOCIN) 1500 mg in  ml infusion  1,500 mg IntraVENous Q8H    cefepime (MAXIPIME) 2 g in 0.9% sodium chloride (MBP/ADV) 100 mL  2 g IntraVENous Q8H    metoprolol tartrate (LOPRESSOR) tablet 50 mg  50 mg Per NG tube BID    NUTRITIONAL SUPPORT ELECTROLYTE PRN ORDERS   Does Not Apply PRN    senna (SENOKOT) tablet 8.6 mg  1 Tab Per NG tube DAILY    HYDROcodone-acetaminophen (NORCO) 7.5-325 mg per tablet 1 Tab  1 Tab Oral Q6H PRN    ibuprofen (ADVIL;MOTRIN) 100 mg/5 mL oral suspension 600 mg  600 mg Per NG tube Q6H PRN    sodium chloride (NS) flush 5-40 mL  5-40 mL IntraVENous PRN    fentaNYL citrate (PF) injection 50 mcg  50 mcg IntraVENous Q2H PRN    acetaminophen (TYLENOL) tablet 650 mg  650 mg Per NG tube Q4H PRN    ondansetron (ZOFRAN) injection 4 mg  4 mg IntraVENous Q4H PRN    atorvastatin (LIPITOR) tablet 40 mg  40 mg Per NG tube QHS    sodium chloride (NS) flush 30 mL  30 mL InterCATHeter Q8H    heparin (porcine) pf 900 Units  900 Units InterCATHeter Q8H    sodium chloride (NS) flush 30 mL  30 mL InterCATHeter PRN    heparin (porcine) pf 900 Units  900 Units InterCATHeter PRN    lip protectant (BLISTEX) ointment 1 Each  1 Each Topical PRN       Objective:     Vitals:    02/16/20 0731 02/16/20 0801 02/16/20 0819 02/16/20 0900   BP: 138/71 130/75    Pulse: 89 80 74 76   Resp: 21 16  17   Temp:       SpO2: 100% 100%  100%   Weight:       Height:             Intake and Output:  Current Shift: 02/16 0701 - 02/16 1900  In: 320 [I.V.:200]  Out: 150 [Urine:150]  Last 24 hr: 02/15 0701 - 02/16 0700  In: 400 [I.V.:400]  Out: 2905 [Urine:2905]    Phsical Exam:   General:  Alert, cooperative. trach    Eyes:  PERRL, EOMs intact. Neck: Supple, symmetrical, trachea midline. Lungs:   Lungs CTA all fields. No wheezes noted. No distress. Heart:  Regular rate and rhythm, S1, S2 normal, no murmur, click, rub or gallop. Abdomen:   Soft, non-tender. Bowel sounds normal. No masses,  No organomegaly. Extremities: Extremities normal, atraumatic, no cyanosis or edema. Pulses: 2+ and symmetric all extremities. Skin: Skin color, texture, turgor normal. Trach intact. Neurologic: Alert and following commands, maew. Remains intubated. Neuro intact.            Lab/Data Review:    Recent Results (from the past 12 hour(s))   CBC W/O DIFF    Collection Time: 02/16/20  5:23 AM   Result Value Ref Range    WBC 6.5 4.3 - 11.1 K/uL    RBC 3.27 (L) 4.05 - 5.2 M/uL    HGB 9.0 (L) 11.7 - 15.4 g/dL    HCT 29.1 (L) 35.8 - 46.3 %    MCV 89.0 79.6 - 97.8 FL    MCH 27.5 26.1 - 32.9 PG    MCHC 30.9 (L) 31.4 - 35.0 g/dL    RDW 12.7 11.9 - 14.6 %    PLATELET 503 543 - 355 K/uL    MPV 10.5 9.4 - 12.3 FL    ABSOLUTE NRBC 0.00 0.0 - 0.2 K/uL   METABOLIC PANEL, BASIC    Collection Time: 02/16/20  5:23 AM   Result Value Ref Range    Sodium 140 136 - 145 mmol/L    Potassium 3.8 3.5 - 5.1 mmol/L    Chloride 106 98 - 107 mmol/L    CO2 29 21 - 32 mmol/L    Anion gap 5 (L) 7 - 16 mmol/L    Glucose 129 (H) 65 - 100 mg/dL    BUN 12 6 - 23 MG/DL    Creatinine 0.47 (L) 0.6 - 1.0 MG/DL    GFR est AA >60 >60 ml/min/1.73m2    GFR est non-AA >60 >60 ml/min/1.73m2    Calcium 8.5 8.3 - 10.4 MG/DL   POC G3    Collection Time: 02/16/20  6:07 AM   Result Value Ref Range    Device: AEROSOL MASK      FIO2 (POC) 40 %    pH (POC) 7.418 7.35 - 7.45      pCO2 (POC) 41.8 35 - 45 MMHG    pO2 (POC) 113 (H) 75 - 100 MMHG    HCO3 (POC) 27.0 (H) 22 - 26 MMOL/L    sO2 (POC) 98 95 - 98 %    Base excess (POC) 2 mmol/L    Allens test (POC) YES      Site LEFT RADIAL      Specimen type (POC) ARTERIAL      Performed by Gina     CO2, POC 28 MMOL/L    COLLECT TIME 605         Assessment/ Plan:     Principal Problem:    AVM (arteriovenous malformation) spine (2020)    Active Problems:    Cavernoma (2/3/2020)      Acute respiratory failure with hypoxia (HCC) (2/3/2020)      Edema of spinal cord (HCC) (2/3/2020)      Acute left-sided weakness (2/3/2020)        NEURO: Patient with cervical hemorrhage and edema secondary to AVF. Repeat MRI C-spine which I reviewed shows Intramedullary subacute hematoma extending from C1 to the C6-C7 level with cord edema. There is also a T2 hypointense intramedullary lesion at the C3 level which is likely the AVF as seen on the angiogram. 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 I explained that the risk of trying any treatment on this lesion is very high and will likely result in her paralysis. I suggest we keep good BP control and do nothing at this time. RESP:PS/CPAP @ 50%: AB.41/42/113. On trach collar this am. Pt POD2 trach. CDI. Pt in nad. CXR this am which I reviewed showed some vascular congestion. Lasix 20mg IV x1 ordered. CV:-160 goal. 2D Echo: EF 55-60%, Trop neg. SCD, lovenox. Metoprolol 50mg po bid. HEME:HH 9/29.1,   NEPH:bun/cr: 12/0.47  GI: Pepcid, senna. TF infusing wo difficulty. ID:TM: [id number]: pan cx sent, neg, completed 10D course of Cef/Vanc 20  LINES:RUE: PICC. Right radial virgil.      Signed By: Venkatesh Bauer MD     2020

## 2020-02-17 ENCOUNTER — APPOINTMENT (OUTPATIENT)
Dept: GENERAL RADIOLOGY | Age: 45
DRG: 004 | End: 2020-02-17
Attending: NURSE PRACTITIONER
Payer: COMMERCIAL

## 2020-02-17 LAB
ANION GAP SERPL CALC-SCNC: 6 MMOL/L (ref 7–16)
BUN SERPL-MCNC: 12 MG/DL (ref 6–23)
CALCIUM SERPL-MCNC: 8.7 MG/DL (ref 8.3–10.4)
CHLORIDE SERPL-SCNC: 105 MMOL/L (ref 98–107)
CO2 SERPL-SCNC: 29 MMOL/L (ref 21–32)
CREAT SERPL-MCNC: 0.44 MG/DL (ref 0.6–1)
ERYTHROCYTE [DISTWIDTH] IN BLOOD BY AUTOMATED COUNT: 12.7 % (ref 11.9–14.6)
GLUCOSE SERPL-MCNC: 124 MG/DL (ref 65–100)
HCT VFR BLD AUTO: 30.2 % (ref 35.8–46.3)
HGB BLD-MCNC: 9.6 G/DL (ref 11.7–15.4)
MCH RBC QN AUTO: 28 PG (ref 26.1–32.9)
MCHC RBC AUTO-ENTMCNC: 31.8 G/DL (ref 31.4–35)
MCV RBC AUTO: 88 FL (ref 79.6–97.8)
NRBC # BLD: 0 K/UL (ref 0–0.2)
PLATELET # BLD AUTO: 186 K/UL (ref 150–450)
PMV BLD AUTO: 10.7 FL (ref 9.4–12.3)
POTASSIUM SERPL-SCNC: 3.4 MMOL/L (ref 3.5–5.1)
RBC # BLD AUTO: 3.43 M/UL (ref 4.05–5.2)
SODIUM SERPL-SCNC: 140 MMOL/L (ref 136–145)
WBC # BLD AUTO: 5.7 K/UL (ref 4.3–11.1)

## 2020-02-17 PROCEDURE — 65610000001 HC ROOM ICU GENERAL

## 2020-02-17 PROCEDURE — 74011250637 HC RX REV CODE- 250/637: Performed by: SURGERY

## 2020-02-17 PROCEDURE — 77010033678 HC OXYGEN DAILY

## 2020-02-17 PROCEDURE — 77030018798 HC PMP KT ENTRL FED COVD -A

## 2020-02-17 PROCEDURE — 36592 COLLECT BLOOD FROM PICC: CPT

## 2020-02-17 PROCEDURE — 85027 COMPLETE CBC AUTOMATED: CPT

## 2020-02-17 PROCEDURE — 74011250637 HC RX REV CODE- 250/637: Performed by: NEUROLOGICAL SURGERY

## 2020-02-17 PROCEDURE — 80048 BASIC METABOLIC PNL TOTAL CA: CPT

## 2020-02-17 PROCEDURE — 74011250636 HC RX REV CODE- 250/636: Performed by: SURGERY

## 2020-02-17 PROCEDURE — 74011250636 HC RX REV CODE- 250/636: Performed by: NURSE PRACTITIONER

## 2020-02-17 PROCEDURE — 71045 X-RAY EXAM CHEST 1 VIEW: CPT

## 2020-02-17 PROCEDURE — 74011000258 HC RX REV CODE- 258: Performed by: SURGERY

## 2020-02-17 PROCEDURE — 99232 SBSQ HOSP IP/OBS MODERATE 35: CPT | Performed by: NEUROLOGICAL SURGERY

## 2020-02-17 PROCEDURE — 97535 SELF CARE MNGMENT TRAINING: CPT

## 2020-02-17 PROCEDURE — 99231 SBSQ HOSP IP/OBS SF/LOW 25: CPT | Performed by: PHYSICAL MEDICINE & REHABILITATION

## 2020-02-17 PROCEDURE — 92597 ORAL SPEECH DEVICE EVAL: CPT

## 2020-02-17 RX ORDER — DIPHENHYDRAMINE HYDROCHLORIDE 50 MG/ML
50 INJECTION, SOLUTION INTRAMUSCULAR; INTRAVENOUS ONCE
Status: COMPLETED | OUTPATIENT
Start: 2020-02-17 | End: 2020-02-17

## 2020-02-17 RX ORDER — POTASSIUM CHLORIDE 14.9 MG/ML
20 INJECTION INTRAVENOUS
Status: COMPLETED | OUTPATIENT
Start: 2020-02-17 | End: 2020-02-17

## 2020-02-17 RX ORDER — METOPROLOL TARTRATE 50 MG/1
100 TABLET ORAL 2 TIMES DAILY
Status: DISCONTINUED | OUTPATIENT
Start: 2020-02-17 | End: 2020-02-18 | Stop reason: HOSPADM

## 2020-02-17 RX ADMIN — ACETAMINOPHEN 650 MG: 325 TABLET, FILM COATED ORAL at 09:30

## 2020-02-17 RX ADMIN — POTASSIUM CHLORIDE 20 MEQ: 200 INJECTION, SOLUTION INTRAVENOUS at 07:56

## 2020-02-17 RX ADMIN — ENOXAPARIN SODIUM 40 MG: 40 INJECTION SUBCUTANEOUS at 10:48

## 2020-02-17 RX ADMIN — Medication 30 ML: at 05:41

## 2020-02-17 RX ADMIN — VANCOMYCIN HYDROCHLORIDE 1500 MG: 10 INJECTION, POWDER, LYOPHILIZED, FOR SOLUTION INTRAVENOUS at 01:45

## 2020-02-17 RX ADMIN — ACETAMINOPHEN 650 MG: 325 TABLET, FILM COATED ORAL at 19:40

## 2020-02-17 RX ADMIN — VANCOMYCIN HYDROCHLORIDE 1500 MG: 10 INJECTION, POWDER, LYOPHILIZED, FOR SOLUTION INTRAVENOUS at 09:12

## 2020-02-17 RX ADMIN — FENTANYL CITRATE 50 MCG: 50 INJECTION, SOLUTION INTRAMUSCULAR; INTRAVENOUS at 00:28

## 2020-02-17 RX ADMIN — ATORVASTATIN CALCIUM 40 MG: 40 TABLET, FILM COATED ORAL at 22:16

## 2020-02-17 RX ADMIN — FAMOTIDINE 20 MG: 20 TABLET, FILM COATED ORAL at 22:16

## 2020-02-17 RX ADMIN — FAMOTIDINE 20 MG: 20 TABLET, FILM COATED ORAL at 08:15

## 2020-02-17 RX ADMIN — POTASSIUM CHLORIDE 20 MEQ: 200 INJECTION, SOLUTION INTRAVENOUS at 05:40

## 2020-02-17 RX ADMIN — CEFEPIME 2 G: 2 INJECTION, POWDER, FOR SOLUTION INTRAVENOUS at 08:15

## 2020-02-17 RX ADMIN — ALPRAZOLAM 0.25 MG: 0.25 TABLET ORAL at 08:14

## 2020-02-17 RX ADMIN — ALPRAZOLAM 0.25 MG: 0.25 TABLET ORAL at 17:51

## 2020-02-17 RX ADMIN — FENTANYL CITRATE 50 MCG: 50 INJECTION, SOLUTION INTRAMUSCULAR; INTRAVENOUS at 20:00

## 2020-02-17 RX ADMIN — Medication 900 UNITS: at 22:16

## 2020-02-17 RX ADMIN — CEFEPIME 2 G: 2 INJECTION, POWDER, FOR SOLUTION INTRAVENOUS at 01:27

## 2020-02-17 RX ADMIN — Medication 30 ML: at 22:33

## 2020-02-17 RX ADMIN — Medication 30 ML: at 13:07

## 2020-02-17 RX ADMIN — Medication 900 UNITS: at 13:07

## 2020-02-17 RX ADMIN — Medication 900 UNITS: at 05:41

## 2020-02-17 RX ADMIN — SENNOSIDES 8.6 MG: 8.6 TABLET, FILM COATED ORAL at 08:15

## 2020-02-17 RX ADMIN — HYDROCODONE BITARTRATE AND ACETAMINOPHEN 1 TABLET: 7.5; 325 TABLET ORAL at 01:45

## 2020-02-17 RX ADMIN — DIPHENHYDRAMINE HYDROCHLORIDE 50 MG: 50 INJECTION, SOLUTION INTRAMUSCULAR; INTRAVENOUS at 22:50

## 2020-02-17 RX ADMIN — HYDROCODONE BITARTRATE AND ACETAMINOPHEN 1 TABLET: 7.5; 325 TABLET ORAL at 13:37

## 2020-02-17 RX ADMIN — METOPROLOL TARTRATE 100 MG: 50 TABLET, FILM COATED ORAL at 17:51

## 2020-02-17 RX ADMIN — FENTANYL CITRATE 50 MCG: 50 INJECTION, SOLUTION INTRAMUSCULAR; INTRAVENOUS at 05:41

## 2020-02-17 RX ADMIN — FENTANYL CITRATE 50 MCG: 50 INJECTION, SOLUTION INTRAMUSCULAR; INTRAVENOUS at 15:07

## 2020-02-17 RX ADMIN — METOPROLOL TARTRATE 50 MG: 50 TABLET, FILM COATED ORAL at 08:15

## 2020-02-17 NOTE — PROGRESS NOTES
Problem: Pressure Injury - Risk of  Goal: *Prevention of pressure injury  Description  Document Bahman Scale and appropriate interventions in the flowsheet. Outcome: Progressing Towards Goal  Note: Pressure Injury Interventions:  Sensory Interventions: Assess changes in LOC, Assess need for specialty bed, Avoid rigorous massage over bony prominences, Discuss PT/OT consult with provider, Keep linens dry and wrinkle-free, Maintain/enhance activity level, Minimize linen layers, Monitor skin under medical devices, Pad between skin to skin, Turn and reposition approx. every two hours (pillows and wedges if needed)    Moisture Interventions: Absorbent underpads, Apply protective barrier, creams and emollients, Assess need for specialty bed, Check for incontinence Q2 hours and as needed, Maintain skin hydration (lotion/cream), Limit adult briefs, Minimize layers, Moisture barrier    Activity Interventions: Assess need for specialty bed, Chair cushion, PT/OT evaluation, Increase time out of bed    Mobility Interventions: Assess need for specialty bed, Float heels, Pressure redistribution bed/mattress (bed type), Turn and reposition approx. every two hours(pillow and wedges)    Nutrition Interventions: Document food/fluid/supplement intake    Friction and Shear Interventions: Apply protective barrier, creams and emollients,   Problem: Falls - Risk of  Goal: *Absence of Falls  Description  Document Roselie Vela Fall Risk and appropriate interventions in the flowsheet.   Outcome: Progressing Towards Goal  Note: Fall Risk Interventions:  Mobility Interventions: Bed/chair exit alarm, Communicate number of staff needed for ambulation/transfer, Strengthening exercises (ROM-active/passive)         Medication Interventions: Assess postural VS orthostatic hypotension, Bed/chair exit alarm, Evaluate medications/consider consulting pharmacy, Patient to call before getting OOB    Elimination Interventions: Bed/chair exit alarm, Call light in reach, Patient to call for help with toileting needs, Toileting schedule/hourly rounds              Problem: Delirium Treatment  Goal: *Level of consciousness restored to baseline  Outcome: Progressing Towards Goal

## 2020-02-17 NOTE — PROGRESS NOTES
Chart reviewed as pt remains ICU. Now with trach and trach collar per MD notes. D/C POC still for [facility] according to MD notes. CM will continue to follow for any assist and d/c POC when stable.

## 2020-02-17 NOTE — PROGRESS NOTES
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 Will try PMV today. 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 admission tomorrow if medically cleared  History/Subjective/Complaint:     Patient seen and examined. Records reviewed.  Tired but no other complaints    Pain 1  Pain Scale 1: Numeric (0 - 10) (02/17/20 0715)  Pain Intensity 1: 0 (02/17/20 0715)  Patient Stated Pain Goal: 0 (02/17/20 6260)  Pain Reassessment 1: Patient resting w/respiratory rate greater than 10 (02/17/20 0621)  Pain Onset 1: acute (02/17/20 0541)  Pain Location 1: Head (02/17/20 0541)  Pain Orientation 1: Anterior (02/17/20 0541)  Pain Description 1: Aching (02/17/20 0541)  Pain Intervention(s) 1: Medication (see MAR) (02/17/20 0541)     Objective:     Vitals:  Patient Vitals for the past 8 hrs:   BP Temp Pulse Resp SpO2   02/17/20 1000 115/64  76 19 98 %   02/17/20 0930 132/77 100.1 °F (37.8 °C) 79 16 99 %   02/17/20 0916     98 %   02/17/20 0900 126/67  75 25 96 %   02/17/20 0800 127/72  72 19 97 %   02/17/20 0715 114/61 99.5 °F (37.5 °C) 90 25 96 %   02/17/20 0700 109/62  78 20 100 %   02/17/20 0641 128/70  72 19 100 %   02/17/20 0621 132/75  83 20 97 %   02/17/20 0601 125/68  72 16 100 %   02/17/20 0541 122/72  73 19 99 %   02/17/20 0521 119/71  76 18 99 %   02/17/20 0501 112/70  74 20 100 %   02/17/20 0441 145/82  95 23 100 %   02/17/20 0421 103/58  81 17 100 %   02/17/20 0401 102/60  75 19 98 %   02/17/20 0400 102/60  75 19 98 %   02/17/20 0321 110/70 99.5 °F (37.5 °C) 76 18 99 %      Intake and Output:  02/15 1901 - 02/17 0700  In: 1120 [I.V.:900]  Out: 4260 [Urine:4260]    Allergies   Allergen Reactions    Ace Inhibitors Angioedema     Current Facility-Administered Medications   Medication Dose Route Frequency    bisacodyL (DULCOLAX) suppository 10 mg  10 mg Rectal DAILY PRN    phenol throat spray (CHLORASEPTIC) 1 Spray  1 Spray Oral PRN    enoxaparin (LOVENOX) injection 40 mg  40 mg SubCUTAneous Q24H    famotidine (PEPCID) tablet 20 mg  20 mg Per NG tube Q12H    ALPRAZolam (XANAX) tablet 0.25 mg  0.25 mg Oral BID    metoprolol tartrate (LOPRESSOR) tablet 50 mg  50 mg Per NG tube BID    NUTRITIONAL SUPPORT ELECTROLYTE PRN ORDERS   Does Not Apply PRN    senna (SENOKOT) tablet 8.6 mg  1 Tab Per NG tube DAILY    HYDROcodone-acetaminophen (NORCO) 7.5-325 mg per tablet 1 Tab  1 Tab Oral Q6H PRN    ibuprofen (ADVIL;MOTRIN) 100 mg/5 mL oral suspension 600 mg  600 mg Per NG tube Q6H PRN    sodium chloride (NS) flush 5-40 mL  5-40 mL IntraVENous PRN    fentaNYL citrate (PF) injection 50 mcg  50 mcg IntraVENous Q2H PRN    acetaminophen (TYLENOL) tablet 650 mg  650 mg Per NG tube Q4H PRN    ondansetron (ZOFRAN) injection 4 mg  4 mg IntraVENous Q4H PRN    atorvastatin (LIPITOR) tablet 40 mg  40 mg Per NG tube QHS    sodium chloride (NS) flush 30 mL  30 mL InterCATHeter Q8H    heparin (porcine) pf 900 Units  900 Units InterCATHeter Q8H    sodium chloride (NS) flush 30 mL  30 mL InterCATHeter PRN    heparin (porcine) pf 900 Units  900 Units InterCATHeter PRN    lip protectant (BLISTEX) ointment 1 Each  1 Each Topical PRN       Physical Exam:  Alert , sitting up in chair working with OT  GUTIÉRREZ with prox left sided weakness,  near symm  Oriented   PERRLA, EOMI  CV rrr no m  LUNGs CTA b  ABD soft NTND bs +  EXT; no edema    Incision(s)/Wound(s):    Wound Neck (Active)   Dressing Status Clean, dry, and intact 2/17/2020  7:15 AM   Dressing Type 4 x 4;Adhesive wound closure strips (Steri-Strips); Special tape (comment) 2/16/2020  7:01 AM   Number of days: 3          Functional Assessment:  Gross Assessment  AROM: Generally decreased, functional (02/13/20 1300)  PROM: Generally decreased, functional (02/13/20 1300)  Strength: Generally decreased, functional (02/13/20 1300)  Coordination: Generally decreased, functional (02/13/20 1300)  Tone: Normal (02/13/20 1300)  Sensation: Impaired (02/13/20 1300)     Gait  Speed/Linda: Slow (02/13/20 1500)  Step Length: Left shortened;Right shortened (02/16/20 1300)  Gait Abnormalities: Decreased step clearance;Trunk sway increased (02/13/20 1500)  Ambulation - Level of Assistance: Contact guard assistance (02/16/20 1300)  Distance (ft): 110 Feet (ft) (02/16/20 1300)  Assistive Device: Walker, rolling (02/16/20 1300)     Bed Mobility  Rolling: Contact guard assistance (02/16/20 1300)  Supine to Sit: Minimum assistance (02/16/20 1300)  Sit to Supine: (left up in chair) (02/10/20 1009)  Scooting: Contact guard assistance;Minimum assistance (02/16/20 1300)     Balance  Sitting: Impaired (02/16/20 1300)  Sitting - Static: Good (unsupported) (02/16/20 1300)  Sitting - Dynamic: Fair (occasional) (02/16/20 1300)  Standing: Impaired (02/16/20 1300)  Standing - Static: Good;Constant support (02/16/20 1300)  Standing - Dynamic : Fair (02/16/20 1300)                       Bed/Mat Mobility  Rolling: Contact guard assistance (02/16/20 1300)  Supine to Sit: Minimum assistance (02/16/20 1300)  Sit to Supine: (left up in chair) (02/10/20 1009)  Sit to Stand: Contact guard assistance (02/16/20 1300)  Stand to Sit: Contact guard assistance (02/16/20 1300)  Bed to Chair: Contact guard assistance (02/16/20 1300)  Scooting: Contact guard assistance;Minimum assistance (02/16/20 1300)     Labs/Studies:  Recent Results (from the past 72 hour(s))   CBC W/O DIFF    Collection Time: 02/15/20  3:29 AM   Result Value Ref Range    WBC 8.5 4.3 - 11.1 K/uL    RBC 3.19 (L) 4.05 - 5.2 M/uL    HGB 9.0 (L) 11.7 - 15.4 g/dL    HCT 28.5 (L) 35.8 - 46.3 %    MCV 89.3 79.6 - 97.8 FL    MCH 28.2 26.1 - 32.9 PG    MCHC 31.6 31.4 - 35.0 g/dL    RDW 12.4 11.9 - 14.6 %    PLATELET 458 759 - 143 K/uL    MPV 11.1 9.4 - 12.3 FL    ABSOLUTE NRBC 0.00 0.0 - 0.2 K/uL   METABOLIC PANEL, BASIC    Collection Time: 02/15/20  3:29 AM   Result Value Ref Range    Sodium 139 136 - 145 mmol/L    Potassium 4.0 3.5 - 5.1 mmol/L    Chloride 104 98 - 107 mmol/L    CO2 28 21 - 32 mmol/L    Anion gap 7 7 - 16 mmol/L    Glucose 99 65 - 100 mg/dL    BUN 13 6 - 23 MG/DL    Creatinine 0.43 (L) 0.6 - 1.0 MG/DL    GFR est AA >60 >60 ml/min/1.73m2    GFR est non-AA >60 >60 ml/min/1.73m2    Calcium 8.5 8.3 - 10.4 MG/DL   POC G3    Collection Time: 02/15/20  3:33 AM   Result Value Ref Range    Device: VENT      FIO2 (POC) 50 %    pH (POC) 7.452 (H) 7.35 - 7.45      pCO2 (POC) 43.9 35 - 45 MMHG    pO2 (POC) 201 (H) 75 - 100 MMHG    HCO3 (POC) 30.7 (H) 22 - 26 MMOL/L    sO2 (POC) 100 (H) 95 - 98 %    Base excess (POC) 6 mmol/L    Mode VENTILATOR/SPONTANEOUS/PRESSURE SUPPORT      PEEP/CPAP (POC) 8 cmH2O    Pressure support 10 cmH2O    Allens test (POC) NOT APPLICABLE      Total resp.  rate 15      Site DRAWN FROM ARTERIAL LINE      Specimen type (POC) ARTERIAL      Performed by Gina     CO2, POC 32 MMOL/L    Respiratory comment: NurseNotified     COLLECT TIME 332     CBC W/O DIFF    Collection Time: 02/16/20  5:23 AM   Result Value Ref Range    WBC 6.5 4.3 - 11.1 K/uL    RBC 3.27 (L) 4.05 - 5.2 M/uL    HGB 9.0 (L) 11.7 - 15.4 g/dL    HCT 29.1 (L) 35.8 - 46.3 %    MCV 89.0 79.6 - 97.8 FL    MCH 27.5 26.1 - 32.9 PG    MCHC 30.9 (L) 31.4 - 35.0 g/dL    RDW 12.7 11.9 - 14.6 %    PLATELET 049 158 - 588 K/uL    MPV 10.5 9.4 - 12.3 FL    ABSOLUTE NRBC 0.00 0.0 - 0.2 K/uL   METABOLIC PANEL, BASIC    Collection Time: 02/16/20  5:23 AM   Result Value Ref Range    Sodium 140 136 - 145 mmol/L    Potassium 3.8 3.5 - 5.1 mmol/L    Chloride 106 98 - 107 mmol/L    CO2 29 21 - 32 mmol/L    Anion gap 5 (L) 7 - 16 mmol/L    Glucose 129 (H) 65 - 100 mg/dL    BUN 12 6 - 23 MG/DL    Creatinine 0.47 (L) 0.6 - 1.0 MG/DL    GFR est AA >60 >60 ml/min/1.73m2    GFR est non-AA >60 >60 ml/min/1.73m2    Calcium 8.5 8.3 - 10.4 MG/DL   POC G3    Collection Time: 02/16/20  6:07 AM   Result Value Ref Range    Device: AEROSOL MASK      FIO2 (POC) 40 %    pH (POC) 7.418 7.35 - 7.45      pCO2 (POC) 41.8 35 - 45 MMHG    pO2 (POC) 113 (H) 75 - 100 MMHG    HCO3 (POC) 27.0 (H) 22 - 26 MMOL/L    sO2 (POC) 98 95 - 98 %    Base excess (POC) 2 mmol/L    Allens test (POC) YES      Site LEFT RADIAL      Specimen type (POC) ARTERIAL      Performed by Gina     CO2, POC 28 MMOL/L    COLLECT TIME 605     CBC W/O DIFF    Collection Time: 02/17/20  3:57 AM   Result Value Ref Range    WBC 5.7 4.3 - 11.1 K/uL    RBC 3.43 (L) 4.05 - 5.2 M/uL    HGB 9.6 (L) 11.7 - 15.4 g/dL    HCT 30.2 (L) 35.8 - 46.3 %    MCV 88.0 79.6 - 97.8 FL    MCH 28.0 26.1 - 32.9 PG    MCHC 31.8 31.4 - 35.0 g/dL    RDW 12.7 11.9 - 14.6 %    PLATELET 445 455 - 416 K/uL    MPV 10.7 9.4 - 12.3 FL    ABSOLUTE NRBC 0.00 0.0 - 0.2 K/uL   METABOLIC PANEL, BASIC    Collection Time: 02/17/20  3:57 AM   Result Value Ref Range    Sodium 140 136 - 145 mmol/L    Potassium 3.4 (L) 3.5 - 5.1 mmol/L    Chloride 105 98 - 107 mmol/L    CO2 29 21 - 32 mmol/L    Anion gap 6 (L) 7 - 16 mmol/L    Glucose 124 (H) 65 - 100 mg/dL    BUN 12 6 - 23 MG/DL    Creatinine 0.44 (L) 0.6 - 1.0 MG/DL    GFR est AA >60 >60 ml/min/1.73m2    GFR est non-AA >60 >60 ml/min/1.73m2    Calcium 8.7 8.3 - 10.4 MG/DL        Assessment:     Principal Problem:    AVM (arteriovenous malformation) spine (2/2/2020)    Active Problems:    Cavernoma (2/3/2020)      Acute respiratory failure with hypoxia (HCC) (2/3/2020)      Edema of spinal cord (HCC) (2/3/2020)      Acute left-sided weakness (2/3/2020)        Plan:     Recommendations: Continue Acute Rehab Program  Coordination of rehab/medical care  Counseling of PM & R care issues management  Monitoring and management of medical conditions per plan of care/orders  Discussion with Family/Caregiver/Staff  Reviewed Therapies/Labs/Medications/Records

## 2020-02-17 NOTE — PROGRESS NOTES
SPEECH PATHOLOGY NOTE:    Attempted to see patient for speaking valve trials this AM, but currently working with OT. Will follow up at later time today.      Eloise Jeong Út [address]., CCC-SLP

## 2020-02-17 NOTE — PROGRESS NOTES
Problem: Patient Education: Go to Patient Education Activity  Goal: Patient/Family Education  Description  1. Patient will complete upper body bathing and dressing with mod A and adaptive equipment as needed. 2. GOAL MET 2/17/2020  3. Patient will tolerate 25 minutes of OT treatment with 1-2 rest breaks to increase activity tolerance for ADLs. PROGRESSING 2/17/2020  4. GOAL MET 2/17/2020  5. Patient will tolerate 20 minutes functional activity while seated edge of bed unsupported with SBA and adaptive equipment as needed. 6. Patient will complete self-grooming with MOD I and adaptive equipment as needed. PROGRESSING 2/17/2020  7. Patient will tolerate 15 minutes therapeutic exercise with LUE to increase use during BADLs. 8. Patient complete self-feeding with use of LUE with min A after set-up. Timeframe: 7 visits        Outcome: Progressing Towards Goal      OCCUPATIONAL THERAPY: Daily Note and AM 2/17/2020  INPATIENT: OT Visit Days: 5  Pay[place]: [place] / [place] / Product Type: PPO /      NAME/AGE/GENDER: Baron Wilson is a 53 y.o. 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 Tatyana Amos presents in the ICU for AVM s/p angiogram. ... Father at bedside to provide background information. At baseline, pt was living with  and daughter in a 1-story home with 2 steps to enter. Pt was independent with ADLs, IADLs, functional mobility and work tasks; still drives. No hx of falls. 2/17/2020: Pt found supine upon arrival, alert and agreeable to OT treatment. Pt practiced bed mobility with Gertrudis/cueing for technique. Static sitting balance intact.  Pt practiced functional transfers for ADLs with CGA/cueing, ambulation for ADLs with CGA/RW. Standing balance good in RW, cues for RW management. Pt toileted with Gertrudis for toilet transfer, CGA elvira hygiene in standing, Gertrudis for bowel hygiene thoroughness in standing. Grooming in standing with CGA/additional time, cueing to utilize LUE for functional tasks. LUE is less coordinated with pt dropping objects from L hand. Pt had to suction secretions several times throughout session. Pt left seated in chair with call bell within reach. Pt is making progress towards goals, met to goals this date. See above. Continue OT POC. This section established at most recent assessment   PROBLEM LIST (Impairments causing functional limitations):  1. Decreased Strength  2. Decreased ADL/Functional Activities  3. Decreased Transfer Abilities  4. Decreased Ambulation Ability/Technique  5. Decreased Balance  6. Decreased Activity Tolerance   INTERVENTIONS PLANNED: (Benefits and precautions of occupational therapy have been discussed with the patient.)  1. Activities of daily living training  2. Adaptive equipment training  3. Balance training  4. Clothing management  5. Community reintergration  6. Donning&doffing training  7. Neuromuscular re-eduation  8. Re-evaluation  9. Therapeutic activity  10. Therapeutic exercise     TREATMENT PLAN: Frequency/Duration: Follow patient 3x/week to address above goals. Rehabilitation Potential For Stated Goals: Good     REHAB RECOMMENDATIONS (at time of discharge pending progress):    Placement: It is my opinion, based on this patient's performance to date, that Ms. Ival Homans may benefit from intensive therapy at an [address] [place] [address] after discharge due to a probable need for multiple therapy disciplines and potential to make ongoing and sustainable functional improvement that is of practical value. .  Equipment:    TBD              OCCUPATIONAL PROFILE AND HISTORY:   History of Present Injury/Illness (Reason for Referral):  See H&P  Past Medical History/Comorbidities:   Ms. Megan Woodard  has no past medical history on file. Ms. Megan Woodard  has no past surgical history on file. Social History/Living Environment:   Home Environment: Private residence  # Steps to Enter: 2  Rails to Enter: No  One/Two Story Residence: One story  Living Alone: No  Support Systems: Family member(s)  Patient Expects to be Discharged to[de-identified] Rehabilitation facility  Current DME Used/Available at Home: None  Tub or Shower Type: Tub/Shower combination  Prior Level of Function/Work/Activity:  Independent with ADLs, IADLs, work tasks, functional mobility and driving. Personal Factors:          Sex:  female        Age:  53 y.o. Other factors that influence how disability is experienced by the patient:  Multiple co-morbidities    Number of Personal Factors/Comorbidities that affect the Plan of Care: Expanded review of therapy/medical records (1-2):  MODERATE COMPLEXITY   ASSESSMENT OF OCCUPATIONAL PERFORMANCE[de-identified]   Activities of Daily Living:   Basic ADLs (From Assessment) Complex ADLs (From Assessment)   Feeding: Total assistance  Oral Facial Hygiene/Grooming: Maximum assistance  Bathing: Moderate assistance, Maximum assistance  Upper Body Dressing: Maximum assistance  Lower Body Dressing: Maximum assistance  Toileting: Maximum assistance Instrumental ADL  Meal Preparation: Total assistance  Homemaking:  Total assistance   Grooming/Bathing/Dressing Activities of Daily Living   Grooming  Washing Hands: Contact guard assistance  Cues: Verbal cues provided             Toileting  Bladder Hygiene: Contact guard assistance  Bowel Hygiene: Minimum assistance  Clothing Management: Contact guard assistance  Adaptive Equipment: Walker;Grab bars     Functional Transfers  Toilet Transfer : Minimum assistance     Bed/Mat Mobility  Supine to Sit: Minimum assistance  Sit to Stand: Contact guard assistance  Stand to Sit: Contact guard assistance  Bed to Chair: Contact guard assistance  Scooting: Stand-by assistance     Most Recent Physical Functioning:   Gross Assessment:                  Posture:  Posture (WDL): Exceptions to WDL  Posture Assessment: Rounded shoulders  Balance:  Sitting - Static: Good (unsupported)  Standing: Intact; With support  Standing - Static: Good;Constant support  Standing - Dynamic : Good;Constant support Bed Mobility:  Supine to Sit: Minimum assistance  Scooting: Stand-by assistance  Wheelchair Mobility:     Transfers:  Sit to Stand: Contact guard assistance  Stand to Sit: Contact guard assistance  Bed to Chair: Contact guard assistance            Patient Vitals for the past 6 hrs:   BP SpO2 O2 Flow Rate (L/min) Pulse   20 0541 122/72 99 %  73   20 0601 125/68 100 %  72   20 0621 132/75 97 %  83   20 0641 128/70 100 %  72   20 0700 109/62 100 %  78   20 0715 114/61 96 %  90   20 0800 127/72 97 %  72   20 0900 126/67 96 %  75   20 0916  98 % 6 l/min    20 0930 132/77 99 %  79   20 1000 115/64 98 %  76   20 1100 119/63 99 %  80       Mental Status  Neurologic State: Alert  Orientation Level: Oriented X4, Unable to verbalize  Cognition: Appropriate decision making, Appropriate for age attention/concentration, Follows commands  Perception: Appears intact  Perseveration: No perseveration noted  Safety/Judgement: Awareness of environment, Fall prevention                          Physical Skills Involved:  1. Balance  2. Strength  3. Activity Tolerance  4. Sensation  5. Fine Motor Control  6. Gross Motor Control Cognitive Skills Affected (resulting in the inability to perform in a timely and safe manner): 1. none Psychosocial Skills Affected:  1. Habits/Routines  2.  Environmental Adaptation   Number of elements that affect the Plan of Care: 5+:  HIGH COMPLEXITY   CLINICAL DECISION MAKIN [facility] [address] [id number] AM-PAC 6 Clicks   Daily Activity Inpatient Short Form  How much help from another person does the patient currently need. .. Total A Lot A Little None   1. Putting on and taking off regular lower body clothing? [x] 1   [] 2   [] 3   [] 4   2. Bathing (including washing, rinsing, drying)? [] 1   [x] 2   [] 3   [] 4   3. Toileting, which includes using toilet, bedpan or urinal?   [x] 1   [] 2   [] 3   [] 4   4. Putting on and taking off regular upper body clothing? [x] 1   [] 2   [] 3   [] 4   5. Taking care of personal grooming such as brushing teeth? [x] 1   [] 2   [] 3   [] 4   6. Eating meals? [x] 1   [] 2   [] 3   [] 4   © 2007, Trustees of [address] [place] [id number], under license to [employer]. All rights reserved      Score:  Initial: 7 Most Recent: X (Date: -- )    Interpretation of Tool:  Represents activities that are increasingly more difficult (i.e. Bed mobility, Transfers, Gait). Medical Necessity:     · Patient demonstrates good rehab potential due to higher previous functional level. Reason for Services/Other Comments:  · Patient continues to require skilled intervention due to medical complications and patient unable to attend/participate in therapy as expected. Use of outcome tool(s) and clinical judgement create a POC that gives a: MODERATE COMPLEXITY         TREATMENT:   (In addition to Assessment/Re-Assessment sessions the following treatments were rendered)     Pre-treatment Symptoms/Complaints:  Unable to verbalize  Pain: Initial:   Pain Intensity 1: 0  Post Session:  same   Self Care: (42 minutes): Procedure(s) (per grid) utilized to improve and/or restore self-care/home management as related to toileting and grooming. Required minimal visual, verbal and manual cueing to facilitate activities of daily living skills and compensatory activities. Pt practiced bed mobility with Gertrudis/cueing for technique. Static sitting balance intact.  Pt practiced functional transfers for ADLs with CGA/cueing, ambulation for ADLs with CGA/RW. Standing balance good in RW, cues for RW management. Pt toileted with Gertrudis for toilet transfer, CGA elvira hygiene in standing, Gertrudis for bowel hygiene thoroughness in standing. Grooming in standing with CGA/additional time, cueing to utilize LUE for functional tasks. LUE is less coordinated with pt dropping objects from L hand. Pt had to suction secretions several times throughout session     Date:  2/6/20 Date:  2/7/20 Date:     Activity/Exercise Parameters Parameters Parameters   Sit to stand weightbearing throughout L UE 4-5 reps Multiple reps with patient able to initiate on her own     Reaching back with L UE  Multiple reps with minimal assistance to find armrests    Scooting  Weight shifting side to side and using L UE to assist  Weight shifting side to side and using L UE to assist     Weightbearing  Standing through R UE on table x 12 reps      Dynamic reaching with grasp and rotation into supination  15 reps      Table Slides 12 reps with additional time to promote shoulder flexion      Balloon Punches  15 reps with pt facilitated into shoulder flexion with elbow extension      Dynamic reaching w/ B UE with facilitation for forward flexion in L UE  ~12 reps with moderate facilitation at L shoulder         Braces/Orthotics/Lines/Etc:   · IV  · nasogastric tube  · ICU monitors   · Trach collar  Treatment/Session Assessment:    · Response to Treatment:  Tolerated well with no issues noted. · Interdisciplinary Collaboration:   o Occupational Therapist  o Registered Nurse  o Physician  · After treatment position/precautions:   o Up in chair  o Bed alarm/tab alert on  o Bed/Chair-wheels locked  o Call light within reach  o RN notified   · Compliance with Program/Exercises: Compliant all of the time. · Recommendations/Intent for next treatment session: \"Next visit will focus on advancements to more challenging activities and reduction in assistance provided\".   Total Treatment Duration:  OT Patient Time In/Time Out  Time In: 1015  Time Out: [address] [place], OTR/L

## 2020-02-17 NOTE — PROGRESS NOTES
Problem: Voice Impaired (Adult)  Goal: *Acute Goals and Plan of Care (Insert Text)  Description  LTG: Patient will tolerate passy ponce speaking valve to communicate wants and needs without respiratory decline. STG: Patient will participate in ongoing passy ponce trials with ST only  STG: Patient will demonstrate independence with doning and doffing speaking valve. STG: Patient will participate in dysphagia assessment to determine appropriateness and safety with po intake      SPEECH LANGUAGE PATHOLOGY: SPEAKING VALVE Initial Assessment    NAME/AGE/GENDER: Edwin Winston is a 53 y.o. 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 Pleasant. Writing to communicate. Writing \"i'm ready to eat and drink\"     History of Present Injury/Illness: Ms. Jonathan Blum  has no past medical history on file. . She also  has no past surgical history on file. Previous Speech Therapy: NONE REPORTED    Problem List:  (Impairments causing functional limitations):  Aphonia s/p trach    Orientation:   Person  Place  Time  Situation     Pain: Pain Scale 1: Numeric (0 - 10)  Pain Intensity 1: 0  Pain Location 1: Head  Pain Intervention(s) 1: Medication (see MAR)         OBJECTIVE   Size 8cuffed. Cuff already deflated upon entry. Oral suctioning with mild to moderate oral secretions pooling in oral cavity. Mild secretions suctioned from trach. Strong cough. No change in vitals or s/sx air trapping with finger occlusion, however patient unable to voice.  Placed passy ponce speaking valve on trach hub, however patient feeling discomfort and + back pressure upon removal.     PASSY PONCE VALVE TRIALS:      Heart Rate   SPO2 RR Prior to Trial   94 98 24   After PMV placed 96 98 26   After PMV removed 96 100 26            ASSESSMENT   Patient presents with aphonia s/p tracheostomy. s/sx of air trapping as evidenced by significant +back pressure noted at trach site upon removal of speaking valve after <30 seconds. Unable to voice. Patient also reports she is not swallowing oral secretions, however concerns for pharyngeal stasis. Edema, trach size, and presence of cuff appears to be impacting tolerance at this time. Patient is not appropriate for passy kalyan speaking valve wear at this time. Anticipate improved tolerance once trach downsized. Questions answered. Will continue to follow. Tool Used: MODIFIED ANGELA SCALE (mRS)   Score   No Symptoms  [] 0   No significant disability despite symptoms; able to carry out all usual duties and activities  [] 1   Slight disability; unable to carry out all previous activities but able to look after own affairs without assistance. [] 2   Moderate disability; requiring some help but able to walk without assistance  [x] 3   Moderately severe disability; unable to walk without assistance and unable to attend to own bodily needs without assistance  [] 4   Severe disability; bedridden, incontinent, and requiring constant nursing care and attention  [] 5      Score:  Initial: 3    Interpretation of Tool: The Modified Angela Scale is a 7-point scaled used to quantify level of disability as it relates to a patient's functional abilities. Current Medications:   No current facility-administered medications on file prior to encounter. Current Outpatient Medications on File Prior to Encounter   Medication Sig Dispense Refill    valsartan (DIOVAN) 80 mg tablet Take 80 mg by mouth daily. cloNIDine HCL (CATAPRES) 0.1 mg tablet Take 0.1 mg by mouth nightly.          PLAN    FREQUENCY/DURATION: Continue to follow patient 2 times a week for duration of hospital stay to address above goals.    - Recommendations for next treatment session: Next treatment will address passy kalyan trials      REHABILITATION POTENTIAL FOR STATED GOALS: Good         RECOMMENDATIONS   STRATEGIES: pen/paper      EDUCATION:  Recommendations discussed with Nursing  Patient     RECOMMENDATIONS for CONTINUED SPEECH THERAPY: YES: Anticipate need for ongoing speech therapy during this hospitalization and at next level of care. Compliance with Program/Exercises: Will assess as treatment progresses  Continuation of Skilled Services/Medical Necessity:  Patient is expected to demonstrate progress in voice  to decrease assistance required communication, increase independence with activities of daily living, and increase communication with family/caregivers. Patient continues to require skilled intervention due to aphonia.        SAFETY:  After treatment position/precautions:  Up in chair  RN notified  Call light within reach    Total Treatment Duration:     Time In: 1220  Time Out: [address], [facility], [address] [facility]

## 2020-02-17 NOTE — PROGRESS NOTES
Nutrition F/U:  TF Management for Dr. Gilbert Enamorado, NP. Assessment:  Weight 105.8 kg (ICU bed 2/12/2020), edema - trace. The patient had a tracheotomy on 2/14 and was taken off the ventilator on 2/16. She remains TF-dependent at this time. Noted plans for ST to place a PMV. Labs are remarkable for potassium 3.4 today (noted increased uop yesterday after receiving a 20 mg dose of Lasix). Bowel movement x 2. Enteral Access:             NGT. Macronutrient Needs (admission BW 98 kg):  Estimated calorie needs - 4502-1649 kenny/day (11-14 kenny/kg/day)  Estimated protein needs - >110 gm pro/day (>2 gm pro/kgIBW/day) IBW - 54.5 kg  Max CHO/day - 172 gm CHO/day (50% kenny/day)   Fluid/day - 1.1-1.4 liters/day (1 ml/kenny/day)  Intake/Comparative Standards:  Current intake of [email or web address] with a 10 ml/hr water flush via NGT and 2 pouches ProSource twice daily via NGT with a 50 ml water flush before and after protein) provides 1360 calories/day (100% calorie goal), 120 grams protein/day (100% protein goal), 156 grams CHO/day (does not exceed max CHO limit) and 1448 ml water/day (100% fluid goal). Intervention:   Meals and Snacks: NPO. Enteral Nutrition: Continue current TF, water flushes and liquid protein until oral intake is established. Mineral Supplement Therapy: Nutrition Support Orders/Electrolyte Management Replacement Protocols are active on the MAR for potassium and magnesium only. Coordination of Nutrition Care by a Nutrition Professional: AM ICU rounds, collaboration Bro Warner RN. Nutrition Discharge Plan: Too soon to determine. Franci Ku  [phone]

## 2020-02-17 NOTE — PROGRESS NOTES
A follow up visit was made to the patient. Emotional support, spiritual presence and   prayer were provided for the patient.       CAMILLE Mendoza

## 2020-02-18 ENCOUNTER — HOSPITAL ENCOUNTER (INPATIENT)
Age: 45
LOS: 10 days | Discharge: HOME HEALTH | DRG: 299 | End: 2020-02-28
Attending: PHYSICAL MEDICINE & REHABILITATION | Admitting: PHYSICAL MEDICINE & REHABILITATION
Payer: COMMERCIAL

## 2020-02-18 DIAGNOSIS — D18.00 CAVERNOMA: ICD-10-CM

## 2020-02-18 DIAGNOSIS — J96.01 ACUTE RESPIRATORY FAILURE WITH HYPOXIA (HCC): ICD-10-CM

## 2020-02-18 DIAGNOSIS — R13.10 DYSPHAGIA, UNSPECIFIED TYPE: ICD-10-CM

## 2020-02-18 DIAGNOSIS — I67.1 CEREBROVASCULAR DURAL AV FISTULA: ICD-10-CM

## 2020-02-18 DIAGNOSIS — Q28.8 AVM (ARTERIOVENOUS MALFORMATION) SPINE: ICD-10-CM

## 2020-02-18 DIAGNOSIS — R93.0 ABNORMAL MRI SCAN, HEAD: ICD-10-CM

## 2020-02-18 DIAGNOSIS — G95.19 EDEMA OF SPINAL CORD (HCC): ICD-10-CM

## 2020-02-18 DIAGNOSIS — R53.1 ACUTE LEFT-SIDED WEAKNESS: ICD-10-CM

## 2020-02-18 LAB
ANION GAP SERPL CALC-SCNC: 6 MMOL/L (ref 7–16)
BUN SERPL-MCNC: 11 MG/DL (ref 6–23)
CALCIUM SERPL-MCNC: 8.9 MG/DL (ref 8.3–10.4)
CHLORIDE SERPL-SCNC: 106 MMOL/L (ref 98–107)
CO2 SERPL-SCNC: 30 MMOL/L (ref 21–32)
CREAT SERPL-MCNC: 0.55 MG/DL (ref 0.6–1)
ERYTHROCYTE [DISTWIDTH] IN BLOOD BY AUTOMATED COUNT: 12.8 % (ref 11.9–14.6)
FLUAV AG NPH QL IA: NEGATIVE
FLUBV AG NPH QL IA: NEGATIVE
GLUCOSE SERPL-MCNC: 120 MG/DL (ref 65–100)
HCT VFR BLD AUTO: 29.6 % (ref 35.8–46.3)
HGB BLD-MCNC: 9.3 G/DL (ref 11.7–15.4)
MCH RBC QN AUTO: 27.8 PG (ref 26.1–32.9)
MCHC RBC AUTO-ENTMCNC: 31.4 G/DL (ref 31.4–35)
MCV RBC AUTO: 88.6 FL (ref 79.6–97.8)
NRBC # BLD: 0 K/UL (ref 0–0.2)
PLATELET # BLD AUTO: 182 K/UL (ref 150–450)
PMV BLD AUTO: 10.8 FL (ref 9.4–12.3)
POTASSIUM SERPL-SCNC: 3.5 MMOL/L (ref 3.5–5.1)
RBC # BLD AUTO: 3.34 M/UL (ref 4.05–5.2)
SODIUM SERPL-SCNC: 142 MMOL/L (ref 136–145)
SPECIMEN SOURCE: NORMAL
WBC # BLD AUTO: 5 K/UL (ref 4.3–11.1)

## 2020-02-18 PROCEDURE — 74011250637 HC RX REV CODE- 250/637: Performed by: NEUROLOGICAL SURGERY

## 2020-02-18 PROCEDURE — 87804 INFLUENZA ASSAY W/OPTIC: CPT

## 2020-02-18 PROCEDURE — 97530 THERAPEUTIC ACTIVITIES: CPT

## 2020-02-18 PROCEDURE — 77030008771 HC TU NG SALEM SUMP -A

## 2020-02-18 PROCEDURE — 77030040832 HC IRR TRAY MDII -A

## 2020-02-18 PROCEDURE — 80048 BASIC METABOLIC PNL TOTAL CA: CPT

## 2020-02-18 PROCEDURE — 36415 COLL VENOUS BLD VENIPUNCTURE: CPT

## 2020-02-18 PROCEDURE — 74011250637 HC RX REV CODE- 250/637: Performed by: SURGERY

## 2020-02-18 PROCEDURE — 77010033678 HC OXYGEN DAILY

## 2020-02-18 PROCEDURE — 77030019905 HC CATH URETH INTMIT MDII -A

## 2020-02-18 PROCEDURE — 99223 1ST HOSP IP/OBS HIGH 75: CPT | Performed by: PHYSICAL MEDICINE & REHABILITATION

## 2020-02-18 PROCEDURE — 74011250636 HC RX REV CODE- 250/636: Performed by: SURGERY

## 2020-02-18 PROCEDURE — 87086 URINE CULTURE/COLONY COUNT: CPT

## 2020-02-18 PROCEDURE — 77030018798 HC PMP KT ENTRL FED COVD -A

## 2020-02-18 PROCEDURE — 85027 COMPLETE CBC AUTOMATED: CPT

## 2020-02-18 PROCEDURE — 87040 BLOOD CULTURE FOR BACTERIA: CPT

## 2020-02-18 PROCEDURE — 74011250637 HC RX REV CODE- 250/637: Performed by: PHYSICAL MEDICINE & REHABILITATION

## 2020-02-18 PROCEDURE — 74011250637 HC RX REV CODE- 250/637: Performed by: NURSE PRACTITIONER

## 2020-02-18 PROCEDURE — 65310000000 HC RM PRIVATE REHAB

## 2020-02-18 PROCEDURE — 99238 HOSP IP/OBS DSCHRG MGMT 30/<: CPT | Performed by: NURSE PRACTITIONER

## 2020-02-18 PROCEDURE — 97161 PT EVAL LOW COMPLEX 20 MIN: CPT

## 2020-02-18 PROCEDURE — 87070 CULTURE OTHR SPECIMN AEROBIC: CPT

## 2020-02-18 PROCEDURE — 74011250636 HC RX REV CODE- 250/636: Performed by: PHYSICAL MEDICINE & REHABILITATION

## 2020-02-18 RX ORDER — FAMOTIDINE 20 MG/1
20 TABLET, FILM COATED ORAL EVERY 12 HOURS
Status: DISCONTINUED | OUTPATIENT
Start: 2020-02-18 | End: 2020-02-21 | Stop reason: HOSPADM

## 2020-02-18 RX ORDER — FACIAL-BODY WIPES
10 EACH TOPICAL DAILY PRN
Status: DISCONTINUED | OUTPATIENT
Start: 2020-02-18 | End: 2020-02-21 | Stop reason: HOSPADM

## 2020-02-18 RX ORDER — ENOXAPARIN SODIUM 100 MG/ML
40 INJECTION SUBCUTANEOUS EVERY 24 HOURS
Status: CANCELLED | OUTPATIENT
Start: 2020-02-19

## 2020-02-18 RX ORDER — SODIUM CHLORIDE 0.9 % (FLUSH) 0.9 %
30 SYRINGE (ML) INJECTION EVERY 8 HOURS
Status: CANCELLED | OUTPATIENT
Start: 2020-02-18

## 2020-02-18 RX ORDER — METOPROLOL TARTRATE 100 MG/1
100 TABLET ORAL 2 TIMES DAILY
Qty: 90 TAB | Refills: 1 | Status: SHIPPED
Start: 2020-02-18 | End: 2020-02-28

## 2020-02-18 RX ORDER — TRIPROLIDINE/PSEUDOEPHEDRINE 2.5MG-60MG
600 TABLET ORAL
Status: DISCONTINUED | OUTPATIENT
Start: 2020-02-18 | End: 2020-02-21 | Stop reason: HOSPADM

## 2020-02-18 RX ORDER — TRIPROLIDINE/PSEUDOEPHEDRINE 2.5MG-60MG
600 TABLET ORAL
Status: DISCONTINUED | OUTPATIENT
Start: 2020-02-18 | End: 2020-02-18 | Stop reason: HOSPADM

## 2020-02-18 RX ORDER — FACIAL-BODY WIPES
10 EACH TOPICAL DAILY PRN
Status: CANCELLED | OUTPATIENT
Start: 2020-02-18

## 2020-02-18 RX ORDER — TRIPROLIDINE/PSEUDOEPHEDRINE 2.5MG-60MG
600 TABLET ORAL
Status: CANCELLED | OUTPATIENT
Start: 2020-02-18

## 2020-02-18 RX ORDER — ONDANSETRON 2 MG/ML
4 INJECTION INTRAMUSCULAR; INTRAVENOUS
Status: DISCONTINUED | OUTPATIENT
Start: 2020-02-18 | End: 2020-02-21 | Stop reason: HOSPADM

## 2020-02-18 RX ORDER — TRAZODONE HYDROCHLORIDE 50 MG/1
50 TABLET ORAL
Status: DISCONTINUED | OUTPATIENT
Start: 2020-02-18 | End: 2020-02-21 | Stop reason: HOSPADM

## 2020-02-18 RX ORDER — ATORVASTATIN CALCIUM 40 MG/1
40 TABLET, FILM COATED ORAL
Status: DISCONTINUED | OUTPATIENT
Start: 2020-02-18 | End: 2020-02-21 | Stop reason: HOSPADM

## 2020-02-18 RX ORDER — ONDANSETRON 2 MG/ML
4 INJECTION INTRAMUSCULAR; INTRAVENOUS
Status: CANCELLED | OUTPATIENT
Start: 2020-02-18

## 2020-02-18 RX ORDER — SODIUM CHLORIDE 0.9 % (FLUSH) 0.9 %
5-40 SYRINGE (ML) INJECTION AS NEEDED
Status: CANCELLED | OUTPATIENT
Start: 2020-02-18

## 2020-02-18 RX ORDER — HEPARIN 100 UNIT/ML
900 SYRINGE INTRAVENOUS AS NEEDED
Status: DISCONTINUED | OUTPATIENT
Start: 2020-02-18 | End: 2020-02-21 | Stop reason: HOSPADM

## 2020-02-18 RX ORDER — SODIUM CHLORIDE 0.9 % (FLUSH) 0.9 %
5-40 SYRINGE (ML) INJECTION AS NEEDED
Status: DISCONTINUED | OUTPATIENT
Start: 2020-02-18 | End: 2020-02-21 | Stop reason: HOSPADM

## 2020-02-18 RX ORDER — ATORVASTATIN CALCIUM 40 MG/1
40 TABLET, FILM COATED ORAL
Qty: 90 TAB | Refills: 3 | Status: ON HOLD
Start: 2020-02-18 | End: 2020-02-28 | Stop reason: SDUPTHER

## 2020-02-18 RX ORDER — ALPRAZOLAM 0.25 MG/1
0.25 TABLET ORAL 2 TIMES DAILY
Status: DISCONTINUED | OUTPATIENT
Start: 2020-02-18 | End: 2020-02-19

## 2020-02-18 RX ORDER — FAMOTIDINE 20 MG/1
20 TABLET, FILM COATED ORAL EVERY 12 HOURS
Status: CANCELLED | OUTPATIENT
Start: 2020-02-18

## 2020-02-18 RX ORDER — HYDROCODONE BITARTRATE AND ACETAMINOPHEN 7.5; 325 MG/1; MG/1
1 TABLET ORAL
Status: DISCONTINUED | OUTPATIENT
Start: 2020-02-18 | End: 2020-02-18

## 2020-02-18 RX ORDER — METOPROLOL TARTRATE 50 MG/1
100 TABLET ORAL 2 TIMES DAILY
Status: CANCELLED | OUTPATIENT
Start: 2020-02-18

## 2020-02-18 RX ORDER — HEPARIN 100 UNIT/ML
900 SYRINGE INTRAVENOUS EVERY 8 HOURS
Status: CANCELLED | OUTPATIENT
Start: 2020-02-18

## 2020-02-18 RX ORDER — HYDROCODONE BITARTRATE AND ACETAMINOPHEN 7.5; 325 MG/1; MG/1
1 TABLET ORAL
Status: CANCELLED | OUTPATIENT
Start: 2020-02-18

## 2020-02-18 RX ORDER — SODIUM CHLORIDE 0.9 % (FLUSH) 0.9 %
30 SYRINGE (ML) INJECTION EVERY 8 HOURS
Status: DISCONTINUED | OUTPATIENT
Start: 2020-02-18 | End: 2020-02-21 | Stop reason: HOSPADM

## 2020-02-18 RX ORDER — ACETAMINOPHEN 325 MG/1
650 TABLET ORAL
Status: CANCELLED | OUTPATIENT
Start: 2020-02-18

## 2020-02-18 RX ORDER — HEPARIN 100 UNIT/ML
900 SYRINGE INTRAVENOUS EVERY 8 HOURS
Status: DISCONTINUED | OUTPATIENT
Start: 2020-02-18 | End: 2020-02-21 | Stop reason: HOSPADM

## 2020-02-18 RX ORDER — SENNOSIDES 8.6 MG/1
1 TABLET ORAL DAILY
Status: DISCONTINUED | OUTPATIENT
Start: 2020-02-19 | End: 2020-02-21 | Stop reason: HOSPADM

## 2020-02-18 RX ORDER — METOPROLOL TARTRATE 100 MG/1
100 TABLET ORAL 2 TIMES DAILY
Status: DISCONTINUED | OUTPATIENT
Start: 2020-02-18 | End: 2020-02-20

## 2020-02-18 RX ORDER — ALPRAZOLAM 0.25 MG/1
0.25 TABLET ORAL 2 TIMES DAILY
Qty: 30 TAB | Refills: 1 | Status: ON HOLD
Start: 2020-02-18 | End: 2020-02-28 | Stop reason: SDUPTHER

## 2020-02-18 RX ORDER — TRAZODONE HYDROCHLORIDE 50 MG/1
50 TABLET ORAL
Status: CANCELLED | OUTPATIENT
Start: 2020-02-18

## 2020-02-18 RX ORDER — ATORVASTATIN CALCIUM 40 MG/1
40 TABLET, FILM COATED ORAL
Status: CANCELLED | OUTPATIENT
Start: 2020-02-18

## 2020-02-18 RX ORDER — SENNOSIDES 8.6 MG/1
1 TABLET ORAL DAILY
Status: CANCELLED | OUTPATIENT
Start: 2020-02-19

## 2020-02-18 RX ORDER — HEPARIN 100 UNIT/ML
900 SYRINGE INTRAVENOUS AS NEEDED
Status: CANCELLED | OUTPATIENT
Start: 2020-02-18

## 2020-02-18 RX ORDER — ACETAMINOPHEN 325 MG/1
650 TABLET ORAL
Status: DISCONTINUED | OUTPATIENT
Start: 2020-02-18 | End: 2020-02-21 | Stop reason: HOSPADM

## 2020-02-18 RX ORDER — ENOXAPARIN SODIUM 100 MG/ML
40 INJECTION SUBCUTANEOUS EVERY 24 HOURS
Status: DISCONTINUED | OUTPATIENT
Start: 2020-02-19 | End: 2020-02-21 | Stop reason: HOSPADM

## 2020-02-18 RX ORDER — HYDROCODONE BITARTRATE AND ACETAMINOPHEN 7.5; 325 MG/1; MG/1
1 TABLET ORAL
Status: DISCONTINUED | OUTPATIENT
Start: 2020-02-18 | End: 2020-02-21 | Stop reason: HOSPADM

## 2020-02-18 RX ORDER — ALPRAZOLAM 0.25 MG/1
0.25 TABLET ORAL 2 TIMES DAILY
Status: CANCELLED | OUTPATIENT
Start: 2020-02-18

## 2020-02-18 RX ADMIN — IBUPROFEN 600 MG: 200 SUSPENSION ORAL at 08:10

## 2020-02-18 RX ADMIN — FAMOTIDINE 20 MG: 20 TABLET, FILM COATED ORAL at 20:52

## 2020-02-18 RX ADMIN — Medication 30 ML: at 20:54

## 2020-02-18 RX ADMIN — ATORVASTATIN CALCIUM 40 MG: 40 TABLET, FILM COATED ORAL at 20:52

## 2020-02-18 RX ADMIN — ALPRAZOLAM 0.25 MG: 0.25 TABLET ORAL at 08:10

## 2020-02-18 RX ADMIN — Medication 30 ML: at 16:08

## 2020-02-18 RX ADMIN — ENOXAPARIN SODIUM 40 MG: 40 INJECTION SUBCUTANEOUS at 10:29

## 2020-02-18 RX ADMIN — Medication 900 UNITS: at 05:22

## 2020-02-18 RX ADMIN — FENTANYL CITRATE 50 MCG: 50 INJECTION, SOLUTION INTRAMUSCULAR; INTRAVENOUS at 04:21

## 2020-02-18 RX ADMIN — SENNOSIDES 8.6 MG: 8.6 TABLET, FILM COATED ORAL at 08:10

## 2020-02-18 RX ADMIN — ACETAMINOPHEN 650 MG: 325 TABLET, FILM COATED ORAL at 00:44

## 2020-02-18 RX ADMIN — ALPRAZOLAM 0.25 MG: 0.25 TABLET ORAL at 18:12

## 2020-02-18 RX ADMIN — Medication 30 ML: at 05:22

## 2020-02-18 RX ADMIN — TRAZODONE HYDROCHLORIDE 50 MG: 50 TABLET ORAL at 20:53

## 2020-02-18 RX ADMIN — HEPARIN SODIUM (PORCINE) LOCK FLUSH IV SOLN 100 UNIT/ML 900 UNITS: 100 SOLUTION at 16:07

## 2020-02-18 RX ADMIN — HYDROCODONE BITARTRATE AND ACETAMINOPHEN 1 TABLET: 7.5; 325 TABLET ORAL at 22:56

## 2020-02-18 RX ADMIN — METOPROLOL TARTRATE 100 MG: 100 TABLET ORAL at 18:12

## 2020-02-18 RX ADMIN — FENTANYL CITRATE 50 MCG: 50 INJECTION, SOLUTION INTRAMUSCULAR; INTRAVENOUS at 01:41

## 2020-02-18 RX ADMIN — FENTANYL CITRATE 50 MCG: 50 INJECTION, SOLUTION INTRAMUSCULAR; INTRAVENOUS at 11:48

## 2020-02-18 RX ADMIN — FAMOTIDINE 20 MG: 20 TABLET, FILM COATED ORAL at 08:10

## 2020-02-18 RX ADMIN — HEPARIN SODIUM (PORCINE) LOCK FLUSH IV SOLN 100 UNIT/ML 900 UNITS: 100 SOLUTION at 20:54

## 2020-02-18 RX ADMIN — METOPROLOL TARTRATE 100 MG: 50 TABLET, FILM COATED ORAL at 08:10

## 2020-02-18 NOTE — PROGRESS NOTES
Problem: Mobility Impaired (Adult and Pediatric)  Goal: *Acute Goals and Plan of Care (Insert Text)  Description  LTG:  (1.)Ms. Lisa Ross will move from supine to sit and sit to supine , scoot up and down and roll side to side in bed with MODIFIED INDEPENDENCE within 7 treatment day(s). (2.)Ms. Lisa Ross will transfer from bed to chair and chair to bed with STAND BY ASSIST using the least restrictive device within 7 treatment day(s). (3.)Ms. Lisa Ross will ambulate with STAND BY ASSIST for 85 feet with the least restrictive device within 7 treatment day(s). Goal met 2/18/20  (4.)Ms. Lisa Ross will perform standing static and dynamic balance activities x 15 minutes with STAND BY ASSIST to improve safety within 7 treatment day(s). ________________________________________________________________________________________________      Outcome: Progressing Towards Goal     PHYSICAL THERAPY: Daily Note and AM 2/18/2020  INPATIENT: PT Visit Days : 1  Payor: [facility] Louie Keita / Plan: [address] / Product Type: PPO /       NAME/AGE/GENDER: Jill Osorio is a 53 y.o. 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 Lisa Ross is a 53 y.o. female in the hospital for an AVM with edema noted around the third cervical vertebrae. Per EV note, pt's SBP should be kept within 100-160 range. Patient presents in supine with 1 staff member and family present. Bed mobility is with stand by assist with additional time. Sitting balance good edge of bed. Sit to stand with contact guard assist tot he walker.   Balance is good with stand by assist.  Gait training with rolling walker x 200 feet plus with several short standing breaks. Patient is returned to the room and sat edge of bed. Good session. Patient is making wonderful progress. She is very cooperative and pleasant to work with. Patient is looking forward to going to the 9th floor for additional skilled She will do very well. BP  633 systolic and patient well. PT will continue to follow and progress as indicated. Possible d/c to 9th floor. Will continue PT efforts. This section established at most recent assessment   PROBLEM LIST (Impairments causing functional limitations):  1. Decreased Strength  2. Decreased ADL/Functional Activities  3. Decreased Transfer Abilities  4. Decreased Ambulation Ability/Technique  5. Decreased Balance  6. Decreased Activity Tolerance  7. Increased Fatigue   INTERVENTIONS PLANNED: (Benefits and precautions of physical therapy have been discussed with the patient.)  1. Balance Exercise  2. Bed Mobility  3. Family Education  4. Gait Training  5. Neuromuscular Re-education/Strengthening  6. Therapeutic Activites  7. Therapeutic Exercise/Strengthening  8. Transfer Training     TREATMENT PLAN: Frequency/Duration: 3 times a week for duration of hospital stay  Rehabilitation Potential For Stated Goals: Excellent     REHAB RECOMMENDATIONS (at time of discharge pending progress):    Placement: It is my opinion, based on this patient's performance to date, that Ms. Silas Murillo may benefit from intensive therapy at an [address] [place] [address] after discharge due to a probable need for multiple therapy disciplines and potential to make ongoing and sustainable functional improvement that is of practical value. .  Equipment:    None at this time              HISTORY:   History of Present Injury/Illness (Reason for Referral): AVM  Past Medical History/Comorbidities:   Ms. Silas Murillo  has no past medical history on file. Ms. Silas Murillo  has no past surgical history on file.   Social History/Living Environment:   Home Environment: Private residence  # Steps to Enter: 2  Rails to [facility]: No  One/Two Story Residence: One story  Living Alone: No  Support Systems: Family member(s)  Patient Expects to be Discharged to[de-identified] Rehabilitation facility  Current DME Used/Available at Home: None  Tub or Shower Type: Tub/Shower combination  Prior Level of Function/Work/Activity:  Lives with spouse and child. PTA independent and working for [employer]. Number of Personal Factors/Comorbidities that affect the Plan of Care: 0: LOW COMPLEXITY   EXAMINATION:   Most Recent Physical Functioning:   Gross Assessment:                  Posture:     Balance:  Sitting: Intact  Sitting - Static: Good (unsupported)  Sitting - Dynamic: Fair (occasional)  Standing: Intact; With support  Standing - Static: Good  Standing - Dynamic : Fair Bed Mobility:  Rolling: Stand-by assistance  Supine to Sit: Stand-by assistance; Additional time  Scooting: Stand-by assistance  Wheelchair Mobility:     Transfers:  Sit to Stand: Contact guard assistance  Stand to Sit: Contact guard assistance  Gait:     Speed/Linda: Slow  Distance (ft): 300 Feet (ft)  Assistive Device: Walker, rolling  Ambulation - Level of Assistance: Contact guard assistance      Body Structures Involved:  1. Nerves  2. Lungs  3. Muscles Body Functions Affected:  1. Mental  2. Sensory/Pain  3. Respiratory  4. Neuromusculoskeletal  5. Movement Related Activities and Participation Affected:  1. General Tasks and Demands  2. Mobility  3. Self Care  4. Domestic Life  5. Community, Social and [address] [place] [address]   Number of elements that affect the Plan of Care: 4+: HIGH COMPLEXITY   CLINICAL PRESENTATION:   Presentation: Evolving clinical presentation with changing clinical characteristics: MODERATE COMPLEXITY   CLINICAL DECISION MAKIN [facility] Inpatient Short Form  How much difficulty does the patient currently have. .. Unable A Lot A Little None   1.   Turning over in bed (including adjusting bedclothes, sheets and blankets)? [] 1   [x] 2   [] 3   [] 4   2. Sitting down on and standing up from a chair with arms ( e.g., wheelchair, bedside commode, etc.)   [] 1   [] 2   [x] 3   [] 4   3. Moving from lying on back to sitting on the side of the bed? [] 1   [x] 2   [] 3   [] 4   How much help from another person does the patient currently need. .. Total A Lot A Little None   4. Moving to and from a bed to a chair (including a wheelchair)? [] 1   [x] 2   [] 3   [] 4   5. Need to walk in hospital room? [] 1   [] 2   [x] 3   [] 4   6. Climbing 3-5 steps with a railing? [] 1   [x] 2   [] 3   [] 4   © 2007, Trustees of [address] [place] [address], under license to [employer]. All rights reserved      Score:  Initial: 14 Most Recent: X (Date: -- )    Interpretation of Tool:  Represents activities that are increasingly more difficult (i.e. Bed mobility, Transfers, Gait). Medical Necessity:     · Patient demonstrates   · excellent  ·  rehab potential due to higher previous functional level. Reason for Services/Other Comments:  · Patient continues to require skilled intervention due to   · Decreased balance and functional mobility   · . Use of outcome tool(s) and clinical judgement create a POC that gives a: Questionable prediction of patient's progress: MODERATE COMPLEXITY            TREATMENT:   (In addition to Assessment/Re-Assessment sessions the following treatments were rendered)   Pre-treatment Symptoms/Complaints:  Using pen/paper to indicate her needs. Pain: Initial: 0/10  Pain Intensity 1: 0  Post Session:  FLACC 0/10   Therapeutic Activity: (    24 minutes): Therapeutic activities including bed mobility, transfers and gait training  to improve mobility, strength, balance and coordination. Required min assist and minimum verbal cues    to ensure safety and independence with functional mobility activities.          Gait Training ( ):  Gait training to improve and/or restore physical functioning as related to mobility, strength, balance, coordination and posture. Ambulated 300 Feet (ft) with Contact guard assistance using a Walker, rolling and minimal cuing   related to their stride length and activity pacing to promote proper body mechanics and safety. Therapeutic Exercise: (   minutes):  Exercises/activities including bed mobility, STS transfers, standing/sitting activities, and ambulation on level ground to improve mobility, strength, balance and coordination. Required minimal visual and verbal cues to promote proper body alignment, promote proper body posture and promote proper body mechanics. Progressed repetitions as indicated. Date:  2/10/20 Date:  2/13/20 Date:  2/16/20   Activity/Exercise Parameters Parameters Parameters   Seated LAQ X 10 B A     Seated AP X 10 B A 1 x 20 B    Seated marching  X 10 B A     Seated hip abd/add X 10 B A     Ambulation  80 ft 110'   Heel slides in supine   10 B   Ankle pumps in supine   10 B   Hip flex/ext in suipine   10 B            Date:  2/12/20 Date:   Date:     Activity/Exercise Parameters Parameters Parameters   APs 1 x 10 B     Heel slides 1 x 5 B                                     Braces/Orthotics/Lines/Etc:   · nasogastric tube  · arterial line  · O2 Device: Endotracheal tube, Ventilator  Treatment/Session Assessment:    · Response to Treatment: participating well increased gait distance this am  · Interdisciplinary Collaboration:   o Physical Therapy Assistant  o Registered Nurse  · After treatment position/precautions:   o Sitting edge of bed  o Bed/Chair-wheels locked  o Call light within reach  o RN notified  o Family at bedside  · Compliance with Program/Exercises: Will assess as treatment progresses  · Recommendations/Intent for next treatment session: \"Next visit will focus on advancements to more challenging activities and reduction in assistance provided\".   Total Treatment Duration:  PT Patient Time In/Time Out  Time In: 0945  Time Out: Sathya Carroll-Abraham, PTA

## 2020-02-18 NOTE — PROGRESS NOTES
TRANSFER - OUT REPORT:    Verbal report given to leanna   on Claudine Webber  being transferred to 9th floor inpatient rehab for Transfer of care  Report consisted of patients Situation, Background, Assessment and   Recommendations(SBAR). Information from the following report(s) recent vitals, labs was reviewed with the receiving nurse. Lines:   PICC Triple Lumen 85/78/45 Right;Basilic (Active)   Central Line Being Utilized Yes 2/18/2020  9:12 AM   Criteria for Appropriate Use Long term IV/antibiotic administration 2/18/2020  9:12 AM   Site Assessment Clean, dry, & intact 2/18/2020  9:12 AM   Phlebitis Assessment 0 2/18/2020  9:12 AM   Infiltration Assessment 0 2/18/2020  9:12 AM   Arm Circumference (cm) 36 cm 2/14/2020  9:26 AM   Date of Last Dressing Change 02/14/20 2/18/2020  9:12 AM   Dressing Status Clean, dry, & intact 2/18/2020  9:12 AM   External Catheter Length (cm) 0 centimeters 2/14/2020  9:26 AM   Dressing Type Disk with Chlorhexadine gluconate (CHG); Stabilization/securement device;Transparent 2/18/2020  9:12 AM   Action Taken Other (comment) 2/18/2020  9:12 AM   Hub Color/Line Status Flushed;Patent 2/18/2020  7:15 AM   Positive Blood Return (Site #1) Yes 2/18/2020  7:15 AM   Hub Color/Line Status Flushed;Patent 2/18/2020  7:15 AM   Positive Blood Return (Site #2) Yes 2/18/2020  7:15 AM   Hub Color/Line Status Flushed;Patent 2/18/2020  7:15 AM   Positive Blood Return (Site #3) Yes 2/18/2020  7:15 AM   Alcohol Cap Used No 2/18/2020  9:12 AM        Opportunity for questions and clarification was provided.       Patient transported with:  RN

## 2020-02-18 NOTE — PROGRESS NOTES
Nutrition:  Nutrition Consult for TF Management. (Dr. Denny Ramirez)  BPA for EN PTA. Assessment:  Anthropometrics:             Height: 5' 4\" (162.6 cm), wgt - 99 kg (ICU bed 2/18/2020), BMI 37.4 BMI class of obese, edema - trace all extremities. Macronutrient Needs (CBW 98 kg):  Estimated calorie needs - 2639-8091 kenny/day (15-18 kenyn/kg/day)  Estimated protein needs - 78-98 gm pro/day (0.8-1)   Max CHO/day - 265 gm CHO/day (60% kenny/day)   Fluid/day - 1.4-1.8 liters/day (1 ml/kenny/day)  Intake/Comparative Standards: The patient was TF-dependent in acute care - Promote @ 50 ml/hr with a 10 ml/hr water flush and 4 pouches ProSource daily - 1360 calories/day (100% calorie goal), 120 grams protein/day (100% protein goal), 156 grams CHO/day (does not exceed max CHO limit) and 1448 ml water/day (100% fluid goal) - adequate to meet her acute care needs. Pertinent Labs:             Potassium 3.5. GI Function/Activity:              Soft abdomen with active bowel sounds. Last bowel movement was 2/17/2020. Diet:             DIET NPO  Enteral Access:             NGT. Food/Nutrition History:             53year old obese lady with a h/o hypertension admitted with acute left sided weakness due to cervical spine hemorrhage at C3. The patient and  do not report any change in her eating habits prior to hospital admission.     Diagnosis (Nutrition): Inadequate oral intake related to NPO status as evidenced by the patient requires TF for nutrition support until able to swallow safely.     Intervention:  Meals and Snacks: NPO. Enteral Nutrition: Start combination bolus and nocturnal TF of Jevity 1.2 - 2 cartons during the day and 1 liter at night (see orders for details) - 1770 calories/day (100% calorie goal), 82 grams protein/day (100% protein goal), 249 grams CHO/day (does not exceed max CHO limit) and 1749 ml water/day (100% fluid goal).   Coordination of Nutrition Care by a Nutrition Professional: Collaboration with Ger Resendez, [facility]. Salvador Gamboa  [phone]

## 2020-02-18 NOTE — PROGRESS NOTES
Pt d/c to 906 per mitesh Snyderison, with [facility]. RN can call report to [phone]. Family agrees with POC for d/c. Care Management Interventions  PCP Verified by CM: Nirmal Curran)  Transition of Care Consult (CM Consult): Discharge Planning(Pt is employed and insured by [employer]/[facility] heat plan which includes pharmacy benefits.  )  Discharge Durable Medical Equipment: No  Physical Therapy Consult: Yes  Occupational Therapy Consult: Yes  Speech Therapy Consult: Yes  Current Support Network: Lives with Spouse  Confirm Follow Up Transport: Family  The Plan for Transition of Care is Related to the Following Treatment Goals : Pt is anticipated to needs supportive care services to return to her functional baseline.   Discharge Location  Discharge Placement: Rehab hospital/unit acute([facility])

## 2020-02-18 NOTE — H&P
HISTORY AND PHYSICAL  IRC       Admit Date: 2/18/2020  Endovascular Neurosurgeon; Dr Vee Sainz  Primary Care Physician: Arnie Harkins MD    Chief Complaint : Gait dysfunction secondary to below. 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 Date of Evaluation:  February 18, 2020    HPI: Yasemin Joiner is a 53 y.o. female patient at [address] [place] [address] who was admitted on 2/18/2020  by Levi Santizo MD with below mentioned medical history ,is being seen for Physical Medicine and Rehabilitation. Amarilys Blanco a 53 y.o. female who originally presented to [facility] in [facility] with acute left neck pain and left arm weakness and paresthesia. The pt had a CT of head which was normal, the pt was transported to [facility] ED for a MRI of brain/C-spine, she had acute resp failure/bradycardia and required intubation. The MRI of Cspine was suspicious for vascular abnormality, AVM vs Cavernoma, with hemorrhage and edema noted Cervical Spine at C2-C5 area. The pt was transferred to [address] [place] [address] Dr. Chelsie Le  via [facility] flight team to Dr. Vee Sainz and Endovascular team for further evaluation and plan of care. The pt was on propofol gtt on arrival and sedated, but it was turned off on arrival and pt was noted to wake up easily and began to follow commands. She is alert with obvious weakness on her left sided compared to R. She is able to hold RUE off bed, but unable to lift up LUE, she was able to give me a thumbs up bilat, and  with left hand. She sould move toes LLE and has tone. Pt remains intubated with 7.5 OETT, [facility] [facility].  GCS: E4, V1I, M6: 11I, NIHSS 12 ( intubated).   SBP goal 100-160 on admit with cardene gtt prn. She was placed on lipitor 40mg, .6 , NGT placed as well as A-line. ( of note; pt seen by PCP in Sept 2019 with c/o left ear pain, left arm pain and dizziness. No imaging)  Today, pt went for a cerebral angio with conscious sedation with fentanyl and versed. Findings included a questionable abnl vessel around C3 region. 2D ECHO with EF of 55-60%, no valvular abnl and no shunting. CT chest neg. Obtained due to hypoxia. Tmax 101.3, bp 109//107. Ms. Amanda Wood had cervical angiogram x2 during her admit that shows she has a AV Fistula at C3 level that is supplied by small vessels of left vertebral artery and feed into the Anterior spinal artery. The risk of treatment is very high with chances of paralysis very high, at this time the treatment will be blood pressure control and monitoring. She will follow up with Dr. Antonette Carrion in 4-6 weeks with repeat MRI C-spine. SBP control with goal SBP <160. Pt denies any paresthesia today, she is moving all extrems. She is anxious and we did discuss xanax vs possible lexapro, but she decline anti-depressant at this time. No obvious neuro changes and clear to transfer out to [facility]. pt was unable to tolerate extubation when attempted: she had trach placed on 2/14/20. She has been off the vent and on trach collar since 2/16, 30-40% O2 . Her NIHSS today 2/18 is 1 and MRS is 1. Temp: Tm 101.7 last pm. Pan cx sent and neg thus far. Pt afebrile on dc. Pt completed 10d course of Vanc/Cefe during this admit for fever- all cx neg. Physical therapy was initiated and patient was starting to mobilize. However, Patient shows significant functional deficits due to prolonged immobility and hospitalization due to cervical cavernoma and hemorrhage.    Our service was consulted for rehab needs and we recommended inpatient hospital rehabilitation is reasonable and necessary due to ongoing acute medical issues which have not changed since initial pre-admission evaluation. I reviewed the preadmission screening and have approved for admission to the [facility]. The patient was cleared for transfer to rehab today. Patient continues to have ongoing  medical issues outlined above requiring physician medical supervision and functional deficits which would benefit from continued intensive therapies. Current Level of Function: (evaluated by acute therapy staff, with bed mobility, transfers, balance personally observed post-admission in the IRF setting minutes prior to submission of document)   \"bed mobility with Gertrudis/cueing for technique. Static sitting balance intact. Pt practiced functional transfers for ADLs with CGA/cueing, ambulation for ADLs with CGA/RW. Standing balance good in RW, cues for RW management. Pt toileted with Gertrudis for toilet transfer, CGA elvira hygiene in standing, Gertrudis for bowel hygiene thoroughness in standing. Grooming in standing with CGA/additional time, cueing to utilize LUE for functional tasks. LUE is less coordinated with pt dropping objects from L hand. \"  Ambulating 110 ft with RW CGA    No past medical history on file.  Anemia    Anxiety    Breast mass 2014   left    Carpal tunnel syndrome 2012   bilateral    Diffuse cystic mastopathy of breast, left 2015    Genital herpes 2014    Herpes    Hypertension    Menorrhagia    Ovarian cyst 04/15/2015   No past surgical history on file.    Procedure Laterality Date    BREAST BIOPSY Left 1912   US core, benign    BREAST BIOPSY EXCISION Right    benign     SECTION   times 2    COLONOSCOPY 2013   [facility] - Dr. Sal Clonts REMOVAL Right   RIGHT BREAST    TONSILLECTOMY    TUBAL LIGATION 3104    UMBILICAL HERNIA REPAIR   2 times      Allergies   Allergen Reactions    Ace Inhibitors Angioedema      No family history on file.   family history includes Breast cancer in her cousin; Diabetes in an other family member; Heart disease in her maternal grandmother; Lung cancer in her mother; No Known Problems in her daughter and daughter; Prostate cancer in her father.     Social History     Tobacco Use    Smoking status: Not on file   Substance Use Topics    Alcohol use: Not on file    Home Environment: Private residence  # Steps to Enter: 2  Rails to Enter: No  One/Two Story Residence: One story  Living Alone: No  Support Systems: Family member(s)  Patient Expects to be Discharged to[de-identified] Rehabilitation facility  Current DME Used/Available at Home: None  Tub or Shower Type: Tub/Shower combination  Prior Level of Function/Work/Activity:  Independent with ADLs, IADLs, work tasks, functional mobility and driving.   Current Facility-Administered Medications   Medication Dose Route Frequency    acetaminophen (TYLENOL) tablet 650 mg  650 mg Per NG tube Q4H PRN    sodium chloride (NS) flush 5-40 mL  5-40 mL IntraVENous PRN    ALPRAZolam (XANAX) tablet 0.25 mg  0.25 mg Oral BID    atorvastatin (LIPITOR) tablet 40 mg  40 mg Per NG tube QHS    bisacodyL (DULCOLAX) suppository 10 mg  10 mg Rectal DAILY PRN    [START ON 2/19/2020] enoxaparin (LOVENOX) injection 40 mg  40 mg SubCUTAneous Q24H    famotidine (PEPCID) tablet 20 mg  20 mg Per NG tube Q12H    heparin (porcine) pf 900 Units  900 Units InterCATHeter Q8H    heparin (porcine) pf 900 Units  900 Units InterCATHeter PRN    HYDROcodone-acetaminophen (NORCO) 7.5-325 mg per tablet 1 Tab  1 Tab Oral Q6H PRN    ibuprofen (ADVIL;MOTRIN) 100 mg/5 mL oral suspension 600 mg  600 mg Per NG tube Q6H PRN    lip protectant (BLISTEX) ointment 1 Each  1 Each Topical PRN    metoprolol tartrate (LOPRESSOR) tablet 100 mg  100 mg Per NG tube BID    ondansetron (ZOFRAN) injection 4 mg  4 mg IntraVENous Q4H PRN    phenol throat spray (CHLORASEPTIC) 1 Spray  1 Spray Oral PRN    [START ON 2/19/2020] senna (SENOKOT) tablet 8.6 mg  1 Tab Per NG tube DAILY    sodium chloride (NS) flush 30 mL  30 mL InterCATHeter Q8H    traZODone (DESYREL) tablet 50 mg  50 mg Per NG tube QHS PRN       Review of Systems:  Denies: fevers, chills, sweats, malaise, anorexia, weight loss + fatigue  Denies: blurry vision, loss of vision, eye pain, photophobia   Denies: hearing loss, ringing in the ears, earache, epistaxis   Denies: chest pain, palpitations, syncope, orthopnea, paroxysmal nocturnal dyspnea, claudication   Denies:  odynophagia, nausea, vomiting, diarrhea, constipation, abdominal pain, jaundice, melena +dysphagia, sore throat  Denies: frequency, dysuria, nocturia, urinary incontinence, stones, hematuria   Denies: polydipsia/polyuria, skin changes, temperature intolerance, unexpected weight gain   Denies: back pain, joint pain, joint swelling, muscle pain,  +muscle weakness   Denies: bleeding problems, blood transfusions, bruising, pallor, swollen lymph nodes   Denies: dysarthria, blurred vision, diplopia,seizure, focal deficits. + head and neck pain      Objective:     Visit Vitals  /75 (BP 1 Location: Left arm, BP Patient Position: At rest)   Pulse 73   Temp 99.9 °F (37.7 °C)   Resp 21   Wt 211 lb 9.6 oz (96 kg)   SpO2 99%   BMI 36.32 kg/m²      Intake and Output:  No intake/output data recorded. Physical Exam:   Psych: Patient was oriented to person, place, and time. Without hallucinations, abnormal affect or abnormal behaviors during the examination. Nonverbal due to trach; mouthing words but not audible   General:   Alert, appears stated age, No acute distress. HEENT:  Normocephalic, EOMI, facial symmetry  Intact. Oral mucosa pink and moist. No nasal discharge. Trach collar intact 30%, min secretions, thin; NGT left nostril   Lungs:  CTA Bilaterally,Respiration even and unlabored   No apparent use of accessory muscles for respiration. No nasal alar flaring. Heart:  Regular rate and rhythm, S1, S2, No obvious murmur, click, rub or gallop.    Genitourinary: defered   Abdomen:  Soft, non-tender to palpation in all four quadrants. Bowel sounds present. No obvious masses palpated.     Neuromuscular:  PERRL, EOMI  No facial asymm, mouthing words, not audible due to trach collar  Strength grossly symm but with dec fine motor coord left hand, dec light touch LUE  Follows commands, cognition appears intact but specifics not tested due to non verbal status  dtrs normactive; no pathologic reflexes   Skin:  Intact, dry, good skin turgor, age related changes present   Edema:none            Lab Review:    Recent Results (from the past 72 hour(s))   CBC W/O DIFF    Collection Time: 02/16/20  5:23 AM   Result Value Ref Range    WBC 6.5 4.3 - 11.1 K/uL    RBC 3.27 (L) 4.05 - 5.2 M/uL    HGB 9.0 (L) 11.7 - 15.4 g/dL    HCT 29.1 (L) 35.8 - 46.3 %    MCV 89.0 79.6 - 97.8 FL    MCH 27.5 26.1 - 32.9 PG    MCHC 30.9 (L) 31.4 - 35.0 g/dL    RDW 12.7 11.9 - 14.6 %    PLATELET 988 194 - 871 K/uL    MPV 10.5 9.4 - 12.3 FL    ABSOLUTE NRBC 0.00 0.0 - 0.2 K/uL   METABOLIC PANEL, BASIC    Collection Time: 02/16/20  5:23 AM   Result Value Ref Range    Sodium 140 136 - 145 mmol/L    Potassium 3.8 3.5 - 5.1 mmol/L    Chloride 106 98 - 107 mmol/L    CO2 29 21 - 32 mmol/L    Anion gap 5 (L) 7 - 16 mmol/L    Glucose 129 (H) 65 - 100 mg/dL    BUN 12 6 - 23 MG/DL    Creatinine 0.47 (L) 0.6 - 1.0 MG/DL    GFR est AA >60 >60 ml/min/1.73m2    GFR est non-AA >60 >60 ml/min/1.73m2    Calcium 8.5 8.3 - 10.4 MG/DL   POC G3    Collection Time: 02/16/20  6:07 AM   Result Value Ref Range    Device: AEROSOL MASK      FIO2 (POC) 40 %    pH (POC) 7.418 7.35 - 7.45      pCO2 (POC) 41.8 35 - 45 MMHG    pO2 (POC) 113 (H) 75 - 100 MMHG    HCO3 (POC) 27.0 (H) 22 - 26 MMOL/L    sO2 (POC) 98 95 - 98 %    Base excess (POC) 2 mmol/L    Allens test (POC) YES      Site LEFT RADIAL      Specimen type (POC) ARTERIAL      Performed by Gina     CO2, POC 28 MMOL/L    COLLECT TIME 605     CBC W/O DIFF    Collection Time: 02/17/20  3:57 AM   Result Value Ref Range    WBC 5.7 4.3 - 11.1 K/uL    RBC 3.43 (L) 4.05 - 5.2 M/uL    HGB 9.6 (L) 11.7 - 15.4 g/dL    HCT 30.2 (L) 35.8 - 46.3 %    MCV 88.0 79.6 - 97.8 FL    MCH 28.0 26.1 - 32.9 PG    MCHC 31.8 31.4 - 35.0 g/dL    RDW 12.7 11.9 - 14.6 %    PLATELET 780 709 - 481 K/uL    MPV 10.7 9.4 - 12.3 FL    ABSOLUTE NRBC 0.00 0.0 - 0.2 K/uL   METABOLIC PANEL, BASIC    Collection Time: 02/17/20  3:57 AM   Result Value Ref Range    Sodium 140 136 - 145 mmol/L    Potassium 3.4 (L) 3.5 - 5.1 mmol/L    Chloride 105 98 - 107 mmol/L    CO2 29 21 - 32 mmol/L    Anion gap 6 (L) 7 - 16 mmol/L    Glucose 124 (H) 65 - 100 mg/dL    BUN 12 6 - 23 MG/DL    Creatinine 0.44 (L) 0.6 - 1.0 MG/DL    GFR est AA >60 >60 ml/min/1.73m2    GFR est non-AA >60 >60 ml/min/1.73m2    Calcium 8.7 8.3 - 10.4 MG/DL   INFLUENZA A & B AG (RAPID TEST)    Collection Time: 02/18/20 12:58 AM   Result Value Ref Range    Influenza A Ag NEGATIVE  NEG      Influenza B Ag NEGATIVE  NEG      Source NASOPHARYNGEAL     CULTURE, RESPIRATORY/SPUTUM/BRONCH W GRAM STAIN    Collection Time: 02/18/20  1:00 AM   Result Value Ref Range    Special Requests: NO SPECIAL REQUESTS      GRAM STAIN 10 TO 40 WBCS SEEN PER OIF     GRAM STAIN 0 TO 1 EPITHELIAL CELLS SEEN PER OIF     GRAM STAIN 2+ MUCUS PRESENT      GRAM STAIN FEW GRAM POSITIVE COCCI      GRAM STAIN FEW GRAM NEGATIVE RODS      Culture result:        NO GROWTH AFTER SHORT PERIOD OF INCUBATION. FURTHER RESULTS TO FOLLOW AFTER OVERNIGHT INCUBATION. CULTURE, URINE    Collection Time: 02/18/20  2:42 AM   Result Value Ref Range    Special Requests: NO SPECIAL REQUESTS      Culture result:        NO GROWTH AFTER SHORT PERIOD OF INCUBATION. FURTHER RESULTS TO FOLLOW AFTER OVERNIGHT INCUBATION.    CBC W/O DIFF    Collection Time: 02/18/20  3:58 AM   Result Value Ref Range    WBC 5.0 4.3 - 11.1 K/uL    RBC 3.34 (L) 4.05 - 5.2 M/uL    HGB 9.3 (L) 11.7 - 15.4 g/dL    HCT 29.6 (L) 35.8 - 46.3 %    MCV 88.6 79.6 - 97.8 FL    MCH 27.8 26.1 - 32.9 PG    MCHC 31.4 31.4 - 35.0 g/dL    RDW 12.8 11.9 - 14.6 %    PLATELET 429 380 - 689 K/uL    MPV 10.8 9.4 - 12.3 FL    ABSOLUTE NRBC 0.00 0.0 - 0.2 K/uL   METABOLIC PANEL, BASIC    Collection Time: 02/18/20  3:58 AM   Result Value Ref Range    Sodium 142 136 - 145 mmol/L    Potassium 3.5 3.5 - 5.1 mmol/L    Chloride 106 98 - 107 mmol/L    CO2 30 21 - 32 mmol/L    Anion gap 6 (L) 7 - 16 mmol/L    Glucose 120 (H) 65 - 100 mg/dL    BUN 11 6 - 23 MG/DL    Creatinine 0.55 (L) 0.6 - 1.0 MG/DL    GFR est AA >60 >60 ml/min/1.73m2    GFR est non-AA >60 >60 ml/min/1.73m2    Calcium 8.9 8.3 - 10.4 MG/DL       Condition on Admission: Good      Assessment: 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 The Patient is a good candidate for acute inpatient rehabilitation. Nothing since the Pre-admission screen has changed that determination. Rehabilitation Plan  The patient has shown the ability to tolerate and benefit from 3 hours of therapy daily and is being admitted to a comprehensive acute inpatient rehabilitation program consisting of at least 3 hours of combined physical and occupational therapies. Begin intensive Physical Therapy for a minimum of 1.5 hours a day, at least 5 out of 7 days per week to address bed mobility, transfers, ambulation, strengthening, balance, and endurance. Begin intensive Occupational Therapy for a minimum of 1.5 hours a day, at least 5 out of 7 days per week to address ADL ( bathing, LE dressing, toileting) and adaptive equipment as needed.     Continue ST for: dysphagia, executive cogn fxn, voicing, PMV trials    Continue 24-hour skilled rehabilitation nursing for bowel and bladder management, skin care for decubitus ulcer prevention , pain management and ongoing medication administration     The patient may benefit from a psychology consult for depression, anxiety and adjustment disorder. AV Fistula at C3 level with epidural hematoma, left sided impairments, respiratory failure due to edema    Continue daily physician medical management:  Pneumonia prophylaxis- aspiration precautions. CXR neg. Will have RT decrease cuff pressure. Tolerating ATC well. Will have ST try PMV trials with eventual capping. Will downsize when tolerates, can at 7-10days post op; cont RT with assist with trach collar, wean OT; pt functionally expected to do well. Do not want to send home with a trach in but may have to depending on tolerance of downsizing. Keeping it now is definitely for safety due to spinal edema    Pt still with NGT for feeding; ST to eval. Aspiration precautions    DVT risk / DVT Prophylaxis- Will require daily physician exam to assess for signs and symptoms as patient is at increased risk for of thromboembolism. Mobilization as tolerated. Intermittent pneumatic compression devices when in bed Thigh-high or knee-high thromboembolic deterrent hose when out of bed. Lovenox    Pain Control: ongoing significant pain in neck and head which is stable and controlled by PRN meds. Will require regular pain assessment and comprenhensive pain management, cont prn tylenol or motrin and Norco if needed for more mod to severe pain. Dc fentanyl    Wound Care: Monitor wound status daily per staff and physician. At risk for failure. Will require 24/7 rehab nursing. Routine catheter care; PICC line RUE. Neck wound healing    Hypertension - BP uncontrolled, fluctuating, managed medically. Goal SBP < 160; cont lopressor    HLD cont Lipitor 40mg    Anx/depression; refuses antidepressent. Was taking xanax at home and will cont low dose xanax at 0.25mg bid.  Hopefully as she sees herself recovering, anxiety will lessen    Anemia; hgb 9.3 monitor    Urinary retention/ neurogenic bladder - schedule voids q6-8 hrs. Check post-void residual as needed; In and Out catheterize if post-void residual is more than 400 cc.    bowel program - cont senna, and prn bowel program    GERD - resume PPI. At times may need additional antacids, Maalox prn. The patient's prognosis for significant practical improvement within a reasonable period of time appears good and the estimated length of stay is 10 days and patient is expected to return home with spouse and continued rehabilitation with home health therapy. Given the patient's complex neurologic/medical condition and the risk of further medical complications including: DVT, PE, skin breakdown, pneumonia due to decreased mobility,   infection at surgical site , CVA, MI, cardiac arrhythmias due to HTN, increased risk of thromboembolism secondary to decreased blood volume and increased energy expenditure in a patient with known acute blood loss could potentially impact the IRF program with decreased cardiovascular efficiency, postural hypotension and cardiac arrhythmia. For these ongoing medical issues, rehabilitation services could not be safely provided at a lower level of care such as a skilled nursing facility or nursing home.         Signed By: Franklyn Soni MD     February 18, 2020

## 2020-02-18 NOTE — DISCHARGE SUMMARY
Discharge Summary     Patient: Yolanda Valentin MRN: [id number]  SSN: [id number]    YOB: 1975  Age: 53 y.o. 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]    Discharge Diagnoses:   Problem List as of 2/18/2020 Date Reviewed: 2/14/2020          Codes Class Noted - Resolved    Cavernoma ICD-10-CM: D18.00  ICD-9-CM: 228.00  2/3/2020 - Present        Acute respiratory failure with hypoxia [facility]) ICD-10-CM: J96.01  ICD-9-CM: 518.81  2/3/2020 - Present        Edema of spinal cord ([facility] [address] ICD-10-CM: G95.19  ICD-9-CM: 336.1  2/3/2020 - Present        Acute left-sided weakness ICD-10-CM: R53.1  ICD-9-CM: 728.87  2/3/2020 - Present        * (Principal) AVM (arteriovenous malformation) spine ICD-10-CM: Q28.8  ICD-9-CM: 747.82  2/2/2020 - Present               Discharge Condition: Stable    Hospital Course: Ms. Maddison Nicholas  Originally admitted as transfer from [facility] in [place], [place] with acute left sided weakness and cervical edema/hemorrhage C2-C6/C7. She was admitted to our ICU under hemorrhagic Stroke protocol. Ms. Cindy Zuniga had cervical angiogram x2 during her admit that shows she has a AV Fistula at C3 level that is supplied by small vessels of left vertebral artery and feed into the Anterior spinal artery. The risk of treatment is very high with chances of paralysis very high, at this time the treatment will be blood pressure control and monitoring. The family has been updated and in agreement. She will be dc'd to inpt rehab, 9th floor today for further rehab/strength training. She is aox3 and following commands. She is moving her left side well. She will follow up with Dr. Alejo Reese in 4-6 weeks with repeat MRI C-spine. 1. Cervical AVF: edema with hemorrhage: will follow up with Dr. Alejo Legions in 4- 6 weeks. SBP control with goal SBP <160. Pt denies any paresthesia today, she is moving all extrems.  She is anxious and we did discuss xanax vs possible lexapro, but she decline anti-depressant at this time. No obvious neuro changes and clear to transfer out to [facility]. 2. TRACH: pt was unable to tolerate extubation when attempted: she had trach placed on 2/14/20 and site is CDI on dc and transfer to 9th floor today. She is being seen by our ST for PSV and speech therapy. Will downsize when tolerates, can at 7-10days post op. Pt has NGT in place and tolerating. 3. Lipitor on admit: 105: placed on lipitor 40mg po daily. Will continue on dc. 4. NIHSS on DC: 1 (sensory) left. 5. MRS on DC: 1    6. Temp: Tm 101.7 last pm. Pan cx sent and neg thus far. Pt afebrile on dc. Pt completed 10d course of Vanc/Cefe during this admit for fever- all cx neg. Consults: Physical Medicine and Rehabilitation, General surgery    Significant Diagnostic Studies: Cervical angiogram x2, CT head, CTA/CTP head, MRI Cspine, 2D Echo, daily labs. Disposition: 9th floor INPT Rehab. Discharge Medications:   Current Discharge Medication List      START taking these medications    Details   ALPRAZolam (XANAX) 0.25 mg tablet Take 1 Tab by mouth two (2) times a day. Max Daily Amount: 0.5 mg.  Qty: 30 Tab, Refills: 1    Associated Diagnoses: Abnormal MRI scan, head; Acute left-sided weakness; Edema of spinal cord ([address] Cerebrovascular dural AV fistula      atorvastatin (LIPITOR) 40 mg tablet 1 Tab by Per NG tube route nightly. Qty: 90 Tab, Refills: 3    Associated Diagnoses: AVM (arteriovenous malformation) spine      metoprolol tartrate (LOPRESSOR) 100 mg IR tablet 1 Tab by Per NG tube route two (2) times a day.   Qty: 90 Tab, Refills: 1    Associated Diagnoses: AVM (arteriovenous malformation) spine; Acute respiratory failure with hypoxia (HCC)         STOP taking these medications       valsartan (DIOVAN) 80 mg tablet Comments:   Reason for Stopping:         cloNIDine HCL (CATAPRES) 0.1 mg tablet Comments:   Reason for Stopping: Activity:PER REHAB TEAM  Diet: Tube feeds, advance per ST/Nutrition team.  Wound Care: Carry Butter care per daily per RT. DC TIME 25 min for dc instructions, family discussion, med rec.     Follow-up Appointments   Procedures    FOLLOW UP VISIT Appointment in: 10 Weeks Dr. Henrry Elkins     Standing Status:   Standing     Number of Occurrences:   1     Order Specific Question:   Appointment in     Answer:   6 Weeks       Signed By: Arti Rivera NP     February 18, 2020

## 2020-02-18 NOTE — PROGRESS NOTES
Bedside and Verbal shift change report given to Susan Torres RN (oncoming nurse) by Kelin Bhat RN (offgoing nurse). Report included the following information SBAR, Kardex and Procedure Summary.        Dual neuro assessment performed with Charles Vila

## 2020-02-18 NOTE — PROGRESS NOTES
TRANSFER - IN REPORT:    Verbal report received from JazmineRN(name) on Yolanda Valentin  being received from ICU 3103(unit) for routine progression of care      Report consisted of patients Situation, Background, Assessment and   Recommendations(SBAR). Information from the following report(s) SBAR, Kardex, [facility] and Recent Results was reviewed with the receiving nurse. Opportunity for questions and clarification was provided. Assessment completed upon patients arrival to unit and care assumed.

## 2020-02-18 NOTE — PROGRESS NOTES
A follow up visit was made to the patient. Emotional support, spiritual presence and   prayer were provided for the patient and her . She was being transferred to the 9th floor.       CAMILLE Boss

## 2020-02-18 NOTE — PROGRESS NOTES
PHYSICAL THERAPY EXAMINATION  [id number]    Patient Name: Mono Sands  Patient Age: 53 y.o. Past Medical History: No past medical history on file. 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] <principal problem not specified>    Precautions at Admission: Other (comment)(trach)    Therapy Diagnosis:   Difficulty with bed mobility  [x]     Difficulty with functional transfers  [x]     Difficulty with ambulation  [x]     Difficulty with stair negotiations  [x]       Problem List:    Decreased strength B LE  [x]     Decreased strength trunk/core  [x]     Decreased AROM   []     Decreased PROM  []    Decreased endurance  []     Decreased balance sitting  []     Decreased balance standing  [x]     Pain   []     Slow ambulation velocity  []    Decreased coordination  []    Decreased safety awareness  []      Functional Limitations:   Decreased independence with bed mobility  [x]     Decreased independence with functional transfers  [x]     Decreased independence with ambulation  [x]     Decreased independence with stair negotiation  [x]       Previous Functional Level: Ambulatory without AD    Home Environment: Home Environment: Private residence  # Steps to Enter: 3  Rails to Enter: No  Wheelchair Ramp: No  One/Two Story Residence: One story  Living Alone: No( and 11 yo daughter)  Support Systems: Family member(s)  Patient Expects to be Discharged to[de-identified] Private residence  Current DME Used/Available at Home: Oxygen, portable(trach)         Outcome Measures:     /75 (BP 1 Location: Left arm, BP Patient Position: At rest)   Pulse 73   Temp 99.9 °F (37.7 °C)   Resp 21   Wt 96 kg (211 lb 9.6 oz)   SpO2 99%   BMI 36.32 kg/m²     Pain level: no complaints of pain  Pain location: N/A  Pain interventions: N/A    Patient education: Pt encouraged to use written communication when necessary as verbal communication with trach is difficult.     Interdisciplinary Communication: Collaborated with CNA to get pt's weight and discussed oxygen needs with RN    Pt left in bed in NAD with call bell & needs in reach. Cardiopulmonary: Pt connected to supplemental oxygen via trach and continuously performing oral/trach secretion management independently. Pt intermittently performing secretion removals throughout session. Cognition: Pt is alert & oriented and follows commands appropriately.     MMT Initial Asssessment   Right Lower Extremity Left Lower Extremity   Hip Flexion 5 4+   Knee Extension 4+ 5   Knee Flexion 5 5   Ankle Dorsiflexion 5 4+   0/5 No palpable muscle contraction  1/5 Palpable muscle contraction, no joint movement  2-/5 Less than full range of motion in gravity eliminated position  2/5 Able to complete full range of motion in gravity eliminated position  2+/5 Able to initiate movement against gravity  3-/5 More than half but not full range of motion against gravity  3/5 Able to complete full range of motion against gravity  3+/5 Completes full range of motion against gravity with minimal resistance  4-/5 Completes full range of motion against gravity with minimal-moderate resistance  4/5 Completes full range of motion against gravity with moderate resistance  4+/5 Completes full range of motion against gravity with moderate-maximum resistance  5/5 Completes full range of motion against gravity with maximum resistance    AROM: WFL    PRIMARY MODE OF LOCOMOTION: ambulation    BED/CHAIR/WHEELCHAIR TRANSFERS Initial Assessment   Rolling Right 5 (Supervision)   Rolling Left 5 (Supervision)   Supine to Sit 4 (Minimal assistance)   Sit to Stand Minimal assistance   Sit to Supine 4 (Minimal assistance)   Transfer Type     Comments     Car Transfer 0 (not tested)   Car Type         WHEELCHAIR MOBILITY/MANAGEMENT Initial Assessment   Able to Propel 0 feet   W/C Assistance     Curbs/ramps assistance required 0 (Not tested)   Wheelchair set up assistance required 0 (Not tested) Wheelchair management 0 (Not tested)       WALKING INDEPENDENCE Initial Assessment   Assistive device     Ambulation assistance - level surface 0 (Not tested)   Distance     Comments     Ambulation assistance - unlevel surface 0 (Not tested)       STEPS/STAIRS Initial Assessment   Steps/Stairs ambulated 0   Stairs Assistance     Rail Use     Comments     Curbs/Ramps 0 (Not tested)       QUALITY INDICATOR ASSIST COMMENTS   Roll right (&return to back) 4: Supervision or touching A    Roll left (& return to back) 4: Supervision or touching A    Supine to sit 4: Supervision or touching A    Sit to stand 4: Supervision or touching A    Chair/bed-to-chair transfer Not Tested: Not attempted due to medical concerns Pt not tested for following mobility tasks due to short length of trach/oxygen tubing connected to wall and lack of adapter for portable transportation outside of room   Walk 10 feet Not Tested: Not attempted due to medical concerns    Walk 50 feet with 2 turns Not Tested: Not attempted due to medical concerns    Walk 150 feet Not Tested: Not attempted due to medical concerns    Walk 10 feet on uneven  Not Tested: Not attempted due to medical concerns    1 step/curb Not Tested: Not attempted due to medical concerns    4 steps Not Tested: Not attempted due to medical concerns    12 steps Not Tested: Not attempted due to medical concerns     object Not Tested: Not attempted due to medical concerns    Wheel 48' w/2 turns Not Tested: Not attempted due to medical concerns    Wheel 150' Not Tested: Not attempted due to medical concerns    Car Transfer Not Tested: Not attempted due to medical concerns             PHYSICAL THERAPY PLAN OF CARE    Therapy Diagnosis: Please see table above. Order received from MD for physical therapy services and chart reviewed. Pt to be seen at least 5 times per week for at least 1.5 hours of physical therapy per day for 2 week(s).  Thank you for the referral.    LTGs: Please see Care Plan for goals. Pt would benefit from skilled physical therapy in order to improve independent functional mobility within the home. Interventions may include range of motion (AROM, PROM B LE/trunk), motor function (B LE/trunk strengthening/coordination), activity tolerance (vitals, oxygen saturation levels), bed mobility training, balance activities, gait training (progressive ambulation program), and functional transfer training. Please see IRC; Interdisciplinary Eval, Care Plan, and Patient Education for further information regarding physical therapy examination and plan of care.      Radha Zelaya, PT  2/18/2020

## 2020-02-18 NOTE — PROGRESS NOTES
Patient with HOB up 30 degrees, some extra pillows for comfort, note pad at bediside with no needs at this time. Call light in reach and family at bedside.

## 2020-02-18 NOTE — PROGRESS NOTES
Dual skin assessment with Jihan Cheatham RN. Skin is warm, dry, flaky, intact, no signs of breakdown.

## 2020-02-18 NOTE — PROGRESS NOTES
Problem: Ventilator Management  Goal: *Adequate oxygenation and ventilation  Outcome: Resolved/Met     Problem: Falls - Risk of  Goal: *Absence of Falls  Description  Document Ethan Fall Risk and appropriate interventions in the flowsheet.   Outcome: Resolved/Met  Note: Fall Risk Interventions:  Mobility Interventions: Bed/chair exit alarm, Communicate number of staff needed for ambulation/transfer         Medication Interventions: Assess postural VS orthostatic hypotension, Teach patient to arise slowly, Patient to call before getting OOB    Elimination Interventions: Bed/chair exit alarm, Call light in reach, Patient to call for help with toileting needs

## 2020-02-18 NOTE — DISCHARGE INSTRUCTIONS
Patient Education   1. Follow up with Dr. Mickey Rae in 4-6 weeks. Our office, [facility] will call you to set up date and time. 2. You can't return to work or drive until your follow up with Dr. Mickey Rae. 3. No strenous exercise, neck manipulation/choirpractor. Walking and PT/OT as tolerated. 4. Call for any questions/concerns asap. Brain Angiogram: What to Expect at [address] brain angiogram (cerebral angiogram) is a test (also called a procedure) that looks for problems with blood vessels and blood flow in the brain. The doctor inserted a thin, flexible tube (catheter) into a blood vessel in your groin. Or the doctor may have put the catheter in a blood vessel in your arm. Then he or she injected a dye into the catheter. The dye flows into the blood vessel. The dye made the blood vessels show up on a video screen. You may have had this test to see if a blood vessel in the brain is bulging, narrowed, or blocked. The test may also be used to check other symptoms, such as unusual headaches, or to check problems found during a different test.  You may have a bruise where the catheter was inserted, and you may feel sore for a day or two after a brain angiogram. You can do light activities around the house. But don't do anything strenuous for several days. This care sheet gives you a general idea about how long it will take for you to recover. But each person recovers at a different pace. Follow the steps below to feel better as quickly as possible. How can you care for yourself at home? Activity    · Do not do strenuous exercise and do not lift, pull, or push anything heavy until your doctor says it is okay. This may be for a day or two.  You can walk around the house and do light activity, such as cooking.     · If the catheter was placed in your groin, try not to walk up stairs for the first couple of days.     · If the catheter was placed in your arm near your wrist, do not bend your wrist deeply for the first couple of days. Be careful using your hand to get into and out of a chair or bed.     · If your doctor recommends it, get more exercise. Walking is a good choice. Bit by bit, increase the amount you walk every day. Try for at least 30 minutes on most days of the week. Diet    · Drink plenty of fluids to help your body flush out the dye. If you have kidney, heart, or liver disease and have to limit fluids, talk with your doctor before you increase the amount of fluids you drink.     · Keep eating a heart-healthy diet that has lots of fruits, vegetables, and whole grains. If you need help with your diet, talk to your doctor. You also may want to talk to a dietitian. This expert can help you to learn about healthy foods and plan meals. Medicines    · Your doctor will tell you if and when you can restart your medicines. He or she will also give you instructions about taking any new medicines.     · If you take blood thinners, such as warfarin (Coumadin), clopidogrel (Plavix), or aspirin, be sure to talk to your doctor. He or she will tell you if and when to start taking those medicines again. Make sure that you understand exactly what your doctor wants you to do.     · Be safe with medicines. Read and follow all instructions on the label. ? If the doctor gave you a prescription medicine for pain, take it as prescribed. ? If you are not taking a prescription pain medicine, ask your doctor if you can take an over-the-counter medicine.    Care of the catheter site    · For the first 3 days, keep a bandage over the spot where the catheter was put in.     · Put ice or a cold pack on the area for 10 to 20 minutes at a time. Try to do this every 1 to 2 hours for the next 3 days (when you are awake) or until the swelling goes down. Put a thin cloth between the ice and your skin.     · You may shower 24 to 48 hours after the procedure, if your doctor okays it.  Pat the incision dry.     · Do not soak the catheter site until it is healed. Don't take a bath for 1 week, or until your doctor tells you it is okay.     · Watch for bleeding from the site. A small amount of blood (up to the size of a quarter) on the bandage can be normal.     · If you are bleeding, lie down and press on the area for 15 minutes to try to make it stop. If the bleeding does not stop, call your doctor or seek immediate medical care. Follow-up care is a key part of your treatment and safety. Be sure to make and go to all appointments, and call your doctor if you are having problems. It's also a good idea to know your test results and keep a list of the medicines you take. When should you call for help? Call 911 anytime you think you may need emergency care. For example, call if:    · You passed out (lost consciousness).     · You have severe trouble breathing.     · You have sudden chest pain and shortness of breath, or you cough up blood.     · You have symptoms of a stroke. These may include:  ? Sudden numbness, tingling, weakness, or loss of movement in your face, arm, or leg, especially on only one side of your body. ? Sudden vision changes. ? Sudden trouble speaking. ? Sudden confusion or trouble understanding simple statements. ? Sudden problems with walking or balance. ? A sudden, severe headache that is different from past headaches.    Call your doctor now or seek immediate medical care if:    · You are bleeding from the area where the catheter was put in your artery.     · You have a fast-growing, painful lump at the catheter site.     · You have symptoms of infection, such as:  ? Increased pain, swelling, warmth, or redness. ? Red streaks leading from the area. ? Pus draining from the area. ? A fever.     · Your leg, arm, or hand is painful, looks blue, or feels cold, numb, or tingly.    Watch closely for any changes in your health, and be sure to contact your doctor if:    · You are not getting better as expected.    Where can you learn more?  Go to http://symone-preeti.info/. Enter 21  in the search box to learn more about \"Brain Angiogram: What to Expect at Home. \"  Current as of: September 26, 2018  Content Version: 12.2  © 5552-9100 [employer]. Care instructions adapted under license by [employer] (which disclaims liability or warranty for this information). If you have questions about a medical condition or this instruction, always ask your healthcare professional. [address] any warranty or liability for your use of this information.

## 2020-02-19 LAB
ANION GAP SERPL CALC-SCNC: 7 MMOL/L (ref 7–16)
APPEARANCE UR: ABNORMAL
BACTERIA URNS QL MICRO: 0 /HPF
BILIRUB UR QL: NEGATIVE
BUN SERPL-MCNC: 15 MG/DL (ref 6–23)
CALCIUM SERPL-MCNC: 8.9 MG/DL (ref 8.3–10.4)
CHLORIDE SERPL-SCNC: 104 MMOL/L (ref 98–107)
CO2 SERPL-SCNC: 31 MMOL/L (ref 21–32)
COLOR UR: ABNORMAL
CREAT SERPL-MCNC: 0.49 MG/DL (ref 0.6–1)
EPI CELLS #/AREA URNS HPF: ABNORMAL /HPF
ERYTHROCYTE [DISTWIDTH] IN BLOOD BY AUTOMATED COUNT: 12.8 % (ref 11.9–14.6)
GLUCOSE SERPL-MCNC: 97 MG/DL (ref 65–100)
GLUCOSE UR STRIP.AUTO-MCNC: NEGATIVE MG/DL
HCT VFR BLD AUTO: 28.1 % (ref 35.8–46.3)
HGB BLD-MCNC: 8.8 G/DL (ref 11.7–15.4)
HGB UR QL STRIP: NEGATIVE
KETONES UR QL STRIP.AUTO: ABNORMAL MG/DL
LEUKOCYTE ESTERASE UR QL STRIP.AUTO: NEGATIVE
MAGNESIUM SERPL-MCNC: 2 MG/DL (ref 1.8–2.4)
MCH RBC QN AUTO: 27.8 PG (ref 26.1–32.9)
MCHC RBC AUTO-ENTMCNC: 31.3 G/DL (ref 31.4–35)
MCV RBC AUTO: 88.9 FL (ref 79.6–97.8)
MUCOUS THREADS URNS QL MICRO: ABNORMAL /LPF
NITRITE UR QL STRIP.AUTO: NEGATIVE
NRBC # BLD: 0 K/UL (ref 0–0.2)
PH UR STRIP: 6 [PH] (ref 5–9)
PLATELET # BLD AUTO: 196 K/UL (ref 150–450)
PMV BLD AUTO: 10.7 FL (ref 9.4–12.3)
POTASSIUM SERPL-SCNC: 3.6 MMOL/L (ref 3.5–5.1)
PROT UR STRIP-MCNC: 30 MG/DL
RBC # BLD AUTO: 3.16 M/UL (ref 4.05–5.2)
RBC #/AREA URNS HPF: ABNORMAL /HPF
SODIUM SERPL-SCNC: 142 MMOL/L (ref 136–145)
SP GR UR REFRACTOMETRY: 1.03 (ref 1–1.02)
UROBILINOGEN UR QL STRIP.AUTO: 1 EU/DL (ref 0.2–1)
WBC # BLD AUTO: 6 K/UL (ref 4.3–11.1)
WBC URNS QL MICRO: ABNORMAL /HPF

## 2020-02-19 PROCEDURE — 80048 BASIC METABOLIC PNL TOTAL CA: CPT

## 2020-02-19 PROCEDURE — 92597 ORAL SPEECH DEVICE EVAL: CPT

## 2020-02-19 PROCEDURE — 83735 ASSAY OF MAGNESIUM: CPT

## 2020-02-19 PROCEDURE — 97530 THERAPEUTIC ACTIVITIES: CPT

## 2020-02-19 PROCEDURE — 85027 COMPLETE CBC AUTOMATED: CPT

## 2020-02-19 PROCEDURE — 97110 THERAPEUTIC EXERCISES: CPT

## 2020-02-19 PROCEDURE — 87086 URINE CULTURE/COLONY COUNT: CPT

## 2020-02-19 PROCEDURE — 81001 URINALYSIS AUTO W/SCOPE: CPT

## 2020-02-19 PROCEDURE — 65310000000 HC RM PRIVATE REHAB

## 2020-02-19 PROCEDURE — 97116 GAIT TRAINING THERAPY: CPT

## 2020-02-19 PROCEDURE — 74011250637 HC RX REV CODE- 250/637: Performed by: PHYSICAL MEDICINE & REHABILITATION

## 2020-02-19 PROCEDURE — 77030018798 HC PMP KT ENTRL FED COVD -A

## 2020-02-19 PROCEDURE — 74011250636 HC RX REV CODE- 250/636: Performed by: PHYSICAL MEDICINE & REHABILITATION

## 2020-02-19 PROCEDURE — 77030006998

## 2020-02-19 PROCEDURE — 97166 OT EVAL MOD COMPLEX 45 MIN: CPT

## 2020-02-19 PROCEDURE — 97535 SELF CARE MNGMENT TRAINING: CPT

## 2020-02-19 RX ORDER — ALPRAZOLAM 0.25 MG/1
0.25 TABLET ORAL 3 TIMES DAILY
Status: DISCONTINUED | OUTPATIENT
Start: 2020-02-19 | End: 2020-02-21 | Stop reason: HOSPADM

## 2020-02-19 RX ADMIN — ALPRAZOLAM 0.25 MG: 0.25 TABLET ORAL at 20:53

## 2020-02-19 RX ADMIN — ATORVASTATIN CALCIUM 40 MG: 40 TABLET, FILM COATED ORAL at 20:52

## 2020-02-19 RX ADMIN — ALPRAZOLAM 0.25 MG: 0.25 TABLET ORAL at 16:37

## 2020-02-19 RX ADMIN — HEPARIN SODIUM (PORCINE) LOCK FLUSH IV SOLN 100 UNIT/ML 900 UNITS: 100 SOLUTION at 21:30

## 2020-02-19 RX ADMIN — TRAZODONE HYDROCHLORIDE 50 MG: 50 TABLET ORAL at 20:51

## 2020-02-19 RX ADMIN — FAMOTIDINE 20 MG: 20 TABLET, FILM COATED ORAL at 08:00

## 2020-02-19 RX ADMIN — HEPARIN SODIUM (PORCINE) LOCK FLUSH IV SOLN 100 UNIT/ML 900 UNITS: 100 SOLUTION at 15:05

## 2020-02-19 RX ADMIN — HEPARIN SODIUM (PORCINE) LOCK FLUSH IV SOLN 100 UNIT/ML 900 UNITS: 100 SOLUTION at 05:44

## 2020-02-19 RX ADMIN — ACETAMINOPHEN 650 MG: 325 TABLET ORAL at 01:00

## 2020-02-19 RX ADMIN — Medication 30 ML: at 15:06

## 2020-02-19 RX ADMIN — FAMOTIDINE 20 MG: 20 TABLET, FILM COATED ORAL at 20:50

## 2020-02-19 RX ADMIN — ENOXAPARIN SODIUM 40 MG: 40 INJECTION SUBCUTANEOUS at 12:38

## 2020-02-19 RX ADMIN — SENNOSIDES 8.6 MG: 8.6 TABLET, FILM COATED ORAL at 08:01

## 2020-02-19 RX ADMIN — Medication 30 ML: at 21:29

## 2020-02-19 RX ADMIN — ALPRAZOLAM 0.25 MG: 0.25 TABLET ORAL at 08:01

## 2020-02-19 RX ADMIN — HYDROCODONE BITARTRATE AND ACETAMINOPHEN 1 TABLET: 7.5; 325 TABLET ORAL at 08:08

## 2020-02-19 RX ADMIN — Medication 30 ML: at 05:44

## 2020-02-19 NOTE — PROGRESS NOTES
RT Candice at bedside, some sutures removed, inner cannula and dressing changed. Patient with rosanakelseyker in right hand, resting with family at bedside. Tube feeding infusing without difficulty, hourly rounds completed this shift.

## 2020-02-19 NOTE — PROGRESS NOTES
OT Daily Note  Time In 1301   Time Out 1410     Subjective: \"No I'm not in any pain right now, my chest hurts a little from not moving my arms over the last few days. \"  Pain: denied  Education: sensation and [facility] activiites  Interdisciplinary Communication: with PT Andriy during ambulation and discussion of trach care  Precautions: fall risk, trach, NGT   Mobility   Score Comments   Rolling 4: Supervision or touching A Side: Right     Supine to Sit 4: Supervision or touching A Requires extra time and effort   Sit to Stand 4: Supervision or touching A Min assist for lifting   Transfer Assist 4: Supervision or touching A Transfer Type: SPT   Equipment: Rolling Walker   Comments: Steady assist and management of lines     OT assessment Daily Assessment   Completed 9 Hole Peg test scoring 32 seconds on R dominant side, >60 seconds on L side. Completed sensation testing with mild impairments noted in L temperature discrimination, light touch, and stereognosis (2 out of 4 correct). Bilateral UE deficits noted in sharp/dull discrimination. Completed MMT and ROM testing with inability to achieve full 90 degrees in L shoulder flexion in AG position. See initial eval for details. Pt reporting mild discomfort in chest upon movement of arms but suspects it is due to lack of exercises with hospital stay. Functional mobility Daily Assessment   Ambulated ~50 ft without device with PT HHA and OT managing wc and lines posteriorly. Sitting breaks required due to increased secretions, coughing, and LLE cramping. Self-Care Daily Assessment   Completed toilet transfer via SPT using RW and grab bars with CGA for balance and verbal cues for management of RW. Steady assist provided for standing balance during clothing management up/down, able to complete toileting hygiene leaning to side. Pt stood at sink to wash hands with CGA. Assessment: Pt with good motivation and effort while participating in therapies.  See initial shawn for more details.    Plan: Continue OT POC with focus on ADL/IADL skills, functional transfers, functional mobility, coordination, strength, static and dynamic balance, and activity tolerance to maximize safety and independence with ADLs and functional transfers      Mendel Garcia   2/19/2020

## 2020-02-19 NOTE — PROGRESS NOTES
The documentation for this period is being entered following the guidelines as defined in the [facility] downtime policy by Sara Pichardo, RN.      0150-pt awake. Reports woke up because she thought it was later and she was hot. Temp=100.0. Tylenol given per request.  Pt sat on side of bed for a short time and then was assisted to lay back in bed.

## 2020-02-19 NOTE — PROGRESS NOTES
PASSY KALYAN SPEAKING VALVE TRIAL     02/19/20 1352   Time With Patient   Time In 1300   Time Out 1313        02/19/20 1353   Type of Assessment   Type of Assessment Evaluation   Tracheostomy Data/Eval   Trach Size/Status #8 ;Cuffed, cuff deflated   Additional Trach Info Primary   Passy-Kalyan Placement SLP   Mental Status   Neurologic State Alert   Cognition Follows commands   Safety/Judgement Awareness of environment   Evidence of Comprehension   Meaningful Eye Movement/Gaze Yes   Cough  Present   PMV Trial    Application Patient   Tolerance Increased back pressure; Increased work of breathing   Vocal Quality Aphonic   Duration (minutes)   (less than 10 seconds)   Oxygen Therapy   O2 Sat (%) 97 %     Patient seen for PMV trial.  Cuff deflated. Tracheotomy completed 2/14 with #8 cuffless trach. Communicating readily via writing. Oral suctioning performed by patient with the palmer periodically throughout the session due to secretions pooling in oral cavity. Patient taken off t-piece and placed on trach collar to attach passy kalyan speaking valve on trach hub, however, no voicing achieved with discomfort and back pressure upon removal.. No change in HR and O2 but with valve only on approximately 10 seconds due to no voicing and discomfort. Significant back pressure with removal.  Patient presents with aphonia s/p tracheostomy and s/sx of air trapping  Also with difficulty swallowing oral secretions. Edema and trach size impacting tolerance at this time with re-attempt indicated once trach in downsized.     Russ Dexter MS, CCC-SLP

## 2020-02-19 NOTE — PROGRESS NOTES
PHYSICAL THERAPY DAILY NOTE  Time In: 1020  Time Out: 1110  Patient Seen For: AM;Gait training; Therapeutic exercise;Transfer training    Subjective: Pt presents in room following OT treatment. Pt indicates she slept good last night and is feeling good this morning. Objective: Other (comment)(trach)  GROSS ASSESSMENT Daily Assessment     Pt managed secretions well throughout session independently. Pt encouraged to cough with effort to clear secretions, but likes to keep secretions in her mouth until she can spit them out in a cup/bag cleanly. COGNITION Daily Assessment    Pt is alert and oriented and following commands appropriately 100% of the time. Pt communicates with nodding/shaking and written communication due to trach. Pt has a pleasant affect. BED/MAT MOBILITY Daily Assessment    Rolling Right : 0 (Not tested)  Rolling Left : 0 (Not tested)  Supine to Sit : 0 (Not tested)  Sit to Supine : 0 (Not tested)       TRANSFERS Daily Assessment    Transfer Type: SPT without device  Transfer Assistance : 4 (Contact guard assistance)  Sit to Stand Assistance: Contact guard assistance  Car Transfers: Not tested       GAIT Daily Assessment   Ambulated with slow, cautious gait pattern with narrow JOAO. Lack of reciprocal arm swing and trunk control. Decreased step length. Following close behind with WC.  Amount of Assistance: 4 (Contact guard assistance)  Distance (ft): 100 Feet (ft)  Assistive Device: Other (comment);Gait belt(close SBA)       STEPS or STAIRS Daily Assessment    Steps/Stairs Ambulated (#): 0  Level of Assist : 0 (Not tested)       BALANCE Daily Assessment   No episodes of LOB with activities Sitting - Static: Good (unsupported)  Sitting - Dynamic: Fair (occasional)  Standing - Static: Good;Constant support  Standing - Dynamic : Impaired       WHEELCHAIR MOBILITY Daily Assessment    Able to Propel (ft): 0 feet  Curbs/Ramps Assist Required (FIM Score): 0 (Not tested)  Wheelchair Setup Assist Required : 0 (Not tested)     Interventions:  Oriented to [facility]  NuStep level 0 x 10 min with oxygen saturation between 96-98% continuously with HR at 73-76 bpm  STS 3 x 8 with UE assist pushing from seated surface  Ambulation x 100 ft    /71   Pulse 80   Temp 99.6 °F (37.6 °C)   Resp 18   Wt 96 kg (211 lb 9.6 oz)   SpO2 96%   BMI 36.32 kg/m²      Pain level: no complaints of pain  Pain location: N/A  Pain interventions: N/A    Patient education: Encouraged to conserve energy to increase activity tolerance and to cough with effort to clear secretions. Interdisciplinary Communication: Collaborated with OT to orient pt to [facility] and anticipate plan of care    Pt left in room in [facility] with call bell and needs in reach. Pt in NAD. Assessment: Pt tolerated therapy well today. Pt demonstrated exercise performance well today with rest breaks indicating poor endurance and activity tolerance. Pt clearing secretions well throughout session with no complains of pain. Pt would benefit from skilled physical therapy to maximize independence, increase activity tolerance, and improve functional mobility. Plan of Care: Continue with POC and progress as tolerated.       Alexa Brito, PT  2/19/2020

## 2020-02-19 NOTE — PROGRESS NOTES
Pt has slept at small intervals during night. Pt and  questioning why she is not sleeping. Discussed with pt and  that a patient's sleep/wake cycle will often be disturbed with long stays in ICU. Also discussed that pt may be having some anxiety r/t upcoming first day of therapy. Assured pt's  that inability to sleep will be addressed to Dr. Bib Goldsmith when she gets here this morning to see if changes in medications are needed. Also encouraged pt that if she takes a nap during day, to only take a short nap and that after a full day of therapy, she may be more tired and have better sleep tonight. Repeat BP noted. Pt is asymptomatic. Will monitor. Tube feedings stopped and NGT flushed.

## 2020-02-19 NOTE — PROGRESS NOTES
Pt trach suctioned per request for small amounts of clear sputum. Pt with moderate to weak cough but able to use yankeur suction to clear secretions. Pt reports numbness to entire L side of body but is able to feel side being touched. Pt is unable to verbalize r/t trach, however, is able to write in order to communicate. Family is at bedside.

## 2020-02-19 NOTE — PROGRESS NOTES
PHYSICAL THERAPY DAILY NOTE  Time In: 1350  Time Out: 4443  Patient Seen For: PM;Therapeutic exercise;Gait training    Subjective: Pt reports no new updates this PM.         Objective: Other (comment)(trach)  GROSS ASSESSMENT Daily Assessment     Pt able to clear her secretions and manage in emesis bag with tissues independently throughout session. Pt required rest breaks during gait training and exercises. COGNITION Daily Assessment    Pt is alert and oriented and following commands appropriately 100% of the time. Pt communicates via nodding/shaking head and written communication. Pt has a pleasant affect. BED/MAT MOBILITY Daily Assessment    Rolling Right : 0 (Not tested)  Rolling Left : 0 (Not tested)  Supine to Sit : 0 (Not tested)  Sit to Supine : 0 (Not tested)       TRANSFERS Daily Assessment    Transfer Type: SPT without device  Transfer Assistance : 4 (Contact guard assistance)  Sit to Stand Assistance: Contact guard assistance  Car Transfers: Not tested       GAIT Daily Assessment   Pt demonstrates slow, cautious yuri with decreased reciprocal arm swing and trunk control. Pt requiring HHA this afternoon compared to this AM indicating fatigue over the day.  Amount of Assistance: 4 (Contact guard assistance)  Distance (ft): 50 Feet (ft)(x4)  Assistive Device: Other (comment)(HHA)       STEPS or STAIRS Daily Assessment    Steps/Stairs Ambulated (#): 0  Level of Assist : 0 (Not tested)       BALANCE Daily Assessment    Sitting - Static: Good (unsupported)  Sitting - Dynamic: Fair (occasional)  Standing - Static: Good  Standing - Dynamic : Impaired       WHEELCHAIR MOBILITY Daily Assessment    Able to Propel (ft): 0 feet  Curbs/Ramps Assist Required (FIM Score): 0 (Not tested)  Wheelchair Setup Assist Required : 0 (Not tested)     Interventions:  Ambulation 50 ft x 4 with rest breaks requiring HHA  LAQ 2 x 8, 1 x 8 1.5# B  Standing marches 2 x 10 B    Cardiopulmonary: Pt on 28% O2 via trach collar (6 L O2 on portable oxygen tank to maintain flow rate). /71   Pulse 80   Temp 99.6 °F (37.6 °C)   Resp 18   Wt 96 kg (211 lb 9.6 oz)   SpO2 97%   BMI 36.32 kg/m²      Pain level: no complaints of pain  Pain location: N/A  Pain interventions: N/A    Patient education: Conserve energy via rest breaks to pace self in building activity tolerance. Interdisciplinary Communication: Collaborated with OT for line/tube management during transfers and ambulation    Pt left in [facility] with call bell and needs in reach. Pt about to use restroom with OT's assistance. Assessment: Pt tolerated therapy well today. Pt would benefit from skilled physical therapy to maximize independence, increase activity tolerance, and improve functional mobility. Plan of Care: Continue with POC and progress as tolerated.       Celio Lu, PT  2/19/2020

## 2020-02-19 NOTE — PROGRESS NOTES
Gunner Hagan MD,   Medical Director  [address] [facility], [address] 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 Couldn't sleep. Worries about secretions. Able to cough them up but asks for suctioning freq and uses yonker to manage her secretions. Difference of opinions amongst RTs so PT/OT not sure what to do as far as leaving room for therapies. Pt complains of sore left foot but improving.  Thinks it fell asleep but better now    Objective:     Current Facility-Administered Medications   Medication Dose Route Frequency    ALPRAZolam (XANAX) tablet 0.25 mg  0.25 mg Oral TID    acetaminophen (TYLENOL) tablet 650 mg  650 mg Per NG tube Q4H PRN    sodium chloride (NS) flush 5-40 mL  5-40 mL IntraVENous PRN    atorvastatin (LIPITOR) tablet 40 mg  40 mg Per NG tube QHS    bisacodyL (DULCOLAX) suppository 10 mg  10 mg Rectal DAILY PRN    enoxaparin (LOVENOX) injection 40 mg  40 mg SubCUTAneous Q24H    famotidine (PEPCID) tablet 20 mg  20 mg Per NG tube Q12H    heparin (porcine) pf 900 Units  900 Units InterCATHeter Q8H    heparin (porcine) pf 900 Units  900 Units InterCATHeter PRN    ibuprofen (ADVIL;MOTRIN) 100 mg/5 mL oral suspension 600 mg  600 mg Per NG tube Q6H PRN    lip protectant (BLISTEX) ointment 1 Each  1 Each Topical PRN    metoprolol tartrate (LOPRESSOR) tablet 100 mg  100 mg Per NG tube BID    ondansetron (ZOFRAN) injection 4 mg  4 mg IntraVENous Q4H PRN    phenol throat spray (CHLORASEPTIC) 1 Spray  1 Spray Oral PRN    senna (SENOKOT) tablet 8.6 mg  1 Tab Per NG tube DAILY    sodium chloride (NS) flush 30 mL  30 mL InterCATHeter Q8H    traZODone (DESYREL) tablet 50 mg  50 mg Per NG tube QHS PRN    HYDROcodone-acetaminophen (NORCO) 7.5-325 mg per tablet 1 Tab  1 Tab Per NG tube Q6H PRN Review of Systems:Denies chest pain, shortness of breath, cough, headache, visual problems, abdominal pain, dysurea, calf pain. Pertinent positives are as noted in the medical records and unremarkable otherwise. Visit Vitals  /71   Pulse 80   Temp 99.6 °F (37.6 °C)   Resp 18   Wt 211 lb 9.6 oz (96 kg)   SpO2 96%   BMI 36.32 kg/m²        Physical Exam:   General: Alert and age appropriately oriented. No acute cardio respiratory distress. HEENT: Normocephalic,no scleral icterus  Oral mucosa moist without cyanosis; NGT   Lungs: Coarse bs bilaterally. Improves with cough and suctioning. Secretions more so in upper airway; thin secretions  Respiration even and unlabored; trach intact; trach collar   Heart: Regular rate and rhythm, S1, S2   No  murmurs, clicks, rub or gallops   Abdomen: Soft, non-tender, nondistended. Bowel sounds present. No organomegaly. Genitourinary: defer   Neuromuscular:      No verbal due to trach collar. Writes down responses appropriately  Mouthing words. Strength grossly sym, dec fine motor on left  Follows commands, no pathologic reflexes   Skin/extremity: No rashes, no erythema. No calf tenderness BLE  No edema                                                                            Functional Assessment:          Balance  Sitting - Static: Good (unsupported) (02/18/20 1500)  Sitting - Dynamic: Fair (occasional) (02/18/20 1500)  Standing - Static: Good;Constant support (02/18/20 1500)                     Nicole Castaneda Fall Risk Assessment:  Nicole Castaneda Fall Risk  Mobility: Ambulates or transfers with assist devices or assistance (02/18/20 1359)  Mobility Interventions: Bed/chair exit alarm;OT consult for ADLs; Patient to call before getting OOB;PT Consult for mobility concerns (02/18/20 1359)  Mentation: Alert, oriented x 3 (02/18/20 1359)  Medication: Patient receiving anticonvulsants, sedatives(tranquilizers), psychotropics or hypnotics, hypoglycemics, narcotics, sleep aids, antihypertensives, laxatives, or diuretics (02/18/20 1359)  Medication Interventions: Patient to call before getting OOB; Teach patient to arise slowly (02/18/20 1359)  Elimination: Needs assistance with toileting (02/18/20 1359)  Elimination Interventions: Call light in reach; Patient to call for help with toileting needs;Stay With Me (per policy); Toilet paper/wipes in reach (02/18/20 1359)  Prior Fall History: No (02/18/20 1359)  Total Score: 3 (02/18/20 1359)  Standard Fall Precautions: Yes (02/18/20 1359)  High Fall Risk: Yes (02/18/20 1359)     Speech Assessment:         Ambulation:        Labs/Studies:  Recent Results (from the past 72 hour(s))   CBC W/O DIFF    Collection Time: 02/17/20  3:57 AM   Result Value Ref Range    WBC 5.7 4.3 - 11.1 K/uL    RBC 3.43 (L) 4.05 - 5.2 M/uL    HGB 9.6 (L) 11.7 - 15.4 g/dL    HCT 30.2 (L) 35.8 - 46.3 %    MCV 88.0 79.6 - 97.8 FL    MCH 28.0 26.1 - 32.9 PG    MCHC 31.8 31.4 - 35.0 g/dL    RDW 12.7 11.9 - 14.6 %    PLATELET 226 439 - 879 K/uL    MPV 10.7 9.4 - 12.3 FL    ABSOLUTE NRBC 0.00 0.0 - 0.2 K/uL   METABOLIC PANEL, BASIC    Collection Time: 02/17/20  3:57 AM   Result Value Ref Range    Sodium 140 136 - 145 mmol/L    Potassium 3.4 (L) 3.5 - 5.1 mmol/L    Chloride 105 98 - 107 mmol/L    CO2 29 21 - 32 mmol/L    Anion gap 6 (L) 7 - 16 mmol/L    Glucose 124 (H) 65 - 100 mg/dL    BUN 12 6 - 23 MG/DL    Creatinine 0.44 (L) 0.6 - 1.0 MG/DL    GFR est AA >60 >60 ml/min/1.73m2    GFR est non-AA >60 >60 ml/min/1.73m2    Calcium 8.7 8.3 - 10.4 MG/DL   INFLUENZA A & B AG (RAPID TEST)    Collection Time: 02/18/20 12:58 AM   Result Value Ref Range    Influenza A Ag NEGATIVE  NEG      Influenza B Ag NEGATIVE  NEG      Source NASOPHARYNGEAL     CULTURE, RESPIRATORY/SPUTUM/BRONCH W GRAM STAIN    Collection Time: 02/18/20  1:00 AM   Result Value Ref Range    Special Requests: NO SPECIAL REQUESTS      GRAM STAIN 10 TO 40 WBCS SEEN PER OIF     GRAM STAIN 0 TO 1 EPITHELIAL CELLS SEEN PER OIF     GRAM STAIN 2+ MUCUS PRESENT      GRAM STAIN FEW GRAM POSITIVE COCCI      GRAM STAIN FEW GRAM NEGATIVE RODS      Culture result: MODERATE NORMAL RESPIRATORY KRISTINA     CULTURE, BLOOD    Collection Time: 02/18/20  1:17 AM   Result Value Ref Range    Special Requests: NO SPECIAL REQUESTS  RIGHT  RED PICC        Culture result: NO GROWTH 1 DAY     CULTURE, BLOOD    Collection Time: 02/18/20  1:27 AM   Result Value Ref Range    Special Requests: LEFT  Antecubital        Culture result: NO GROWTH 1 DAY     CULTURE, URINE    Collection Time: 02/18/20  2:42 AM   Result Value Ref Range    Special Requests: NO SPECIAL REQUESTS      Culture result: NO GROWTH 1 DAY     CBC W/O DIFF    Collection Time: 02/18/20  3:58 AM   Result Value Ref Range    WBC 5.0 4.3 - 11.1 K/uL    RBC 3.34 (L) 4.05 - 5.2 M/uL    HGB 9.3 (L) 11.7 - 15.4 g/dL    HCT 29.6 (L) 35.8 - 46.3 %    MCV 88.6 79.6 - 97.8 FL    MCH 27.8 26.1 - 32.9 PG    MCHC 31.4 31.4 - 35.0 g/dL    RDW 12.8 11.9 - 14.6 %    PLATELET 793 095 - 141 K/uL    MPV 10.8 9.4 - 12.3 FL    ABSOLUTE NRBC 0.00 0.0 - 0.2 K/uL   METABOLIC PANEL, BASIC    Collection Time: 02/18/20  3:58 AM   Result Value Ref Range    Sodium 142 136 - 145 mmol/L    Potassium 3.5 3.5 - 5.1 mmol/L    Chloride 106 98 - 107 mmol/L    CO2 30 21 - 32 mmol/L    Anion gap 6 (L) 7 - 16 mmol/L    Glucose 120 (H) 65 - 100 mg/dL    BUN 11 6 - 23 MG/DL    Creatinine 0.55 (L) 0.6 - 1.0 MG/DL    GFR est AA >60 >60 ml/min/1.73m2    GFR est non-AA >60 >60 ml/min/1.73m2    Calcium 8.9 8.3 - 10.4 MG/DL   CBC W/O DIFF    Collection Time: 02/19/20  5:43 AM   Result Value Ref Range    WBC 6.0 4.3 - 11.1 K/uL    RBC 3.16 (L) 4.05 - 5.2 M/uL    HGB 8.8 (L) 11.7 - 15.4 g/dL    HCT 28.1 (L) 35.8 - 46.3 %    MCV 88.9 79.6 - 97.8 FL    MCH 27.8 26.1 - 32.9 PG    MCHC 31.3 (L) 31.4 - 35.0 g/dL    RDW 12.8 11.9 - 14.6 %    PLATELET 944 267 - 459 K/uL    MPV 10.7 9.4 - 12.3 FL    ABSOLUTE NRBC 0.00 0.0 - 0.2 K/uL   MAGNESIUM Collection Time: 02/19/20  5:43 AM   Result Value Ref Range    Magnesium 2.0 1.8 - 2.4 mg/dL   METABOLIC PANEL, BASIC    Collection Time: 02/19/20  5:43 AM   Result Value Ref Range    Sodium 142 136 - 145 mmol/L    Potassium 3.6 3.5 - 5.1 mmol/L    Chloride 104 98 - 107 mmol/L    CO2 31 21 - 32 mmol/L    Anion gap 7 7 - 16 mmol/L    Glucose 97 65 - 100 mg/dL    BUN 15 6 - 23 MG/DL    Creatinine 0.49 (L) 0.6 - 1.0 MG/DL    GFR est AA >60 >60 ml/min/1.73m2    GFR est non-AA >60 >60 ml/min/1.73m2    Calcium 8.9 8.3 - 10.4 MG/DL       Assessment:     Problem List as of 2/19/2020 Date Reviewed: 2/14/2020          Codes Class Noted - Resolved    [place] ICD-10-CM: D18.00  ICD-9-CM: 228.00  2/3/2020 - Present        Acute respiratory failure with hypoxia [facility]) ICD-10-CM: J96.01  ICD-9-CM: 518.81  2/3/2020 - Present        Edema of spinal cord (HCC) ICD-10-CM: G95.19  ICD-9-CM: 336.1  2/3/2020 - Present        Acute left-sided weakness ICD-10-CM: R53.1  ICD-9-CM: 728.87  2/3/2020 - Present        AVM (arteriovenous malformation) spine ICD-10-CM: Q28.8  ICD-9-CM: 747.82  2/2/2020 - Present            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 CXR neg. Will have RT decrease cuff pressure. Tolerating ATC well. Will have ST try PMV trials with eventual capping. Will downsize when tolerates, can at 7-10days post op; cont RT with assist with trach collar, wean OT; pt functionally expected to do well. Do not want to send home with a trach in but may have to depending on tolerance of downsizing. Keeping it now is definitely for safety due to spinal edema  -pt constantly suctioning secretions; will add scop patch and monitor. dont want mucous plugging. Very thin secretions; 6L trach collar     ID; fever 2/17 ; blood and spt cxs neg.  Wbc normal. Urinalysis pending     Pt still with NGT for feeding; ST to eval. Aspiration precautions     DVT risk / DVT Prophylaxis- Will require daily physician exam to assess for signs and symptoms as patient is at increased risk for of thromboembolism. Mobilization as tolerated. Intermittent pneumatic compression devices when in bed Thigh-high or knee-high thromboembolic deterrent hose when out of bed. Lovenox     Pain Control: ongoing significant pain in neck and head which is stable and controlled by PRN meds. Will require regular pain assessment and comprenhensive pain management, cont prn tylenol or motrin and Norco if needed for more mod to severe pain. Dc fentanyl     Wound Care: Monitor wound status daily per staff and physician. At risk for failure. Will require 24/7 rehab nursing. Routine catheter care; PICC line RUE. Neck wound healing     Hypertension - BP uncontrolled, fluctuating, managed medically. Goal SBP < 160; cont lopressor     HLD cont Lipitor 40mg     Anx/depression; refuses antidepressent. Was taking xanax at home and will cont low dose xanax at 0.25mg bid. Hopefully as she sees herself recovering, anxiety will lessen  -2/19 inc anxiety, didn't sleep; inc trazadone and change xanax to tid; has sleep wake cycle mixed up due to coming from ICU environment.      Anemia; hgb 9.3 monitor; 2/19 hgb 8.8; no evid of bleed. monitor     Urinary retention/ neurogenic bladder - schedule voids q6-8 hrs. Check post-void residual as needed; In and Out catheterize if post-void residual is more than 400 cc.     bowel program - cont senna, and prn bowel program     GERD - resume PPI. At times may need additional antacids, Maalox prn. Time spent was 25 minutes with over 1/2 in direct patient care/examination, consultation and coordination of care.      Signed By: Kelvin Aguilar MD     February 19, 2020

## 2020-02-19 NOTE — PROGRESS NOTES
2200-while assisting pt to the [facility] it was noted that NGT was pulled further out than where it was prior. Tube removed and new NGT inserted to 66cm wade on tubing. Secured with silk tape. Pt tolerated well. Air bubble auscultated in stomach to confirm placement. Pt reports not being able to sleep. Sleeping med was given. Pt also reports pain pointing to her L abd and side. Pain med given. Family at bedside.

## 2020-02-19 NOTE — PROGRESS NOTES
CM met with patient at bedside. Patient unable to speak, written communication was used for assessment. Patient is alert and oriented X3. She is  and lives with her spouse Yahir Hennessy [phone]. They live in a one level home, 3STE, 0 steps on inside. Patient was driving and independent in ADL's before admission. Medications, PCP and insurance confirmed by patient. CM will continue to follow.      Care Management Interventions  PCP Verified by CM: Yes(Dr. Kayley Lama, unsure of date of last visit)  Current Support Network: Lives with Spouse, Own Home  The Patient and/or Patient Representative was Provided with a Choice of Provider and Agrees with the Discharge Plan?: Yes  Name of the Patient Representative Who was Provided with a Choice of Provider and Agrees with the Discharge Plan: patient  Freedom of Choice List was Provided with Basic Dialogue that Supports the Patient's Individualized Plan of Care/Goals, Treatment Preferences and Shares the Quality Data Associated with the Providers?: Yes  [place] Resource Information Provided?: Yes  Discharge Location  Discharge Placement: Unable to determine at this time

## 2020-02-19 NOTE — PROGRESS NOTES
Problem: Falls - Risk of  Goal: *Absence of Falls  Description  Document Rajesh Burleson Fall Risk and appropriate interventions in the flowsheet.   Outcome: Progressing Towards Goal  Note: Fall Risk Interventions:  Mobility Interventions: Bed/chair exit alarm, Patient to call before getting OOB         Medication Interventions: Patient to call before getting OOB    Elimination Interventions: Call light in reach              Problem: Patient Education: Go to Patient Education Activity  Goal: Patient/Family Education  Outcome: Progressing Towards Goal

## 2020-02-19 NOTE — PROGRESS NOTES
[facility] OCCUPATIONAL THERAPY INITIAL EVALUATION    Time In: 0845  Time Out: 1020    Precautions: trach    Pain: Patient had no complaint of pain. Pt reporting chest discomfort (strain) upon L shoulder flexion past ~45 degrees. History of Presenting Illness (per previous reports): Ct Hennessy is a 53 y.o. female patient at [address] [place] [address] who was admitted on 2/18/2020  by Hilary Toussaint MD with below mentioned medical history ,is being seen for Physical Medicine and Rehabilitation.       Victor M Howell a 53 y.o. female who originally presented to [facility] in [facility] with acute left neck pain and left arm weakness and paresthesia. The pt had a CT of head which was normal, the pt was transported to [facility] ED for a MRI of brain/C-spine, she had acute resp failure/bradycardia and required intubation. The MRI of Thony was suspicious for vascular abnormality, AVM vs Cavernoma, with hemorrhage and edema noted Cervical Spine at C2-C5 area. The pt was transferred to [address] [place] [address] Dr. Deedee LANE via [facility] flight team to Dr. Manjit Mendez and Endovascular team for further evaluation and plan of care. The pt was on propofol gtt on arrival and sedated, but it was turned off on arrival and pt was noted to wake up easily and began to follow commands. She is alert with obvious weakness on her left sided compared to R. She is able to hold RUE off bed, but unable to lift up LUE, she was able to give me a thumbs up bilat, and  with left hand. She sould move toes LLE and has tone. Pt remains intubated with 7.5 OETT, [email or web address]. GCS: E4, V1I, M6: 11I, NIHSS 12 ( intubated).   SBP goal 100-160 on admit with cardene gtt prn. She was placed on lipitor 40mg, .6 , NGT placed as well as A-line. ( of note; pt seen by PCP in Sept 2019 with c/o left ear pain, left arm pain and dizziness. No imaging)  Today, pt went for a cerebral angio with conscious sedation with fentanyl and versed.  Findings included a questionable abnl vessel around C3 region. 2D ECHO with EF of 55-60%, no valvular abnl and no shunting. CT chest neg. Obtained due to hypoxia. Tmax 101.3, bp 109//107.   Ms. 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 The risk of treatment is very high with chances of paralysis very high, at this time the treatment will be blood pressure control and monitoring. She will follow up with Dr. Yaniv Mittal in 4-6 weeks with repeat MRI C-spine. SBP control with goal SBP <160. Pt denies any paresthesia today, she is moving all extrems. She is anxious and we did discuss xanax vs possible lexapro, but she decline anti-depressant at this time. No obvious neuro changes and clear to transfer out to [facility].  pt was unable to tolerate extubation when attempted: she had trach placed on 2/14/20. She has been off the vent and on trach collar since 2/16, 30-40% O2 . Her NIHSS today 2/18 is 1 and MRS is 1. Temp: Tm 101.7 last pm. Pan cx sent and neg thus far. Pt afebrile on dc. Pt completed 10d course of Vanc/Cefe during this admit for fever- all cx neg. Past Medical History/ Co-morbidities: No past medical history on file.     Patient's Goal: \"To get back to walking and my hand coordination back to normal.\"    Previous Level of Function: Pt reporting previoulsy independent with ADLs, IADLs, and functional mobility    [address] Private residence   Lives Alone No( and 11 yo daughter)   Support Systems Family member(s)   Shower Situation Tub/Shower combination   Current DME    Lift Chair     Stairs to Enter 3      Rails        W/C Ramp No   Interior Steps       Upper Extremity Function   LUE RUE   FMC Impaired Decreased, but functional   GMC Impaired Intact   Light Touch  Impaired No apparent deficit   Sharp/Dull Discrimination  Impaired Impaired   Hot/Cold Impaired No apparent deficit   Proprioception       Stereognosis  Slight impairement  No apparent deficit   9 Hole Peg Test  >60 seconds  32 seconds      CARRILLO HAIRSTON   General Evalutaion       Shoulder Flexion 2+ 4   Shoulder Extension  2+ 4   Shoulder Abduction 2+ 4   Shoulder Adduction 2+ 4   Elbow Flexion 3+ 4   Elbow Extension  3+ 4    3+ 4   0/5 No palpable muscle contraction  1/5 Palpable muscle contraction, no joint movement  2-/5 Less than full range of motion in gravity eliminated position  2/5 Able to complete full range of motion in gravity eliminated position  2+/5 Able to initiate movement against gravity  3-/5 More than half but not full range of motion against gravity  3/5 Able to complete full range of motion against gravity  3+/5 Completes full range of motion against gravity with minimal resistance  4-/5 Completes full range of motion against gravity with minimal-moderate resistance  4/5 Completes full range of motion against gravity with moderate resistance  4+/5 Completes full range of motion against gravity with moderate-maximum resistance  5/5 Completes full range of motion against gravity with maximum resistance      Functional Mobility   Score Comments   Rolling 4: Supervision or touching A Side: Right     Supine to Sit 4: Supervision or touching A Min assist   Sit to Stand 4: Supervision or touching A Min assist for lifting, verbal cues for setup   Transfer Assist 4: Supervision or touching A Transfer Type: SPT   Equipment: Rolling Walker   Comments: Steady assist for safety     Activities of Daily Living    Score Comments   Oral Hyigene 5: S/U or clean-up assist Setup of gel and toothette   Bathing 4: Supervision or touching A Type of Shower: Shower  Position: Standing PRN and Unsupported Sitting   Adaptive  Equipment: Tub Transfer Bench and Grab Bars  Comments: CGA for standing balance while in stance and washing bottom.  Able to assume figure 4 position for washing B feet   Upper Body  Dressing 2: Substantial/Maximal A Items Applied: Pullover and Bra  Position: Unsupported Sitting  Comments: Assist to thread over L elbow, R elbow and head (due to medical equipment)   Lower Body Dressing 3: Partial/Moderate A Items Applied: Underwear and Elastic pants  Position: Standing PRN and Unsupported Sitting  Adaptive Equipment: N/A  Comments: Assist for balance in stance and min assist to help pull pants over waist due to tight fit   Donning/[place] Footwear 5: S/U or clean-up assist Items Applied: Socks and Slip-on shoes  Adaptive Equipment: N/A  Comments: figure 4 position     Cognition: Completed IRF-MARINA with a score of 15 out of 15. Pt non-verbal due to trach without PMV. Pt indicated needs via gestures, writing on paper, and mouthing words. Pt appears friendly and engaging and responds well to encouragement in therapy. Encouraging pt to continue to produce cough for getting secretions up. Vision/Perception: Pt notes that she has light sensitivity in L eye and some blurriness, screened visual fields which appear intact and smooth tracking. Session: Pt was alert and oriented upon arrival. Pt was agreeable to therapy and requested to take a shower. Educated by respiratory therapist upon trach and oxygen use. SpO2 level at 92% on room air and up to 94% when placed on 28% on trach collar. Pt completed ADLs with quality indicators above. Pt with increased coughing and production of secretions requiring assistance to wipe fluids and mouth. Completed MMT, ROM, sensation screen, parts of visual screen, IRF-MARINA, and OT interview with details noted above. Interdisciplinary Communication: Collaborated with PT and nursing for current level of function, plan of care, and measures to assure safety during their stay. Updated board with current level of function to increase carryover between disciplines.         Patient/Family Education: Patient and Family was/were educated On the role of OT, On POC and On IRC expectations. Problem List: [facility] [place] [address], Activity Tolerance, Decreased ROM LUE, Safety Awareness, Strength, Pain and Standing Balance    Functional Limitations: ADL, IADL, Functional Transfers and Functional Mobility    Goals: Please see Care Plan    OT order received and chart reviewed. OT orders have been acknowledged. Patient will benefit from skilled OT services to address ADL, functional transfers, UE strength, UE coordination, balance, cognition and activity tolerance to maximize functional performance with daily self-care tasks and functional mobility. Treatment is likely to include ADL, Balance, Strength, Activity tolerance, Neuro-muscular re-education, DME, AE, Family  and Safety awareness training to increase independence with self-care. Patient will be seen for 1.5-2 hours of skilled OT services 5-6 days a week as appropriate. Initate POC.      Mendel Davis  2/19/2020

## 2020-02-20 ENCOUNTER — APPOINTMENT (OUTPATIENT)
Dept: GENERAL RADIOLOGY | Age: 45
DRG: 299 | End: 2020-02-20
Attending: PHYSICAL MEDICINE & REHABILITATION
Payer: COMMERCIAL

## 2020-02-20 LAB
ANION GAP SERPL CALC-SCNC: 5 MMOL/L (ref 7–16)
BACTERIA SPEC CULT: NORMAL
BACTERIA SPEC CULT: NORMAL
BUN SERPL-MCNC: 17 MG/DL (ref 6–23)
CALCIUM SERPL-MCNC: 9.2 MG/DL (ref 8.3–10.4)
CHLORIDE SERPL-SCNC: 106 MMOL/L (ref 98–107)
CO2 SERPL-SCNC: 31 MMOL/L (ref 21–32)
CREAT SERPL-MCNC: 0.58 MG/DL (ref 0.6–1)
ERYTHROCYTE [DISTWIDTH] IN BLOOD BY AUTOMATED COUNT: 13.1 % (ref 11.9–14.6)
GLUCOSE SERPL-MCNC: 99 MG/DL (ref 65–100)
GRAM STN SPEC: NORMAL
HCT VFR BLD AUTO: 28.7 % (ref 35.8–46.3)
HGB BLD-MCNC: 8.9 G/DL (ref 11.7–15.4)
MCH RBC QN AUTO: 27.8 PG (ref 26.1–32.9)
MCHC RBC AUTO-ENTMCNC: 31 G/DL (ref 31.4–35)
MCV RBC AUTO: 89.7 FL (ref 79.6–97.8)
NRBC # BLD: 0 K/UL (ref 0–0.2)
PLATELET # BLD AUTO: 223 K/UL (ref 150–450)
PMV BLD AUTO: 10.6 FL (ref 9.4–12.3)
POTASSIUM SERPL-SCNC: 3.9 MMOL/L (ref 3.5–5.1)
RBC # BLD AUTO: 3.2 M/UL (ref 4.05–5.2)
SERVICE CMNT-IMP: NORMAL
SERVICE CMNT-IMP: NORMAL
SODIUM SERPL-SCNC: 142 MMOL/L (ref 136–145)
WBC # BLD AUTO: 6.6 K/UL (ref 4.3–11.1)

## 2020-02-20 PROCEDURE — 97530 THERAPEUTIC ACTIVITIES: CPT

## 2020-02-20 PROCEDURE — 99233 SBSQ HOSP IP/OBS HIGH 50: CPT | Performed by: PHYSICAL MEDICINE & REHABILITATION

## 2020-02-20 PROCEDURE — 85027 COMPLETE CBC AUTOMATED: CPT

## 2020-02-20 PROCEDURE — 74011250637 HC RX REV CODE- 250/637: Performed by: PHYSICAL MEDICINE & REHABILITATION

## 2020-02-20 PROCEDURE — 77030018798 HC PMP KT ENTRL FED COVD -A

## 2020-02-20 PROCEDURE — 65310000000 HC RM PRIVATE REHAB

## 2020-02-20 PROCEDURE — 97116 GAIT TRAINING THERAPY: CPT

## 2020-02-20 PROCEDURE — 77030006998

## 2020-02-20 PROCEDURE — 71045 X-RAY EXAM CHEST 1 VIEW: CPT

## 2020-02-20 PROCEDURE — 92526 ORAL FUNCTION THERAPY: CPT

## 2020-02-20 PROCEDURE — 97535 SELF CARE MNGMENT TRAINING: CPT

## 2020-02-20 PROCEDURE — 97110 THERAPEUTIC EXERCISES: CPT

## 2020-02-20 PROCEDURE — 74011250636 HC RX REV CODE- 250/636: Performed by: PHYSICAL MEDICINE & REHABILITATION

## 2020-02-20 PROCEDURE — 80048 BASIC METABOLIC PNL TOTAL CA: CPT

## 2020-02-20 RX ORDER — FLUCONAZOLE 100 MG/1
200 TABLET ORAL
Status: COMPLETED | OUTPATIENT
Start: 2020-02-20 | End: 2020-02-20

## 2020-02-20 RX ORDER — SCOLOPAMINE TRANSDERMAL SYSTEM 1 MG/1
1 PATCH, EXTENDED RELEASE TRANSDERMAL
Status: DISCONTINUED | OUTPATIENT
Start: 2020-02-20 | End: 2020-02-21 | Stop reason: HOSPADM

## 2020-02-20 RX ORDER — METOPROLOL TARTRATE 25 MG/1
25 TABLET, FILM COATED ORAL 2 TIMES DAILY
Status: DISCONTINUED | OUTPATIENT
Start: 2020-02-20 | End: 2020-02-21 | Stop reason: HOSPADM

## 2020-02-20 RX ORDER — NYSTATIN 100000 [USP'U]/ML
500000 SUSPENSION ORAL 4 TIMES DAILY
Status: DISCONTINUED | OUTPATIENT
Start: 2020-02-20 | End: 2020-02-21 | Stop reason: HOSPADM

## 2020-02-20 RX ORDER — DEXAMETHASONE SODIUM PHOSPHATE 4 MG/ML
4 INJECTION, SOLUTION INTRA-ARTICULAR; INTRALESIONAL; INTRAMUSCULAR; INTRAVENOUS; SOFT TISSUE EVERY 8 HOURS
Status: DISCONTINUED | OUTPATIENT
Start: 2020-02-20 | End: 2020-02-21 | Stop reason: HOSPADM

## 2020-02-20 RX ORDER — GABAPENTIN 100 MG/1
200 CAPSULE ORAL
Status: DISCONTINUED | OUTPATIENT
Start: 2020-02-20 | End: 2020-02-21 | Stop reason: HOSPADM

## 2020-02-20 RX ORDER — CEFAZOLIN SODIUM/WATER 2 G/20 ML
2 SYRINGE (ML) INTRAVENOUS ONCE
Status: DISPENSED | OUTPATIENT
Start: 2020-02-21 | End: 2020-02-21

## 2020-02-20 RX ADMIN — HEPARIN SODIUM (PORCINE) LOCK FLUSH IV SOLN 100 UNIT/ML 900 UNITS: 100 SOLUTION at 14:43

## 2020-02-20 RX ADMIN — SENNOSIDES 8.6 MG: 8.6 TABLET, FILM COATED ORAL at 08:09

## 2020-02-20 RX ADMIN — ALPRAZOLAM 0.25 MG: 0.25 TABLET ORAL at 16:41

## 2020-02-20 RX ADMIN — NYSTATIN 500000 UNITS: 100000 SUSPENSION ORAL at 21:05

## 2020-02-20 RX ADMIN — FLUCONAZOLE 200 MG: 100 TABLET ORAL at 12:37

## 2020-02-20 RX ADMIN — ALPRAZOLAM 0.25 MG: 0.25 TABLET ORAL at 08:09

## 2020-02-20 RX ADMIN — Medication 30 ML: at 14:44

## 2020-02-20 RX ADMIN — NYSTATIN 500000 UNITS: 100000 SUSPENSION ORAL at 12:37

## 2020-02-20 RX ADMIN — FAMOTIDINE 20 MG: 20 TABLET, FILM COATED ORAL at 08:09

## 2020-02-20 RX ADMIN — ALPRAZOLAM 0.25 MG: 0.25 TABLET ORAL at 21:07

## 2020-02-20 RX ADMIN — HEPARIN SODIUM (PORCINE) LOCK FLUSH IV SOLN 100 UNIT/ML 900 UNITS: 100 SOLUTION at 21:09

## 2020-02-20 RX ADMIN — ATORVASTATIN CALCIUM 40 MG: 40 TABLET, FILM COATED ORAL at 21:07

## 2020-02-20 RX ADMIN — GABAPENTIN 200 MG: 100 CAPSULE ORAL at 21:07

## 2020-02-20 RX ADMIN — DEXAMETHASONE SODIUM PHOSPHATE 4 MG: 4 INJECTION, SOLUTION INTRAMUSCULAR; INTRAVENOUS at 14:43

## 2020-02-20 RX ADMIN — HYDROCODONE BITARTRATE AND ACETAMINOPHEN 1 TABLET: 7.5; 325 TABLET ORAL at 06:21

## 2020-02-20 RX ADMIN — Medication 30 ML: at 21:09

## 2020-02-20 RX ADMIN — HEPARIN SODIUM (PORCINE) LOCK FLUSH IV SOLN 100 UNIT/ML 900 UNITS: 100 SOLUTION at 05:42

## 2020-02-20 RX ADMIN — FAMOTIDINE 20 MG: 20 TABLET, FILM COATED ORAL at 21:07

## 2020-02-20 RX ADMIN — TRAZODONE HYDROCHLORIDE 50 MG: 50 TABLET ORAL at 21:08

## 2020-02-20 RX ADMIN — DEXAMETHASONE SODIUM PHOSPHATE 4 MG: 4 INJECTION, SOLUTION INTRAMUSCULAR; INTRAVENOUS at 21:08

## 2020-02-20 RX ADMIN — Medication 30 ML: at 05:42

## 2020-02-20 NOTE — PROGRESS NOTES
Low blood pressure, 87/58, reported to Dr. Bib Goldsmith. Patient asymptomatic, denies dizziness or feeling faint/weak. Dr. Bib Goldsmith to see patient. Will continue to monitor.

## 2020-02-20 NOTE — CONSULTS
[facility] Consult Note       Primary GI Physician: None    Referring Provider:  Dr Brien Dixon Date:  2/20/2020    Admit Date:  2/18/2020    Chief Complaint:  PEG Evaluation    Subjective:     History of Present Illness:  Patient is a 53 y.o. 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 Winston Putnam for PEG eval,  She ordinally presented  with acute left neck pain and left arm weakness and paresthesia. The pt had a CT of head which was normal, the pt was transported to [facility] ED for a MRI of brain/C-spine, she had acute resp failure/bradycardia and required intubation. The MRI of Cspine was suspicious for vascular abnormality, AVM vs Cavernoma, with hemorrhage and edema noted Cervical Spine at C2-C5 area. The pt was transferred to [address] [place] [address] Dr. Harsha Valdivia  via [facility] flight team to Dr. Anyi Hand and Endovascular team for further evaluation and plan of care. She was intubated. 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 The risk of treatment is very high with chances of paralysis very high, at this time the treatment will be blood pressure control and monitoring. She was unable to tolerate extubation when attempted and had trach placed on 2/14/20. She has been off the vent and on trach collar since 2/16, 30-40% O2. Speech eval 2/19 w pt having difficulty swallowing secretions. She has left sided deficit with weakness, numbness and pain in neck. She has had umbilical hernia surgery w mesh in 2014. She also has had 2 c section, no other abd surgeries. PMH:  No past medical history on file. PSH:  No past surgical history on file. Allergies:   Allergies   Allergen Reactions    Ace Inhibitors Angioedema       Home Medications:  Prior to Admission medications    Medication Sig Start Date End Date Taking? Authorizing Provider   ALPRAZolam Jennifer Abo) 0.25 mg tablet Take 1 Tab by mouth two (2) times a day. Max Daily Amount: 0.5 mg. 2/18/20   Jimmy Sood NP   atorvastatin (LIPITOR) 40 mg tablet 1 Tab by Per NG tube route nightly. 2/18/20   Jimmy Sood NP   metoprolol tartrate (LOPRESSOR) 100 mg IR tablet 1 Tab by Per NG tube route two (2) times a day.  2/18/20   Zach Nielson NP       Hospital Medications:  Current Facility-Administered Medications   Medication Dose Route Frequency    scopolamine (TRANSDERM-SCOP) 1 mg over 3 days 1 Patch  1 Patch TransDERmal Q72H    gabapentin (NEURONTIN) capsule 200 mg  200 mg Oral QHS    dexamethasone (DECADRON) 4 mg/mL injection 4 mg  4 mg IntraVENous Q8H    metoprolol tartrate (LOPRESSOR) tablet 25 mg  25 mg Per NG tube BID    nystatin (MYCOSTATIN) 100,000 unit/mL oral suspension 500,000 Units  500,000 Units Oral QID    fluconazole (DIFLUCAN) tablet 200 mg  200 mg Oral NOW    ALPRAZolam (XANAX) tablet 0.25 mg  0.25 mg Oral TID    acetaminophen (TYLENOL) tablet 650 mg  650 mg Per NG tube Q4H PRN    sodium chloride (NS) flush 5-40 mL  5-40 mL IntraVENous PRN    atorvastatin (LIPITOR) tablet 40 mg  40 mg Per NG tube QHS    bisacodyL (DULCOLAX) suppository 10 mg  10 mg Rectal DAILY PRN    enoxaparin (LOVENOX) injection 40 mg  40 mg SubCUTAneous Q24H    famotidine (PEPCID) tablet 20 mg  20 mg Per NG tube Q12H    heparin (porcine) pf 900 Units  900 Units InterCATHeter Q8H    heparin (porcine) pf 900 Units  900 Units InterCATHeter PRN    ibuprofen (ADVIL;MOTRIN) 100 mg/5 mL oral suspension 600 mg  600 mg Per NG tube Q6H PRN    lip protectant (BLISTEX) ointment 1 Each  1 Each Topical PRN    ondansetron (ZOFRAN) injection 4 mg  4 mg IntraVENous Q4H PRN    phenol throat spray (CHLORASEPTIC) 1 Spray  1 Spray Oral PRN  senna (SENOKOT) tablet 8.6 mg  1 Tab Per NG tube DAILY    sodium chloride (NS) flush 30 mL  30 mL InterCATHeter Q8H    traZODone (DESYREL) tablet 50 mg  50 mg Per NG tube QHS PRN    HYDROcodone-acetaminophen (NORCO) 7.5-325 mg per tablet 1 Tab  1 Tab Per NG tube Q6H PRN       Social History:  Social History     Tobacco Use    Smoking status: Not on file   Substance Use Topics    Alcohol use: Not on file       Pt denies any history of drug use, blood transfusions, or tattoos. Family History:  No family history on file. Review of Systems:  A detailed 10 system ROS is obtained, with pertinent positives as listed above. All others are negative. Diet:  TF via NG    Objective:     Physical Exam:  Vitals:  Visit Vitals  BP (!) 87/58 Comment: asymptomatic while in bed   Pulse 80   Temp 99.7 °F (37.6 °C)   Resp 12   Wt 96 kg (211 lb 9.6 oz)   SpO2 98%   BMI 36.32 kg/m²     Gen:  Pt is alert, cooperative, no acute distress  Skin:  Extremities and face reveal no rashes. HEENT: Sclerae anicteric. Extra-occular muscles are intact. Trach  Cardiovascular: Regular rate and rhythm. No murmurs, gallops, or rubs. Respiratory:  Comfortable breathing with no accessory muscle use. Clear breath sounds anteriorly with no wheezes, rales, or rhonchi. GI:  Abdomen nondistended, soft, and nontender. Normal active bowel sounds. No enlargement of the liver or spleen. No masses palpable. Rectal:  Deferred  Musculoskeletal:  No pitting edema of the lower legs. Left sided weak but moves extremities     Neurological:  Gross memory appears intact. Patient is alert and oriented. Psychiatric:  Mood appears appropriate with judgement intact.       Laboratory:    Recent Labs     02/20/20  0541 02/19/20  0543 02/18/20  0358   WBC 6.6 6.0 5.0   HGB 8.9* 8.8* 9.3*   HCT 28.7* 28.1* 29.6*    196 182   MCV 89.7 88.9 88.6    142 142   K 3.9 3.6 3.5    104 106   CO2 31 31 30   BUN 17 15 11   CREA 0.58* 0.49* 0.55* CA 9.2 8.9 8.9   MG  --  2.0  --    GLU 99 97 120*          Assessment:     Active Problems:    AVM (arteriovenous malformation) spine (2/2/2020)      53 y.o. 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 Plan:     - NPO after MN  - PEG placement tomorrow - procedure was described to the patient and her  at beside and she wishes toproceed  - Hold Lovenox tomorrow am    Nain Leo NP  Patient is seen and examined in collaboration with Dr. Loren Heath. Assessment and plan as per Dr. Roxy Lovelace.

## 2020-02-20 NOTE — PROGRESS NOTES
PHYSICAL THERAPY DAILY NOTE  Time In: 1121  Time Out: 0041  Patient Seen For: AM;Therapeutic exercise    Subjective: Pt presents in rehab gym following OT session. Objective: Other (comment)(trach)    COGNITION Daily Assessment    Pt is alert and oriented and following commands appropriately 100% of the time. Pt communicates written communication due to trach collar and has a pleasant affect.        BED/MAT MOBILITY Daily Assessment    Rolling Right : 0 (Not tested)  Rolling Left : 0 (Not tested)  Supine to Sit : 0 (Not tested)  Sit to Supine : 0 (Not tested)       TRANSFERS Daily Assessment    Transfer Type: SPT without device  Transfer Assistance : 4 (Contact guard assistance)  Sit to Stand Assistance: Contact guard assistance  Car Transfers: Not tested       GAIT Daily Assessment    Amount of Assistance: 0 (Not tested)  Distance (ft): 200 Feet (ft)  Assistive Device: Other (comment)(close SBA)       STEPS or STAIRS Daily Assessment   Performed repeated step ups 2 x 8 on a 6 inch step between parallel bars with SBA Steps/Stairs Ambulated (#): 1(2x8)  Level of Assist : 4 (Contact guard assistance)  Rail Use: (parallel bars)       BALANCE Daily Assessment   No LOB Sitting - Static: Good (unsupported)  Sitting - Dynamic: Fair (occasional)  Standing - Static: Good  Standing - Dynamic : Impaired       WHEELCHAIR MOBILITY Daily Assessment    Able to Propel (ft): 0 feet  Curbs/Ramps Assist Required (FIM Score): 0 (Not tested)  Wheelchair Setup Assist Required : 0 (Not tested)     Interventions:  STS 3 x 8 from [facility]  Step ups on 6 in step 2 x 8    BP (!) 87/58 Comment: asymptomatic while in bed  Pulse 80   Temp 99.7 °F (37.6 °C)   Resp 12   Wt 96 kg (211 lb 9.6 oz)   SpO2 98%   BMI 36.32 kg/m²      Pain level: mild complaints of pain   Pain location: LLE  Pain interventions: Rest from activity; talk with RN    Cardiopulmonary: Pt on 28% O2 trach collar (6 L on portable O2 tank for flow rate) and has NG tube.    Pt reports the pain in her L leg comes and goes and that it occurs generally whenever she stands on it, although it sometimes happens when she is sitting. Pt describes the pain as \"throbbing\" and/or \"pins & needles\" whenever it occurs. Pt reports he BP was low this morning, so she was not given any meds for pain. Patient education: Encouraged pt to conserve energy and pace herself for activity tolerance. Interdisciplinary Communication: Collaborated with RN regarding pain    Pt left in WC in room with call bell and needs in reach. Pt in NAD and  visiting. Session cut short by NP from GI to consult pt about potentially getting a PEG tube tomorrow. Assessment: Pt tolerated therapy well today. Pt demonstrates adequate strength and endurance in her LE to complete tasks into today's session and will progress accordingly. Pt would benefit from skilled physical therapy to maximize independence, increase activity tolerance, and improve functional mobility. Plan of Care: Continue with POC and progress as tolerated.       Nini Tavares, PT  2/20/2020

## 2020-02-20 NOTE — PROGRESS NOTES
Problem: Falls - Risk of  Goal: *Absence of Falls  Description  Document Agueda Orellana Fall Risk and appropriate interventions in the flowsheet. 2/20/2020 1341 by Freya Bates RN  Outcome: Progressing Towards Goal  Note: Fall Risk Interventions:  Mobility Interventions: Communicate number of staff needed for ambulation/transfer         Medication Interventions: Patient to call before getting OOB    Elimination Interventions: Patient to call for help with toileting needs           2/20/2020 0758 by Freya Bates RN  Outcome: Progressing Towards Goal  Note: Fall Risk Interventions:  Mobility Interventions: Communicate number of staff needed for ambulation/transfer         Medication Interventions: Patient to call before getting OOB    Elimination Interventions: Call light in reach              Problem: Patient Education: Go to Patient Education Activity  Goal: Patient/Family Education  Outcome: Progressing Towards Goal     Problem: Pressure Injury - Risk of  Goal: *Prevention of pressure injury  Description  Document Bahman Scale and appropriate interventions in the flowsheet.   2/20/2020 1341 by Freya Bates RN  Outcome: Progressing Towards Goal  Note: Pressure Injury Interventions:  Sensory Interventions: Assess changes in LOC, Check visual cues for pain    Moisture Interventions: Absorbent underpads    Activity Interventions: Pressure redistribution bed/mattress(bed type)    Mobility Interventions: Pressure redistribution bed/mattress (bed type)    Nutrition Interventions: Document food/fluid/supplement intake    Friction and Shear Interventions: Apply protective barrier, creams and emollients             2/20/2020 0758 by Freya Bates RN  Outcome: Progressing Towards Goal  Note: Pressure Injury Interventions:  Sensory Interventions: Assess changes in LOC    Moisture Interventions: Absorbent underpads    Activity Interventions: Pressure redistribution bed/mattress(bed type)    Mobility Interventions: Pressure redistribution bed/mattress (bed type)    Nutrition Interventions: Document food/fluid/supplement intake    Friction and Shear Interventions: Apply protective barrier, creams and emollients                Problem: Patient Education: Go to Patient Education Activity  Goal: Patient/Family Education  2/20/2020 1341 by Deric Monahan RN  Outcome: Progressing Towards Goal  2/20/2020 0758 by Deric Monahan RN  Outcome: Progressing Towards Goal

## 2020-02-20 NOTE — H&P (VIEW-ONLY)
[facility] Consult Note Primary GI Physician: None Referring Provider:  Dr Benigno Morales Consult Date:  2/20/2020 Admit Date:  2/18/2020 Chief Complaint:  PEG Evaluation Subjective:  
 
History of Present Illness:  Patient is a 53 y.o. 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 Najma Will for PEG eval,  She ordinally presented  with acute left neck pain and left arm weakness and paresthesia. The pt had a CT of head which was normal, the pt was transported to [facility] ED for a MRI of brain/C-spine, she had acute resp failure/bradycardia and required intubation. The MRI of Cspine was suspicious for vascular abnormality, AVM vs Ca[place]oma, with hemorrhage and edema noted Cervical Spine at C2-C5 area. The pt was transferred to [address] [place] [address] Dr. Buddy LANE via [facility] flight team to Dr. Queenie Condon and Endovascular team for further evaluation and plan of care. She was intubated. 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 The risk of treatment is very high with chances of paralysis very high, at this time the treatment will be blood pressure control and monitoring. She was unable to tolerate extubation when attempted and had trach placed on 2/14/20. She has been off the vent and on trach collar since 2/16, 30-40% O2. Speech eval 2/19 w pt having difficulty swallowing secretions. She has left sided deficit with weakness, numbness and pain in neck. She has had umbilical hernia surgery w mesh in 2014. She also has had 2 c section, no other abd surgeries. PMH: 
No past medical history on file. PSH: 
No past surgical history on file. Allergies: Allergies Allergen Reactions  Ace Inhibitors Angioedema Home Medications: 
Prior to Admission medications Medication Sig Start Date End Date Taking? Authorizing Provider ALPRAZolam (XANAX) 0.25 mg tablet Take 1 Tab by mouth two (2) times a day. Max Daily Amount: 0.5 mg. 2/18/20   Genna Sood NP  
atorvastatin (LIPITOR) 40 mg tablet 1 Tab by Per NG tube route nightly. 2/18/20   Genna Sood NP  
metoprolol tartrate (LOPRESSOR) 100 mg IR tablet 1 Tab by Per NG tube route two (2) times a day. 2/18/20   Samm Lainez NP Hospital Medications: 
Current Facility-Administered Medications Medication Dose Route Frequency  scopolamine (TRANSDERM-SCOP) 1 mg over 3 days 1 Patch  1 Patch TransDERmal Q72H  
 gabapentin (NEURONTIN) capsule 200 mg  200 mg Oral QHS  dexamethasone (DECADRON) 4 mg/mL injection 4 mg  4 mg IntraVENous Q8H  
 metoprolol tartrate (LOPRESSOR) tablet 25 mg  25 mg Per NG tube BID  nystatin (MYCOSTATIN) 100,000 unit/mL oral suspension 500,000 Units  500,000 Units Oral QID  fluconazole (DIFLUCAN) tablet 200 mg  200 mg Oral NOW  ALPRAZolam (XANAX) tablet 0.25 mg  0.25 mg Oral TID  acetaminophen (TYLENOL) tablet 650 mg  650 mg Per NG tube Q4H PRN  
 sodium chloride (NS) flush 5-40 mL  5-40 mL IntraVENous PRN  
 atorvastatin (LIPITOR) tablet 40 mg  40 mg Per NG tube QHS  bisacodyL (DULCOLAX) suppository 10 mg  10 mg Rectal DAILY PRN  
 enoxaparin (LOVENOX) injection 40 mg  40 mg SubCUTAneous Q24H  
 famotidine (PEPCID) tablet 20 mg  20 mg Per NG tube Q12H  
 heparin (porcine) pf 900 Units  900 Units InterCATHeter Q8H  
 heparin (porcine) pf 900 Units  900 Units InterCATHeter PRN  
 ibuprofen (ADVIL;MOTRIN) 100 mg/5 mL oral suspension 600 mg  600 mg Per NG tube Q6H PRN  
 lip protectant (BLISTEX) ointment 1 Each  1 Each Topical PRN  
 ondansetron (ZOFRAN) injection 4 mg  4 mg IntraVENous Q4H PRN  
  phenol throat spray (CHLORASEPTIC) 1 Spray  1 Spray Oral PRN  
 senna (SENOKOT) tablet 8.6 mg  1 Tab Per NG tube DAILY  sodium chloride (NS) flush 30 mL  30 mL InterCATHeter Q8H  
 traZODone (DESYREL) tablet 50 mg  50 mg Per NG tube QHS PRN  
 HYDROcodone-acetaminophen (NORCO) 7.5-325 mg per tablet 1 Tab  1 Tab Per NG tube Q6H PRN Social History: 
Social History Tobacco Use  Smoking status: Not on file Substance Use Topics  Alcohol use: Not on file Pt denies any history of drug use, blood transfusions, or tattoos. Family History: No family history on file. Review of Systems: A detailed 10 system ROS is obtained, with pertinent positives as listed above. All others are negative. Diet:  TF via NG 
 
Objective:  
 
Physical Exam: 
Vitals: 
Visit Vitals BP (!) 87/58 Comment: asymptomatic while in bed Pulse 80 Temp 99.7 °F (37.6 °C) Resp 12 Wt 96 kg (211 lb 9.6 oz) SpO2 98% BMI 36.32 kg/m² Gen:  Pt is alert, cooperative, no acute distress Skin:  Extremities and face reveal no rashes. HEENT: Sclerae anicteric. Extra-occular muscles are intact. Maureen Plater Cardiovascular: Regular rate and rhythm. No murmurs, gallops, or rubs. Respiratory:  Comfortable breathing with no accessory muscle use. Clear breath sounds anteriorly with no wheezes, rales, or rhonchi. GI:  Abdomen nondistended, soft, and nontender. Normal active bowel sounds. No enlargement of the liver or spleen. No masses palpable. Rectal:  Deferred Musculoskeletal:  No pitting edema of the lower legs. Left sided weak but moves extremities Neurological:  Gross memory appears intact. Patient is alert and oriented. Psychiatric:  Mood appears appropriate with judgement intact. Laboratory:   
Recent Labs  
  02/20/20 
0541 02/19/20 
0543 02/18/20 
2292 WBC 6.6 6.0 5.0 HGB 8.9* 8.8* 9.3* HCT 28.7* 28.1* 29.6*  196 182 MCV 89.7 88.9 88.6  142 142  
K 3.9 3.6 3.5  104 106 CO2 31 31 30 BUN 17 15 11 CREA 0.58* 0.49* 0.55* CA 9.2 8.9 8.9 MG  --  2.0  --   
GLU 99 97 120* Assessment:  
 
Active Problems: 
  AVM (arteriovenous malformation) spine (2/2/2020) 53 y.o. 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 Plan:  
 
- NPO after MN 
- PEG placement tomorrow - procedure was described to the patient and her  at beside and she wishes toproceed 
- Hold Lovenox tomorrow am 
 
Katina Luque NP Patient is seen and examined in collaboration with Dr. Christelle Jansen. Assessment and plan as per Dr. Vickey cr.

## 2020-02-20 NOTE — PROGRESS NOTES
PHYSICAL THERAPY DAILY NOTE  Time In: 3758  Time Out: 1005  Patient Seen For: AM;Gait training; Therapeutic exercise    Subjective: Pt reports she's feeling good this morning. Pt reported some L foot pain/numbness described as 'pin needles' and some cramps in B calves following NuStep. Objective: Other (comment)(trach)    COGNITION Daily Assessment    Pt is alert and oriented and following commands appropriately 100% of the time. Pt communicates via written communication due to trach collar and has a pleasant affect.        BED/MAT MOBILITY Daily Assessment    Rolling Right : 0 (Not tested)  Rolling Left : 0 (Not tested)  Supine to Sit : 0 (Not tested)  Sit to Supine : 0 (Not tested)       TRANSFERS Daily Assessment    Transfer Type: SPT without device  Transfer Assistance : 4 (Contact guard assistance)  Sit to Stand Assistance: Contact guard assistance  Car Transfers: Not tested       GAIT Daily Assessment   Ambulated with slow, cautious yuri and decreased B arm swing and trunk rotation with short step length B Amount of Assistance: 4 (Contact guard assistance)  Distance (ft): 200 Feet (ft)  Assistive Device: Other (comment)(close SBA)       STEPS or STAIRS Daily Assessment   NT Steps/Stairs Ambulated (#): 0  Level of Assist : 0 (Not tested)       BALANCE Daily Assessment   No episodes of LOB today Sitting - Static: Good (unsupported)  Sitting - Dynamic: Fair (occasional)  Standing - Static: Good  Standing - Dynamic : Impaired       WHEELCHAIR MOBILITY Daily Assessment   NT Able to Propel (ft): 0 feet  Curbs/Ramps Assist Required (FIM Score): 0 (Not tested)  Wheelchair Setup Assist Required : 0 (Not tested)     Interventions:  Oral care in pt's bathroom aiding with managing secretions  NuStep level 3 x 10 min with break throughout for oral secretion management  CGA SPT to bike  Ambulation x 200 ft without rest breaks, but pauses to clear secretions    Cardiopulmonary: 28% O2 via trach collar (6 L on tank to maintain flow). Pt clearing secretions independently while in rehab gym. BP (!) 87/58 Comment: asymptomatic while in bed  Pulse 80   Temp 99.7 °F (37.6 °C)   Resp 12   Wt 96 kg (211 lb 9.6 oz)   SpO2 98%   BMI 36.32 kg/m²      Pain level: mild-moderate complaints of pain in B calves (cramps)  Pain location: calves  Pain interventions: massage and ankle pumps; rest break    Patient education: Pt reminded to use arm rests when ascending/descending during transfers from [facility]. Interdisciplinary Communication: Coordinated partial treatment with OT for oral care and respiratory management    Pt left in [facility] in room with call bell and needs in reach. Pt in NAD. Assessment: Pt tolerated therapy well today. Pt demonstrates good endurance following NuStep and gait training. Pt denied reports of fatigue throughout session. After talking with MD, physician believes that pt's L foot pain/numbness is a residual effect from cervical spinal cord. MD reports that pt has complained about L sided (UE and LE) deficits previously, but pt only reports L foot/calf has issues during PT. Pt would benefit from skilled physical therapy to maximize independence, increase activity tolerance, and improve functional mobility. Plan of Care: Continue with POC and progress as tolerated.       Nini Tavares, PT  2/20/2020

## 2020-02-20 NOTE — PROGRESS NOTES
Problem: Falls - Risk of  Goal: *Absence of Falls  Description  Document Bianca Nuñez Fall Risk and appropriate interventions in the flowsheet. Outcome: Progressing Towards Goal  Note: Fall Risk Interventions:  Mobility Interventions: Communicate number of staff needed for ambulation/transfer, OT consult for ADLs, Patient to call before getting OOB, PT Consult for mobility concerns, PT Consult for assist device competence, Strengthening exercises (ROM-active/passive), Utilize walker, cane, or other assistive device         Medication Interventions: Assess postural VS orthostatic hypotension, Patient to call before getting OOB, Evaluate medications/consider consulting pharmacy, Teach patient to arise slowly    Elimination Interventions: Call light in reach, Patient to call for help with toileting needs              Problem: Pressure Injury - Risk of  Goal: *Prevention of pressure injury  Description  Document Bahman Scale and appropriate interventions in the flowsheet. Outcome: Progressing Towards Goal  Note: Pressure Injury Interventions:  Sensory Interventions: Assess changes in LOC, Assess need for specialty bed, Avoid rigorous massage over bony prominences, Chair cushion, Check visual cues for pain, Discuss PT/OT consult with provider, Float heels, Keep linens dry and wrinkle-free, Maintain/enhance activity level, Minimize linen layers, Pressure redistribution bed/mattress (bed type), Monitor skin under medical devices, Turn and reposition approx.  every two hours (pillows and wedges if needed)    Moisture Interventions: Absorbent underpads, Contain wound drainage, Minimize layers    Activity Interventions: Assess need for specialty bed, Chair cushion, Increase time out of bed, Pressure redistribution bed/mattress(bed type), PT/OT evaluation    Mobility Interventions: Assess need for specialty bed, Chair cushion, Float heels, HOB 30 degrees or less, Pressure redistribution bed/mattress (bed type), PT/OT evaluation, Turn and reposition approx.  every two hours(pillow and wedges)    Nutrition Interventions: Document food/fluid/supplement intake, Offer support with meals,snacks and hydration    Friction and Shear Interventions: Apply protective barrier, creams and emollients, Feet elevated on foot rest, Foam dressings/transparent film/skin sealants, HOB 30 degrees or less, Lift sheet, Minimize layers

## 2020-02-20 NOTE — PROGRESS NOTES
OT Daily Note  Time In 1032   Time Out 1115     Subjective: No complaints. Pleasant and cooperative. Pain: none reported  Education: functional use of LUE to promote improved coordination   Interdisciplinary Communication: with nurse regarding oxygen via trach   Precautions: Other (comment)(josi, NGT)  [facility]     Coordination Daily Assessment   Patient engaged in LUE fine motor coordination task following written instructions using LUE to place small pegs with no errors, no assist, no dropped pegs, and extra time. Focus was on fine motor coordination and tip to palm translation. Required two rest breaks due to fatigue. Good response to task. Would benefit from further fine motor coordination tasks. Communication Daily Assessment   Patient utilized blank paper to communicate throughout session. Educated patient in using mouth/tongue to make clicking sounds or clapping hands to get the attention of staff and visitors in her room; nodded understanding. Patient was handed off to PT. Assessment: Making steady progress with LUE coordination. Plan: Continue OT POC with focus on ADL/IADL skills, functional transfers, functional mobility, neuro re-education, coordination, strength, static and dynamic balance, and activity tolerance to maximize safety and independence with ADLs and functional transfers.      Kimi Mcpherson MS, OTR/L  2/20/2020        Note may contain the following abbreviations:   Abbreviation Explanation   AROM Active range of motion   PROM Passive range of motion   SPT Stand pivot transfer   LPT Lateral pivot transfer   WC wheelchair   RW Rolling walker    BUE Bilateral upper extremities   BLE Bilateral lower extremities    WBAT Weight bearing as tolerated    TTWB Toe-touch weight bearing    AD Adaptive device   AE Adaptive equipment    NMES Neuro muscular electrical stimulation   UBE Upper body ergometer

## 2020-02-20 NOTE — PROGRESS NOTES
Nutrition F/U:  TF Management for Dr. Morgan Olea. Assessment:  Weight 96 kg (9th floor unknown weight source 2/18/2020), edema - none reported. The patient remains NPO with bolus TF at breakfast and lunch and continuous \"nocturnal\" TF from 4P-4A daily. The nurse was getting ready to give the lunch feeding. She reports no difficulty giving the feeding and no GI complaints from the patient. The patient reports easy transition to the feeding schedule and no \"overly full\" feeling after the bolus or nocturnal feedings. She reports constipation. Noted that Senokot was started yesterday. Current formula is a fiber-containing formula which should contribute ~25 gm fiber daily. Noted plans for a PEG tomorrow. Enteral Access:   NGT. Macronutrient Needs (CBW 98 kg):  Estimated calorie needs - 7478-1569 kenny/day (15-18 kenny/kg/day)  Estimated protein needs - 78-98 gm pro/day (0.8-1)   Max CHO/day - 265 gm CHO/day (60% kenny/day)   Fluid/day - 1.4-1.8 liters/day (1 ml/kenny/day)  Intake/Comparative Standards:  Current intake of combo bolus and nocturnal TF (2 cartons during the day and 1 liter at night - 1770 calories/day (100% calorie goal), 82 grams protein/day (100% protein goal), 249 grams CHO/day (does not exceed max CHO limit) and 1749 ml water/day (100% fluid goal). Intervention:   Meals and Snacks: NPO. Enteral Nutrition: Continue above combination bolus and nocturnal TF while in rehab. PEG scheduled for 2/21/2020. Coordination of Nutrition Care by a Nutrition Professional: Collaboration with Onesimo Geronimo RN. Nutrition Discharge Plan: Too soon to determine at this time. Makeda Darling.  [facility]  [phone]

## 2020-02-20 NOTE — PROGRESS NOTES
DYSPHAGIA TREATMENT     02/20/20 1854   Time Spent With Patient   Time In 1250   Time Out 1317   Patient Seen For: PM    Mental Status   Neurologic State Alert   Orientation Level Oriented X4   Cognition Appropriate decision making   Perception Appears intact   Perseveration No perseveration noted   Safety/Judgement Awareness of environment        02/20/20 1854   Laryngeal Exercises   Mendelsohn Maneuver Yes   Sets  1   Reps  10   Effortful Swallow Yes   Sets  1   Reps  10   Lia Yes   Sets  1   Reps  10     Patient introduced to laryngeal/pharyngeal strengthening exercises. Sitting up in the chair with NG in place. Poor tolerance of PMV secondary to air trapping with significant back pressure with #8 trach yesterday and earlier in the week while in acute setting. Plan for re-attempt with pending trach downsize. Plan for PEG placement tomorrow. Patient c/o some discomfort with swallowing exercises with NG likely a contributing factor. Also with oral thrush present. Good oral control of the bolus and expulsion with tsp water trial with patient then effectively able to swish Nystatin and expel administered by RN. Patient reports that she has not brushed her teeth while in ICU. Patient was able to brush her teeth independently without difficulty. Instructed patient and family to brush her teeth twice a day as she usually would to reduce risk of pneumonia related to aspiration of secretions with understanding expressed. Bedside swallow deferred until patient is able to tolerate PMV and oral secretions are minimized. Patient was able to complete lia, mendelsohn, and effortful swallow exercises outlined above 1x10 with 90% accuracy and minimal cueing. Audible swallow (RN able to hear from several feet away) with completion of effortful swallows. Patient continues to use yankauer to clear oral secretions intermittently during the session.   Recommend continued therapy for PMV trials and dysphagia treatment.     Gypsy Mejia MS, CCC-SLP

## 2020-02-20 NOTE — PROGRESS NOTES
Problem: Falls - Risk of  Goal: *Absence of Falls  Description  Document Marie Lin Fall Risk and appropriate interventions in the flowsheet. Outcome: Progressing Towards Goal  Note: Fall Risk Interventions:  Mobility Interventions: Communicate number of staff needed for ambulation/transfer         Medication Interventions: Patient to call before getting OOB    Elimination Interventions: Call light in reach              Problem: Pressure Injury - Risk of  Goal: *Prevention of pressure injury  Description  Document Bahman Scale and appropriate interventions in the flowsheet.   Outcome: Progressing Towards Goal  Note: Pressure Injury Interventions:  Sensory Interventions: Assess changes in LOC    Moisture Interventions: Absorbent underpads    Activity Interventions: Pressure redistribution bed/mattress(bed type)    Mobility Interventions: Pressure redistribution bed/mattress (bed type)    Nutrition Interventions: Document food/fluid/supplement intake    Friction and Shear Interventions: Apply protective barrier, creams and emollients                Problem: Patient Education: Go to Patient Education Activity  Goal: Patient/Family Education  Outcome: Progressing Towards Goal

## 2020-02-20 NOTE — PROGRESS NOTES
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 Told PT it was just the foot. No hx of injury. Still with numbness. Spitting out secretions and suctioning.      Objective:     Current Facility-Administered Medications   Medication Dose Route Frequency    scopolamine (TRANSDERM-SCOP) 1 mg over 3 days 1 Patch  1 Patch TransDERmal Q72H    gabapentin (NEURONTIN) capsule 200 mg  200 mg Oral QHS    dexamethasone (DECADRON) 4 mg/mL injection 4 mg  4 mg IntraVENous Q8H    ALPRAZolam (XANAX) tablet 0.25 mg  0.25 mg Oral TID    acetaminophen (TYLENOL) tablet 650 mg  650 mg Per NG tube Q4H PRN    sodium chloride (NS) flush 5-40 mL  5-40 mL IntraVENous PRN    atorvastatin (LIPITOR) tablet 40 mg  40 mg Per NG tube QHS    bisacodyL (DULCOLAX) suppository 10 mg  10 mg Rectal DAILY PRN    enoxaparin (LOVENOX) injection 40 mg  40 mg SubCUTAneous Q24H    famotidine (PEPCID) tablet 20 mg  20 mg Per NG tube Q12H    heparin (porcine) pf 900 Units  900 Units InterCATHeter Q8H    heparin (porcine) pf 900 Units  900 Units InterCATHeter PRN    ibuprofen (ADVIL;MOTRIN) 100 mg/5 mL oral suspension 600 mg  600 mg Per NG tube Q6H PRN    lip protectant (BLISTEX) ointment 1 Each  1 Each Topical PRN    metoprolol tartrate (LOPRESSOR) tablet 100 mg  100 mg Per NG tube BID    ondansetron (ZOFRAN) injection 4 mg  4 mg IntraVENous Q4H PRN    phenol throat spray (CHLORASEPTIC) 1 Spray  1 Spray Oral PRN    senna (SENOKOT) tablet 8.6 mg  1 Tab Per NG tube DAILY    sodium chloride (NS) flush 30 mL  30 mL InterCATHeter Q8H    traZODone (DESYREL) tablet 50 mg  50 mg Per NG tube QHS PRN    HYDROcodone-acetaminophen (NORCO) 7.5-325 mg per tablet 1 Tab  1 Tab Per NG tube Q6H PRN     Review of Systems:Denies chest pain, shortness of breath, cough, headache, visual problems, abdominal pain, dysurea, calf pain. Pertinent positives are as noted in the medical records and unremarkable otherwise. C/o vaginal itching  Visit Vitals  BP (!) 87/58   Pulse 80   Temp 99.7 °F (37.6 °C)   Resp 12   Wt 211 lb 9.6 oz (96 kg)   SpO2 98%   BMI 36.32 kg/m²        Physical Exam:   General: Alert and age appropriately oriented. No acute cardio respiratory distress. HEENT: Normocephalic,no scleral icterus  Oral mucosa moist without cyanosis; white coating to roof of mouth   Lungs: Clear to auscultation  bilaterally. Respiration even and unlabored   Heart: Regular rate and rhythm, S1, S2   No  murmurs, clicks, rub or gallops   Abdomen: Soft, non-tender, nondistended. Bowel sounds present. No organomegaly. obese   Genitourinary: Benign . Neuromuscular:      Grossly no focal motor deficits noted. Moves ankles. Dec lt touch on left  Writing responses; no new deficits   Skin/extremity: No rashes, no erythema. No calf tenderness BLE  Trach intact.                                                                             Functional Assessment:          Balance  Sitting - Static: Good (unsupported) (02/19/20 1500)  Sitting - Dynamic: Fair (occasional) (02/19/20 1500)  Standing - Static: Good (02/19/20 1500)  Standing - Dynamic : Impaired (02/19/20 1500)                     Bianca Nuñez Fall Risk Assessment:  Bianca Nuñez Fall Risk  Mobility: Ambulates or transfers with assist devices or assistance (02/20/20 0754)  Mobility Interventions: Communicate number of staff needed for ambulation/transfer (02/20/20 0754)  Mentation: Alert, oriented x 3 (02/20/20 0754)  Medication: Patient receiving anticonvulsants, sedatives(tranquilizers), psychotropics or hypnotics, hypoglycemics, narcotics, sleep aids, antihypertensives, laxatives, or diuretics (02/20/20 0754)  Medication Interventions: Patient to call before getting OOB (02/20/20 0754)  Elimination: Needs assistance with toileting (02/20/20 0754)  Elimination Interventions: Call light in reach (02/20/20 0754)  Prior Fall History: No (02/20/20 0754)  Total Score: 3 (02/20/20 0754)  Standard Fall Precautions: Yes (02/19/20 9032)  High Fall Risk: Yes (02/20/20 0754)     Speech Assessment:         Ambulation:  Gait  Distance (ft): 50 Feet (ft)(x4) (02/19/20 1500)  Assistive Device: Other (comment)(HHA) (02/19/20 1500)     Labs/Studies:  Recent Results (from the past 67 hour(s))   INFLUENZA A & B AG (RAPID TEST)    Collection Time: 02/18/20 12:58 AM   Result Value Ref Range    Influenza A Ag NEGATIVE  NEG      Influenza B Ag NEGATIVE  NEG      Source NASOPHARYNGEAL     CULTURE, RESPIRATORY/SPUTUM/BRONCH W GRAM STAIN    Collection Time: 02/18/20  1:00 AM   Result Value Ref Range    Special Requests: NO SPECIAL REQUESTS      GRAM STAIN 10 TO 40 WBCS SEEN PER OIF     GRAM STAIN 0 TO 1 EPITHELIAL CELLS SEEN PER OIF     GRAM STAIN 2+ MUCUS PRESENT      GRAM STAIN FEW GRAM POSITIVE COCCI      GRAM STAIN FEW GRAM NEGATIVE RODS      Culture result: MODERATE NORMAL RESPIRATORY KRISTINA     CULTURE, BLOOD    Collection Time: 02/18/20  1:17 AM   Result Value Ref Range    Special Requests: NO SPECIAL REQUESTS  RIGHT  RED PICC        Culture result: NO GROWTH 1 DAY     CULTURE, BLOOD    Collection Time: 02/18/20  1:27 AM   Result Value Ref Range    Special Requests: LEFT  Antecubital        Culture result: NO GROWTH 1 DAY     CULTURE, URINE    Collection Time: 02/18/20  2:42 AM   Result Value Ref Range    Special Requests: NO SPECIAL REQUESTS      Culture result: NO GROWTH 2 DAYS     CBC W/O DIFF    Collection Time: 02/18/20  3:58 AM   Result Value Ref Range    WBC 5.0 4.3 - 11.1 K/uL    RBC 3.34 (L) 4.05 - 5.2 M/uL    HGB 9.3 (L) 11.7 - 15.4 g/dL    HCT 29.6 (L) 35.8 - 46.3 %    MCV 88.6 79.6 - 97.8 FL    MCH 27.8 26.1 - 32.9 PG    MCHC 31.4 31.4 - 35.0 g/dL    RDW 12.8 11.9 - 14.6 %    PLATELET 983 150 - 450 K/uL    MPV 10.8 9.4 - 12.3 FL    ABSOLUTE NRBC 0.00 0.0 - 0.2 K/uL   METABOLIC PANEL, BASIC    Collection Time: 02/18/20  3:58 AM   Result Value Ref Range    Sodium 142 136 - 145 mmol/L    Potassium 3.5 3.5 - 5.1 mmol/L    Chloride 106 98 - 107 mmol/L    CO2 30 21 - 32 mmol/L    Anion gap 6 (L) 7 - 16 mmol/L    Glucose 120 (H) 65 - 100 mg/dL    BUN 11 6 - 23 MG/DL    Creatinine 0.55 (L) 0.6 - 1.0 MG/DL    GFR est AA >60 >60 ml/min/1.73m2    GFR est non-AA >60 >60 ml/min/1.73m2    Calcium 8.9 8.3 - 10.4 MG/DL   CBC W/O DIFF    Collection Time: 02/19/20  5:43 AM   Result Value Ref Range    WBC 6.0 4.3 - 11.1 K/uL    RBC 3.16 (L) 4.05 - 5.2 M/uL    HGB 8.8 (L) 11.7 - 15.4 g/dL    HCT 28.1 (L) 35.8 - 46.3 %    MCV 88.9 79.6 - 97.8 FL    MCH 27.8 26.1 - 32.9 PG    MCHC 31.3 (L) 31.4 - 35.0 g/dL    RDW 12.8 11.9 - 14.6 %    PLATELET 210 703 - 276 K/uL    MPV 10.7 9.4 - 12.3 FL    ABSOLUTE NRBC 0.00 0.0 - 0.2 K/uL   MAGNESIUM    Collection Time: 02/19/20  5:43 AM   Result Value Ref Range    Magnesium 2.0 1.8 - 2.4 mg/dL   METABOLIC PANEL, BASIC    Collection Time: 02/19/20  5:43 AM   Result Value Ref Range    Sodium 142 136 - 145 mmol/L    Potassium 3.6 3.5 - 5.1 mmol/L    Chloride 104 98 - 107 mmol/L    CO2 31 21 - 32 mmol/L    Anion gap 7 7 - 16 mmol/L    Glucose 97 65 - 100 mg/dL    BUN 15 6 - 23 MG/DL    Creatinine 0.49 (L) 0.6 - 1.0 MG/DL    GFR est AA >60 >60 ml/min/1.73m2    GFR est non-AA >60 >60 ml/min/1.73m2    Calcium 8.9 8.3 - 10.4 MG/DL   URINALYSIS W/ RFLX MICROSCOPIC    Collection Time: 02/19/20 11:58 AM   Result Value Ref Range    Color EMILIANO      Appearance TURBID      Specific gravity 1.031 (H) 1.001 - 1.023      pH (UA) 6.0 5.0 - 9.0      Protein 30 (A) NEG mg/dL    Glucose NEGATIVE  mg/dL    Ketone TRACE (A) NEG mg/dL    Bilirubin NEGATIVE  NEG      Blood NEGATIVE  NEG      Urobilinogen 1.0 0.2 - 1.0 EU/dL    Nitrites NEGATIVE  NEG      Leukocyte Esterase NEGATIVE  NEG      WBC 3-5 0 /hpf RBC 3-5 0 /hpf    Epithelial cells 10-20 0 /hpf    Bacteria 0 0 /hpf    Mucus 3+ (H) 0 /lpf   CULTURE, URINE    Collection Time: 02/19/20 11:58 AM   Result Value Ref Range    Special Requests: NO SPECIAL REQUESTS      Culture result:        10,000 to 50,000  COLONIES/mL  NORMAL SKIN KRISTINA ISOLATED     CBC W/O DIFF    Collection Time: 02/20/20  5:41 AM   Result Value Ref Range    WBC 6.6 4.3 - 11.1 K/uL    RBC 3.20 (L) 4.05 - 5.2 M/uL    HGB 8.9 (L) 11.7 - 15.4 g/dL    HCT 28.7 (L) 35.8 - 46.3 %    MCV 89.7 79.6 - 97.8 FL    MCH 27.8 26.1 - 32.9 PG    MCHC 31.0 (L) 31.4 - 35.0 g/dL    RDW 13.1 11.9 - 14.6 %    PLATELET 599 747 - 264 K/uL    MPV 10.6 9.4 - 12.3 FL    ABSOLUTE NRBC 0.00 0.0 - 0.2 K/uL   METABOLIC PANEL, BASIC    Collection Time: 02/20/20  5:41 AM   Result Value Ref Range    Sodium 142 136 - 145 mmol/L    Potassium 3.9 3.5 - 5.1 mmol/L    Chloride 106 98 - 107 mmol/L    CO2 31 21 - 32 mmol/L    Anion gap 5 (L) 7 - 16 mmol/L    Glucose 99 65 - 100 mg/dL    BUN 17 6 - 23 MG/DL    Creatinine 0.58 (L) 0.6 - 1.0 MG/DL    GFR est AA >60 >60 ml/min/1.73m2    GFR est non-AA >60 >60 ml/min/1.73m2    Calcium 9.2 8.3 - 10.4 MG/DL       Assessment:     Problem List as of 2/20/2020 Date Reviewed: 2/14/2020          Codes Class Noted - Resolved    [place] ICD-10-CM: D18.00  ICD-9-CM: 228.00  2/3/2020 - Present        Acute respiratory failure with hypoxia (HCC) ICD-10-CM: J96.01  ICD-9-CM: 518.81  2/3/2020 - Present        Edema of spinal cord (HCC) ICD-10-CM: G95.19  ICD-9-CM: 336.1  2/3/2020 - Present        Acute left-sided weakness ICD-10-CM: R53.1  ICD-9-CM: 728.87  2/3/2020 - Present        AVM (arteriovenous malformation) spine ICD-10-CM: Q28.8  ICD-9-CM: 747.82  2/2/2020 - Present                   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 CXR neg. Will have RT decrease cuff pressure. Tolerating ATC well. Will have ST try PMV trials with eventual capping. Will downsize when tolerates, can at 7-10days post op; cont RT with assist with trach collar, wean OT; pt functionally expected to do well. Do not want to send home with a trach in but may have to depending on tolerance of downsizing. Keeping it now is definitely for safety due to spinal edema  -pt constantly suctioning secretions; will add scop patch and monitor. dont want mucous plugging. Very thin secretions; 6L trach collar   -trach stoma quite big, do not think we can downsize to a #6 trach. Could not tolerate PMV at all; back pressure. Scopolamine patch placed for decreasing oral secretions. sats 98%, 6L. Lungs clear; spt cx neg; will f/u CXR due to known left atelectasis and vasc congestion    Spinal cord edema/hematoma C3; dysphagia profound. Will need PEG. Cannot continue NGT for the length of time I suspect she will need. Will consult GI 2/20  -start trial of decadron 4mg IV q 8hrs to see if edema can be decreased and pt can tolerate secretions better and swallow 2/20; discussed with neurosurgery     ID; fever 2/17 ; blood and spt cxs neg. Wbc normal. Urinalysis pending  -2/20 urinalysis suspicious, but cx neg thus far. tmax 100.2; f/u CXR; no leukocytosis     Pt still with NGT for feeding; ST to eval. Aspiration precautions  -2/20 not even close to being ready for swallow eval. #8 trach, cant tolerate PMV,     Oral thrush; can use nystatin to coat roof of mouth with swab. Strict thorough oral care     DVT risk / DVT Prophylaxis- Will require daily physician exam to assess for signs and symptoms as patient is at increased risk for of thromboembolism. Mobilization as tolerated.  Intermittent pneumatic compression devices when in bed Thigh-high or knee-high thromboembolic deterrent hose when out of bed. Lovenox     Pain Control: ongoing significant pain in neck and head which is stable and controlled by PRN meds. Will require regular pain assessment and comprenhensive pain management, cont prn tylenol or motrin and Norco if needed for more mod to severe pain. Dc fentanyl  -2/20 paresthesias LLE; start Neurontin 200mg qhs for now     Wound Care: Monitor wound status daily per staff and physician. At risk for failure. Will require 24/7 rehab nursing. Routine catheter care; PICC line RUE. Neck wound healing; cont trach care     Hypertension - BP uncontrolled, fluctuating, managed medically. Goal SBP < 160; cont lopressor  -2/20 asymptomatic hypotension, dec lopressor and add parameters. sbp in the 80s, may be due to cervical SCI and autonomic dysfxn     HLD cont Lipitor 40mg     Anx/depression; refuses antidepressent. Was taking xanax at home and will cont low dose xanax at 0.25mg bid. Hopefully as she sees herself recovering, anxiety will lessen  -2/19 inc anxiety, didn't sleep; inc trazadone and change xanax to tid; has sleep wake cycle mixed up due to coming from ICU environment.  -2/20 improved sleep last noc      Anemia; hgb 9.3 monitor; 2/19 hgb 8.8; no evid of bleed. monitor     Urinary retention/ neurogenic bladder - schedule voids q6-8 hrs. Check post-void residual as needed; In and Out catheterize if post-void residual is more than 400 cc.     bowel program - cont senna, and prn bowel program     GERD - resume PPI. At times may need additional antacids, Maalox prn. Time spent was 35 minutes with over 1/2 in direct patient care/examination, consultation and coordination of care.      Signed By: Reta Major MD     February 20, 2020

## 2020-02-21 ENCOUNTER — HOSPITAL ENCOUNTER (OUTPATIENT)
Age: 45
Setting detail: OUTPATIENT SURGERY
Discharge: NURSING, REHAB OR LONG-TERM CARE | End: 2020-02-21
Attending: INTERNAL MEDICINE | Admitting: INTERNAL MEDICINE
Payer: COMMERCIAL

## 2020-02-21 ENCOUNTER — ANESTHESIA EVENT (OUTPATIENT)
Dept: ENDOSCOPY | Age: 45
End: 2020-02-21
Payer: COMMERCIAL

## 2020-02-21 ENCOUNTER — ANESTHESIA (OUTPATIENT)
Dept: ENDOSCOPY | Age: 45
End: 2020-02-21
Payer: COMMERCIAL

## 2020-02-21 VITALS
SYSTOLIC BLOOD PRESSURE: 133 MMHG | OXYGEN SATURATION: 100 % | DIASTOLIC BLOOD PRESSURE: 70 MMHG | HEART RATE: 93 BPM | TEMPERATURE: 98 F | RESPIRATION RATE: 22 BRPM

## 2020-02-21 LAB
BACTERIA SPEC CULT: NORMAL
SERVICE CMNT-IMP: NORMAL

## 2020-02-21 PROCEDURE — 74011250636 HC RX REV CODE- 250/636: Performed by: NURSE ANESTHETIST, CERTIFIED REGISTERED

## 2020-02-21 PROCEDURE — 77030006998

## 2020-02-21 PROCEDURE — 74011000250 HC RX REV CODE- 250: Performed by: NURSE ANESTHETIST, CERTIFIED REGISTERED

## 2020-02-21 PROCEDURE — 97112 NEUROMUSCULAR REEDUCATION: CPT

## 2020-02-21 PROCEDURE — 74011250636 HC RX REV CODE- 250/636: Performed by: PHYSICAL MEDICINE & REHABILITATION

## 2020-02-21 PROCEDURE — 74011250637 HC RX REV CODE- 250/637: Performed by: PHYSICAL MEDICINE & REHABILITATION

## 2020-02-21 PROCEDURE — C1751 CATH, INF, PER/CENT/MIDLINE: HCPCS

## 2020-02-21 PROCEDURE — 76060000032 HC ANESTHESIA 0.5 TO 1 HR: Performed by: INTERNAL MEDICINE

## 2020-02-21 PROCEDURE — 65310000000 HC RM PRIVATE REHAB

## 2020-02-21 PROCEDURE — 97535 SELF CARE MNGMENT TRAINING: CPT

## 2020-02-21 PROCEDURE — 97110 THERAPEUTIC EXERCISES: CPT

## 2020-02-21 PROCEDURE — 99232 SBSQ HOSP IP/OBS MODERATE 35: CPT | Performed by: PHYSICAL MEDICINE & REHABILITATION

## 2020-02-21 PROCEDURE — 76040000025: Performed by: INTERNAL MEDICINE

## 2020-02-21 PROCEDURE — 77030005122 HC CATH GASTMY PEG BSC -B: Performed by: INTERNAL MEDICINE

## 2020-02-21 RX ORDER — DEXAMETHASONE SODIUM PHOSPHATE 4 MG/ML
4 INJECTION, SOLUTION INTRA-ARTICULAR; INTRALESIONAL; INTRAMUSCULAR; INTRAVENOUS; SOFT TISSUE EVERY 8 HOURS
Status: DISCONTINUED | OUTPATIENT
Start: 2020-02-21 | End: 2020-02-23

## 2020-02-21 RX ORDER — ATORVASTATIN CALCIUM 40 MG/1
40 TABLET, FILM COATED ORAL DAILY
Status: DISCONTINUED | OUTPATIENT
Start: 2020-02-22 | End: 2020-02-27

## 2020-02-21 RX ORDER — PROPOFOL 10 MG/ML
INJECTION, EMULSION INTRAVENOUS AS NEEDED
Status: DISCONTINUED | OUTPATIENT
Start: 2020-02-21 | End: 2020-02-21 | Stop reason: HOSPADM

## 2020-02-21 RX ORDER — GABAPENTIN 100 MG/1
200 CAPSULE ORAL
Status: DISCONTINUED | OUTPATIENT
Start: 2020-02-21 | End: 2020-02-22

## 2020-02-21 RX ORDER — SODIUM CHLORIDE, SODIUM LACTATE, POTASSIUM CHLORIDE, CALCIUM CHLORIDE 600; 310; 30; 20 MG/100ML; MG/100ML; MG/100ML; MG/100ML
INJECTION, SOLUTION INTRAVENOUS
Status: DISCONTINUED | OUTPATIENT
Start: 2020-02-21 | End: 2020-02-21 | Stop reason: HOSPADM

## 2020-02-21 RX ORDER — FAMOTIDINE 20 MG/1
20 TABLET, FILM COATED ORAL EVERY 12 HOURS
Status: DISCONTINUED | OUTPATIENT
Start: 2020-02-21 | End: 2020-02-27

## 2020-02-21 RX ORDER — ONDANSETRON 2 MG/ML
4 INJECTION INTRAMUSCULAR; INTRAVENOUS
Status: DISCONTINUED | OUTPATIENT
Start: 2020-02-21 | End: 2020-02-28 | Stop reason: HOSPADM

## 2020-02-21 RX ORDER — ENOXAPARIN SODIUM 100 MG/ML
40 INJECTION SUBCUTANEOUS EVERY 24 HOURS
Status: DISCONTINUED | OUTPATIENT
Start: 2020-02-22 | End: 2020-02-28 | Stop reason: HOSPADM

## 2020-02-21 RX ORDER — SCOLOPAMINE TRANSDERMAL SYSTEM 1 MG/1
1 PATCH, EXTENDED RELEASE TRANSDERMAL
Status: DISCONTINUED | OUTPATIENT
Start: 2020-02-21 | End: 2020-02-27

## 2020-02-21 RX ORDER — HEPARIN 100 UNIT/ML
600 SYRINGE INTRAVENOUS EVERY 8 HOURS
Status: DISCONTINUED | OUTPATIENT
Start: 2020-02-21 | End: 2020-02-25

## 2020-02-21 RX ORDER — SODIUM CHLORIDE 9 MG/ML
10 INJECTION INTRAMUSCULAR; INTRAVENOUS; SUBCUTANEOUS AS NEEDED
Status: DISCONTINUED | OUTPATIENT
Start: 2020-02-21 | End: 2020-02-25

## 2020-02-21 RX ORDER — ALPRAZOLAM 0.25 MG/1
0.25 TABLET ORAL 3 TIMES DAILY
Status: DISCONTINUED | OUTPATIENT
Start: 2020-02-21 | End: 2020-02-27

## 2020-02-21 RX ORDER — TRAZODONE HYDROCHLORIDE 50 MG/1
50 TABLET ORAL
Status: DISCONTINUED | OUTPATIENT
Start: 2020-02-21 | End: 2020-02-27

## 2020-02-21 RX ORDER — HYDROCODONE BITARTRATE AND ACETAMINOPHEN 7.5; 325 MG/1; MG/1
1 TABLET ORAL
Status: DISCONTINUED | OUTPATIENT
Start: 2020-02-21 | End: 2020-02-27

## 2020-02-21 RX ORDER — SODIUM CHLORIDE, SODIUM LACTATE, POTASSIUM CHLORIDE, CALCIUM CHLORIDE 600; 310; 30; 20 MG/100ML; MG/100ML; MG/100ML; MG/100ML
100 INJECTION, SOLUTION INTRAVENOUS CONTINUOUS
Status: DISCONTINUED | OUTPATIENT
Start: 2020-02-21 | End: 2020-02-22

## 2020-02-21 RX ORDER — SODIUM CHLORIDE 9 MG/ML
3 INJECTION INTRAMUSCULAR; INTRAVENOUS; SUBCUTANEOUS AS NEEDED
Status: DISCONTINUED | OUTPATIENT
Start: 2020-02-21 | End: 2020-02-21

## 2020-02-21 RX ORDER — SODIUM CHLORIDE, SODIUM LACTATE, POTASSIUM CHLORIDE, CALCIUM CHLORIDE 600; 310; 30; 20 MG/100ML; MG/100ML; MG/100ML; MG/100ML
1000 INJECTION, SOLUTION INTRAVENOUS CONTINUOUS
Status: CANCELLED | OUTPATIENT
Start: 2020-02-21

## 2020-02-21 RX ORDER — ACETAMINOPHEN 325 MG/1
650 TABLET ORAL
Status: DISCONTINUED | OUTPATIENT
Start: 2020-02-21 | End: 2020-02-27

## 2020-02-21 RX ORDER — METOPROLOL TARTRATE 25 MG/1
25 TABLET, FILM COATED ORAL 2 TIMES DAILY
Status: DISCONTINUED | OUTPATIENT
Start: 2020-02-21 | End: 2020-02-27

## 2020-02-21 RX ORDER — TRIPROLIDINE/PSEUDOEPHEDRINE 2.5MG-60MG
100 TABLET ORAL
Status: DISCONTINUED | OUTPATIENT
Start: 2020-02-21 | End: 2020-02-21 | Stop reason: SDUPTHER

## 2020-02-21 RX ORDER — NYSTATIN 100000 [USP'U]/ML
500000 SUSPENSION ORAL 4 TIMES DAILY
Status: DISCONTINUED | OUTPATIENT
Start: 2020-02-21 | End: 2020-02-28 | Stop reason: HOSPADM

## 2020-02-21 RX ORDER — HEPARIN 100 UNIT/ML
300 SYRINGE INTRAVENOUS AS NEEDED
Status: DISCONTINUED | OUTPATIENT
Start: 2020-02-21 | End: 2020-02-25

## 2020-02-21 RX ORDER — CEFAZOLIN SODIUM/WATER 2 G/20 ML
2 SYRINGE (ML) INTRAVENOUS ONCE
Status: CANCELLED | OUTPATIENT
Start: 2020-02-21 | End: 2020-02-22

## 2020-02-21 RX ORDER — PROPOFOL 10 MG/ML
INJECTION, EMULSION INTRAVENOUS
Status: DISCONTINUED | OUTPATIENT
Start: 2020-02-21 | End: 2020-02-21 | Stop reason: HOSPADM

## 2020-02-21 RX ORDER — FACIAL-BODY WIPES
10 EACH TOPICAL DAILY PRN
Status: DISCONTINUED | OUTPATIENT
Start: 2020-02-21 | End: 2020-02-28 | Stop reason: HOSPADM

## 2020-02-21 RX ORDER — LIDOCAINE HYDROCHLORIDE 20 MG/ML
INJECTION, SOLUTION EPIDURAL; INFILTRATION; INTRACAUDAL; PERINEURAL AS NEEDED
Status: DISCONTINUED | OUTPATIENT
Start: 2020-02-21 | End: 2020-02-21 | Stop reason: HOSPADM

## 2020-02-21 RX ORDER — AMOXICILLIN 250 MG
1 CAPSULE ORAL
Status: DISCONTINUED | OUTPATIENT
Start: 2020-02-21 | End: 2020-02-28 | Stop reason: HOSPADM

## 2020-02-21 RX ADMIN — DEXAMETHASONE SODIUM PHOSPHATE 4 MG: 4 INJECTION, SOLUTION INTRAMUSCULAR; INTRAVENOUS at 06:11

## 2020-02-21 RX ADMIN — NYSTATIN 500000 UNITS: 100000 SUSPENSION ORAL at 22:37

## 2020-02-21 RX ADMIN — ALPRAZOLAM 0.25 MG: 0.25 TABLET ORAL at 16:24

## 2020-02-21 RX ADMIN — PROPOFOL 50 MG: 10 INJECTION, EMULSION INTRAVENOUS at 13:16

## 2020-02-21 RX ADMIN — TRAZODONE HYDROCHLORIDE 50 MG: 50 TABLET ORAL at 20:32

## 2020-02-21 RX ADMIN — FAMOTIDINE 20 MG: 20 TABLET, FILM COATED ORAL at 20:35

## 2020-02-21 RX ADMIN — DEXAMETHASONE SODIUM PHOSPHATE 4 MG: 4 INJECTION, SOLUTION INTRAMUSCULAR; INTRAVENOUS at 21:09

## 2020-02-21 RX ADMIN — METOPROLOL TARTRATE 25 MG: 25 TABLET, FILM COATED ORAL at 17:24

## 2020-02-21 RX ADMIN — DEXAMETHASONE SODIUM PHOSPHATE 4 MG: 4 INJECTION, SOLUTION INTRAMUSCULAR; INTRAVENOUS at 16:00

## 2020-02-21 RX ADMIN — SODIUM CHLORIDE 10 ML: 9 INJECTION INTRAMUSCULAR; INTRAVENOUS; SUBCUTANEOUS at 22:40

## 2020-02-21 RX ADMIN — SODIUM CHLORIDE, SODIUM LACTATE, POTASSIUM CHLORIDE, AND CALCIUM CHLORIDE 100 ML/HR: 600; 310; 30; 20 INJECTION, SOLUTION INTRAVENOUS at 17:07

## 2020-02-21 RX ADMIN — LIDOCAINE HYDROCHLORIDE 50 MG: 20 INJECTION, SOLUTION EPIDURAL; INFILTRATION; INTRACAUDAL; PERINEURAL at 13:15

## 2020-02-21 RX ADMIN — HEPARIN 600 UNITS: 100 SYRINGE at 21:10

## 2020-02-21 RX ADMIN — PROPOFOL 140 MCG/KG/MIN: 10 INJECTION, EMULSION INTRAVENOUS at 13:17

## 2020-02-21 RX ADMIN — HEPARIN 600 UNITS: 100 SYRINGE at 16:29

## 2020-02-21 RX ADMIN — SODIUM CHLORIDE, SODIUM LACTATE, POTASSIUM CHLORIDE, AND CALCIUM CHLORIDE: 600; 310; 30; 20 INJECTION, SOLUTION INTRAVENOUS at 12:49

## 2020-02-21 RX ADMIN — HEPARIN SODIUM (PORCINE) LOCK FLUSH IV SOLN 100 UNIT/ML 900 UNITS: 100 SOLUTION at 06:12

## 2020-02-21 RX ADMIN — Medication 30 ML: at 06:12

## 2020-02-21 RX ADMIN — ALPRAZOLAM 0.25 MG: 0.25 TABLET ORAL at 21:09

## 2020-02-21 RX ADMIN — ACETAMINOPHEN 650 MG: 325 TABLET, FILM COATED ORAL at 20:32

## 2020-02-21 RX ADMIN — GABAPENTIN 200 MG: 100 CAPSULE ORAL at 20:31

## 2020-02-21 NOTE — ANESTHESIA POSTPROCEDURE EVALUATION
Procedure(s):  ESOPHAGOGASTRODUODENOSCOPY (EGD) room 906  PERCUTANEOUS ENDOSCOPIC GASTROSTOMY TUBE INSERTION. total IV anesthesia    Anesthesia Post Evaluation      Multimodal analgesia: multimodal analgesia used between 6 hours prior to anesthesia start to PACU discharge  Patient location during evaluation: bedside  Patient participation: complete - patient participated  Level of consciousness: awake and alert  Pain score: 3  Pain management: adequate  Airway patency: patent  Anesthetic complications: no  Cardiovascular status: acceptable and hemodynamically stable  Respiratory status: acceptable  Hydration status: acceptable  Post anesthesia nausea and vomiting:  none      Vitals Value Taken Time   /70 2/21/2020  1:45 PM   Temp 36.7 °C (98 °F) 2/21/2020  1:41 PM   Pulse 90 2/21/2020  1:46 PM   Resp 22 2/21/2020  1:41 PM   SpO2 100 % 2/21/2020  1:46 PM   Vitals shown include unvalidated device data.

## 2020-02-21 NOTE — PROGRESS NOTES
SPEECH PATHOLOGY NOTE:    Patient off the floor for PEG placement this pm.  Will continue to follow. Please place re-consult orders over the weekend for PMV trial if her trach is downsized. Will continue to follow for PMV trials and dysphagia management.     Marlon Cabrera MS, CCC-SLP

## 2020-02-21 NOTE — PROGRESS NOTES
Tube feeding infusing without difficulty, patient has suction yankauer in hand. No distress noted, resting comfortably. Call bell within reach, hourly rounds completed this shift.  at bedside.

## 2020-02-21 NOTE — ROUTINE PROCESS
TRANSFER - OUT REPORT: 
 
Verbal report given to [facility] RN(name) on Edwin Winston  being transferred to 9th floor(unit) for routine progression of care Report consisted of patients Situation, Background, Assessment and  
Recommendations(SBAR). Information from the following report(s) Procedure Summary was reviewed with the receiving nurse. Lines: PICC Triple Lumen 14/88/32 Right;Basilic (Active) Central Line Being Utilized Yes 2/21/2020 12:37 PM  
Criteria for Appropriate Use Limited/no vessel suitable for conventional peripheral access 2/21/2020 12:37 PM  
Site Assessment Clean, dry, & intact 2/21/2020 12:37 PM  
Phlebitis Assessment 0 2/21/2020 12:37 PM  
Infiltration Assessment 0 2/21/2020 12:37 PM  
Arm Circumference (cm) 35 cm 2/21/2020 11:30 AM  
Date of Last Dressing Change 02/21/20 2/21/2020 11:30 AM  
Dressing Status Clean, dry, & intact 2/21/2020 12:37 PM  
External Catheter Length (cm) 0 centimeters 2/21/2020 11:30 AM  
Dressing Type Disk with Chlorhexadine gluconate (CHG); Transparent 2/21/2020 12:37 PM  
Action Taken Dressing changed 2/21/2020 11:30 AM  
Hub Color/Line Status Flushed;Red 2/21/2020 12:37 PM  
Positive Blood Return (Site #1) No 2/21/2020 12:37 PM  
Hub Color/Line Status Gray;Flushed; Infusing;Patent 2/21/2020 12:37 PM  
Positive Blood Return (Site #2) Yes 2/21/2020 12:37 PM  
Hub Color/Line Status Cap end changed; Patent 2/21/2020 11:30 AM  
Positive Blood Return (Site #3) No 2/21/2020 11:30 AM  
Alcohol Cap Used No 2/21/2020 11:30 AM  
  
 
Opportunity for questions and clarification was provided. Patient transported with: 
 [employer]

## 2020-02-21 NOTE — PROGRESS NOTES
Time In 0745   Time Out 0915     Mobility   Score Comments   Supine to Sit 4: Supervision or touching A Min assist    Sit to Stand 4: Supervision or touching A Min assist for lifting and hand placement   Transfer Assist 4: Supervision or touching A Transfer Type: SPT   Equipment: Rolling Walker   Comments: Steady assist     Activities of Daily Living    Score Comments   Oral Hyigene 5: S/U or clean-up assist Assist to retrieve items   Bathing 4: Supervision or touching A Type of Shower: Shower  Position: Standing PRN and Unsupported Sitting   Adaptive  Equipment: Tub Transfer Bench and Grab Bars  Comments: Steady assist for balance while washing bottom, min assist to wash R upper arm. Cues for maria victoria techniques   Upper Body  Dressing 3: Partial/Moderate A Items Applied: Pullover  Position: Supported Sitting  Comments: Assist with partially threading R arm through and pull down posteriorly   Lower Body Dressing 4: Supervision or touching A Items Applied: Underwear and Elastic pants  Position: Standing PRN and Unsupported Sitting  Adaptive Equipment: N/A  Comments: Cues to thread LLE through clothing openings first. Steady assist for standing balance while pulling clothing over waist   Donning/Timnath Footwear 5: S/U or clean-up assist Items Applied: Slip-on shoes  Adaptive Equipment: N/A  Comments: Toilet Transfer 4: Supervision or touching A Transfer Type: SPT   Equipment: Vanesa Fellanmol   Comments: Steady assist   Toileting Hygiene 4: Supervision or touching A Output: urine  Comments: Steady assist for clothing management up/down, able to complete toileting hygiene   Education  Cues for dressing techniques and functional mobility       S: \"I am getting the PEG tube today. \" Agreeable to therapy. Patient was able to ambulate ~10 feet using a RW with CGA. Pt completed ADLs with quality indicators noted above. Transitioned to gym, completed SPT with min assist from wc > EOM.  Completed PROM stretches for B shoulder flexion/extension progressing to AROM of same stretches. Pt noting minimal chest discomfort upon L shoulder flexion past 160 degrees. Pt was able to complete AROM L shoulder flexion to 160 degrees in gravity eliminated plane, full shoulder extension, IR, and ER. SpO2 level at 94% on room air post-ADLs, 97% on 28% O2 trach collar. Pain 2 out of 10 upon certain shoulder movements, did not push past point of pain. Collaborated with PT regarding patient performance and POC. Patient tolerated session well, but activity tolerance, balance, functional mobility, strength, coordination, cognition are still below baseline and require skilled facilitation to successfully and safely complete ADLs and transfers. Patient ended session in wc with PT assuming care.        Mendel Emmanuel, JUSTYNR/L

## 2020-02-21 NOTE — PROGRESS NOTES
Diana Gardiner MD,   Medical Director  [address] [facility], [place], [address] [facility]  Tel: [phone]       SFD PROGRESS NOTE    Veronica Stringer  Admit Date: 2/18/2020  Admit Diagnosis: AVM (arteriovenous malformation) spine [Q28.8]    Subjective     Feeling well. NGT fell out overnoc, RN left out due to getting PEG today. Slept MUCH better, decreased secretions. Denies cp or sob. No abd pain .  Voiding well, + bm    Objective:     Current Facility-Administered Medications   Medication Dose Route Frequency    scopolamine (TRANSDERM-SCOP) 1 mg over 3 days 1 Patch  1 Patch TransDERmal Q72H    gabapentin (NEURONTIN) capsule 200 mg  200 mg Oral QHS    dexamethasone (DECADRON) 4 mg/mL injection 4 mg  4 mg IntraVENous Q8H    metoprolol tartrate (LOPRESSOR) tablet 25 mg  25 mg Per NG tube BID    nystatin (MYCOSTATIN) 100,000 unit/mL oral suspension 500,000 Units  500,000 Units Oral QID    ceFAZolin (ANCEF) 2 g/20 mL in sterile water IV syringe  2 g IntraVENous ONCE    ALPRAZolam (XANAX) tablet 0.25 mg  0.25 mg Oral TID    acetaminophen (TYLENOL) tablet 650 mg  650 mg Per NG tube Q4H PRN    sodium chloride (NS) flush 5-40 mL  5-40 mL IntraVENous PRN    atorvastatin (LIPITOR) tablet 40 mg  40 mg Per NG tube QHS    bisacodyL (DULCOLAX) suppository 10 mg  10 mg Rectal DAILY PRN    enoxaparin (LOVENOX) injection 40 mg  40 mg SubCUTAneous Q24H    famotidine (PEPCID) tablet 20 mg  20 mg Per NG tube Q12H    heparin (porcine) pf 900 Units  900 Units InterCATHeter Q8H    heparin (porcine) pf 900 Units  900 Units InterCATHeter PRN    ibuprofen (ADVIL;MOTRIN) 100 mg/5 mL oral suspension 600 mg  600 mg Per NG tube Q6H PRN    lip protectant (BLISTEX) ointment 1 Each  1 Each Topical PRN    ondansetron (ZOFRAN) injection 4 mg  4 mg IntraVENous Q4H PRN    phenol throat spray (CHLORASEPTIC) 1 Spray  1 Spray Oral PRN    senna (SENOKOT) tablet 8.6 mg  1 Tab Per NG tube DAILY    sodium chloride (NS) flush 30 mL  30 mL InterCATHeter Q8H    traZODone (DESYREL) tablet 50 mg  50 mg Per NG tube QHS PRN    HYDROcodone-acetaminophen (NORCO) 7.5-325 mg per tablet 1 Tab  1 Tab Per NG tube Q6H PRN     Review of Systems:Denies chest pain, shortness of breath, cough, headache, visual problems, abdominal pain, dysurea, calf pain. Pertinent positives are as noted in the medical records and unremarkable otherwise. Visit Vitals  /70   Pulse 93   Temp 98.3 °F (36.8 °C)   Resp 17   Wt 211 lb 9.6 oz (96 kg)   SpO2 99%   BMI 36.32 kg/m²        Physical Exam:   General: Alert and age appropriately oriented. No acute cardio respiratory distress. HEENT: Normocephalic,no scleral icterus  Oral mucosa moist without cyanosis; dec thrush on palate   Lungs: Clear to auscultation  bilaterally. Respiration even and unlabored   Heart: Regular rate and rhythm, S1, S2   No  murmurs, clicks, rub or gallops   Abdomen: Soft, non-tender, nondistended. Bowel sounds present. No organomegaly. Genitourinary: defer   Neuromuscular:      Grossly no motor deficits; fc 100%currently on RA sats 90s; 6L Trach collar overnoc     Skin/extremity: No rashes, no erythema. No calf tenderness BLE  No edema                                                                            Functional Assessment:          Balance  Sitting - Static: Good (unsupported) (02/20/20 1200)  Sitting - Dynamic: Fair (occasional) (02/20/20 1200)  Standing - Static: Good (02/20/20 1200)  Standing - Dynamic : Impaired (02/20/20 1200)                     [facility] Fall Risk Assessment:  [facility] Fall Risk  Mobility: Ambulates or transfers with assist devices or assistance (02/20/20 4193)  Mobility Interventions: Communicate number of staff needed for ambulation/transfer;OT consult for ADLs; Patient to call before getting OOB;PT Consult for mobility concerns;PT Consult for assist device competence;Strengthening exercises (ROM-active/passive); Utilize walker, cane, or other assistive device (02/20/20 2239)  Mentation: Alert, oriented x 3 (02/20/20 2239)  Medication: Patient receiving anticonvulsants, sedatives(tranquilizers), psychotropics or hypnotics, hypoglycemics, narcotics, sleep aids, antihypertensives, laxatives, or diuretics (02/20/20 2239)  Medication Interventions: Assess postural VS orthostatic hypotension; Evaluate medications/consider consulting pharmacy; Patient to call before getting OOB; Teach patient to arise slowly (02/20/20 2239)  Elimination: Needs assistance with toileting (02/20/20 2239)  Elimination Interventions: Call light in reach; Patient to call for help with toileting needs (02/20/20 2239)  Prior Fall History: No (02/20/20 2239)  Total Score: 3 (02/20/20 2239)  Standard Fall Precautions: Yes (02/20/20 2239)  High Fall Risk: Yes (02/20/20 2239)     Speech Assessment:         Ambulation:  Gait  Distance (ft): 200 Feet (ft) (02/20/20 1000)  Assistive Device: Other (comment)(close SBA) (02/20/20 1000)  Rail Use: (parallel bars) (02/20/20 1200)     Labs/Studies:  Recent Results (from the past 72 hour(s))   CBC W/O DIFF    Collection Time: 02/19/20  5:43 AM   Result Value Ref Range    WBC 6.0 4.3 - 11.1 K/uL    RBC 3.16 (L) 4.05 - 5.2 M/uL    HGB 8.8 (L) 11.7 - 15.4 g/dL    HCT 28.1 (L) 35.8 - 46.3 %    MCV 88.9 79.6 - 97.8 FL    MCH 27.8 26.1 - 32.9 PG    MCHC 31.3 (L) 31.4 - 35.0 g/dL    RDW 12.8 11.9 - 14.6 %    PLATELET 161 578 - 550 K/uL    MPV 10.7 9.4 - 12.3 FL    ABSOLUTE NRBC 0.00 0.0 - 0.2 K/uL   MAGNESIUM    Collection Time: 02/19/20  5:43 AM   Result Value Ref Range    Magnesium 2.0 1.8 - 2.4 mg/dL   METABOLIC PANEL, BASIC    Collection Time: 02/19/20  5:43 AM   Result Value Ref Range    Sodium 142 136 - 145 mmol/L    Potassium 3.6 3.5 - 5.1 mmol/L    Chloride 104 98 - 107 mmol/L    CO2 31 21 - 32 mmol/L    Anion gap 7 7 - 16 mmol/L    Glucose 97 65 - 100 mg/dL    BUN 15 6 - 23 MG/DL    Creatinine 0.49 (L) 0.6 - 1.0 MG/DL    GFR est AA >60 >60 ml/min/1.73m2    GFR est non-AA >60 >60 ml/min/1.73m2    Calcium 8.9 8.3 - 10.4 MG/DL   URINALYSIS W/ RFLX MICROSCOPIC    Collection Time: 02/19/20 11:58 AM   Result Value Ref Range    Color EMILIANO      Appearance TURBID      Specific gravity 1.031 (H) 1.001 - 1.023      pH (UA) 6.0 5.0 - 9.0      Protein 30 (A) NEG mg/dL    Glucose NEGATIVE  mg/dL    Ketone TRACE (A) NEG mg/dL    Bilirubin NEGATIVE  NEG      Blood NEGATIVE  NEG      Urobilinogen 1.0 0.2 - 1.0 EU/dL    Nitrites NEGATIVE  NEG      Leukocyte Esterase NEGATIVE  NEG      WBC 3-5 0 /hpf    RBC 3-5 0 /hpf    Epithelial cells 10-20 0 /hpf    Bacteria 0 0 /hpf    Mucus 3+ (H) 0 /lpf   CULTURE, URINE    Collection Time: 02/19/20 11:58 AM   Result Value Ref Range    Special Requests: NO SPECIAL REQUESTS      Culture result:        10,000 to 50,000  COLONIES/mL  NORMAL SKIN KRISTINA ISOLATED     CBC W/O DIFF    Collection Time: 02/20/20  5:41 AM   Result Value Ref Range    WBC 6.6 4.3 - 11.1 K/uL    RBC 3.20 (L) 4.05 - 5.2 M/uL    HGB 8.9 (L) 11.7 - 15.4 g/dL    HCT 28.7 (L) 35.8 - 46.3 %    MCV 89.7 79.6 - 97.8 FL    MCH 27.8 26.1 - 32.9 PG    MCHC 31.0 (L) 31.4 - 35.0 g/dL    RDW 13.1 11.9 - 14.6 %    PLATELET 226 595 - 463 K/uL    MPV 10.6 9.4 - 12.3 FL    ABSOLUTE NRBC 0.00 0.0 - 0.2 K/uL   METABOLIC PANEL, BASIC    Collection Time: 02/20/20  5:41 AM   Result Value Ref Range    Sodium 142 136 - 145 mmol/L    Potassium 3.9 3.5 - 5.1 mmol/L    Chloride 106 98 - 107 mmol/L    CO2 31 21 - 32 mmol/L    Anion gap 5 (L) 7 - 16 mmol/L    Glucose 99 65 - 100 mg/dL    BUN 17 6 - 23 MG/DL    Creatinine 0.58 (L) 0.6 - 1.0 MG/DL    GFR est AA >60 >60 ml/min/1.73m2    GFR est non-AA >60 >60 ml/min/1.73m2    Calcium 9.2 8.3 - 10.4 MG/DL       Assessment:     Problem List as of 2/21/2020 Date Reviewed: 2/14/2020          Codes Class Noted - Resolved    [place] ICD-10-CM: D18.00  ICD-9-CM: 228.00  2/3/2020 - Present        Acute respiratory failure with hypoxia [facility]) ICD-10-CM: J96.01  ICD-9-CM: 518.81  2/3/2020 - Present        Edema of spinal cord ([address] ICD-10-CM: G95.19  ICD-9-CM: 336.1  2/3/2020 - Present        Acute left-sided weakness ICD-10-CM: R53.1  ICD-9-CM: 728.87  2/3/2020 - Present        AVM (arteriovenous malformation) spine ICD-10-CM: Q28.8  ICD-9-CM: 747.82  2/2/2020 - Present                  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 CXR neg. Will have RT decrease cuff pressure. Tolerating ATC well. Will have ST try PMV trials with eventual capping. Will downsize when tolerates, can at 7-10days post op; cont RT with assist with trach collar, wean OT; pt functionally expected to do well. Do not want to send home with a trach in but may have to depending on tolerance of downsizing. Keeping it now is definitely for safety due to spinal edema  -pt constantly suctioning secretions; will add scop patch and monitor. dont want mucous plugging. Very thin secretions; 6L trach collar   -trach stoma quite big, do not think we can downsize to a #6 trach. Could not tolerate PMV at all; back pressure. Scopolamine patch placed for decreasing oral secretions. sats 98%, 6L. Lungs clear; spt cx neg; will f/u CXR due to known left atelectasis and vasc congestion  -2/21 CXR slt improved, needs to take deep breaths. Will DOWNSIZE to #6 trach today/working on PMV tolerance     Spinal cord edema/hematoma C3; dysphagia profound. Will need PEG. Cannot continue NGT for the length of time I suspect she will need. Will consult GI 2/20  -start trial of decadron 4mg IV q 8hrs to see if edema can be decreased and pt can tolerate secretions better and swallow 2/20; discussed with neurosurgery; 2/21 doing well today, managing secretions better     ID; fever 2/17 ; blood and spt cxs neg.  Wbc normal. Urinalysis pending  -2/20 urinalysis suspicious, but cx neg thus far. tmax 100.2; f/u CXR; no leukocytosis; 2/21 afebrile. Tm 99.7     Pt still with NGT for feeding; ST to eval. Aspiration precautions  -2/20 not even close to being ready for swallow eval. #8 trach, cant tolerate PMV,   -2/21 PEG today, appreciate GI assistance; NGT out. Will start IVFs     Oral thrush; can use nystatin to coat roof of mouth with swab. Strict thorough oral care 2/20     DVT risk / DVT Prophylaxis- Will require daily physician exam to assess for signs and symptoms as patient is at increased risk for of thromboembolism. Mobilization as tolerated. Intermittent pneumatic compression devices when in bed Thigh-high or knee-high thromboembolic deterrent hose when out of bed. Lovenox     Pain Control: ongoing significant pain in neck and head which is stable and controlled by PRN meds. Will require regular pain assessment and comprenhensive pain management, cont prn tylenol or motrin and Norco if needed for more mod to severe pain. Dc fentanyl  -2/20 paresthesias LLE; start Neurontin 200mg qhs for now; 2/21 much improved overnoc     Wound Care: Monitor wound status daily per staff and physician. At risk for failure. Will require 24/7 rehab nursing. Routine catheter care; PICC line RUE. Neck wound healing; cont trach care     Hypertension - BP uncontrolled, fluctuating, managed medically. Goal SBP < 160; cont lopressor  -2/20 asymptomatic hypotension, dec lopressor and add parameters. sbp in the 80s, may be due to cervical SCI and autonomic dysfxn     HLD cont Lipitor 40mg     Anx/depression; refuses antidepressent. Was taking xanax at home and will cont low dose xanax at 0.25mg bid. Hopefully as she sees herself recovering, anxiety will lessen  -2/19 inc anxiety, didn't sleep; inc trazadone and change xanax to tid; has sleep wake cycle mixed up due to coming from ICU environment.  -2/20 improved sleep last noc ; 2/21 excellent noc/had added neurontin.  Cont trazadone and xanax; signif dec anxiety     Anemia; hgb 9.3 monitor; 2/19 hgb 8.8; no evid of bleed. monitor     Urinary retention/ neurogenic bladder - schedule voids q6-8 hrs. Check post-void residual as needed; In and Out catheterize if post-void residual is more than 400 cc.  -voiding without difficulty     bowel program - cont senna, and prn bowel program     GERD - resume PPI. At times may need additional antacids, Maalox prn.        Time spent was 25 minutes with over 1/2 in direct patient care/examination, consultation and coordination of care.      Signed By: Michelle Cardenas MD     February 21, 2020

## 2020-02-21 NOTE — PROGRESS NOTES
Pt arrive from 9th floor by stretcher with trach collar on 6L.  in patients room. No other personal beloings with pt. No c/o pain.

## 2020-02-21 NOTE — PROGRESS NOTES
PHYSICAL THERAPY DAILY NOTE  Time In: 0915  Time Out: 2053  Patient Seen For: AM;Therapeutic exercise    Subjective: Pt reports no new symptoms this morning and denies reports of pain. Objective: Other (comment)(trach)    COGNITION Daily Assessment    Pt is alert and oriented and following commands appropriately 100% of the time. Pt communicates via mouthing words and written communication and has a pleasant affect. BED/MAT MOBILITY Daily Assessment    Rolling Right : 0 (Not tested)  Rolling Left : 0 (Not tested)  Supine to Sit : 4 (Contact guard assistance)  Sit to Supine : 5 (Supervision)       TRANSFERS Daily Assessment    Transfer Type: SPT without device  Transfer Assistance : 5 (Stand-by assistance)  Sit to Stand Assistance: Supervision  Car Transfers: Not tested       GAIT Daily Assessment    Amount of Assistance: 5 (Supervision/setup)  Distance (ft): 20 Feet (ft)  Assistive Device: Other (comment)(close SBA)       STEPS or STAIRS Daily Assessment    Steps/Stairs Ambulated (#): 0  Level of Assist : 0 (Not tested)       BALANCE Daily Assessment    Sitting - Static: Good (unsupported)  Sitting - Dynamic: Fair (occasional)  Standing - Static: Good  Standing - Dynamic : Impaired       WHEELCHAIR MOBILITY Daily Assessment    Able to Propel (ft): 0 feet  Curbs/Ramps Assist Required (FIM Score): 0 (Not tested)  Wheelchair Setup Assist Required : 0 (Not tested)     Interventions:  Bridges 3 x 8 x 3 sec  STS 3 x 10  Cross body rotations with ball 3 x 6 B  Standing hip ABD 2 x 10 B    Cardiopulmonary: Pt on 28% O2 trach collar (6 L on portable oxygen tank). Pt does not have NG tube this AM and is waiting for PEG placement later today as part of GI plan.     /69   Pulse 81   Temp 98.2 °F (36.8 °C)   Resp 16   Wt 96 kg (211 lb 9.6 oz)   SpO2 90%   BMI 36.32 kg/m²      Pain level: no complaints of pain  Pain location: N/A  Pain interventions: N/A    Patient education: Encouraged pt to take rest breaks during exercise sets to pace for tolerance    Interdisciplinary Communication: Collaborated with RN regarding pt's oxygen setup in room following session    Pt left in room with call bell and needs in reach. Pt in NAD and RN still in room working with pt's oxygen. Assessment: Pt tolerated therapy well today. Pt demonstrates good activity tolerance today, but requires some rest breaks between exercises. Pt demonstrated less secretions this AM compared to previously and has a weak cough. Pt did not complain of pain or symptoms throughout the sessions with her L side. Pt had difficulty with cross body rotations as the L arm made the activity difficult due to weakness. Pt would benefit from skilled physical therapy to maximize independence, increase activity tolerance, and improve functional mobility. Plan of Care: Continue with POC and progress as tolerated.       Celio Lu, PT  2/21/2020

## 2020-02-21 NOTE — PROCEDURES
PERCUTANEOUS ENDOSCOPIC GASTROSTOMY (PEG)    DATE of PROCEDURE: 2020    INDICATION: dysphagia, failure to thrive    POSTPROCEDURE DIAGNOSIS: gastritis, hiatal hernia    MEDICATIONS ADMINISTERED: MAC anesthesia (see anesthesia report)  SURGEON: Osvaldo Siu MD    INSTRUMENT:    PROCEDURE:  After obtaining informed consent, the patient was left in the supine position and sedated. The endoscope was advanced under direct vision without difficulty. The esophagus, stomach (including retroflexed views) and duodenum were evaluated. PEG placement: good transillumination and indentation in LUQ area. Skin infiltrated with lidocaine. Incision made superficially to allow room for the PEG tube. Trochar introduced, then guide wire inserted, being captured by endoscopist with a snare and brought transorally. PEG then attached to guide wire, surgeon applied traction to pull the PEG across gastric/abdominal wall. External markin cm. Re-endoscopy after the PEG confirmed appropriate position/location of inner bolster in the anterior wall, body of the stomach. The patient was taken to the recovery area in stable condition. FINDINGS:  ESOPHAGUS: normal  STOMACH: diffuse gastritis. DUODENUM: normal    Estimated blood loss: 0-minimal   Specimens obtained during procedure: none    PLAN: PEG for meds today. Start tube feeding tomorrow.

## 2020-02-21 NOTE — PROGRESS NOTES
TRANSFER - IN REPORT:    Verbal report received from [facility] on Dorian  being received from GI lab/endoscopy for routine progression of care      Report consisted of patients Situation, Background, Assessment and   Recommendations(SBAR). Information from the following report(s) SBAR, Procedure Summary, MAR and Recent Results was reviewed with the receiving nurse. Opportunity for questions and clarification was provided. Assessment completed upon patients arrival to unit and care assumed.

## 2020-02-21 NOTE — PROGRESS NOTES
TRANSFER - IN REPORT:    Verbal report received from DEEP Abdi on Christiane Garcia  being received from [phone] for ordered procedure      Report consisted of patients Situation, Background, Assessment and   Recommendations(SBAR). Information from the following report(s) SBAR, Kardex, [facility], Pre Procedure Checklist and Procedure Verification was reviewed with the receiving nurse. Opportunity for questions and clarification was provided.

## 2020-02-21 NOTE — PROGRESS NOTES
Problem: Falls - Risk of  Goal: *Absence of Falls  Description  Document Coleen Vela Fall Risk and appropriate interventions in the flowsheet. Outcome: Progressing Towards Goal  Note: Fall Risk Interventions:  Mobility Interventions: Communicate number of staff needed for ambulation/transfer, OT consult for ADLs, Patient to call before getting OOB, PT Consult for mobility concerns, PT Consult for assist device competence, Strengthening exercises (ROM-active/passive), Utilize walker, cane, or other assistive device         Medication Interventions: Assess postural VS orthostatic hypotension, Evaluate medications/consider consulting pharmacy, Patient to call before getting OOB, Teach patient to arise slowly    Elimination Interventions: Call light in reach, Patient to call for help with toileting needs              Problem: Pressure Injury - Risk of  Goal: *Prevention of pressure injury  Description  Document Bahman Scale and appropriate interventions in the flowsheet. Outcome: Progressing Towards Goal  Note: Pressure Injury Interventions:  Sensory Interventions: Assess changes in LOC, Avoid rigorous massage over bony prominences, Assess need for specialty bed, Chair cushion, Check visual cues for pain, Discuss PT/OT consult with provider, Float heels, Minimize linen layers, Maintain/enhance activity level, Keep linens dry and wrinkle-free, Pad between skin to skin, Pressure redistribution bed/mattress (bed type), Turn and reposition approx.  every two hours (pillows and wedges if needed)    Moisture Interventions: Absorbent underpads, Check for incontinence Q2 hours and as needed, Minimize layers    Activity Interventions: Assess need for specialty bed, Chair cushion, Increase time out of bed, Pressure redistribution bed/mattress(bed type), PT/OT evaluation    Mobility Interventions: Assess need for specialty bed, Chair cushion, Float heels, HOB 30 degrees or less, Pressure redistribution bed/mattress (bed type), PT/OT evaluation, Turn and reposition approx.  every two hours(pillow and wedges)    Nutrition Interventions: Document food/fluid/supplement intake, Offer support with meals,snacks and hydration    Friction and Shear Interventions: Apply protective barrier, creams and emollients

## 2020-02-21 NOTE — PROGRESS NOTES
Called to pt's room. NGT was partially out. Pulled NGT out rest of way. Pt tolerated well. Will not replace as pt is for PEG tube today. Pt assisted to BSC--urine output has increased. Pt back in bed and positioned for comfort.

## 2020-02-21 NOTE — ANESTHESIA PREPROCEDURE EVALUATION
Relevant Problems   No relevant active problems       Anesthetic History     PONV          Review of Systems / Medical History  Patient summary reviewed and pertinent labs reviewed    Pulmonary                Comments: Respiratory Failure 2/2 inability to protect airway from C3 lesion now with trach   Neuro/Psych       CVA (Cervical AVM with Cavernoma C3.  Acute left sided weakness )       Cardiovascular  Within defined limits                Exercise tolerance: >4 METS  Comments: Echo 2/20: Grossly wnl with preserved EF and no valvular abnormalilties   GI/Hepatic/Renal  Within defined limits              Endo/Other        Anemia (Hb 8.9)     Other Findings   Comments: PICC in place               Physical Exam    Airway          Tracheostomy present   Cardiovascular    Rhythm: regular  Rate: normal         Dental         Pulmonary  Breath sounds clear to auscultation               Abdominal         Other Findings            Anesthetic Plan    ASA: 3  Anesthesia type: total IV anesthesia          Induction: Intravenous  Anesthetic plan and risks discussed with: Patient

## 2020-02-21 NOTE — INTERVAL H&P NOTE
H&P Update:  Trupti Duncan was seen and examined. History and physical has been reviewed. The patient has been examined.  There have been no significant clinical changes since the completion of the originally dated History and Physical.

## 2020-02-21 NOTE — PROGRESS NOTES
Problem: Nutrition Deficit  Goal: *Optimize nutritional status  Outcome: Progressing Towards Goal     Problem: Patient Education: Go to Patient Education Activity  Goal: Patient/Family Education  Description  Patient / Vucht family will verbalize understanding of PT safety recommendations, demonstrate appropriate assist for current functional mobility status, safety, and home exercise program by time of discharge. Outcome: Progressing Towards Goal     Problem: Falls - Risk of  Goal: *Absence of Falls  Description  Document Murray De La Torre Fall Risk and appropriate interventions in the flowsheet. Outcome: Progressing Towards Goal  Note: Fall Risk Interventions:  Mobility Interventions: OT consult for ADLs, Patient to call before getting OOB, PT Consult for mobility concerns, PT Consult for assist device competence, Utilize walker, cane, or other assistive device         Medication Interventions: Patient to call before getting OOB, Evaluate medications/consider consulting pharmacy    Elimination Interventions: Call light in reach, Patient to call for help with toileting needs              Problem: Patient Education: Go to Patient Education Activity  Goal: Patient/Family Education  Outcome: Progressing Towards Goal     Problem: Communication Impaired (Adult)  Goal: *Speech Goal: (INSERT TEXT)  Description  LTG: Patient will tolerate passy kalyan speaking valve to communicate wants and needs without respiratory decline. STG: Patient will participate in ongoing passy kalyan trials with ST only  STG: Patient will demonstrate independence with doning and doffing speaking valve.    STG: Patient will participate in dysphagia assessment to determine appropriateness and safety with po intake    Outcome: Progressing Towards Goal     Problem: Patient Education: Go to Patient Education Activity  Goal: Patient/Family Education  Outcome: Progressing Towards Goal     Problem: Patient Education: Go to Patient Education Activity  Goal: Patient/Family Education  Description  Pt/caregiver will demonstrate and verbalize good understanding of OT recommendations regarding ADL status, functional transfer status, home safety, DME, AE, energy conservation techniques, follow-up therapy, for increasing safety with functional tasks upon discharge. Outcome: Progressing Towards Goal     Problem: Pressure Injury - Risk of  Goal: *Prevention of pressure injury  Description  Document Bahman Scale and appropriate interventions in the flowsheet.   Outcome: Progressing Towards Goal  Note: Pressure Injury Interventions:  Sensory Interventions: Check visual cues for pain, Pressure redistribution bed/mattress (bed type), Minimize linen layers, Discuss PT/OT consult with provider, Keep linens dry and wrinkle-free    Moisture Interventions: Check for incontinence Q2 hours and as needed, Offer toileting Q_hr, Moisture barrier, Absorbent underpads    Activity Interventions: Assess need for specialty bed, Chair cushion, Increase time out of bed, Pressure redistribution bed/mattress(bed type), PT/OT evaluation    Mobility Interventions: Pressure redistribution bed/mattress (bed type), PT/OT evaluation    Nutrition Interventions: Document food/fluid/supplement intake    Friction and Shear Interventions: Minimize layers, Lift sheet                Problem: Patient Education: Go to Patient Education Activity  Goal: Patient/Family Education  Outcome: Progressing Towards Goal

## 2020-02-22 PROCEDURE — 97535 SELF CARE MNGMENT TRAINING: CPT

## 2020-02-22 PROCEDURE — 65310000000 HC RM PRIVATE REHAB

## 2020-02-22 PROCEDURE — 31502 CHANGE OF WINDPIPE AIRWAY: CPT

## 2020-02-22 PROCEDURE — 97110 THERAPEUTIC EXERCISES: CPT

## 2020-02-22 PROCEDURE — 99232 SBSQ HOSP IP/OBS MODERATE 35: CPT | Performed by: PHYSICAL MEDICINE & REHABILITATION

## 2020-02-22 PROCEDURE — 97116 GAIT TRAINING THERAPY: CPT

## 2020-02-22 PROCEDURE — 74011250636 HC RX REV CODE- 250/636: Performed by: PHYSICAL MEDICINE & REHABILITATION

## 2020-02-22 PROCEDURE — 77010033678 HC OXYGEN DAILY

## 2020-02-22 PROCEDURE — 97530 THERAPEUTIC ACTIVITIES: CPT

## 2020-02-22 PROCEDURE — 74011250637 HC RX REV CODE- 250/637: Performed by: PHYSICAL MEDICINE & REHABILITATION

## 2020-02-22 RX ORDER — GABAPENTIN 100 MG/1
200 CAPSULE ORAL
Status: DISCONTINUED | OUTPATIENT
Start: 2020-02-22 | End: 2020-02-27

## 2020-02-22 RX ADMIN — ATORVASTATIN CALCIUM 40 MG: 40 TABLET, FILM COATED ORAL at 09:43

## 2020-02-22 RX ADMIN — ALPRAZOLAM 0.25 MG: 0.25 TABLET ORAL at 21:16

## 2020-02-22 RX ADMIN — METOPROLOL TARTRATE 25 MG: 25 TABLET, FILM COATED ORAL at 17:14

## 2020-02-22 RX ADMIN — ENOXAPARIN SODIUM 40 MG: 40 INJECTION SUBCUTANEOUS at 09:53

## 2020-02-22 RX ADMIN — HEPARIN 600 UNITS: 100 SYRINGE at 21:15

## 2020-02-22 RX ADMIN — SODIUM CHLORIDE 10 ML: 9 INJECTION INTRAMUSCULAR; INTRAVENOUS; SUBCUTANEOUS at 14:18

## 2020-02-22 RX ADMIN — SODIUM CHLORIDE, SODIUM LACTATE, POTASSIUM CHLORIDE, AND CALCIUM CHLORIDE 100 ML/HR: 600; 310; 30; 20 INJECTION, SOLUTION INTRAVENOUS at 03:23

## 2020-02-22 RX ADMIN — NYSTATIN 500000 UNITS: 100000 SUSPENSION ORAL at 09:41

## 2020-02-22 RX ADMIN — SODIUM CHLORIDE 10 ML: 9 INJECTION INTRAMUSCULAR; INTRAVENOUS; SUBCUTANEOUS at 06:18

## 2020-02-22 RX ADMIN — DEXAMETHASONE SODIUM PHOSPHATE 4 MG: 4 INJECTION, SOLUTION INTRAMUSCULAR; INTRAVENOUS at 06:02

## 2020-02-22 RX ADMIN — METOPROLOL TARTRATE 25 MG: 25 TABLET, FILM COATED ORAL at 09:42

## 2020-02-22 RX ADMIN — HEPARIN 600 UNITS: 100 SYRINGE at 14:19

## 2020-02-22 RX ADMIN — ALPRAZOLAM 0.25 MG: 0.25 TABLET ORAL at 09:43

## 2020-02-22 RX ADMIN — HEPARIN 600 UNITS: 100 SYRINGE at 06:19

## 2020-02-22 RX ADMIN — ONDANSETRON 4 MG: 2 INJECTION INTRAMUSCULAR; INTRAVENOUS at 03:01

## 2020-02-22 RX ADMIN — NYSTATIN 500000 UNITS: 100000 SUSPENSION ORAL at 21:11

## 2020-02-22 RX ADMIN — SODIUM CHLORIDE 10 ML: 9 INJECTION INTRAMUSCULAR; INTRAVENOUS; SUBCUTANEOUS at 21:11

## 2020-02-22 RX ADMIN — DEXAMETHASONE SODIUM PHOSPHATE 4 MG: 4 INJECTION, SOLUTION INTRAMUSCULAR; INTRAVENOUS at 21:10

## 2020-02-22 RX ADMIN — ALPRAZOLAM 0.25 MG: 0.25 TABLET ORAL at 17:14

## 2020-02-22 RX ADMIN — FAMOTIDINE 20 MG: 20 TABLET, FILM COATED ORAL at 09:43

## 2020-02-22 RX ADMIN — NYSTATIN 500000 UNITS: 100000 SUSPENSION ORAL at 13:00

## 2020-02-22 RX ADMIN — GABAPENTIN 200 MG: 100 CAPSULE ORAL at 21:10

## 2020-02-22 RX ADMIN — FAMOTIDINE 20 MG: 20 TABLET, FILM COATED ORAL at 21:10

## 2020-02-22 RX ADMIN — DEXAMETHASONE SODIUM PHOSPHATE 4 MG: 4 INJECTION, SOLUTION INTRAMUSCULAR; INTRAVENOUS at 14:16

## 2020-02-22 RX ADMIN — ACETAMINOPHEN 650 MG: 325 TABLET, FILM COATED ORAL at 06:40

## 2020-02-22 NOTE — PROGRESS NOTES
Chirag Stephenson MD,   Medical Director  [address] 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 Denies sob, no cough, decreased secretions . Looks great. PEG placement went well . Mouthing words well.      Objective:     Current Facility-Administered Medications   Medication Dose Route Frequency    acetaminophen (TYLENOL) tablet 650 mg  650 mg Per G Tube Q6H PRN    ALPRAZolam (XANAX) tablet 0.25 mg  0.25 mg Per G Tube TID    atorvastatin (LIPITOR) tablet 40 mg  40 mg Per G Tube DAILY    bisacodyL (DULCOLAX) suppository 10 mg  10 mg Rectal DAILY PRN    dexamethasone (DECADRON) 4 mg/mL injection 4 mg  4 mg IntraVENous Q8H    enoxaparin (LOVENOX) injection 40 mg  40 mg SubCUTAneous Q24H    gabapentin (NEURONTIN) capsule 200 mg  200 mg Oral QHS    heparin (porcine) pf 300 Units  300 Units InterCATHeter PRN    heparin (porcine) pf 600 Units  600 Units InterCATHeter Q8H    HYDROcodone-acetaminophen (NORCO) 7.5-325 mg per tablet 1 Tab  1 Tab Per G Tube Q4H PRN    famotidine (PEPCID) tablet 20 mg  20 mg Per G Tube Q12H    lip protectant (BLISTEX) ointment 1 Each  1 Each Topical PRN    nystatin (MYCOSTATIN) 100,000 unit/mL oral suspension 500,000 Units  500,000 Units Oral QID    ondansetron (ZOFRAN) injection 4 mg  4 mg IntraVENous Q4H PRN    metoprolol tartrate (LOPRESSOR) tablet 25 mg  25 mg Oral BID    phenol throat spray (CHLORASEPTIC) 1 Spray  1 Spray Oral PRN    scopolamine (TRANSDERM-SCOP) 1 mg over 3 days 1 Patch  1 Patch TransDERmal Q72H    senna-docusate (PERICOLACE) 8.6-50 mg per tablet 1 Tab  1 Tab Oral BID PRN    traZODone (DESYREL) tablet 50 mg  50 mg Per G Tube QHS PRN    0.9% sodium chloride injection 10 mL  10 mL IntraVENous PRN     Review of Systems:Denies chest pain, shortness of breath, cough, headache, visual problems, abdominal pain, dysurea, calf pain. Pertinent positives are as noted in the medical records and unremarkable otherwise. Visit Vitals  /68 (BP 1 Location: Left arm, BP Patient Position: At rest)   Pulse 62   Temp 98.8 °F (37.1 °C)   Resp 16   Wt 211 lb 9.6 oz (96 kg)   SpO2 96%   BMI 36.32 kg/m²        Physical Exam:   General: Alert and age appropriately oriented. No acute cardio respiratory distress. HEENT: Normocephalic,no scleral icterus  Oral mucosa moist without cyanosis; dec thrush roof of mouth  Trach sliding out a bit, easily glides in.    Lungs: Clear to auscultation  Bilaterally. Dec at bases  Respiration even and unlabored   Heart: Regular rate and rhythm, S1, S2   No  murmurs, clicks, rub or gallops   Abdomen: Soft, non-tender, nondistended. Bowel sounds present. No organomegaly. PEG c/d/i   Genitourinary: Benign . Neuromuscular:      Grossly no focal motor deficits noted. Aphonic due to trach; follows 2-3 step commands well   Skin/extremity: No edema                                                                            Functional Assessment:          Balance  Sitting - Static: Good (unsupported) (02/21/20 1000)  Sitting - Dynamic: Fair (occasional) (02/21/20 1000)  Standing - Static: Good (02/21/20 1000)  Standing - Dynamic : Impaired (02/21/20 1000)                     Edgar Brunner Fall Risk Assessment:  Edgar Brunner Fall Risk  Mobility: Ambulates or transfers with assist devices or assistance (02/22/20 0010)  Mobility Interventions: Patient to call before getting OOB; Strengthening exercises (ROM-active/passive); Utilize walker, cane, or other assistive device (02/22/20 0010)  Mentation: Alert, oriented x 3 (02/22/20 0010)  Medication: Patient receiving anticonvulsants, sedatives(tranquilizers), psychotropics or hypnotics, hypoglycemics, narcotics, sleep aids, antihypertensives, laxatives, or diuretics (02/22/20 0010)  Medication Interventions: Patient to call before getting OOB; Teach patient to arise slowly (02/22/20 0010)  Elimination: Needs assistance with toileting (02/22/20 0010)  Elimination Interventions: Call light in reach; Patient to call for help with toileting needs;Stay With Me (per policy); Toilet paper/wipes in reach (02/22/20 0010)  Prior Fall History: No (02/22/20 0010)  Total Score: 3 (02/22/20 0010)  Standard Fall Precautions: Yes (02/21/20 0813)  High Fall Risk: Yes (02/22/20 0010)     Speech Assessment:         Ambulation: cannot be correct; pt is ambulating over 200 ft sba without AD  Gait  Distance (ft): 20 Feet (ft) (02/21/20 1000)  Assistive Device: Other (comment)(close SBA) (02/21/20 1000)  Rail Use: (parallel bars) (02/20/20 1200)     Labs/Studies:  Recent Results (from the past 72 hour(s))   URINALYSIS W/ RFLX MICROSCOPIC    Collection Time: 02/19/20 11:58 AM   Result Value Ref Range    Color EMILIANO      Appearance TURBID      Specific gravity 1.031 (H) 1.001 - 1.023      pH (UA) 6.0 5.0 - 9.0      Protein 30 (A) NEG mg/dL    Glucose NEGATIVE  mg/dL    Ketone TRACE (A) NEG mg/dL    Bilirubin NEGATIVE  NEG      Blood NEGATIVE  NEG      Urobilinogen 1.0 0.2 - 1.0 EU/dL    Nitrites NEGATIVE  NEG      Leukocyte Esterase NEGATIVE  NEG      WBC 3-5 0 /hpf    RBC 3-5 0 /hpf    Epithelial cells 10-20 0 /hpf    Bacteria 0 0 /hpf    Mucus 3+ (H) 0 /lpf   CULTURE, URINE    Collection Time: 02/19/20 11:58 AM   Result Value Ref Range    Special Requests: NO SPECIAL REQUESTS      Culture result:        10,000 to 50,000  COLONIES/mL  NORMAL SKIN KRISTINA ISOLATED     CBC W/O DIFF    Collection Time: 02/20/20  5:41 AM   Result Value Ref Range    WBC 6.6 4.3 - 11.1 K/uL    RBC 3.20 (L) 4.05 - 5.2 M/uL    HGB 8.9 (L) 11.7 - 15.4 g/dL    HCT 28.7 (L) 35.8 - 46.3 %    MCV 89.7 79.6 - 97.8 FL    MCH 27.8 26.1 - 32.9 PG    MCHC 31.0 (L) 31.4 - 35.0 g/dL    RDW 13.1 11.9 - 14.6 %    PLATELET 001 092 - 311 K/uL    MPV 10.6 9.4 - 12.3 FL    ABSOLUTE NRBC 0. 00 0.0 - 0.2 K/uL   METABOLIC PANEL, BASIC    Collection Time: 02/20/20  5:41 AM   Result Value Ref Range    Sodium 142 136 - 145 mmol/L    Potassium 3.9 3.5 - 5.1 mmol/L    Chloride 106 98 - 107 mmol/L    CO2 31 21 - 32 mmol/L    Anion gap 5 (L) 7 - 16 mmol/L    Glucose 99 65 - 100 mg/dL    BUN 17 6 - 23 MG/DL    Creatinine 0.58 (L) 0.6 - 1.0 MG/DL    GFR est AA >60 >60 ml/min/1.73m2    GFR est non-AA >60 >60 ml/min/1.73m2    Calcium 9.2 8.3 - 10.4 MG/DL       Assessment:     Problem List as of 2/22/2020 Date Reviewed: 2/14/2020          Codes Class Noted - Resolved    [place] ICD-10-CM: D18.00  ICD-9-CM: 228.00  2/3/2020 - Present        Acute respiratory failure with hypoxia [facility]) ICD-10-CM: J96.01  ICD-9-CM: 518.81  2/3/2020 - Present        Edema of spinal cord (HCC) ICD-10-CM: G95.19  ICD-9-CM: 336.1  2/3/2020 - Present        Acute left-sided weakness ICD-10-CM: R53.1  ICD-9-CM: 728.87  2/3/2020 - Present        AVM (arteriovenous malformation) spine ICD-10-CM: Q28.8  ICD-9-CM: 747.82  2/2/2020 - Present            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 CXR neg. Will have RT decrease cuff pressure. Tolerating ATC well. Will have ST try PMV trials with eventual capping. Will downsize when tolerates, can at 7-10days post op; cont RT with assist with trach collar, wean OT; pt functionally expected to do well. Do not want to send home with a trach in but may have to depending on tolerance of downsizing. Keeping it now is definitely for safety due to spinal edema  -pt constantly suctioning secretions; will add scop patch and monitor. dont want mucous plugging. Very thin secretions; 6L trach collar   -trach stoma quite big, do not think we can downsize to a #6 trach. Could not tolerate PMV at all; back pressure.  Scopolamine patch placed for decreasing oral secretions.  sats 98%, 6L. Lungs clear; spt cx neg; will f/u CXR due to known left atelectasis and vasc congestion  -2/21 CXR slt improved, needs to take deep breaths. Will DOWNSIZE to #6 trach today/working on PMV tolerance;   2/22 downsize today. 28% trach collar; turned O2 to 3L. Off o2 for adls and sats 83%. Hopefully will tolerated PMV with smaller trach     Spinal cord edema/hematoma C3; dysphagia profound. Will need PEG. Cannot continue NGT for the length of time I suspect she will need. Will consult GI 2/20  -start trial of decadron 4mg IV q 8hrs to see if edema can be decreased and pt can tolerate secretions better and swallow 2/20; discussed with neurosurgery; 2/21 doing well today, managing secretions better  -2/22 PEG placed by Dr Michael Fraire yesterday. Tolerated well. Can restart bolus tube feeds today.      ID; fever 2/17 ; blood and spt cxs neg. Wbc normal. Urinalysis pending  -2/20 urinalysis suspicious, but cx neg thus far. tmax 100.2; f/u CXR; no leukocytosis; 2/21 afebrile. Tm 99.7; 2/22 afebrile     Pt still with NGT for feeding; ST to eval. Aspiration precautions  -2/20 not even close to being ready for swallow eval. #8 trach, cant tolerate PMV,   -2/21 PEG today, appreciate GI assistance; NGT out. Will start IVFs  -2/22 PEG ; can start tfs     Oral thrush; can use nystatin to coat roof of mouth with swab. Strict thorough oral care 2/20; 2/22 improving     DVT risk / DVT Prophylaxis- Will require daily physician exam to assess for signs and symptoms as patient is at increased risk for of thromboembolism. Mobilization as tolerated. Intermittent pneumatic compression devices when in bed Thigh-high or knee-high thromboembolic deterrent hose when out of bed. Lovenox     Pain Control: ongoing significant pain in neck and head which is stable and controlled by PRN meds.  Will require regular pain assessment and comprenhensive pain management, cont prn tylenol or motrin and Norco if needed for more mod to severe pain. Dc fentanyl  -2/20 paresthesias LLE; start Neurontin 200mg qhs for now; 2/21 much improved overnoc     Wound Care: Monitor wound status daily per staff and physician. At risk for failure. Will require 24/7 rehab nursing. Routine catheter care; PICC line RUE. Neck wound healing; cont trach care     Hypertension - BP uncontrolled, fluctuating, managed medically. Goal SBP < 160; cont lopressor  -2/20 asymptomatic hypotension, dec lopressor and add parameters. sbp in the 80s, may be due to cervical SCI and autonomic dysfxn  -2/22 111/68 improved     HLD cont Lipitor 40mg     Anx/depression; refuses antidepressent. Was taking xanax at home and will cont low dose xanax at 0.25mg bid. Hopefully as she sees herself recovering, anxiety will lessen  -2/19 inc anxiety, didn't sleep; inc trazadone and change xanax to tid; has sleep wake cycle mixed up due to coming from ICU environment.  -2/20 improved sleep last noc ; 2/21 excellent noc/had added neurontin. Cont trazadone and xanax; signif dec anxiety  -2/22 doing much better     Anemia; hgb 9.3 monitor; 2/19 hgb 8.8; no evid of bleed. monitor     Urinary retention/ neurogenic bladder - schedule voids q6-8 hrs. Check post-void residual as needed; In and Out catheterize if post-void residual is more than 400 cc.  -voiding without difficulty     bowel program - cont senna, and prn bowel program     GERD - resume PPI. At times may need additional antacids, Maalox prn.       Time spent was 25 minutes with over 1/2 in direct patient care/examination, consultation and coordination of care.      Signed By: Javier Zacarias MD     February 22, 2020

## 2020-02-22 NOTE — PROGRESS NOTES
Trach care complete. Humidity H2O bottle changed. Pt on 28% T piece. Inner cannulas at bedside. Trach downsize held for trach size and type. Will report to next shift. Will continue to follow.

## 2020-02-22 NOTE — PROGRESS NOTES
Patient returned to bed to perform trach change procedure. Patient's trach changed from #8 Shiley to #6 Shiley cuffless trach. Trach care completed. Obturator used. #8 removed with no complications. SpO2 100%. Heart rate 115. Patient suctioned x1 with scant, thick tan secretions. #6 cuffless inserted with no complications. SpO2 97%, heart rate 113. Obturator placed at head of bed. Ambu bag and mask at head bed. #4 Shiley cuffless, #6 and #8 Shiley cuffed remain in bedside drawer. Patient tolerated procedure well. No distress noted prior to, during or after procedure. Patient remains in bed. O2 reapplied. RT Camilo Guzman. Also present at bedside. Nurse notified of procedure.

## 2020-02-22 NOTE — PROGRESS NOTES
02/22/20 1200   Time Spent With Patient   Time In 0701   Time Out 0802   Patient Seen For: AM;ADLs     Time In 0701   Time Out 0802     Mobility    Activities of Daily Living    Score Comments   Oral Hyigene 5: S/U or clean-up assist Set up    Bathing 4: Supervision or touching A Type of Shower: Bath Pack  Position: Supported sitting and Standing PRN   Adaptive  Equipment: Grab Bars  Comments: Bathing at sink. CGA assistance for safety while standing to complete pericare. Upper Body  Dressing 4: Supervision or touching A Items Applied: hospital gown  Position: Supported Sitting  Comments: able to don shirt with supervision to assist over trach    Lower Body Dressing 4: Supervision or touching A Items Applied: Underwear and Elastic pants  Position: Supported sitting and Standing PRN  Adaptive Equipment: N/A  Comments: patient able to cross legs to thread pants. Assist for standing safety   Donning/[place] Footwear 5: S/U or clean-up assist Items Applied: Socks  Adaptive Equipment: N/A  Comments: set up      OT Daily Note    Subjective: \"I can do it with out tube on\"   Pain: none reported  Interdisciplinary Communication: Nursing regarding trach  Precautions: Other (comment)(trach, NGT)  Location on arrival: Supine in bed     Assessment: Patient O2 95% after 40 minutes without oxygen to complete ADLs. Patient demonstrated good sequence/problem solving during ADL throughout session. Plan: Continue OT POC with focus on ADL/IADL skills, functional transfers, functional mobility, coordination, strength, static and dynamic balance, and activity tolerance to maximize safety and independence with ADLs and functional transfers. Patient was left wc with call light/all needs in reach and in no distress. Nurse reconnect IV and trach tubing.       Tracy Vasquez   2/22/2020

## 2020-02-22 NOTE — PROGRESS NOTES
PHYSICAL THERAPY DAILY NOTE  Time In: 1050  Time Out: 6660  Patient Seen For: AM;Balance activities;Gait training;Patient education; Therapeutic exercise;Transfer training    Subjective: Patient agreeable to physical therapy treatment today. States no C/O pain or discomfort at this time. Medications taken about 2 hour ago per RN. Last PT treatment went good. No new complaints at this time. Objective:  Patient supine in bed. SPT from bed to  without AD and SPV. Orientation Assessment :   Alert and age appropriately oriented. Affect/Behavior:   Appropriate and Cooperative. Basic Command Following:   intact. Safety/Judgment:   intact. Pain Present:   No.    Other (comment)(trach)  GROSS ASSESSMENT Daily Assessment    AROM: Generally decreased, functional  PROM: Within functional limits  Strength: Generally decreased, functional  Coordination: Generally decreased, functional  Tone: Normal  Sensation: Intact       BED/MAT MOBILITY Daily Assessment    Supine to Sit : 5 (Supervision)       TRANSFERS Daily Assessment    Transfer Type: SPT without device  Transfer Assistance : 5 (Supervision/setup)  Sit to Stand Assistance: Supervision  Car Transfers: Not tested       GAIT Daily Assessment   Base of Support: Widened; Center of Gravity altered. Speed/Linda: Slow; Fluctuations. Step Length: Right shortened;Left shortened. Gait Abnormalities: Trunk sway increased; Path deviations; Step thru gait. Distance (ft): 200 Feet (ft). Assistive Device: Gait belt;Walker, rolling (and HHA). Ambulation - Level of assistance: SBA. Interventions: Safety awareness training; Tactile cues; Verbal cues. 6L O2 in tow. Amount of Assistance: 5 (Stand-by assistance)  Distance (ft): 200 Feet (ft)(total feet)  Assistive Device: Other (comment);Gait belt;Walker, rolling(HHA)       COGNITION Daily Assessment   Communication: extra time needed to understand requests.   Social Interaction: patient presents appropriate, cooperating and participates in treatment. Problem Solving: Verbal cues to teach techniques for completing tasks. Memory: Executing requests without distractions. Communication:  Social Interaction:  Problem Solving:  Memory:     STEPS or STAIRS Daily Assessment   Instructed in stair climbing. Steps/Stairs Ambulated (#): 4  Level of Assist : 5 (Stand-by assistance)  Rail Use: Both       BALANCE Daily Assessment    Sitting - Static: Good (unsupported)  Sitting - Dynamic: Good (unsupported)  Standing - Static: Good  Standing - Dynamic : Impaired       WHEELCHAIR MOBILITY Daily Assessment   NT         LOWER EXTREMITY EXERCISES Daily Assessment    Extremity: Both  Exercise Type #1: Standing lower extremity strengthening  Sets Performed: 1  Reps Performed: 15  Level of Assist: Stand-by assistance  Exercise Type #2: Other (comment)(Balance activities)  Sets Performed: 1  Reps Performed: (10 seconds)  Level of Assist: Stand-by assistance          Assessment: Education:  Teaching Method:   Demonstration, Explanation. PT Impairments or Limitations:   Ambulation deficits, Balance deficits, Endurance deficits, Strength deficits, Transfer deficits. Rehab Potential Physical Therapy:   Good. Grossly no focal motor deficits noted. No rashes, no erythema. No calf tenderness BLE. Patient performed all given exercises listed. Patient was taken back to room. Left in sitting position in wc, call bell and necessities in reach. Nurse notified of completion of treatment. Plan of Care: The patient has shown the ability to tolerate and benefit from physical therapy daily in a comprehensive inpatient rehabilitation program.  Continue intensive Physical Therapy  to address bed mobility, transfers, ambulation, strengthening, balance, and endurance. Continue with plan of care and focus on goals.     Marshall Diop, PTA  2/22/2020

## 2020-02-22 NOTE — PROGRESS NOTES
Problem: Nutrition Deficit  Goal: *Optimize nutritional status  Outcome: Progressing Towards Goal     Problem: Patient Education: Go to Patient Education Activity  Goal: Patient/Family Education  Description  Patient / Alejandro Herrera family will verbalize understanding of PT safety recommendations, demonstrate appropriate assist for current functional mobility status, safety, and home exercise program by time of discharge. Outcome: Progressing Towards Goal     Problem: Falls - Risk of  Goal: *Absence of Falls  Description  Document Nicole Castaneda Fall Risk and appropriate interventions in the flowsheet. Outcome: Progressing Towards Goal  Note: Fall Risk Interventions:  Mobility Interventions: OT consult for ADLs, Patient to call before getting OOB, PT Consult for mobility concerns, PT Consult for assist device competence, Utilize walker, cane, or other assistive device         Medication Interventions: Patient to call before getting OOB, Teach patient to arise slowly, Evaluate medications/consider consulting pharmacy    Elimination Interventions: Call light in reach, Patient to call for help with toileting needs              Problem: Patient Education: Go to Patient Education Activity  Goal: Patient/Family Education  Outcome: Progressing Towards Goal     Problem: Communication Impaired (Adult)  Goal: *Speech Goal: (INSERT TEXT)  Description  LTG: Patient will tolerate passy kalyan speaking valve to communicate wants and needs without respiratory decline. STG: Patient will participate in ongoing passy kalyan trials with ST only  STG: Patient will demonstrate independence with doning and doffing speaking valve.    STG: Patient will participate in dysphagia assessment to determine appropriateness and safety with po intake    Outcome: Progressing Towards Goal     Problem: Patient Education: Go to Patient Education Activity  Goal: Patient/Family Education  Outcome: Progressing Towards Goal     Problem: Patient Education: Go to Patient Education Activity  Goal: Patient/Family Education  Description  Pt/caregiver will demonstrate and verbalize good understanding of OT recommendations regarding ADL status, functional transfer status, home safety, DME, AE, energy conservation techniques, follow-up therapy, for increasing safety with functional tasks upon discharge. Outcome: Progressing Towards Goal     Problem: Pressure Injury - Risk of  Goal: *Prevention of pressure injury  Description  Document Bahman Scale and appropriate interventions in the flowsheet.   Outcome: Progressing Towards Goal  Note: Pressure Injury Interventions:  Sensory Interventions: Assess changes in LOC, Check visual cues for pain, Maintain/enhance activity level, Pressure redistribution bed/mattress (bed type), Minimize linen layers, Discuss PT/OT consult with provider, Keep linens dry and wrinkle-free    Moisture Interventions: Check for incontinence Q2 hours and as needed, Apply protective barrier, creams and emollients, Offer toileting Q_hr    Activity Interventions: Increase time out of bed, Pressure redistribution bed/mattress(bed type), PT/OT evaluation    Mobility Interventions: Pressure redistribution bed/mattress (bed type), PT/OT evaluation    Nutrition Interventions: Document food/fluid/supplement intake    Friction and Shear Interventions: HOB 30 degrees or less, Lift sheet, Foam dressings/transparent film/skin sealants                Problem: Patient Education: Go to Patient Education Activity  Goal: Patient/Family Education  Outcome: Progressing Towards Goal

## 2020-02-22 NOTE — PROGRESS NOTES
GI DAILY PROGRESS NOTE    Admit Date: 2/18/2020    CC: PEG    Subjective:     Patient tolerated PEG for meds. No abd pains at the site. Medications:  Current Facility-Administered Medications   Medication Dose Route Frequency    acetaminophen (TYLENOL) tablet 650 mg  650 mg Per G Tube Q6H PRN    ALPRAZolam (XANAX) tablet 0.25 mg  0.25 mg Per G Tube TID    atorvastatin (LIPITOR) tablet 40 mg  40 mg Per G Tube DAILY    bisacodyL (DULCOLAX) suppository 10 mg  10 mg Rectal DAILY PRN    dexamethasone (DECADRON) 4 mg/mL injection 4 mg  4 mg IntraVENous Q8H    enoxaparin (LOVENOX) injection 40 mg  40 mg SubCUTAneous Q24H    gabapentin (NEURONTIN) capsule 200 mg  200 mg Oral QHS    heparin (porcine) pf 300 Units  300 Units InterCATHeter PRN    heparin (porcine) pf 600 Units  600 Units InterCATHeter Q8H    HYDROcodone-acetaminophen (NORCO) 7.5-325 mg per tablet 1 Tab  1 Tab Per G Tube Q4H PRN    famotidine (PEPCID) tablet 20 mg  20 mg Per G Tube Q12H    lip protectant (BLISTEX) ointment 1 Each  1 Each Topical PRN    nystatin (MYCOSTATIN) 100,000 unit/mL oral suspension 500,000 Units  500,000 Units Oral QID    ondansetron (ZOFRAN) injection 4 mg  4 mg IntraVENous Q4H PRN    metoprolol tartrate (LOPRESSOR) tablet 25 mg  25 mg Oral BID    phenol throat spray (CHLORASEPTIC) 1 Spray  1 Spray Oral PRN    scopolamine (TRANSDERM-SCOP) 1 mg over 3 days 1 Patch  1 Patch TransDERmal Q72H    senna-docusate (PERICOLACE) 8.6-50 mg per tablet 1 Tab  1 Tab Oral BID PRN    traZODone (DESYREL) tablet 50 mg  50 mg Per G Tube QHS PRN    0.9% sodium chloride injection 10 mL  10 mL IntraVENous PRN       Objective:   Vitals:  Visit Vitals  /68 (BP 1 Location: Left arm, BP Patient Position: At rest)   Pulse 62   Temp 98.8 °F (37.1 °C)   Resp 16   Wt 96 kg (211 lb 9.6 oz)   SpO2 96%   BMI 36.32 kg/m²       Intake/Output:  No intake/output data recorded.   02/20 1901 - 02/22 0700  In: 2092 [I.V.:1300]  Out: 453 [Urine:450]    Exam:  abd nontender    Data Review (Labs):    No results found for this or any previous visit (from the past 24 hour(s)). Assessment:     Active Problems:    AVM (arteriovenous malformation) spine (2/2/2020)        Plan:     PEG placed 2-21-20. Spoke with RN that patient needs Nutrition consult for formula orders and bolus feeding orders. Will sign off. Call if questions.

## 2020-02-23 LAB
ANION GAP SERPL CALC-SCNC: 5 MMOL/L (ref 7–16)
BACTERIA SPEC CULT: NORMAL
BACTERIA SPEC CULT: NORMAL
BUN SERPL-MCNC: 16 MG/DL (ref 6–23)
CALCIUM SERPL-MCNC: 9.4 MG/DL (ref 8.3–10.4)
CHLORIDE SERPL-SCNC: 106 MMOL/L (ref 98–107)
CO2 SERPL-SCNC: 31 MMOL/L (ref 21–32)
CREAT SERPL-MCNC: 0.57 MG/DL (ref 0.6–1)
ERYTHROCYTE [DISTWIDTH] IN BLOOD BY AUTOMATED COUNT: 12.9 % (ref 11.9–14.6)
GLUCOSE SERPL-MCNC: 111 MG/DL (ref 65–100)
HCT VFR BLD AUTO: 27.8 % (ref 35.8–46.3)
HGB BLD-MCNC: 8.6 G/DL (ref 11.7–15.4)
MCH RBC QN AUTO: 27.4 PG (ref 26.1–32.9)
MCHC RBC AUTO-ENTMCNC: 30.9 G/DL (ref 31.4–35)
MCV RBC AUTO: 88.5 FL (ref 79.6–97.8)
NRBC # BLD: 0 K/UL (ref 0–0.2)
PLATELET # BLD AUTO: 231 K/UL (ref 150–450)
PMV BLD AUTO: 10.8 FL (ref 9.4–12.3)
POTASSIUM SERPL-SCNC: 4.3 MMOL/L (ref 3.5–5.1)
RBC # BLD AUTO: 3.14 M/UL (ref 4.05–5.2)
SERVICE CMNT-IMP: NORMAL
SERVICE CMNT-IMP: NORMAL
SODIUM SERPL-SCNC: 142 MMOL/L (ref 136–145)
WBC # BLD AUTO: 9.6 K/UL (ref 4.3–11.1)

## 2020-02-23 PROCEDURE — 74011250637 HC RX REV CODE- 250/637: Performed by: PHYSICAL MEDICINE & REHABILITATION

## 2020-02-23 PROCEDURE — 80048 BASIC METABOLIC PNL TOTAL CA: CPT

## 2020-02-23 PROCEDURE — 36592 COLLECT BLOOD FROM PICC: CPT

## 2020-02-23 PROCEDURE — 74011250636 HC RX REV CODE- 250/636: Performed by: PHYSICAL MEDICINE & REHABILITATION

## 2020-02-23 PROCEDURE — 99232 SBSQ HOSP IP/OBS MODERATE 35: CPT | Performed by: PHYSICAL MEDICINE & REHABILITATION

## 2020-02-23 PROCEDURE — 85027 COMPLETE CBC AUTOMATED: CPT

## 2020-02-23 PROCEDURE — 94760 N-INVAS EAR/PLS OXIMETRY 1: CPT

## 2020-02-23 PROCEDURE — 65310000000 HC RM PRIVATE REHAB

## 2020-02-23 RX ORDER — DEXAMETHASONE SODIUM PHOSPHATE 4 MG/ML
4 INJECTION, SOLUTION INTRA-ARTICULAR; INTRALESIONAL; INTRAMUSCULAR; INTRAVENOUS; SOFT TISSUE EVERY 12 HOURS
Status: DISCONTINUED | OUTPATIENT
Start: 2020-02-23 | End: 2020-02-27

## 2020-02-23 RX ADMIN — HEPARIN 600 UNITS: 100 SYRINGE at 06:12

## 2020-02-23 RX ADMIN — ENOXAPARIN SODIUM 40 MG: 40 INJECTION SUBCUTANEOUS at 09:17

## 2020-02-23 RX ADMIN — SODIUM CHLORIDE 10 ML: 9 INJECTION INTRAMUSCULAR; INTRAVENOUS; SUBCUTANEOUS at 21:24

## 2020-02-23 RX ADMIN — DEXAMETHASONE SODIUM PHOSPHATE 4 MG: 4 INJECTION, SOLUTION INTRAMUSCULAR; INTRAVENOUS at 06:12

## 2020-02-23 RX ADMIN — FAMOTIDINE 20 MG: 20 TABLET, FILM COATED ORAL at 21:23

## 2020-02-23 RX ADMIN — ATORVASTATIN CALCIUM 40 MG: 40 TABLET, FILM COATED ORAL at 09:05

## 2020-02-23 RX ADMIN — NYSTATIN 500000 UNITS: 100000 SUSPENSION ORAL at 17:10

## 2020-02-23 RX ADMIN — DEXAMETHASONE SODIUM PHOSPHATE 4 MG: 4 INJECTION, SOLUTION INTRAMUSCULAR; INTRAVENOUS at 21:24

## 2020-02-23 RX ADMIN — METOPROLOL TARTRATE 25 MG: 25 TABLET, FILM COATED ORAL at 17:01

## 2020-02-23 RX ADMIN — ALPRAZOLAM 0.25 MG: 0.25 TABLET ORAL at 09:04

## 2020-02-23 RX ADMIN — LACTULOSE 45 ML: 20 SOLUTION ORAL at 17:10

## 2020-02-23 RX ADMIN — SODIUM CHLORIDE 10 ML: 9 INJECTION INTRAMUSCULAR; INTRAVENOUS; SUBCUTANEOUS at 06:12

## 2020-02-23 RX ADMIN — HEPARIN 600 UNITS: 100 SYRINGE at 14:00

## 2020-02-23 RX ADMIN — ALPRAZOLAM 0.25 MG: 0.25 TABLET ORAL at 21:24

## 2020-02-23 RX ADMIN — HEPARIN 600 UNITS: 100 SYRINGE at 21:24

## 2020-02-23 RX ADMIN — FAMOTIDINE 20 MG: 20 TABLET, FILM COATED ORAL at 09:04

## 2020-02-23 RX ADMIN — METOPROLOL TARTRATE 25 MG: 25 TABLET, FILM COATED ORAL at 09:04

## 2020-02-23 RX ADMIN — GABAPENTIN 200 MG: 100 CAPSULE ORAL at 21:23

## 2020-02-23 RX ADMIN — ALPRAZOLAM 0.25 MG: 0.25 TABLET ORAL at 17:01

## 2020-02-23 RX ADMIN — NYSTATIN 500000 UNITS: 100000 SUSPENSION ORAL at 21:23

## 2020-02-23 NOTE — PROGRESS NOTES
Problem: Falls - Risk of  Goal: *Absence of Falls  Description  Document Caro Alegria Fall Risk and appropriate interventions in the flowsheet.   Outcome: Progressing Towards Goal  Note: Fall Risk Interventions:  Mobility Interventions: Utilize walker, cane, or other assistive device         Medication Interventions: Patient to call before getting OOB    Elimination Interventions: Bed/chair exit alarm, Call light in reach              Problem: Patient Education: Go to Patient Education Activity  Goal: Patient/Family Education  Outcome: Progressing Towards Goal

## 2020-02-23 NOTE — PROGRESS NOTES
Azeb Hightower MD,   Medical Director  [address] [facility], [address] [facility] [facility]  Tel: [phone]       [facility] PROGRESS NOTE    Trupti Duncan  Admit Date: 2/18/2020  Admit Diagnosis: AVM (arteriovenous malformation) spine [Q28.8]    Subjective     Doing well. No sob. Significantly dec secretions. Looks great.  Tolerated downsize to #6    Objective:     Current Facility-Administered Medications   Medication Dose Route Frequency    dexamethasone (DECADRON) 4 mg/mL injection 4 mg  4 mg IntraVENous Q12H    gabapentin (NEURONTIN) capsule 200 mg  200 mg Per G Tube QHS    acetaminophen (TYLENOL) tablet 650 mg  650 mg Per G Tube Q6H PRN    ALPRAZolam (XANAX) tablet 0.25 mg  0.25 mg Per G Tube TID    atorvastatin (LIPITOR) tablet 40 mg  40 mg Per G Tube DAILY    bisacodyL (DULCOLAX) suppository 10 mg  10 mg Rectal DAILY PRN    enoxaparin (LOVENOX) injection 40 mg  40 mg SubCUTAneous Q24H    heparin (porcine) pf 300 Units  300 Units InterCATHeter PRN    heparin (porcine) pf 600 Units  600 Units InterCATHeter Q8H    HYDROcodone-acetaminophen (NORCO) 7.5-325 mg per tablet 1 Tab  1 Tab Per G Tube Q4H PRN    famotidine (PEPCID) tablet 20 mg  20 mg Per G Tube Q12H    lip protectant (BLISTEX) ointment 1 Each  1 Each Topical PRN    nystatin (MYCOSTATIN) 100,000 unit/mL oral suspension 500,000 Units  500,000 Units Oral QID    ondansetron (ZOFRAN) injection 4 mg  4 mg IntraVENous Q4H PRN    metoprolol tartrate (LOPRESSOR) tablet 25 mg  25 mg Oral BID    phenol throat spray (CHLORASEPTIC) 1 Spray  1 Spray Oral PRN    scopolamine (TRANSDERM-SCOP) 1 mg over 3 days 1 Patch  1 Patch TransDERmal Q72H    senna-docusate (PERICOLACE) 8.6-50 mg per tablet 1 Tab  1 Tab Oral BID PRN    traZODone (DESYREL) tablet 50 mg  50 mg Per G Tube QHS PRN    0.9% sodium chloride injection 10 mL  10 mL IntraVENous PRN     Review of Systems:Denies chest pain, shortness of breath, cough, headache, visual problems, abdominal pain, dysurea, calf pain. Pertinent positives are as noted in the medical records and unremarkable otherwise. Visit Vitals  /90 (BP 1 Location: Left arm)   Pulse 81   Temp 97.8 °F (36.6 °C)   Resp 18   Wt 211 lb 9.6 oz (96 kg)   SpO2 97%   BMI 36.32 kg/m²        Physical Exam:   General: Alert and age appropriately oriented. Aphonic due to trach  No acute cardio respiratory distress. HEENT: Normocephalic,no scleral icterus  Oral mucosa moist without cyanosis   Lungs: Clear to auscultation  bilaterally. Respiration even and unlabored; trach site clean, trach in place   Heart: Regular rate and rhythm, S1, S2   No  murmurs, clicks, rub or gallops   Abdomen: Soft, non-tender, nondistended. Bowel sounds present. No organomegaly. PEG c/d/i   Genitourinary: Benign . Neuromuscular:      Grossly no focal motor deficits noted. Asked approp questions re; care; comprehension good     Skin/extremity: No rashes, no erythema.  No calf tenderness BLE  No edema                                                                            Functional Assessment:  Gross Assessment  AROM: Generally decreased, functional (02/22/20 1200)  PROM: Within functional limits (02/22/20 1200)  Strength: Generally decreased, functional (02/22/20 1200)  Coordination: Generally decreased, functional (02/22/20 1200)  Tone: Normal (02/22/20 1200)  Sensation: Intact (02/22/20 1200)       Balance  Sitting - Static: Good (unsupported) (02/22/20 1200)  Sitting - Dynamic: Good (unsupported) (02/22/20 1200)  Standing - Static: Good (02/22/20 1200)  Standing - Dynamic : Impaired (02/22/20 1200)                     Clide Failing Fall Risk Assessment:  Clide Failing Fall Risk  Mobility: Ambulates or transfers with assist devices or assistance (02/22/20 2003)  Mobility Interventions: Utilize walker, cane, or other assistive device (02/22/20 2003)  Mentation: Alert, oriented x 3 (02/22/20 2003)  Medication: Patient receiving anticonvulsants, sedatives(tranquilizers), psychotropics or hypnotics, hypoglycemics, narcotics, sleep aids, antihypertensives, laxatives, or diuretics (02/22/20 2003)  Medication Interventions: Patient to call before getting OOB (02/22/20 2003)  Elimination: Needs assistance with toileting (02/22/20 2003)  Elimination Interventions: Bed/chair exit alarm;Call light in reach (02/22/20 2003)  Prior Fall History: No (02/22/20 2003)  Total Score: 3 (02/22/20 2003)  Standard Fall Precautions: Yes (02/22/20 0710)  High Fall Risk: Yes (02/22/20 2003)     Speech Assessment:         Ambulation:  Gait  Distance (ft): 200 Feet (ft)(total feet) (02/22/20 1200)  Assistive Device: Other (comment);Gait belt;Walker, rolling(HHA) (02/22/20 1200)  Rail Use: Both (02/22/20 1200)     Labs/Studies:  Recent Results (from the past 72 hour(s))   CBC W/O DIFF    Collection Time: 02/23/20  5:53 AM   Result Value Ref Range    WBC 9.6 4.3 - 11.1 K/uL    RBC 3.14 (L) 4.05 - 5.2 M/uL    HGB 8.6 (L) 11.7 - 15.4 g/dL    HCT 27.8 (L) 35.8 - 46.3 %    MCV 88.5 79.6 - 97.8 FL    MCH 27.4 26.1 - 32.9 PG    MCHC 30.9 (L) 31.4 - 35.0 g/dL    RDW 12.9 11.9 - 14.6 %    PLATELET 287 272 - 835 K/uL    MPV 10.8 9.4 - 12.3 FL    ABSOLUTE NRBC 0.00 0.0 - 0.2 K/uL   METABOLIC PANEL, BASIC    Collection Time: 02/23/20  5:53 AM   Result Value Ref Range    Sodium 142 136 - 145 mmol/L    Potassium 4.3 3.5 - 5.1 mmol/L    Chloride 106 98 - 107 mmol/L    CO2 31 21 - 32 mmol/L    Anion gap 5 (L) 7 - 16 mmol/L    Glucose 111 (H) 65 - 100 mg/dL    BUN 16 6 - 23 MG/DL    Creatinine 0.57 (L) 0.6 - 1.0 MG/DL    GFR est AA >60 >60 ml/min/1.73m2    GFR est non-AA >60 >60 ml/min/1.73m2    Calcium 9.4 8.3 - 10.4 MG/DL       Assessment:     Problem List as of 2/23/2020 Date Reviewed: 2/14/2020          Codes Class Noted - Resolved    [place] ICD-10-CM: D18.00  ICD-9-CM: 228.00  2/3/2020 - Present        Acute respiratory failure with hypoxia (HCC) ICD-10-CM: J96.01  ICD-9-CM: 518.81  2/3/2020 - Present        Edema of spinal cord (HCC) ICD-10-CM: G95.19  ICD-9-CM: 336.1  2/3/2020 - Present        Acute left-sided weakness ICD-10-CM: R53.1  ICD-9-CM: 728.87  2/3/2020 - Present        AVM (arteriovenous malformation) spine ICD-10-CM: Q28.8  ICD-9-CM: 747.82  2/2/2020 - Present               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 CXR neg. Will have RT decrease cuff pressure. Tolerating ATC well. Will have ST try PMV trials with eventual capping. Will downsize when tolerates, can at 7-10days post op; cont RT with assist with trach collar, wean OT; pt functionally expected to do well. Do not want to send home with a trach in but may have to depending on tolerance of downsizing. Keeping it now is definitely for safety due to spinal edema  -pt constantly suctioning secretions; will add scop patch and monitor. dont want mucous plugging. Very thin secretions; 6L trach collar   -trach stoma quite big, do not think we can downsize to a #6 trach. Could not tolerate PMV at all; back pressure. Scopolamine patch placed for decreasing oral secretions.  sats 98%, 6L. Lungs clear; spt cx neg; will f/u CXR due to known left atelectasis and vasc congestion  -2/21 CXR slt improved, needs to take deep breaths. Will DOWNSIZE to #6 trach today/working on PMV tolerance;   2/22 downsize today. 28% trach collar; turned O2 to 3L. Off o2 for adls and sats 15%. Hopefully will tolerated PMV with smaller trach  -sats97 % RA right now; trach collar at noc 6L; do not feel she needs that much O2     Spinal cord edema/hematoma C3; dysphagia profound. Will need PEG. Cannot continue NGT for the length of time I suspect she will need.  Will consult GI 2/20  -start trial of decadron 4mg IV q 8hrs to see if edema can be decreased and pt can tolerate secretions better and swallow 2/20; discussed with neurosurgery; 2/21 doing well today, managing secretions better  -2/22 PEG placed by Dr Marty Gaxiola yesterday. Tolerated well. Can restart bolus tube feeds today.      ID; fever 2/17 ; blood and spt cxs neg. Wbc normal. Urinalysis pending  -2/20 urinalysis suspicious, but cx neg thus far. tmax 100.2; f/u CXR; no leukocytosis; 2/21 afebrile. Tm 99.7; 2/23 afebrile     Pt still with NGT for feeding; ST to eval. Aspiration precautions  -2/20 not even close to being ready for swallow eval. #8 trach, cant tolerate PMV,   -2/21 PEG today, appreciate GI assistance; NGT out. Will start IVFs  -2/22 PEG ; can start tfs; 2/23 tolerating bolus tf well. Will have dietician assess nutritional needs Mod 2/24     Oral thrush; can use nystatin to coat roof of mouth with swab. Strict thorough oral care 2/20; 2/23 improving     DVT risk / DVT Prophylaxis- Will require daily physician exam to assess for signs and symptoms as patient is at increased risk for of thromboembolism. Mobilization as tolerated. Intermittent pneumatic compression devices when in bed Thigh-high or knee-high thromboembolic deterrent hose when out of bed. Lovenox     Pain Control: ongoing significant pain in neck and head which is stable and controlled by PRN meds. Will require regular pain assessment and comprenhensive pain management, cont prn tylenol or motrin and Norco if needed for more mod to severe pain. Dc fentanyl  -2/20 paresthesias LLE; start Neurontin 200mg qhs for now; 2/21 much improved overnoc     Wound Care: Monitor wound status daily per staff and physician. At risk for failure. Will require 24/7 rehab nursing. Routine catheter care; PICC line RUE. Neck wound healing; cont trach care     Hypertension - BP uncontrolled, fluctuating, managed medically. Goal SBP < 160; cont lopressor  -2/20 asymptomatic hypotension, dec lopressor and add parameters.  sbp in the 80s, may be due to cervical SCI and autonomic dysfxn  -2/22 111/68 improved; 2/23 130/90; HR 80s     HLD cont Lipitor 40mg     Anx/depression; refuses antidepressent. Was taking xanax at home and will cont low dose xanax at 0.25mg bid. Hopefully as she sees herself recovering, anxiety will lessen  -2/19 inc anxiety, didn't sleep; inc trazadone and change xanax to tid; has sleep wake cycle mixed up due to coming from ICU environment.  -2/20 improved sleep last noc ; 2/21 excellent noc/had added neurontin. Cont trazadone and xanax; signif dec anxiety  -2/23 doing much better     Anemia; hgb 9.3 monitor; 2/19 hgb 8.8; no evid of bleed. monitor     Urinary retention/ neurogenic bladder - schedule voids q6-8 hrs. Check post-void residual as needed; In and Out catheterize if post-void residual is more than 400 cc.  -voiding without difficulty     bowel program - cont senna, and prn bowel program     GERD - resume PPI. At times may need additional antacids, Maalox prn.          Time spent was 25 minutes with over 1/2 in direct patient care/examination, consultation and coordination of care.      Signed By: Toyin Avalos MD     February 23, 2020

## 2020-02-24 PROCEDURE — 65310000000 HC RM PRIVATE REHAB

## 2020-02-24 PROCEDURE — 97112 NEUROMUSCULAR REEDUCATION: CPT

## 2020-02-24 PROCEDURE — 97110 THERAPEUTIC EXERCISES: CPT

## 2020-02-24 PROCEDURE — 99232 SBSQ HOSP IP/OBS MODERATE 35: CPT | Performed by: PHYSICAL MEDICINE & REHABILITATION

## 2020-02-24 PROCEDURE — 97535 SELF CARE MNGMENT TRAINING: CPT

## 2020-02-24 PROCEDURE — 74011250636 HC RX REV CODE- 250/636: Performed by: PHYSICAL MEDICINE & REHABILITATION

## 2020-02-24 PROCEDURE — 97116 GAIT TRAINING THERAPY: CPT

## 2020-02-24 PROCEDURE — 92507 TX SP LANG VOICE COMM INDIV: CPT

## 2020-02-24 PROCEDURE — 74011250637 HC RX REV CODE- 250/637: Performed by: PHYSICAL MEDICINE & REHABILITATION

## 2020-02-24 PROCEDURE — 97530 THERAPEUTIC ACTIVITIES: CPT

## 2020-02-24 RX ORDER — AMOXICILLIN 250 MG
1 CAPSULE ORAL 2 TIMES DAILY
Status: DISCONTINUED | OUTPATIENT
Start: 2020-02-24 | End: 2020-02-28 | Stop reason: HOSPADM

## 2020-02-24 RX ADMIN — DEXAMETHASONE SODIUM PHOSPHATE 4 MG: 4 INJECTION, SOLUTION INTRAMUSCULAR; INTRAVENOUS at 10:11

## 2020-02-24 RX ADMIN — SODIUM CHLORIDE 10 ML: 9 INJECTION INTRAMUSCULAR; INTRAVENOUS; SUBCUTANEOUS at 14:40

## 2020-02-24 RX ADMIN — NYSTATIN 500000 UNITS: 100000 SUSPENSION ORAL at 22:51

## 2020-02-24 RX ADMIN — SODIUM CHLORIDE 10 ML: 9 INJECTION INTRAMUSCULAR; INTRAVENOUS; SUBCUTANEOUS at 06:09

## 2020-02-24 RX ADMIN — ENOXAPARIN SODIUM 40 MG: 40 INJECTION SUBCUTANEOUS at 10:12

## 2020-02-24 RX ADMIN — FAMOTIDINE 20 MG: 20 TABLET, FILM COATED ORAL at 10:11

## 2020-02-24 RX ADMIN — DEXAMETHASONE SODIUM PHOSPHATE 4 MG: 4 INJECTION, SOLUTION INTRAMUSCULAR; INTRAVENOUS at 22:08

## 2020-02-24 RX ADMIN — SENNOSIDES AND DOCUSATE SODIUM 1 TABLET: 8.6; 5 TABLET ORAL at 18:20

## 2020-02-24 RX ADMIN — HEPARIN 600 UNITS: 100 SYRINGE at 06:09

## 2020-02-24 RX ADMIN — GABAPENTIN 200 MG: 100 CAPSULE ORAL at 22:51

## 2020-02-24 RX ADMIN — LACTULOSE 45 ML: 20 SOLUTION ORAL at 10:31

## 2020-02-24 RX ADMIN — METOPROLOL TARTRATE 25 MG: 25 TABLET, FILM COATED ORAL at 18:20

## 2020-02-24 RX ADMIN — ATORVASTATIN CALCIUM 40 MG: 40 TABLET, FILM COATED ORAL at 10:11

## 2020-02-24 RX ADMIN — FAMOTIDINE 20 MG: 20 TABLET, FILM COATED ORAL at 22:06

## 2020-02-24 RX ADMIN — HEPARIN 600 UNITS: 100 SYRINGE at 14:40

## 2020-02-24 RX ADMIN — ALPRAZOLAM 0.25 MG: 0.25 TABLET ORAL at 10:11

## 2020-02-24 RX ADMIN — METOPROLOL TARTRATE 25 MG: 25 TABLET, FILM COATED ORAL at 10:11

## 2020-02-24 RX ADMIN — HEPARIN 600 UNITS: 100 SYRINGE at 22:08

## 2020-02-24 RX ADMIN — NYSTATIN 500000 UNITS: 100000 SUSPENSION ORAL at 18:21

## 2020-02-24 RX ADMIN — SODIUM CHLORIDE 10 ML: 9 INJECTION INTRAMUSCULAR; INTRAVENOUS; SUBCUTANEOUS at 22:08

## 2020-02-24 RX ADMIN — ALPRAZOLAM 0.25 MG: 0.25 TABLET ORAL at 22:07

## 2020-02-24 RX ADMIN — TRAZODONE HYDROCHLORIDE 50 MG: 50 TABLET ORAL at 22:51

## 2020-02-24 RX ADMIN — ALPRAZOLAM 0.25 MG: 0.25 TABLET ORAL at 18:20

## 2020-02-24 RX ADMIN — NYSTATIN 500000 UNITS: 100000 SUSPENSION ORAL at 10:11

## 2020-02-24 NOTE — PROGRESS NOTES
Time In 0700   Time Out 0830     Mobility   Score Comments   Sit to Stand 4: Supervision or touching A CGA   Transfer Assist 4: Supervision or touching A Transfer Type: SPT   Equipment: Rolling Walker   Comments: CGA     Activities of Daily Living    Score Comments   Oral Hyigene 5: S/U or clean-up assist    Bathing 4: Supervision or touching A Type of Shower: Shower  Position: Standing PRN and Unsupported Sitting   Adaptive  Equipment: Tub Transfer Bench and Grab Bars  Comments: Verbal cues for washing RUE with improved ROM of L shoulder. Close SBA for standing balance while washing bottom and elvira area   Upper Body  Dressing 4: Supervision or touching A Items Applied: Pullover and Bra  Position: Unsupported Sitting  Comments: Min assist to rotate bra anteriorly, assist to adjust clothing loosely below trach   Lower Body Dressing 4: Supervision or touching A Items Applied: Underwear and Elastic pants  Position: Standing PRN and Unsupported Sitting  Adaptive Equipment: N/A  Comments: SBA for standing balance while completing clothing management over waist   Donning/Sperry Footwear 5: S/U or clean-up assist Items Applied: Slip-on shoes  Adaptive Equipment: N/A  Comments: Toilet Transfer 4: Supervision or touching A Transfer Type: SPT   Equipment: Rolling Walker   Comments: CGA for balance    Toileting Hygiene 4: Supervision or touching A Output: urine  Comments: CGA > close SBA for standing balance while pulling pants up/down. Cues to use grab bars for balance as needed   Education  Dressing and bathing techniques       S: \"It doesn't hurt, it's just a little weak\" [referring to L hand] Agreeable to therapy. Patient was able to ambulate ~10 feet using a RW with CGA. Pt completed ADLs with quality indicators noted above, applied gauze change to PEG with clean site noted and discussed with DEEP Ureña. Transitioned to gym and completed [facility], strengthening, and standing activity.  Medium resistance theraputty used to find 20 beads in various methods to increase bilateral UE/ strength and activity tolerance. Used rolling pin for addition BUE strengthening and ROM exercise to flatten theraputty before using L hand to  and place beads into theraputty. Improving  strength and coordination in L hand. Issued pink (medium) resistive therasponge and completed 20 reps x B hands of the following exercises: full fisted grasp, three jaw tahmina grasp, pincer grasp, and full digital extended grasp. Pt demonstrated good performance and understanding of exercises, provided written handout for pt to follow as part of HEP. Patient stood for ~10 minutes at standing table with occasional  one hand(s) support on table during visual perceptual activity, in order to improve activity tolerance, standing balance, weight shifting, weight bearing, posture, and functional mobility. Provided verbal cues for weight shifting to forefeet in order to increase balance and stance for integration into functional activities. While in stance, completed rubber band activity with mod verbal cues for copying visual design. Pt attempting to pull rubber band through additional slots, with difficulty matching pattern. Assist to cover up portion of visual design to aide with simplifying picture. SpO2 level at 98% on 28% trach collar using 3 L of O2. Pain not expressed. Pt noting PEG placement is sore but is not painful. Collaborated with PT regarding patient performance and POC. Patient tolerated session well, but activity tolerance, balance, functional mobility, strength, coordination, cognition are still below baseline and require skilled facilitation to successfully and safely complete ADLs and transfers. Patient ended session in wc with PT assuming care.        Mendel Emmanuel, JUSTYNR/L

## 2020-02-24 NOTE — PROGRESS NOTES
PHYSICAL THERAPY DAILY NOTE  Time In: 0830  Time Out: 1137  Patient Seen For: AM;Gait training; Therapeutic exercise    Subjective: Pt reports she is feeling a little sore where her PEG tube was inserted. Objective: Other (comment)(trach)    Pt's trach collar was downsized and pt is on 3 L O2 using a trach collar. COGNITION Daily Assessment    Pt is alert and oriented and following commands appropriately 100% of the time. Pt communicates by mouthing words and written communication. Pt has a pleasant affect. BED/MAT MOBILITY Daily Assessment    Rolling Right : 5 (Stand-by assistance)  Rolling Left : 5 (Stand-by assistance)  Supine to Sit : 5 (Supervision)  Sit to Supine : 5 (Supervision)       TRANSFERS Daily Assessment    Transfer Type: SPT without device  Transfer Assistance : 5 (Supervision/setup)  Sit to Stand Assistance: Supervision  Car Transfers: Not tested       GAIT Daily Assessment   Pt's gait is improving as evidenced by increased arm swing B and trunk rotation during ambulation. Pt demonstrates normal gait speed and improved stride length. Pt required no rest breaks during ambulation today.  Amount of Assistance: 5 (Stand-by assistance)  Distance (ft): 450 Feet (ft)  Assistive Device: Other (comment)(close SBA)       STEPS or STAIRS Daily Assessment    Steps/Stairs Ambulated (#): 0  Level of Assist : 0 (Not tested)       BALANCE Daily Assessment    Sitting - Static: Good (unsupported)  Sitting - Dynamic: Good (unsupported)  Standing - Static: Good  Standing - Dynamic : Impaired       WHEELCHAIR MOBILITY Daily Assessment   NT Able to Propel (ft): 0 feet  Curbs/Ramps Assist Required (FIM Score): 0 (Not tested)  Wheelchair Setup Assist Required : 0 (Not tested)     Interventions:  NuStep level 2 x 12 min  Bridges 3 x 8 x 3 sec  Hooklying clam shells 3 x 10 (RTB around thighs)  Hooklying isometric hip ADD 3 x 10 x 5 sec  Sit to stands 3 x 8 from blue mat  Standing hip ABD at rail 3 x 10 B  Ambulation x 450 ft with SBA     /82   Pulse 78   Temp 98.5 °F (36.9 °C)   Resp 16   Wt 96 kg (211 lb 9.6 oz)   SpO2 98%   BMI 36.32 kg/m²      Pain level: no complaints of pain  Pain location: N/A  Pain interventions: N/A    Patient education: Encouraged pt to use arm rest when ascending/descending from [facility]. Interdisciplinary Communication: Collaborated with ST regarding pt's progress    Pt left in room with call bell and needs in reach with SLP in room getting ready for session. Assessment: Pt tolerated therapy well today. Pt demonstrates improved activity tolerance and less difficulty with oral secretion clearance compared to previous sessions. Pt denied reports of pain/numbness/tingling with LLE today. Pt would benefit from skilled physical therapy to maximize independence, increase activity tolerance, and improve functional mobility. Plan of Care: Continue with POC and progress as tolerated.       Poonam Signs, PT  2/24/2020

## 2020-02-24 NOTE — PROGRESS NOTES
.Rounded on pt hourly during night. Slept at intervals. Skin warm and dry. Respiration even and unlabored. Tolerated jevity 1.2 a can at 12/6. Suctioned trach PICC with good blood return. 3xs during the night. No acute distress noted. No complaints of pain. Safety maintained.

## 2020-02-24 NOTE — PROGRESS NOTES
OT Daily Note  Time In 1109   Time Out 1205     Subjective: \"I called my dad and my , they are just amazed. I cried after she put it on\" [regarding PMV placement]. Pain: denied  Education: NMRE shoulder AROM exercises and static standing balance techniques  Interdisciplinary Communication: with SLP [facility] regarding PMV  Precautions: fall risk  Mobility   Score Comments   Sit to Stand 4: Supervision or touching A Close SBA   Transfer Assist 4: Supervision or touching A Transfer Type: SPT   Equipment: N/A   Comments: Close SBA     Neuro-Muscular Re-Education Daily Assessment   Completed AROM B shoulder stretches including shoulder flexion, extension, horizontal abduction and adduction, elbow flexion/extension. Pt denied chest discomfort on this date with arm movements. Pt in semi supine position on mat with wedge behind back. Facilitated PNF pattern D1 and D2 flexion/extension exercises with mod resistance on RUE and min resistance on LUE. Completed x10 reps x2 sets each. Improved AROM and tolerance of resistive exercises. Balance `   Patient stood for ~15 minutes at standing table with occasional  one hand(s) support on table during [facility] activity, in order to improve activity tolerance, standing balance, weight shifting, weight bearing, posture, and functional mobility. Provided verbal cues for weight shifting anteriorly to forefoot and for extension of knees in order to increase balance and stance for integration into functional activities. Pt completed bolts, washer, and nut activity while in stance to further promote [facility] skills in B hands. Min assist initially for bilateral hand coordination skills required to stabilize items with L hand and complete fine motor task with R hand.     Pt noted fatigue in legs after 15 minutes, discussed slowly building up static standing balance and endurance     Functional mobility Daily Assessment   Pt ambulated 30 ft without device with CGA > close SBA for improved functional mobility. Assessment: Making progress with LUE ROM and slowly improving strength. Continues to demonstrate great effort in therapeutic activities. Plan: Continue OT POC with focus on ADL/IADL skills, functional transfers, functional mobility, coordination, strength, static and dynamic balance, and activity tolerance to maximize safety and independence with ADLs and functional transfers.        Mendel Brooks, OTR/L  2/24/2020

## 2020-02-24 NOTE — PROGRESS NOTES
PHYSICAL THERAPY DAILY NOTE  Time In: 2293  Time Out: 6292  Patient Seen For: PM;Gait training;Transfer training; Therapeutic exercise    Subjective: Pt reports she is happy to have her trach out and she's feeling tired from today's sessions. Objective: Other (comment)(trach)    COGNITION Daily Assessment    Pt is alert and oriented and following commands appropriately 100% of the time. Pt communicates verbally now that she has a PMV. Pt has a pleasant affect.        BED/MAT MOBILITY Daily Assessment    Rolling Right : 0 (Not tested)  Rolling Left : 0 (Not tested)  Supine to Sit : 0 (Not tested)  Sit to Supine : 0 (Not tested)       TRANSFERS Daily Assessment    Transfer Type: SPT without device  Transfer Assistance : 5 (Supervision/setup)  Sit to Stand Assistance: Supervision  Car Transfers: Not tested       GAIT Daily Assessment    Amount of Assistance: 5 (Stand-by assistance)  Distance (ft): 200 Feet (ft)  Assistive Device: Other (comment)(close SBA)       STEPS or STAIRS Daily Assessment   Using B rails ascend/descend with step through gait pattern Steps/Stairs Ambulated (#): 4(x4)  Level of Assist : 5 (Stand-by assistance)  Rail Use: Both       BALANCE Daily Assessment    Sitting - Static: Good (unsupported)  Sitting - Dynamic: Good (unsupported)  Standing - Static: Good  Standing - Dynamic : Impaired       WHEELCHAIR MOBILITY Daily Assessment   NT Able to Propel (ft): 0 feet  Curbs/Ramps Assist Required (FIM Score): 0 (Not tested)  Wheelchair Setup Assist Required : 0 (Not tested)     Interventions:  NuStep level 3 x 8 min  Step ups 6\" step 3 x 10 in // bars  Walking ramp up/down with B rails and no LOB  Walking up/down stairs with step through gait pattern     /82   Pulse 78   Temp 98.5 °F (36.9 °C)   Resp 16   Wt 96 kg (211 lb 9.6 oz)   SpO2 98%   BMI 36.32 kg/m²      Pain level: no complaints of pain  Pain location: N/A  Pain interventions: N/A    Patient education: Pt encouraged to take rest breaks throughout activity to pace herself after reports of fatigue. Interdisciplinary Communication: Collaborated with OT regarding pt's progress    Pt left in restroom with CNA assisting. Assessment: Pt tolerated therapy well today. Pt demonstrates pleasant affect following removal of trach. Pt reports her legs feel fatigued (R > L) following today's busy schedule. Pt would benefit from skilled physical therapy to maximize independence, increase activity tolerance, and improve functional mobility. Plan of Care: Continue with POC and progress as tolerated.       Hyacinth Wong, PT  2/24/2020

## 2020-02-24 NOTE — PROGRESS NOTES
Nutrition F/U:  TF Management for Dr. Richard Ruelas. Assessment:  The patient had a PEG placed on 2/21 and GI MD in her note on 2/22 mentioned the need for a nutrition consult for TF orders. No orders were placed. Today I visited the patient and there is no feeding pump in the room and the patient reports that she is receiving her nutrition as all boluses. RN reports that she gets 4 boluses per day - 12-6-12-6 because the other schedule interfered with her therapy. Her initial TF schedule - 1 liter bolus at night with 2 cartons during the day was ordered to accommodate her therapy schedule. Enteral Access:   PEG  Macronutrient Needs (CBW 98 kg):  Estimated calorie needs - 2070-8090 kenny/day (15-18 kenny/kg/day)  Estimated protein needs - 78-98 gm pro/day (0.8-1 gm pro/kg/day)   Max CHO/day - 265 gm CHO/day (60% kenny/day)   Fluid/day - 1.4-1.8 liters/day (1 ml/kenny/day)  Intake/Comparative Standards:  Unsure the current TF regimen since there is no subsequent order after the initial TF orders. Diagnosis:  Inadequate energy intake related to inadequate TF infusion as evidenced by the need for new TF orders for bolus feeds to meet her estimated daily needs. Intervention:   Meals and Snacks: NPO. Enteral Nutrition: Change TF regimen to 1 carton Jevity 1.2 every 4 hours with 50 ml water flush before and after each carton. The patient must get 6 cartons of formula per day to meet her needs. Coordination of Nutrition Care by a Nutrition Professional: Collaboration with Alyssa Berry RN. Nutrition Discharge Plan: Too soon to determine. Estephania Maldonado.  Maria R Diana  [phone]

## 2020-02-24 NOTE — PROGRESS NOTES
Went by to check pt's trach, and inner cannula. Pt was not in the room. Will attempt to see pt again.

## 2020-02-24 NOTE — PROGRESS NOTES
02/24/20 1049   Time Spent With Patient   Time In 0942   Time Out 1002   Patient Seen For: AM   Mental Status   Neurologic State Alert   Orientation Level Oriented X4   Cognition Follows commands   Perception Appears intact   Perseveration No perseveration noted   Safety/Judgement Awareness of environment        02/24/20 1050   Type of Assessment   Type of Assessment Treatment   Tracheostomy Data/Eval   Trach Size/Status #6 ;Uncuffed   Date of Placement 02/23/20   Additional Trach Info Size change   Passy-Maria Del Rosario Placement SLP   On Ventilator No   Evidence of Comprehension   Meaningful Eye Movement/Gaze Yes   Response to Basic Yes/No Questions (%) 100 %   Complex Commands (%) 100 %   Cough  Present   PMV Trial    Application SLP   Tolerance Tolerated without changes in vitals, breathing effort or level of anxiety   Vocal Quality Low volume;Hoarse   Vocal Intensity Too soft   Duration (minutes) 18 minutes   Oxygen Therapy   O2 Device Room air   Treatment Diagnosis   Treatment Diagnosis aphonia; loss of voice R49.1     Patient participated with PMV trial s/p trach downsize to #6 cuffless. Improved tolerance without discomfort, back pressure upon removal, and stable vitals throughout 15 minute trial.  Able to achieve voicing with fair intelligibility; voice is hoarse and soft. Patient became tearful when hearing her own voice and wanted to call her Dad to surprise him. Instructed patient in front of the mirror on how do bronwyn/doff the valve. Instructions on use including not to ever wear when sleeping and instructions to clean with soap/water instead of alcohol discussed. Patient's 02 remained at 97 on room air with no significant change in HR or RR. Recommend use of valve as tolerated with staff. Will follow for bedside swallowing assessment. Patient reports practicing exercises she was introduced to on Friday over the weekend and is highly motivated.       Estefanía Veras MS, CCC-SLP

## 2020-02-24 NOTE — PROGRESS NOTES
Problem: Falls - Risk of  Goal: *Absence of Falls  Description  Document Marie Lin Fall Risk and appropriate interventions in the flowsheet.   Outcome: Progressing Towards Goal  Note: Fall Risk Interventions:  Mobility Interventions: Utilize walker, cane, or other assistive device         Medication Interventions: Patient to call before getting OOB    Elimination Interventions: Call light in reach, Stay With Me (per policy)              Problem: Patient Education: Go to Patient Education Activity  Goal: Patient/Family Education  Outcome: Progressing Towards Goal

## 2020-02-24 NOTE — PROGRESS NOTES
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 Vss. No acute complaints. PT, OT well tolerated. Steady gains made. No new barrier to progress noted.      Objective:     Current Facility-Administered Medications   Medication Dose Route Frequency    dexamethasone (DECADRON) 4 mg/mL injection 4 mg  4 mg IntraVENous Q12H    lactulose (CHRONULAC) 10 gram/15 mL solution 45 mL  30 g Per G Tube DAILY PRN    gabapentin (NEURONTIN) capsule 200 mg  200 mg Per G Tube QHS    acetaminophen (TYLENOL) tablet 650 mg  650 mg Per G Tube Q6H PRN    ALPRAZolam (XANAX) tablet 0.25 mg  0.25 mg Per G Tube TID    atorvastatin (LIPITOR) tablet 40 mg  40 mg Per G Tube DAILY    bisacodyL (DULCOLAX) suppository 10 mg  10 mg Rectal DAILY PRN    enoxaparin (LOVENOX) injection 40 mg  40 mg SubCUTAneous Q24H    heparin (porcine) pf 300 Units  300 Units InterCATHeter PRN    heparin (porcine) pf 600 Units  600 Units InterCATHeter Q8H    HYDROcodone-acetaminophen (NORCO) 7.5-325 mg per tablet 1 Tab  1 Tab Per G Tube Q4H PRN    famotidine (PEPCID) tablet 20 mg  20 mg Per G Tube Q12H    lip protectant (BLISTEX) ointment 1 Each  1 Each Topical PRN    nystatin (MYCOSTATIN) 100,000 unit/mL oral suspension 500,000 Units  500,000 Units Oral QID    ondansetron (ZOFRAN) injection 4 mg  4 mg IntraVENous Q4H PRN    metoprolol tartrate (LOPRESSOR) tablet 25 mg  25 mg Oral BID    phenol throat spray (CHLORASEPTIC) 1 Spray  1 Spray Oral PRN    scopolamine (TRANSDERM-SCOP) 1 mg over 3 days 1 Patch  1 Patch TransDERmal Q72H    senna-docusate (PERICOLACE) 8.6-50 mg per tablet 1 Tab  1 Tab Oral BID PRN    traZODone (DESYREL) tablet 50 mg  50 mg Per G Tube QHS PRN    0.9% sodium chloride injection 10 mL  10 mL IntraVENous PRN     Review of Systems:Denies chest pain, shortness of breath, cough, headache, visual problems, abdominal pain, dysurea, calf pain. Pertinent positives are as noted in the medical records and unremarkable otherwise. Visit Vitals  /71 (BP 1 Location: Left arm, BP Patient Position: At rest)   Pulse 75   Temp 99.1 °F (37.3 °C)   Resp 16   Wt 211 lb 9.6 oz (96 kg)   SpO2 100%   BMI 36.32 kg/m²        Physical Exam:   General: Alert and age appropriately oriented. No acute cardio respiratory distress. HEENT: Normocephalic,no scleral icterus  Oral mucosa moist without cyanosis   Lungs: Clear to auscultation  bilaterally. Respiration even and unlabored   Heart: Regular rate and rhythm, S1, S2   No  murmurs, clicks, rub or gallops   Abdomen: Soft, non-tender, nondistended. Bowel sounds present. No organomegaly. Genitourinary: Benign . Neuromuscular:      Grossly no focal motor deficits noted. Moves ankles. Ankle dorsiflexion 5/5  Ankle plantarflexion 5/5  No sensory deficits distally. Exam limited by pain. Skin/extremity: No rashes, no erythema. No calf tenderness BLE  Wound covered.                                                                             Functional Assessment:  Gross Assessment  AROM: Generally decreased, functional (02/22/20 1200)  PROM: Within functional limits (02/22/20 1200)  Strength: Generally decreased, functional (02/22/20 1200)  Coordination: Generally decreased, functional (02/22/20 1200)  Tone: Normal (02/22/20 1200)  Sensation: Intact (02/22/20 1200)       Balance  Sitting - Static: Good (unsupported) (02/22/20 1200)  Sitting - Dynamic: Good (unsupported) (02/22/20 1200)  Standing - Static: Good (02/22/20 1200)  Standing - Dynamic : Impaired (02/22/20 1200)                     Caro Alegria Fall Risk Assessment:  Caro Alegria Fall Risk  Mobility: Ambulates or transfers with assist devices or assistance (02/23/20 1936)  Mobility Interventions: Utilize walker, cane, or other assistive device (02/23/20 2303)  Mentation: Alert, oriented x 3 (02/23/20 2303)  Medication: Patient receiving anticonvulsants, sedatives(tranquilizers), psychotropics or hypnotics, hypoglycemics, narcotics, sleep aids, antihypertensives, laxatives, or diuretics (02/23/20 2303)  Medication Interventions: Patient to call before getting OOB (02/23/20 2303)  Elimination: Needs assistance with toileting (02/23/20 2303)  Elimination Interventions: Call light in reach; Stay With Me (per policy) (05/40/63 0443)  Prior Fall History: No (02/23/20 2303)  Total Score: 3 (02/23/20 2303)  Standard Fall Precautions: Yes (02/22/20 0710)  High Fall Risk: Yes (02/23/20 2303)     Speech Assessment:         Ambulation:  Gait  Distance (ft): 200 Feet (ft)(total feet) (02/22/20 1200)  Assistive Device: Other (comment);Gait belt;Walker, rolling(HHA) (02/22/20 1200)  Rail Use: Both (02/22/20 1200)     Labs/Studies:  Recent Results (from the past 72 hour(s))   CBC W/O DIFF    Collection Time: 02/23/20  5:53 AM   Result Value Ref Range    WBC 9.6 4.3 - 11.1 K/uL    RBC 3.14 (L) 4.05 - 5.2 M/uL    HGB 8.6 (L) 11.7 - 15.4 g/dL    HCT 27.8 (L) 35.8 - 46.3 %    MCV 88.5 79.6 - 97.8 FL    MCH 27.4 26.1 - 32.9 PG    MCHC 30.9 (L) 31.4 - 35.0 g/dL    RDW 12.9 11.9 - 14.6 %    PLATELET 267 169 - 664 K/uL    MPV 10.8 9.4 - 12.3 FL    ABSOLUTE NRBC 0.00 0.0 - 0.2 K/uL   METABOLIC PANEL, BASIC    Collection Time: 02/23/20  5:53 AM   Result Value Ref Range    Sodium 142 136 - 145 mmol/L    Potassium 4.3 3.5 - 5.1 mmol/L    Chloride 106 98 - 107 mmol/L    CO2 31 21 - 32 mmol/L    Anion gap 5 (L) 7 - 16 mmol/L    Glucose 111 (H) 65 - 100 mg/dL    BUN 16 6 - 23 MG/DL    Creatinine 0.57 (L) 0.6 - 1.0 MG/DL    GFR est AA >60 >60 ml/min/1.73m2    GFR est non-AA >60 >60 ml/min/1.73m2    Calcium 9.4 8.3 - 10.4 MG/DL       Assessment:     Problem List as of 2/24/2020 Date Reviewed: 2/14/2020          Codes Class Noted - Resolved [place] ICD-10-CM: D18.00  ICD-9-CM: 228.00  2/3/2020 - Present        Acute respiratory failure with hypoxia [facility]) ICD-10-CM: J96.01  ICD-9-CM: 518.81  2/3/2020 - Present        Edema of spinal cord (HCC) ICD-10-CM: G95.19  ICD-9-CM: 336.1  2/3/2020 - Present        Acute left-sided weakness ICD-10-CM: R53.1  ICD-9-CM: 728.87  2/3/2020 - Present        AVM (arteriovenous malformation) spine ICD-10-CM: Q28.8  ICD-9-CM: 747.82  2/2/2020 - Present            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 CXR neg. Will have RT decrease cuff pressure. Tolerating ATC well. Will have ST try PMV trials with eventual capping. Will downsize when tolerates, can at 7-10days post op; cont RT with assist with trach collar, wean OT; pt functionally expected to do well. Do not want to send home with a trach in but may have to depending on tolerance of downsizing. Keeping it now is definitely for safety due to spinal edema  -pt constantly suctioning secretions; will add scop patch and monitor. dont want mucous plugging. Very thin secretions; 6L trach collar   -trach stoma quite big, do not think we can downsize to a #6 trach. Could not tolerate PMV at all; back pressure. Scopolamine patch placed for decreasing oral secretions.  sats 98%, 6L. Lungs clear; spt cx neg; will f/u CXR due to known left atelectasis and vasc congestion  -2/21 CXR slt improved, needs to take deep breaths. Will DOWNSIZE to #6 trach today/working on PMV tolerance;   2/22 downsize today. 28% trach collar; turned O2 to 3L. Off o2 for adls and sats 45%. Hopefully will tolerated PMV with smaller trach  -sats97 % RA right now; trach collar at noc 6L; do not feel she needs that much O2  -2/24 tolerating #6, did not require suctioning overnoc     Spinal cord edema/hematoma C3; dysphagia profound.  Will need PEG. Cannot continue NGT for the length of time I suspect she will need. Will consult GI 2/20  -start trial of decadron 4mg IV q 8hrs to see if edema can be decreased and pt can tolerate secretions better and swallow 2/20; discussed with neurosurgery; 2/21 doing well today, managing secretions better  -2/22 PEG placed by Dr Syeda Montanez yesterday. Tolerated well. Can restart bolus tube feeds today.   -tolerating TF well     ID; fever 2/17 ; blood and spt cxs neg. Wbc normal. Urinalysis pending  -2/20 urinalysis suspicious, but cx neg thus far. tmax 100.2; f/u CXR; no leukocytosis; 2/21 afebrile. Tm 99.7; 2/23 afebrile     Pt still with NGT for feeding; ST to eval. Aspiration precautions  -2/20 not even close to being ready for swallow eval. #8 trach, cant tolerate PMV,   -2/21 PEG today, appreciate GI assistance; NGT out. Will start IVFs  -2/22 PEG ; can start tfs; 2/23 tolerating bolus tf well. Will have dietician assess nutritional needs MBS 2/24     Oral thrush; can use nystatin to coat roof of mouth with swab. Strict thorough oral care 2/20; 2/24  thrush improved     DVT risk / DVT Prophylaxis- Will require daily physician exam to assess for signs and symptoms as patient is at increased risk for of thromboembolism. Mobilization as tolerated. Intermittent pneumatic compression devices when in bed Thigh-high or knee-high thromboembolic deterrent hose when out of bed. Lovenox     Pain Control: ongoing significant pain in neck and head which is stable and controlled by PRN meds. Will require regular pain assessment and comprenhensive pain management, cont prn tylenol or motrin and Norco if needed for more mod to severe pain. Dc fentanyl  -2/20 paresthesias LLE; start Neurontin 200mg qhs for now; 2/21 much improved overnoc     Wound Care: Monitor wound status daily per staff and physician. At risk for failure. Will require 24/7 rehab nursing. Routine catheter care; PICC line RUE.  Neck wound healing; cont trach care     Hypertension - BP uncontrolled, fluctuating, managed medically. Goal SBP < 160; cont lopressor  -2/20 asymptomatic hypotension, dec lopressor and add parameters. sbp in the 80s, may be due to cervical SCI and autonomic dysfxn  -2/22 111/68 improved; 2/23 130/90; HR 80s     HLD cont Lipitor 40mg     Anx/depression; refuses antidepressent. Was taking xanax at home and will cont low dose xanax at 0.25mg bid. Hopefully as she sees herself recovering, anxiety will lessen  -2/19 inc anxiety, didn't sleep; inc trazadone and change xanax to tid; has sleep wake cycle mixed up due to coming from ICU environment.  -2/20 improved sleep last noc ; 2/21 excellent noc/had added neurontin. Cont trazadone and xanax; signif dec anxiety  -2/23 doing much better     Anemia; hgb 9.3 monitor; 2/19 hgb 8.8; no evid of bleed. monitor     Urinary retention/ neurogenic bladder - schedule voids q6-8 hrs. Check post-void residual as needed; In and Out catheterize if post-void residual is more than 400 cc.  -voiding without difficulty     bowel program - cont senna, and prn bowel program     GERD - resume PPI. At times may need additional antacids, Maalox prn.        Time spent was 25 minutes with over 1/2 in direct patient care/examination, consultation and coordination of care.      Signed By: Forest Brian MD     February 24, 2020

## 2020-02-25 PROCEDURE — 97530 THERAPEUTIC ACTIVITIES: CPT

## 2020-02-25 PROCEDURE — 97535 SELF CARE MNGMENT TRAINING: CPT

## 2020-02-25 PROCEDURE — 74011250637 HC RX REV CODE- 250/637: Performed by: PHYSICAL MEDICINE & REHABILITATION

## 2020-02-25 PROCEDURE — 92610 EVALUATE SWALLOWING FUNCTION: CPT

## 2020-02-25 PROCEDURE — 97110 THERAPEUTIC EXERCISES: CPT

## 2020-02-25 PROCEDURE — 65310000000 HC RM PRIVATE REHAB

## 2020-02-25 PROCEDURE — 74011250636 HC RX REV CODE- 250/636: Performed by: PHYSICAL MEDICINE & REHABILITATION

## 2020-02-25 PROCEDURE — 92507 TX SP LANG VOICE COMM INDIV: CPT

## 2020-02-25 PROCEDURE — 97116 GAIT TRAINING THERAPY: CPT

## 2020-02-25 PROCEDURE — 94760 N-INVAS EAR/PLS OXIMETRY 1: CPT

## 2020-02-25 PROCEDURE — 92526 ORAL FUNCTION THERAPY: CPT

## 2020-02-25 PROCEDURE — 99232 SBSQ HOSP IP/OBS MODERATE 35: CPT | Performed by: PHYSICAL MEDICINE & REHABILITATION

## 2020-02-25 RX ORDER — SODIUM CHLORIDE 9 MG/ML
30 INJECTION INTRAMUSCULAR; INTRAVENOUS; SUBCUTANEOUS AS NEEDED
Status: DISCONTINUED | OUTPATIENT
Start: 2020-02-25 | End: 2020-02-28 | Stop reason: HOSPADM

## 2020-02-25 RX ORDER — HEPARIN 100 UNIT/ML
900 SYRINGE INTRAVENOUS EVERY 8 HOURS
Status: DISCONTINUED | OUTPATIENT
Start: 2020-02-25 | End: 2020-02-28 | Stop reason: HOSPADM

## 2020-02-25 RX ADMIN — DEXAMETHASONE SODIUM PHOSPHATE 4 MG: 4 INJECTION, SOLUTION INTRAMUSCULAR; INTRAVENOUS at 09:37

## 2020-02-25 RX ADMIN — NYSTATIN 500000 UNITS: 100000 SUSPENSION ORAL at 18:23

## 2020-02-25 RX ADMIN — HEPARIN SODIUM (PORCINE) LOCK FLUSH IV SOLN 100 UNIT/ML 900 UNITS: 100 SOLUTION at 22:17

## 2020-02-25 RX ADMIN — GABAPENTIN 200 MG: 100 CAPSULE ORAL at 22:18

## 2020-02-25 RX ADMIN — SODIUM CHLORIDE 30 ML: 9 INJECTION INTRAMUSCULAR; INTRAVENOUS; SUBCUTANEOUS at 22:17

## 2020-02-25 RX ADMIN — METOPROLOL TARTRATE 25 MG: 25 TABLET, FILM COATED ORAL at 18:23

## 2020-02-25 RX ADMIN — NYSTATIN 500000 UNITS: 100000 SUSPENSION ORAL at 22:18

## 2020-02-25 RX ADMIN — SODIUM CHLORIDE 30 ML: 9 INJECTION INTRAMUSCULAR; INTRAVENOUS; SUBCUTANEOUS at 14:34

## 2020-02-25 RX ADMIN — SENNOSIDES AND DOCUSATE SODIUM 1 TABLET: 8.6; 5 TABLET ORAL at 09:37

## 2020-02-25 RX ADMIN — HYDROCODONE BITARTRATE AND ACETAMINOPHEN 1 TABLET: 7.5; 325 TABLET ORAL at 14:33

## 2020-02-25 RX ADMIN — SODIUM CHLORIDE 10 ML: 9 INJECTION INTRAMUSCULAR; INTRAVENOUS; SUBCUTANEOUS at 06:02

## 2020-02-25 RX ADMIN — NYSTATIN 500000 UNITS: 100000 SUSPENSION ORAL at 12:30

## 2020-02-25 RX ADMIN — ALPRAZOLAM 0.25 MG: 0.25 TABLET ORAL at 18:23

## 2020-02-25 RX ADMIN — FAMOTIDINE 20 MG: 20 TABLET, FILM COATED ORAL at 09:37

## 2020-02-25 RX ADMIN — HEPARIN 600 UNITS: 100 SYRINGE at 06:03

## 2020-02-25 RX ADMIN — HEPARIN SODIUM (PORCINE) LOCK FLUSH IV SOLN 100 UNIT/ML 900 UNITS: 100 SOLUTION at 14:34

## 2020-02-25 RX ADMIN — ATORVASTATIN CALCIUM 40 MG: 40 TABLET, FILM COATED ORAL at 09:37

## 2020-02-25 RX ADMIN — NYSTATIN 500000 UNITS: 100000 SUSPENSION ORAL at 09:37

## 2020-02-25 RX ADMIN — ENOXAPARIN SODIUM 40 MG: 40 INJECTION SUBCUTANEOUS at 09:37

## 2020-02-25 RX ADMIN — METOPROLOL TARTRATE 25 MG: 25 TABLET, FILM COATED ORAL at 09:37

## 2020-02-25 RX ADMIN — SENNOSIDES AND DOCUSATE SODIUM 1 TABLET: 8.6; 5 TABLET ORAL at 18:22

## 2020-02-25 RX ADMIN — ALPRAZOLAM 0.25 MG: 0.25 TABLET ORAL at 09:37

## 2020-02-25 RX ADMIN — ALPRAZOLAM 0.25 MG: 0.25 TABLET ORAL at 22:18

## 2020-02-25 RX ADMIN — TRAZODONE HYDROCHLORIDE 50 MG: 50 TABLET ORAL at 22:18

## 2020-02-25 RX ADMIN — FAMOTIDINE 20 MG: 20 TABLET, FILM COATED ORAL at 22:18

## 2020-02-25 RX ADMIN — DEXAMETHASONE SODIUM PHOSPHATE 4 MG: 4 INJECTION, SOLUTION INTRAMUSCULAR; INTRAVENOUS at 22:18

## 2020-02-25 NOTE — PROGRESS NOTES
Problem: Falls - Risk of  Goal: *Absence of Falls  Description  Document Marie Lin Fall Risk and appropriate interventions in the flowsheet. Outcome: Progressing Towards Goal  Note: Fall Risk Interventions:  Mobility Interventions: Patient to call before getting OOB         Medication Interventions: Patient to call before getting OOB    Elimination Interventions: Patient to call for help with toileting needs              Problem: Patient Education: Go to Patient Education Activity  Goal: Patient/Family Education  Outcome: Progressing Towards Goal     Problem: Pressure Injury - Risk of  Goal: *Prevention of pressure injury  Description  Document Bahman Scale and appropriate interventions in the flowsheet.   Outcome: Progressing Towards Goal  Note: Pressure Injury Interventions:  Sensory Interventions: Assess changes in LOC    Moisture Interventions: Maintain skin hydration (lotion/cream)    Activity Interventions: Increase time out of bed, Pressure redistribution bed/mattress(bed type)    Mobility Interventions: HOB 30 degrees or less, Pressure redistribution bed/mattress (bed type)    Nutrition Interventions: Document food/fluid/supplement intake    Friction and Shear Interventions: HOB 30 degrees or less, Lift sheet                Problem: Patient Education: Go to Patient Education Activity  Goal: Patient/Family Education  Outcome: Progressing Towards Goal

## 2020-02-25 NOTE — PROGRESS NOTES
OT Daily Note  Time In 1000   Time Out 1030     Subjective: \"I am getting an x-ray tomorrow morning. \"  Pain: not expressed  Education: dexterity and strengthening tasks  Interdisciplinary Communication: with PT regarding pt's functional status  Precautions: fall risk  Mobility   Score Comments   Sit to Stand 4: Supervision or touching A SBA   Transfer Assist 4: Supervision or touching A Transfer Type: SPT   Equipment: N/A   Comments: Close SBA     Functional mobility Daily Assessment   Pt ambulated ~150 ft without use of device with close SBA with improving functional mobility. Slow speed but improving step length. Coordination Daily Assessment   Pt completed large peg activity working on [facility], dexterity, efficiency, and hand strength, while seated in chair. Pt followed visual design with 100% accuracy. Pt used L hand as fine motor assist and R hand as stabilizing assist. Noted difficulty with divided attention but able to retrace steps to find her place within pattern provided additional few seconds. Improving L hand dexterity skills, slowly improving speed       Assessment: Making good progress. See details above. Plan: Continue OT POC with focus on ADL/IADL skills, functional transfers, functional mobility, coordination, strength, static and dynamic balance, and activity tolerance to maximize safety and independence with ADLs and functional transfers.       Mendel Bowens   2/25/2020

## 2020-02-25 NOTE — PROGRESS NOTES
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 Afebrile. therapy well tolerated. Objective:     Current Facility-Administered Medications   Medication Dose Route Frequency    heparin (porcine) pf 900 Units  900 Units InterCATHeter Q8H    0.9% sodium chloride injection 30 mL  30 mL IntraVENous PRN    senna-docusate (PERICOLACE) 8.6-50 mg per tablet 1 Tab  1 Tab Oral BID    dexamethasone (DECADRON) 4 mg/mL injection 4 mg  4 mg IntraVENous Q12H    lactulose (CHRONULAC) 10 gram/15 mL solution 45 mL  30 g Per G Tube DAILY PRN    gabapentin (NEURONTIN) capsule 200 mg  200 mg Per G Tube QHS    acetaminophen (TYLENOL) tablet 650 mg  650 mg Per G Tube Q6H PRN    ALPRAZolam (XANAX) tablet 0.25 mg  0.25 mg Per G Tube TID    atorvastatin (LIPITOR) tablet 40 mg  40 mg Per G Tube DAILY    bisacodyL (DULCOLAX) suppository 10 mg  10 mg Rectal DAILY PRN    enoxaparin (LOVENOX) injection 40 mg  40 mg SubCUTAneous Q24H    HYDROcodone-acetaminophen (NORCO) 7.5-325 mg per tablet 1 Tab  1 Tab Per G Tube Q4H PRN    famotidine (PEPCID) tablet 20 mg  20 mg Per G Tube Q12H    lip protectant (BLISTEX) ointment 1 Each  1 Each Topical PRN    nystatin (MYCOSTATIN) 100,000 unit/mL oral suspension 500,000 Units  500,000 Units Oral QID    ondansetron (ZOFRAN) injection 4 mg  4 mg IntraVENous Q4H PRN    metoprolol tartrate (LOPRESSOR) tablet 25 mg  25 mg Oral BID    phenol throat spray (CHLORASEPTIC) 1 Spray  1 Spray Oral PRN    scopolamine (TRANSDERM-SCOP) 1 mg over 3 days 1 Patch  1 Patch TransDERmal Q72H    senna-docusate (PERICOLACE) 8.6-50 mg per tablet 1 Tab  1 Tab Oral BID PRN    traZODone (DESYREL) tablet 50 mg  50 mg Per G Tube QHS PRN     Review of Systems:Denies chest pain, shortness of breath, cough, headache, visual problems, abdominal pain, dysurea, calf pain.  Pertinent positives are as noted in the medical records and unremarkable otherwise. Visit Vitals  /80   Pulse 75   Temp 98.5 °F (36.9 °C)   Resp 16   Wt 211 lb 9.6 oz (96 kg)   SpO2 97%   BMI 36.32 kg/m²        Physical Exam:   General: Alert and age appropriately oriented. No acute cardio respiratory distress. HEENT: Normocephalic,no scleral icterus  Oral mucosa moist without cyanosis   Lungs: Clear to auscultation  bilaterally. Respiration even and unlabored   Heart: Regular rate and rhythm, S1, S2   No  murmurs, clicks, rub or gallops   Abdomen: Soft, non-tender, nondistended. Bowel sounds present. No organomegaly. Genitourinary: Benign . Neuromuscular:      Grossly no focal motor deficits noted. Moves ankles. Ankle dorsiflexion 5/5  Ankle plantarflexion 5/5  No sensory deficits distally. Exam limited by pain. Skin/extremity: No rashes, no erythema. No calf tenderness BLE  Wound covered.                                                                             Functional Assessment:  Gross Assessment  AROM: Generally decreased, functional (02/22/20 1200)  PROM: Within functional limits (02/22/20 1200)  Strength: Generally decreased, functional (02/22/20 1200)  Coordination: Generally decreased, functional (02/22/20 1200)  Tone: Normal (02/22/20 1200)  Sensation: Intact (02/22/20 1200)       Balance  Sitting - Static: Good (unsupported) (02/24/20 1400)  Sitting - Dynamic: Good (unsupported) (02/24/20 1400)  Standing - Static: Good (02/24/20 1400)  Standing - Dynamic : Impaired (02/24/20 1400)                     Sudha Latin Fall Risk Assessment:  Sudha Latin Fall Risk  Mobility: Ambulates or transfers with assist devices or assistance (02/25/20 0800)  Mobility Interventions: Patient to call before getting OOB (02/25/20 0800)  Mentation: Alert, oriented x 3 (02/25/20 0800)  Medication: Patient receiving anticonvulsants, sedatives(tranquilizers), psychotropics or hypnotics, hypoglycemics, narcotics, sleep aids, antihypertensives, laxatives, or diuretics (02/25/20 0800)  Medication Interventions: Patient to call before getting OOB (02/25/20 0800)  Elimination: Needs assistance with toileting (02/25/20 0800)  Elimination Interventions: Patient to call for help with toileting needs (02/25/20 0800)  Prior Fall History: No (02/25/20 0800)  Total Score: 3 (02/25/20 0800)  Standard Fall Precautions: Yes (02/22/20 0710)  High Fall Risk: Yes (02/25/20 0800)     Speech Assessment:  Aspiration Signs/Symptoms: None (02/25/20 0949)      Ambulation:  Gait  Distance (ft): 200 Feet (ft) (02/24/20 1400)  Assistive Device: Other (comment)(close SBA) (02/24/20 1400)  Rail Use: Both (02/24/20 1400)     Labs/Studies:  Recent Results (from the past 72 hour(s))   CBC W/O DIFF    Collection Time: 02/23/20  5:53 AM   Result Value Ref Range    WBC 9.6 4.3 - 11.1 K/uL    RBC 3.14 (L) 4.05 - 5.2 M/uL    HGB 8.6 (L) 11.7 - 15.4 g/dL    HCT 27.8 (L) 35.8 - 46.3 %    MCV 88.5 79.6 - 97.8 FL    MCH 27.4 26.1 - 32.9 PG    MCHC 30.9 (L) 31.4 - 35.0 g/dL    RDW 12.9 11.9 - 14.6 %    PLATELET 560 069 - 997 K/uL    MPV 10.8 9.4 - 12.3 FL    ABSOLUTE NRBC 0.00 0.0 - 0.2 K/uL   METABOLIC PANEL, BASIC    Collection Time: 02/23/20  5:53 AM   Result Value Ref Range    Sodium 142 136 - 145 mmol/L    Potassium 4.3 3.5 - 5.1 mmol/L    Chloride 106 98 - 107 mmol/L    CO2 31 21 - 32 mmol/L    Anion gap 5 (L) 7 - 16 mmol/L    Glucose 111 (H) 65 - 100 mg/dL    BUN 16 6 - 23 MG/DL    Creatinine 0.57 (L) 0.6 - 1.0 MG/DL    GFR est AA >60 >60 ml/min/1.73m2    GFR est non-AA >60 >60 ml/min/1.73m2    Calcium 9.4 8.3 - 10.4 MG/DL       Assessment:     Problem List as of 2/25/2020 Date Reviewed: 2/14/2020          Codes Class Noted - Resolved    [place] ICD-10-CM: D18.00  ICD-9-CM: 228.00  2/3/2020 - Present        Acute respiratory failure with hypoxia (HCC) ICD-10-CM: J96.01  ICD-9-CM: 518.81  2/3/2020 - Present        Edema of spinal cord (HCC) ICD-10-CM: G95.19  ICD-9-CM: 336.1  2/3/2020 - Present Acute left-sided weakness ICD-10-CM: R53.1  ICD-9-CM: 728.87  2/3/2020 - Present        AVM (arteriovenous malformation) spine ICD-10-CM: Q28.8  ICD-9-CM: 747.82  2/2/2020 - Present            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 CXR neg. Will have RT decrease cuff pressure. Tolerating ATC well. Will have ST try PMV trials with eventual capping. Will downsize when tolerates, can at 7-10days post op; cont RT with assist with trach collar, wean OT; pt functionally expected to do well. Do not want to send home with a trach in but may have to depending on tolerance of downsizing. Keeping it now is definitely for safety due to spinal edema  -pt constantly suctioning secretions; will add scop patch and monitor. dont want mucous plugging. Very thin secretions; 6L trach collar   -trach stoma quite big, do not think we can downsize to a #6 trach. Could not tolerate PMV at all; back pressure. Scopolamine patch placed for decreasing oral secretions.  sats 98%, 6L. Lungs clear; spt cx neg; will f/u CXR due to known left atelectasis and vasc congestion  -2/21 CXR slt improved, needs to take deep breaths. Will DOWNSIZE to #6 trach today/working on PMV tolerance;   2/22 downsize today. 28% trach collar; turned O2 to 3L. Off o2 for adls and sats 58%. Hopefully will tolerated PMV with smaller trach  -sats97 % RA right now; trach collar at noc 6L; do not feel she needs that much O2  -2/24 tolerating #6, did not require suctioning overnoc  2/25 - PMV tolerated well during the day. SaO2 good on RA.      Spinal cord edema/hematoma C3; dysphagia profound. Will need PEG. Cannot continue NGT for the length of time I suspect she will need.  Will consult GI 2/20  -start trial of decadron 4mg IV q 8hrs to see if edema can be decreased and pt can tolerate secretions better and swallow 2/20; discussed with neurosurgery; 2/21 doing well today, managing secretions better  -2/22 PEG placed by Dr Fela Pennington yesterday. Tolerated well. Can restart bolus tube feeds today.   -tolerating TF well  2/25 - tolerating bolus PEG feeds. MBS tomorrow am.     ID; fever 2/17 ; blood and spt cxs neg. Wbc normal. Urinalysis pending  -2/20 urinalysis suspicious, but cx neg thus far. tmax 100.2; f/u CXR; no leukocytosis; 2/21 afebrile. Tm 99.7; 2/23 afebrile  2/25 - afebrile.        Oral thrush; can use nystatin to coat roof of mouth with swab. Strict thorough oral care 2/20; 2/24  thrush improved     DVT risk / DVT Prophylaxis- Will require daily physician exam to assess for signs and symptoms as patient is at increased risk for of thromboembolism. Mobilization as tolerated. Intermittent pneumatic compression devices when in bed Thigh-high or knee-high thromboembolic deterrent hose when out of bed. Lovenox     Pain Control: ongoing significant pain in neck and head which is stable and controlled by PRN meds. Will require regular pain assessment and comprenhensive pain management, cont prn tylenol or motrin and Norco if needed for more mod to severe pain. Dc fentanyl  -2/20 paresthesias LLE; start Neurontin 200mg qhs for now; 2/21 much improved overnoc  - 2/25 - reports pain is better. Continue gabapentin hs. Prn percocet.      Wound Care: Monitor wound status daily per staff and physician. At risk for failure. Will require 24/7 rehab nursing. Routine catheter care; PICC line RUE. Neck wound healing; cont trach care     Hypertension - BP uncontrolled, fluctuating, managed medically. Goal SBP < 160; cont lopressor  -2/20 asymptomatic hypotension, dec lopressor and add parameters. sbp in the 80s, may be due to cervical SCI and autonomic dysfxn  -2/22 111/68 improved; 2/23 130/90; HR 80s     HLD cont Lipitor 40mg     Anx/depression; refuses antidepressent.  Was taking xanax at home and will cont low dose xanax at 0.25mg bid. Hopefully as she sees herself recovering, anxiety will lessen  -2/19 inc anxiety, didn't sleep; inc trazadone and change xanax to tid; has sleep wake cycle mixed up due to coming from ICU environment.  -2/20 improved sleep last noc ; 2/21 excellent noc/had added neurontin. Cont trazadone and xanax; signif dec anxiety  -2/23 doing much better     Anemia; hgb 9.3 monitor; 2/19 hgb 8.8; no evid of bleed. monitor     Urinary retention/ neurogenic bladder - schedule voids q6-8 hrs. Check post-void residual as needed; In and Out catheterize if post-void residual is more than 400 cc.  -voiding without difficulty     bowel program - cont senna, and prn bowel program     GERD - resume PPI. At times may need additional antacids, Maalox prn.        Time spent was 25 minutes with over 1/2 in direct patient care/examination, consultation and coordination of care.      Signed By: Mariah Bates MD     February 25, 2020

## 2020-02-25 NOTE — PROGRESS NOTES
DYSPHAGIA ASSESSMENT AND TREATMENT     02/25/20 0949   Mental Status   Neurologic State Alert   Orientation Level Oriented X4   Cognition Follows commands   Perception Appears intact   Perseveration No perseveration noted   Safety/Judgement Awareness of environment   Oral Assessment   Labial No impairment   Dentition Natural   Lingual No impairment   PO Trials   Assessment Method(s) Observation   Vocal Quality Low volume;Hoarse   Consistency Presented Solid; Thin liquid;Mixed consistency;Puree   How Presented Self-fed/presented;Cup/sip;Spoon;Straw   Bolus Acceptance No impairment   Bolus Formation/Control No impairment   Propulsion No impairment   Oral Residue None   Initiation of Swallow No impairment   Aspiration Signs/Symptoms None   Pharyngeal Phase Characteristics Double swallow        02/25/20 0950   Laryngeal Exercises   Mendelsohn Maneuver Yes   Sets  1   Reps  10   Effortful Swallow Yes   Sets  1   Reps  10   Lia Yes   Sets  1   Reps  10      02/25/20 0950   Time Spent With Patient   Time In 0830   Time Out 0900     Patient participated with a bedside swallowing assessment and laryngeal/pharyngeal strengthening exercises. Tolerating PMV on room air with valve placed prior to evaluation. No overt signs/sx of aspiration with ice chips, thin liquids via spoon, cup, or straw, pureed, or mixed trials. Mastication time within normal limits. Intermittent double swallow with po trials. No change in vocal quality after any consistency. Patient with dry, hacking cough noted x2 during po trials but not directly correlated with a particular consistency and mirrored baseline cough prior to administering trials.  02 >97 throughout trials. Patient accurately demonstrated how to don/doff PMV in front of the mirror and recalled guidelines to not wear when sleeping and cleaning instructions. Cough when valve placed with initial change of pressure.   Instructed patient and family to eliminate background noise to better facilitate communication e.g. TV and 02 off if not using. Patient completed laryngeal exercises outlined above 1x10 with 80-90% accuracy overall. Recommend modified barium swallow study (MBS) tomorrow am for objective assessment of pharyngeal phase of the swallow.     Jony Salcedo MS, CCC-SLP

## 2020-02-25 NOTE — PROGRESS NOTES
Patient with PMV on, talking with friends at bedside. No distress noted, call bell within reach. Hourly rounds completed this shift.

## 2020-02-25 NOTE — PROGRESS NOTES
PT WEEKLY PROGRESS NOTE   Time In: 1030   Time Out: 1112    Subjective: Pt reports she is very sleepy this AM.         Objective: Other (comment)(trach)    Outcome Measures:     /80   Pulse 75   Temp 98.5 °F (36.9 °C)   Resp 16   Wt 96 kg (211 lb 9.6 oz)   SpO2 97%   BMI 36.32 kg/m²     Pain level: no complaints of pain  Pain location: N/A  Pain interventions: N/A    Patient education: Pt encouraged to pace herself for energy conservation during activities. Interdisciplinary Communication: Collaborated with OT regarding pt's progress    Pt left in room with  & dad in room in bedside chair with call bell & needs in reach. Cognition: Pt communicates well verbally with PMV and has a pleasant affect.     BED/CHAIR/WHEELCHAIR TRANSFERS Initial Assessment Weekly Progress Assessment 2/25/2020   Rolling Right 5 (Supervision) 0 (Not tested)   Rolling Left 5 (Supervision) 0 (Not tested)   Supine to Sit 4 (Minimal assistance) 0 (Not tested)   Sit to Stand Minimal assistance Supervision   Sit to Supine 4 (Minimal assistance) 0 (Not tested)   Transfer Type SPT without device SPT without device   Comments       Car Transfer Not tested Not tested   Car Type         GROSS ASSESSMENT Weekly Progress Assessment 2/25/2020   AROM Generally decreased, functional   Strength Generally decreased, functional   Coordination Within functional limits   Tone Normal   Sensation Intact   PROM       POSTURE Weekly Progress Assessment 2/25/2020   Posture (WDL) Within defined limits   Posture Assessment       WHEELCHAIR MOBILITY/MANAGEMENT Initial Assessment Weekly Progress Assessment 2/25/2020   Able to Propel 0 feet 0 feet   Curbs/ramps assistance required 0 (Not tested) 0 (Not tested)   Wheelchair set up assistance required 0 (Not tested)     Wheelchair management         WALKING INDEPENDENCE Initial Assessment Weekly Progress Assessment 2/25/2020   Assistive device Other (comment), Gait belt(close SBA) Other (comment)(close SBA)   Ambulation assistance - level surface 0 (Not tested)     Distance 100 Feet (ft) 200 Feet (ft)   Comments   Pt ambulates well with supervision assist and requires no cueing for safety. Pt gets fatigued easily and requires some rest breaks throughout treatments. GAIT Weekly Progress Assessment 2/25/2020   Gait Description (WDL)     Gait Abnormalities Other(decreased arm swing, trunk rotation, JOAO)     STEPS/STAIRS Initial Assessment Weekly Progress Assessment 2/25/2020   Steps/Stairs ambulated 0 4(x4)   Rail Use (parallel bars) Both   Comments       Curbs/Ramps 0 (Not tested)            Assessment: Pt tolerated therapy well today. She is making improvements with functional mobility and will progress well. Pt is mostly limited by respiratory issues. Pt would benefit from skilled therapy to improve community integration and functional mobility. Plan of Care: Please see Care Plan for updated LTGs. Pt has met all STGs and is progressing well towards meeting LTGs.    Family Training: Needs to be scheduled    Razia Herrera PT  2/25/2020

## 2020-02-25 NOTE — PROGRESS NOTES
OT WEEKLY PROGRESS NOTE    Time In: 0700  Time Out: 0825    Mobility   Score Comments   Rolling 6: Independent Side: Right     Supine to Sit 6: Independent Increased time and effort   Sit to Supine 6: Independent    Sit to Stand 4: Supervision or touching A Close SBA   Transfer Assist 4: Supervision or touching A Transfer Type: SPT   Equipment: N/A   Comments: SBA     Activities of Daily Living    Score Comments   Oral Hyigene 5: S/U or clean-up assist    Bathing 4: Supervision or touching A Type of Shower: Bath Pack  Position: Supported sitting and Standing PRN   Adaptive  Equipment: chair  Comments: SBA for standing balance while washing bottom and elvira area   Upper Body  Dressing 4: Supervision or touching A Items Applied: Pullover  Position: Supported Sitting  Comments: Min assist to pull over L arm (IV placement)   Lower Body Dressing 4: Supervision or touching A Items Applied: Underwear and Elastic pants  Position: Supported sitting and Standing PRN  Adaptive Equipment: N/A  Comments: SBA for standing balance while pulling clothing over waist   Donning/[place] Footwear 5: S/U or clean-up assist Items Applied: Slip-on shoes  Adaptive Equipment: N/A  Comments: Toilet Transfer 4: Supervision or touching A Transfer Type: SPT   Equipment: N/A   Comments: SBA   Toileting Hygiene 4: Supervision or touching A Output: urine  Comments: SBA for clothing management   Education  Theraband HEP       Plan of Care: Please see Care Plan for updated LTGs. Family Training: to be completed closer to discharge date. Family present during gym sessions. Summary of Progress: Patient demonstrates good progress with balance, activity tolerance, functional mobility, and BUE ROM for performance of ADL and functional transfers, see above for details. Patient continues to require skilled OT services to address deficits, provide education, and progress towards goals as stated in POC.     Summary of Session:   S: \"I think I tense up my neck and I have to remember not to. \" Agreeable to therapy. Focus of session was on morning ADL routine and progress assessment. Patient was able to ambulate ~100 feet without a device with close SBA. Completed ADLs with quality indicators noted above. Patient educated regarding Antoniette Roses for BUE seated on edge of mat. Patient verbalized and demonstrated understanding for 7 BUE therapeutic exercises included in HEP utilizing red Theraband. Patient completed 1 set x 10 to 15 reps of scapular retraction, shoulder flexion, shoulder extension, shoulder horizontal abduction, anterior punch, elbow flexion, and elbow extension exercises. Printed copy of HEP and red Theraband provided to patient. Modified theraband use to ROM exercises using LUE for shoulder movements with AAROM for end ranges. Pain not expressed. Collaborated with RN and PT and confirmed patient is on track to reach goals as documented in the care plan. Patient tolerated session well, but LUE ROM, strength, balance, and activity tolerance are still below baseline and requires skilled facilitation to successfully and safely complete ADL's and transfers. Patient ended session in wc with PT assuming care.        Mendel Lugo  2/25/2020

## 2020-02-25 NOTE — PROGRESS NOTES
PHYSICAL THERAPY DAILY NOTE  Time In: 7901  Time Out: 1824  Patient Seen For: PM;Gait training; Therapeutic exercise    Subjective: Pt reports she is still tired and ready for a nap. Objective: Other (comment)(trach)  GROSS ASSESSMENT Daily Assessment    AROM: Generally decreased, functional  Strength: Generally decreased, functional  Coordination: Within functional limits  Tone: Normal  Sensation: Intact       COGNITION Daily Assessment    Pt is alert and oriented and following commands appropriately 100% of the time. Pt communicates verbally with soft voice due to PMV. Pt has a pleasant affect and is engaged in activity. BED/MAT MOBILITY Daily Assessment    Rolling Right : 0 (Not tested)  Rolling Left : 0 (Not tested)  Supine to Sit : 0 (Not tested)  Sit to Supine : 0 (Not tested)       TRANSFERS Daily Assessment    Transfer Type: SPT without device  Transfer Assistance : 5 (Supervision/setup)  Sit to Stand Assistance: Supervision  Car Transfers: Not tested       GAIT Daily Assessment   Ambulated >1000 ft in busy, open, new environment with supervision assist (close SBA).  Pt demonstrated slow gait speed with decreased trunk rotation, dec. arm swing B, and increased toe out position Amount of Assistance: 5 (Supervision/setup)  Distance (ft): 1000 Feet (ft)  Assistive Device: Other (comment);Gait belt(close SBA)       STEPS or STAIRS Daily Assessment    Steps/Stairs Ambulated (#): 0  Level of Assist : 0 (Not tested)  Rail Use: Both       BALANCE Daily Assessment    Sitting - Static: Good (unsupported)  Sitting - Dynamic: Good (unsupported)  Standing - Static: Good  Standing - Dynamic : Impaired       WHEELCHAIR MOBILITY Daily Assessment    Able to Propel (ft): 0 feet  Curbs/Ramps Assist Required (FIM Score): 0 (Not tested)  Wheelchair Setup Assist Required : 0 (Not tested)     Therapeutic Interventions:  Ambulation x 1000 ft in open environment for community reintegration; pt required rest break in between  Squats 3 x 8 with chair behind pt for cueing  Penguins w/ RTB around midfoot 3 x 3 laps along rail of wall    /80   Pulse 75   Temp 98.5 °F (36.9 °C)   Resp 16   Wt 96 kg (211 lb 9.6 oz)   SpO2 97%   BMI 36.32 kg/m²      Pain level: no complaints of pain  Pain location: N/A  Pain interventions: N/A    Patient education: Pt encouraged to take a rest break during activity to conserve energy. Interdisciplinary Communication: Collaborated with OT regarding pt's progress    Pt left in bed with call bell and needs in reach. Pt in NAD and  present in room. Assessment: Pt tolerated therapy well today. Pt demonstrates good endurance today compared to previous sessions. Pt was happy to be outside for a while and have a new change of scenery. Pt would benefit from skilled physical therapy to maximize independence, increase activity tolerance, and improve functional mobility. Plan of Care: Continue with POC and progress as tolerated.       Jennifer Andersen, PT  2/25/2020

## 2020-02-26 ENCOUNTER — APPOINTMENT (OUTPATIENT)
Dept: GENERAL RADIOLOGY | Age: 45
DRG: 299 | End: 2020-02-26
Attending: PHYSICAL MEDICINE & REHABILITATION
Payer: COMMERCIAL

## 2020-02-26 PROCEDURE — 97110 THERAPEUTIC EXERCISES: CPT

## 2020-02-26 PROCEDURE — 74011250636 HC RX REV CODE- 250/636: Performed by: PHYSICAL MEDICINE & REHABILITATION

## 2020-02-26 PROCEDURE — 92611 MOTION FLUOROSCOPY/SWALLOW: CPT

## 2020-02-26 PROCEDURE — 65310000000 HC RM PRIVATE REHAB

## 2020-02-26 PROCEDURE — 74011250637 HC RX REV CODE- 250/637: Performed by: PHYSICAL MEDICINE & REHABILITATION

## 2020-02-26 PROCEDURE — 99232 SBSQ HOSP IP/OBS MODERATE 35: CPT | Performed by: PHYSICAL MEDICINE & REHABILITATION

## 2020-02-26 PROCEDURE — 74230 X-RAY XM SWLNG FUNCJ C+: CPT

## 2020-02-26 PROCEDURE — 97535 SELF CARE MNGMENT TRAINING: CPT

## 2020-02-26 PROCEDURE — 74011000255 HC RX REV CODE- 255: Performed by: PHYSICAL MEDICINE & REHABILITATION

## 2020-02-26 PROCEDURE — 97530 THERAPEUTIC ACTIVITIES: CPT

## 2020-02-26 PROCEDURE — 97116 GAIT TRAINING THERAPY: CPT

## 2020-02-26 PROCEDURE — 77030018836 HC SOL IRR NACL ICUM -A

## 2020-02-26 PROCEDURE — C1751 CATH, INF, PER/CENT/MIDLINE: HCPCS

## 2020-02-26 PROCEDURE — 97112 NEUROMUSCULAR REEDUCATION: CPT

## 2020-02-26 RX ADMIN — BARIUM SULFATE 15 ML: 400 PASTE ORAL at 08:29

## 2020-02-26 RX ADMIN — METOPROLOL TARTRATE 25 MG: 25 TABLET, FILM COATED ORAL at 17:34

## 2020-02-26 RX ADMIN — DEXAMETHASONE SODIUM PHOSPHATE 4 MG: 4 INJECTION, SOLUTION INTRAMUSCULAR; INTRAVENOUS at 08:50

## 2020-02-26 RX ADMIN — METOPROLOL TARTRATE 25 MG: 25 TABLET, FILM COATED ORAL at 09:00

## 2020-02-26 RX ADMIN — NYSTATIN 500000 UNITS: 100000 SUSPENSION ORAL at 14:46

## 2020-02-26 RX ADMIN — ALPRAZOLAM 0.25 MG: 0.25 TABLET ORAL at 17:09

## 2020-02-26 RX ADMIN — ALPRAZOLAM 0.25 MG: 0.25 TABLET ORAL at 21:14

## 2020-02-26 RX ADMIN — TRAZODONE HYDROCHLORIDE 50 MG: 50 TABLET ORAL at 21:15

## 2020-02-26 RX ADMIN — FAMOTIDINE 20 MG: 20 TABLET, FILM COATED ORAL at 08:59

## 2020-02-26 RX ADMIN — GABAPENTIN 200 MG: 100 CAPSULE ORAL at 21:13

## 2020-02-26 RX ADMIN — BARIUM SULFATE 45 ML: 980 POWDER, FOR SUSPENSION ORAL at 08:30

## 2020-02-26 RX ADMIN — FAMOTIDINE 20 MG: 20 TABLET, FILM COATED ORAL at 21:15

## 2020-02-26 RX ADMIN — SENNOSIDES AND DOCUSATE SODIUM 1 TABLET: 8.6; 5 TABLET ORAL at 17:34

## 2020-02-26 RX ADMIN — ENOXAPARIN SODIUM 40 MG: 40 INJECTION SUBCUTANEOUS at 08:57

## 2020-02-26 RX ADMIN — HEPARIN SODIUM (PORCINE) LOCK FLUSH IV SOLN 100 UNIT/ML 900 UNITS: 100 SOLUTION at 05:44

## 2020-02-26 RX ADMIN — DEXAMETHASONE SODIUM PHOSPHATE 4 MG: 4 INJECTION, SOLUTION INTRAMUSCULAR; INTRAVENOUS at 21:14

## 2020-02-26 RX ADMIN — ATORVASTATIN CALCIUM 40 MG: 40 TABLET, FILM COATED ORAL at 09:00

## 2020-02-26 RX ADMIN — NYSTATIN 500000 UNITS: 100000 SUSPENSION ORAL at 08:56

## 2020-02-26 RX ADMIN — NYSTATIN 500000 UNITS: 100000 SUSPENSION ORAL at 17:35

## 2020-02-26 RX ADMIN — SODIUM CHLORIDE 30 ML: 9 INJECTION INTRAMUSCULAR; INTRAVENOUS; SUBCUTANEOUS at 05:44

## 2020-02-26 RX ADMIN — SODIUM CHLORIDE 30 ML: 9 INJECTION INTRAMUSCULAR; INTRAVENOUS; SUBCUTANEOUS at 21:39

## 2020-02-26 RX ADMIN — SODIUM CHLORIDE 30 ML: 9 INJECTION INTRAMUSCULAR; INTRAVENOUS; SUBCUTANEOUS at 14:50

## 2020-02-26 RX ADMIN — ALPRAZOLAM 0.25 MG: 0.25 TABLET ORAL at 09:00

## 2020-02-26 RX ADMIN — HEPARIN SODIUM (PORCINE) LOCK FLUSH IV SOLN 100 UNIT/ML 900 UNITS: 100 SOLUTION at 21:39

## 2020-02-26 RX ADMIN — NYSTATIN 500000 UNITS: 100000 SUSPENSION ORAL at 21:14

## 2020-02-26 RX ADMIN — SENNOSIDES AND DOCUSATE SODIUM 1 TABLET: 8.6; 5 TABLET ORAL at 08:59

## 2020-02-26 RX ADMIN — HEPARIN SODIUM (PORCINE) LOCK FLUSH IV SOLN 100 UNIT/ML 900 UNITS: 100 SOLUTION at 14:51

## 2020-02-26 NOTE — PROGRESS NOTES
Time In 0700   Time Out 0738     Mobility   Score Comments   Supine to Sit 4: Supervision or touching A SBA   Sit to Stand 4: Supervision or touching A SBA   Transfer Assist 4: Supervision or touching A Transfer Type: SPT   Equipment: N/A   Comments: Close SBA     Activities of Daily Living    Score Comments   Oral Hyigene 5: S/U or clean-up assist Assist to gather materials   Bathing 4: Supervision or touching A Type of Shower: Shower  Position: Standing PRN and Unsupported Sitting   Adaptive  Equipment: Tub Transfer Bench and Grab Bars  Comments: Close SBA for standing balance while washing bottom and elvira area   Upper Body  Dressing 5: S/U or clean-up assist Items Applied: Pullover  Position: Supported Sitting  Comments:    Lower Body Dressing 4: Supervision or touching A Items Applied: Underwear and Elastic pants  Position: Supported sitting and Standing PRN  Adaptive Equipment: N/A  Comments: Close SBA for standing balance while completing clothing management over waist   Donning/[place] Footwear 5: S/U or clean-up assist Items Applied: Slip-on shoes  Adaptive Equipment: N/A  Comments:    Education  Retraining of donning PMV       S: \"I need some help [lip reading while pt attempting to don PMV in front of mirror]. \" Agreeable to therapy. Patient was able to ambulate ~10 feet x2 without use of device with close SBA. Pain not expressed. Collaborated with RN regarding patient performance and POC. Patient tolerated session well, but activity tolerance, balance, functional mobility, strength, coordination, cognition are still below baseline and require skilled facilitation to successfully and safely complete ADLs and transfers. Patient ended session at [facility] with call remote and phone within reach.        Mendel Emmanuel, JUSTYNR/L

## 2020-02-26 NOTE — PROGRESS NOTES
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 Afebrile. Comfortable with PMV when awake. Passed MBS, ST recommending regular textures/thin liquids. Feels well. No new setbacks.      Objective:     Current Facility-Administered Medications   Medication Dose Route Frequency    barium sulfate (VARIBAR HONEY) 40 % (w/v) 29% (w/w) contrast suspension 15 mL  15 mL Oral RAD ONCE    barium sulfate (VARIBAR NECTAR) 40 % (w/v) contrast suspension 15 mL  15 mL Oral RAD ONCE    heparin (porcine) pf 900 Units  900 Units InterCATHeter Q8H    0.9% sodium chloride injection 30 mL  30 mL IntraVENous PRN    senna-docusate (PERICOLACE) 8.6-50 mg per tablet 1 Tab  1 Tab Oral BID    dexamethasone (DECADRON) 4 mg/mL injection 4 mg  4 mg IntraVENous Q12H    lactulose (CHRONULAC) 10 gram/15 mL solution 45 mL  30 g Per G Tube DAILY PRN    gabapentin (NEURONTIN) capsule 200 mg  200 mg Per G Tube QHS    acetaminophen (TYLENOL) tablet 650 mg  650 mg Per G Tube Q6H PRN    ALPRAZolam (XANAX) tablet 0.25 mg  0.25 mg Per G Tube TID    atorvastatin (LIPITOR) tablet 40 mg  40 mg Per G Tube DAILY    bisacodyL (DULCOLAX) suppository 10 mg  10 mg Rectal DAILY PRN    enoxaparin (LOVENOX) injection 40 mg  40 mg SubCUTAneous Q24H    HYDROcodone-acetaminophen (NORCO) 7.5-325 mg per tablet 1 Tab  1 Tab Per G Tube Q4H PRN    famotidine (PEPCID) tablet 20 mg  20 mg Per G Tube Q12H    lip protectant (BLISTEX) ointment 1 Each  1 Each Topical PRN    nystatin (MYCOSTATIN) 100,000 unit/mL oral suspension 500,000 Units  500,000 Units Oral QID    ondansetron (ZOFRAN) injection 4 mg  4 mg IntraVENous Q4H PRN    metoprolol tartrate (LOPRESSOR) tablet 25 mg  25 mg Oral BID    phenol throat spray (CHLORASEPTIC) 1 Spray  1 Spray Oral PRN    scopolamine (TRANSDERM-SCOP) 1 mg over 3 days 1 Patch  1 Patch TransDERmal Q72H    senna-docusate (PERICOLACE) 8.6-50 mg per tablet 1 Tab  1 Tab Oral BID PRN    traZODone (DESYREL) tablet 50 mg  50 mg Per G Tube QHS PRN     Review of Systems: Denies chest pain, shortness of breath, cough, headache, visual problems, abdominal pain, dysurea, calf pain. Pertinent positives are as noted in the medical records and unremarkable otherwise. Visit Vitals  /70   Pulse 81   Temp 97.9 °F (36.6 °C)   Resp 18   Wt 211 lb 9.6 oz (96 kg)   SpO2 99%   BMI 36.32 kg/m²        Physical Exam:   General: Alert and age appropriately oriented. No acute cardio respiratory distress. HEENT: Normocephalic,no scleral icterus  Oral mucosa moist without cyanosis. + #6 Shiley. Trach stoma appears healthy, with some slough. Lungs: Clear to auscultation  bilaterally. Respiration even and unlabored   Heart: Regular rate and rhythm, S1, S2   No  Murmurs. Abdomen: Soft, non-tender, nondistended. Bowel sounds present. . PEG stoma without erythema. Genitourinary: Benign . Neuromuscular:      EOMI, PERRL. Speech is clear. Cognitively intact. Trace left UE decreased fine motor coordination. No sensory deficits distally. Skin/extremity: No rashes, no erythema. No calf tenderness BLE  Wound covered.                                                                             Functional Assessment:  Gross Assessment  AROM: Generally decreased, functional (02/25/20 1200)  PROM: Within functional limits (02/22/20 1200)  Strength: Generally decreased, functional (02/25/20 1200)  Coordination: Within functional limits (02/25/20 1200)  Tone: Normal (02/25/20 1200)  Sensation: Intact (02/25/20 1200)       Balance  Sitting - Static: Good (unsupported) (02/25/20 1400)  Sitting - Dynamic: Good (unsupported) (02/25/20 1400)  Standing - Static: Good (02/25/20 1400)  Standing - Dynamic : Impaired (02/25/20 1400)                     Marta Juan Fall Risk Assessment:  Marta Juan Fall Risk  Mobility: Ambulates or transfers with assist devices or assistance (02/25/20 1950)  Mobility Interventions: Utilize walker, cane, or other assistive device (02/25/20 1950)  Mentation: Alert, oriented x 3 (02/25/20 1950)  Medication: Patient receiving anticonvulsants, sedatives(tranquilizers), psychotropics or hypnotics, hypoglycemics, narcotics, sleep aids, antihypertensives, laxatives, or diuretics (02/25/20 1950)  Medication Interventions: Patient to call before getting OOB (02/25/20 1950)  Elimination: Needs assistance with toileting (02/25/20 1950)  Elimination Interventions: Call light in reach; Patient to call for help with toileting needs (02/25/20 1950)  Prior Fall History: No (02/25/20 1950)  Total Score: 3 (02/25/20 1950)  Standard Fall Precautions: Yes (02/22/20 0710)  High Fall Risk: Yes (02/25/20 1950)     Speech Assessment:  Aspiration Signs/Symptoms: None (02/25/20 0949)      Ambulation:  Gait  Speed/Linda: Slow (02/25/20 1200)  Step Length: Right shortened;Left shortened (02/25/20 1200)  Gait Abnormalities: Other(decreased arm swing, trunk rotation, JOAO) (02/25/20 1200)  Distance (ft): 1000 Feet (ft) (02/25/20 1400)  Assistive Device: Other (comment);Gait belt(close SBA) (02/25/20 1400)  Rail Use: Both (02/25/20 1200)     Labs/Studies:  No results found for this or any previous visit (from the past 67 hour(s)).     Assessment:     Problem List as of 2/26/2020 Date Reviewed: 2/14/2020          Codes Class Noted - Resolved    [place] ICD-10-CM: D18.00  ICD-9-CM: 228.00  2/3/2020 - Present        Acute respiratory failure with hypoxia [facility]) ICD-10-CM: J96.01  ICD-9-CM: 518.81  2/3/2020 - Present        Edema of spinal cord ([facility] [address] ICD-10-CM: G95.19  ICD-9-CM: 336.1  2/3/2020 - Present        Acute left-sided weakness ICD-10-CM: R53.1  ICD-9-CM: 728.87  2/3/2020 - Present        AVM (arteriovenous malformation) spine ICD-10-CM: Q28.8  ICD-9-CM: 747.82  2/2/2020 - Present            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 CXR neg. Will have RT decrease cuff pressure. Tolerating ATC well. Will have ST try PMV trials with eventual capping. Will downsize when tolerates, can at 7-10days post op; cont RT with assist with trach collar, wean OT; pt functionally expected to do well. Do not want to send home with a trach in but may have to depending on tolerance of downsizing. Keeping it now is definitely for safety due to spinal edema  -pt constantly suctioning secretions; will add scop patch and monitor. dont want mucous plugging. Very thin secretions; 6L trach collar   -trach stoma quite big, do not think we can downsize to a #6 trach. Could not tolerate PMV at all; back pressure. Scopolamine patch placed for decreasing oral secretions.  sats 98%, 6L. Lungs clear; spt cx neg; will f/u CXR due to known left atelectasis and vasc congestion  -2/21 CXR slt improved, needs to take deep breaths. Will DOWNSIZE to #6 trach today/working on PMV tolerance;   2/22 downsize today. 28% trach collar; turned O2 to 3L. Off o2 for adls and sats 02%. Hopefully will tolerated PMV with smaller trach  -sats97 % RA right now; trach collar at noc 6L; do not feel she needs that much O2  -2/24 tolerating #6, did not require suctioning overnoc  2/25 - PMV tolerated well during the day. SaO2 good on RA.   2/26 - SaO2 97-99% on RA. minimal secretions, has not needed suctioning. Will start capping during the day.      Spinal cord edema/hematoma C3; dysphagia profound. Will need PEG. Cannot continue NGT for the length of time I suspect she will need. Will consult GI 2/20  -start trial of decadron 4mg IV q 8hrs to see if edema can be decreased and pt can tolerate secretions better and swallow 2/20; discussed with neurosurgery; 2/21 doing well today, managing secretions better  -2/22 PEG placed by Dr Michael Fraire yesterday. Tolerated well.  Can restart bolus tube feeds today.   -tolerating TF well  2/25 - tolerating bolus PEG feeds. MBS tomorrow am.  2/26 - passed MBS. Start reg, reg diet. Will wean PEG feeds as po intake increases. Continue decadron 4mg iv q12h.      ID; fever 2/17 ; blood and spt cxs neg. Wbc normal. Urinalysis pending  -2/20 urinalysis suspicious, but cx neg thus far. tmax 100.2; f/u CXR; no leukocytosis; 2/21 afebrile. Tm 99.7; 2/23 afebrile  2/25 - afebrile.        Oral thrush; can use nystatin to coat roof of mouth with swab. Strict thorough oral care 2/20; 2/24  thrush improved     DVT risk / DVT Prophylaxis- Will require daily physician exam to assess for signs and symptoms as patient is at increased risk for of thromboembolism. Mobilization as tolerated. Intermittent pneumatic compression devices when in bed Thigh-high or knee-high thromboembolic deterrent hose when out of bed. Lovenox     Pain Control: ongoing significant pain in neck and head which is stable and controlled by PRN meds. Will require regular pain assessment and comprenhensive pain management, cont prn tylenol or motrin and Norco if needed for more mod to severe pain. Dc fentanyl  -2/20 paresthesias LLE; start Neurontin 200mg qhs for now; 2/21 much improved overnoc  - 2/25 - reports pain is better. Continue gabapentin hs. Prn percocet.      Wound Care: Monitor wound status daily per staff and physician. At risk for failure. Will require 24/7 rehab nursing. Routine catheter care; PICC line RUE. Neck wound healing; cont trach care     Hypertension - BP uncontrolled, fluctuating, managed medically. Goal SBP < 160; cont lopressor  -2/20 asymptomatic hypotension, dec lopressor and add parameters. sbp in the 80s, may be due to cervical SCI and autonomic dysfxn  -2/22 111/68 improved; 2/23 130/90; HR 80s  -2/26 - lopressor 25 bid. BP/HR in fair range.      HLD cont Lipitor 40mg     Anx/depression; refuses antidepressent.  Was taking xanax at home and will cont low dose xanax at 0.25mg bid. Hopefully as she sees herself recovering, anxiety will lessen  -2/19 inc anxiety, didn't sleep; inc trazadone and change xanax to tid; has sleep wake cycle mixed up due to coming from ICU environment.  -2/20 improved sleep last noc ; 2/21 excellent noc/had added neurontin. Cont trazadone and xanax; signif dec anxiety  -2/23 doing much better  - 2/26 - mood good.      Anemia; hgb 9.3 monitor; 2/19 hgb 8.8; no evid of bleed. Monitor  - hgb 8.6 2/23. Fairly stable.       Urinary retention/ neurogenic bladder - schedule voids q6-8 hrs. Check post-void residual as needed; In and Out catheterize if post-void residual is more than 400 cc.  -voiding without difficulty     bowel program - cont senna, and prn bowel program     GERD - resume PPI. At times may need additional antacids, Maalox prn.        Time spent was 25 minutes with over 1/2 in direct patient care/examination, consultation and coordination of care.      Signed By: Angy Geronimo MD     February 26, 2020

## 2020-02-26 NOTE — PROGRESS NOTES
OT Daily Note  Time In 1115   Time Out 1155     Subjective: \"I'd like to go home as soon as possible but I want to make sure this [trach] is safe. \"  Pain: not expressed  Education: [facility] skills retraining  Interdisciplinary Communication: with IP team during team conference   Precautions: Green socks  Mobility   Score Comments   Sit to Stand 6: Independent    Transfer Assist 6: Independent Transfer Type: SPT   Equipment: N/A   Comments: Pt issued green socks      Neuro-Muscular Re-Education Daily Assessment   Pt completed two [facility] and cognitive/visual perceptual tasks in order to work on functional use of affected LUE. Pt completed small peg activity using L hand as fine motor assist and R hand as stabilizing assist. 100% accuracy in following complex visual design. Pt additionally completed project 6 of electrical snaps activity in the same manner as described above with L hand FM assist and R hand min assist for strength needed to connect snaps in circuit. Min verbal cues provided for instructions of task and to initiate task. Pt completed tasks while wearing reading glasses with improving efficiency and dexterity in L hand       Functional mobility Daily Assessment   Pt ambulated ~200 ft without device with independence with improving arm swing, step length, and speed. Assessment: Making great progress. Limitations include LUE ROM, strength, and coordination, however pt compensates functionally very well. Plan: Continue OT POC with focus on ADL/IADL skills, functional transfers, functional mobility, coordination, strength, static and dynamic balance, and activity tolerance to maximize safety and independence with ADLs and functional transfer.         Mendel Santos OTR/L   2/26/2020

## 2020-02-26 NOTE — PROGRESS NOTES
Problem: Falls - Risk of  Goal: *Absence of Falls  Description  Document Nicolejame Castaneda Fall Risk and appropriate interventions in the flowsheet.   Outcome: Progressing Towards Goal  Note: Fall Risk Interventions:  Mobility Interventions: Utilize walker, cane, or other assistive device         Medication Interventions: Patient to call before getting OOB    Elimination Interventions: Call light in reach, Patient to call for help with toileting needs              Problem: Patient Education: Go to Patient Education Activity  Goal: Patient/Family Education  Outcome: Progressing Towards Goal

## 2020-02-26 NOTE — PROGRESS NOTES
PHYSICAL THERAPY DAILY NOTE  Time In: 1300  Time Out: 6616  Patient Seen For: PM;Therapeutic exercise; Other (see progress notes)(therapeutic activity)    Subjective: Pt reports her lunch was good today as it was her first one by mouth in a long time. Objective: Other (comment)(trach)    COGNITION Daily Assessment    Pt is alert and oriented and following commands appropriately 100% of the time. Pt communicates verbally. Pt has a pleasant affect. BED/MAT MOBILITY Daily Assessment    Rolling Right : 0 (Not tested)  Rolling Left : 0 (Not tested)  Supine to Sit : 0 (Not tested)  Sit to Supine : 0 (Not tested)       TRANSFERS Daily Assessment    Transfer Type: SPT without device  Transfer Assistance : 5 (Supervision/setup)  Sit to Stand Assistance: Supervision  Car Transfers: Not tested       GAIT Daily Assessment    Amount of Assistance: 5 (Supervision/setup)  Distance (ft): 200 Feet (ft)  Assistive Device: Gait belt; Other (comment)(close SBA)       STEPS or STAIRS Daily Assessment    Steps/Stairs Ambulated (#): 0  Level of Assist : 0 (Not tested)       BALANCE Daily Assessment    Sitting - Static: Good (unsupported)  Sitting - Dynamic: Good (unsupported)  Standing - Static: Good  Standing - Dynamic : Impaired       WHEELCHAIR MOBILITY Daily Assessment    Able to Propel (ft): 0 feet  Curbs/Ramps Assist Required (FIM Score): 0 (Not tested)  Wheelchair Setup Assist Required : 0 (Not tested)     Therapeutic Interventions:  Simulated grocery shopping for community reintegration using LUE for neuroplasticity  Loading groceries in shopping cart and in back seat of car with LUE  Summing total of grocery list at checkout without calculator (5 cents off from accurate amount) for cognitive task  Car transfer  Accurate recall of grocery object location following activity for cognitive task    /70   Pulse 81   Temp 97.9 °F (36.6 °C)   Resp 18   Wt 96 kg (211 lb 9.6 oz)   SpO2 99%   BMI 36.32 kg/m² Pain level: no complaints of pain  Pain location: N/A  Pain interventions: N/A    Patient education: Encouraged pt to pace herself with activities. Interdisciplinary Communication: Collaborated with OT regarding pt's progress     Pt left in bed with call bell and needs in reach. Pt in NAD and acquired \"green sock\" status. Assessment: Pt tolerated therapy well today. Pt demonstrates proficiency with community reintegration task with great accuracy. Pt demonstrates good functional mobility with L arm, but complained that some objects felt heavy. Pt had one near LOB when bending down to  object from bottom shelf, so need to work more on dynamic balance activities. Pt would benefit from skilled physical therapy to maximize independence, increase activity tolerance, and improve functional mobility. Plan of Care: Continue with POC and progress as tolerated.       Celio Lu, PT  2/26/2020

## 2020-02-26 NOTE — PROGRESS NOTES
Team conference held today. Patient has a discharge date on 2/28/2020. Outpatient PT was recommended by therapist but patient stated she doesn't need it, and she said her  will assist her. CM will continue to follow.

## 2020-02-26 NOTE — PROGRESS NOTES
Problem: Nutrition Deficit  Goal: *Optimize nutritional status  Outcome: Progressing Towards Goal     Problem: Patient Education: Go to Patient Education Activity  Goal: Patient/Family Education  Description  Patient / Mariel Amos family will verbalize understanding of PT safety recommendations, demonstrate appropriate assist for current functional mobility status, safety, and home exercise program by time of discharge. Outcome: Progressing Towards Goal     Problem: Falls - Risk of  Goal: *Absence of Falls  Description  Document Sudha Latin Fall Risk and appropriate interventions in the flowsheet. Outcome: Progressing Towards Goal  Note: Fall Risk Interventions:  Mobility Interventions: OT consult for ADLs, Patient to call before getting OOB, PT Consult for mobility concerns, PT Consult for assist device competence, Utilize walker, cane, or other assistive device         Medication Interventions: Patient to call before getting OOB, Evaluate medications/consider consulting pharmacy    Elimination Interventions: Bed/chair exit alarm    History of Falls Interventions: Evaluate medications/consider consulting pharmacy         Problem: Patient Education: Go to Patient Education Activity  Goal: Patient/Family Education  Outcome: Progressing Towards Goal     Problem: Communication Impaired (Adult)  Goal: *Speech Goal: (INSERT TEXT)  Description  LTG: Patient will tolerate passy kalyan speaking valve to communicate wants and needs without respiratory decline. STG: Patient will participate in ongoing passy kalyan trials with ST only  STG: Patient will demonstrate independence with doning and doffing speaking valve.    STG: Patient will participate in dysphagia assessment to determine appropriateness and safety with po intake    Outcome: Progressing Towards Goal     Problem: Patient Education: Go to Patient Education Activity  Goal: Patient/Family Education  Outcome: Progressing Towards Goal     Problem: Patient Education: Go to Patient Education Activity  Goal: Patient/Family Education  Description  Pt/caregiver will demonstrate and verbalize good understanding of OT recommendations regarding ADL status, functional transfer status, home safety, DME, AE, energy conservation techniques, follow-up therapy, for increasing safety with functional tasks upon discharge. Outcome: Progressing Towards Goal     Problem: Pressure Injury - Risk of  Goal: *Prevention of pressure injury  Description  Document Bahman Scale and appropriate interventions in the flowsheet.   Outcome: Progressing Towards Goal  Note: Pressure Injury Interventions:  Sensory Interventions: Check visual cues for pain, Pressure redistribution bed/mattress (bed type), Minimize linen layers, Discuss PT/OT consult with provider, Keep linens dry and wrinkle-free    Moisture Interventions: Maintain skin hydration (lotion/cream), Minimize layers, Absorbent underpads, Moisture barrier    Activity Interventions: Increase time out of bed, Pressure redistribution bed/mattress(bed type), PT/OT evaluation    Mobility Interventions: Pressure redistribution bed/mattress (bed type), PT/OT evaluation    Nutrition Interventions: Document food/fluid/supplement intake    Friction and Shear Interventions: Minimize layers, Lift sheet, Lift team/patient mobility team                Problem: Patient Education: Go to Patient Education Activity  Goal: Patient/Family Education  Outcome: Progressing Towards Goal

## 2020-02-26 NOTE — PROGRESS NOTES
PHYSICAL THERAPY DAILY NOTE  Time In: 1030  Time Out: 9858  Patient Seen For: AM;Gait training; Therapeutic exercise    Subjective: Pt reports she can now tolerate solid foods after passing her MBS. Objective: Other (comment)(trach) PMV    COGNITION Daily Assessment    Pt is alert and oriented and following commands appropriately 100% of the time. Pt communicates verbally. Pt has a pleasant affect. Pt demonstrates motivation and willingness to participate in therapy. BED/MAT MOBILITY Daily Assessment    Rolling Right : 0 (Not tested)  Rolling Left : 0 (Not tested)  Supine to Sit : 0 (Not tested)  Sit to Supine : 0 (Not tested)       TRANSFERS Daily Assessment    Transfer Type: SPT without device  Transfer Assistance : 5 (Supervision/setup)  Sit to Stand Assistance: Supervision  Car Transfers: Not tested       GAIT Daily Assessment    Amount of Assistance: 5 (Supervision/setup)  Distance (ft): 200 Feet (ft)  Assistive Device: Gait belt; Other (comment)(close SBA)       STEPS or STAIRS Daily Assessment   Ambulated 3 flights of stairs x 3 with rest breaks in between with SBA using B rails and reciprocal gait pattern Steps/Stairs Ambulated (#): 10(x3)  Level of Assist : 5 (Stand-by assistance)       BALANCE Daily Assessment    Sitting - Static: Good (unsupported)  Sitting - Dynamic: Good (unsupported)  Standing - Static: Good  Standing - Dynamic : Impaired       WHEELCHAIR MOBILITY Daily Assessment   NT Able to Propel (ft): 0 feet  Curbs/Ramps Assist Required (FIM Score): 0 (Not tested)  Wheelchair Setup Assist Required : 0 (Not tested)     Therapeutic Interventions:  Gait training over 10 stairs x 3 ascending/descending with reciprocal gait pattern  Penguins in // bars RTB around midfeet 3 x 3 laps  Standing marches 3 x 20 B in // bars  Ambulation x 200 ft with supervision assist and no LOB    /70   Pulse 81   Temp 97.9 °F (36.6 °C)   Resp 18   Wt 96 kg (211 lb 9.6 oz)   SpO2 99%   BMI 36.32 kg/m²      Pain level: no complaints of pain  Pain location: N/A  Pain interventions: N/A    Patient education: Pt requiring mod verbal cues for exercise performance in // bars today for correct technique. Interdisciplinary Communication: Collaborated with OT to upgrade pt to \"green socks\" status     Pt left in rehab gym waiting for OT session. Assessment: Pt tolerated therapy well today. Pt demonstrates improved activity tolerance and performed well on ambulation over the stairs. Pt would benefit from skilled physical therapy to maximize independence, increase activity tolerance, and improve functional mobility. Plan of Care: Continue with POC and progress as tolerated.       Mary Rae, PT  2/26/2020

## 2020-02-26 NOTE — PROGRESS NOTES
MODIFIED BARIUM SWALLOW STUDY     02/26/20 0846   Mental Status   Neurologic State Alert   Orientation Level Oriented X4   Cognition Follows commands   Perception Appears intact   Perseveration No perseveration noted   Safety/Judgement Awareness of environment   Oral Assessment   Labial No impairment   Dentition Natural   Lingual No impairment   Swallowing Study Trial   Type of Study Modified barium swallow   Film Views Lateral;Fluoro   Radiologist Dr Zehra Barlow   Patient Position upright in chair   Consistency Presented Thin liquid; Solid;Mixed consistency   How Presented Self-fed/presented;Straw;Cup/sip   Bolus Acceptance No impairment   Bolus Formation/Control No impairment   Propulsion No impairment   Oral Residue None   Initiation of Swallow No impairment   Timing No impairment   Penetration None   Aspiration/Timing No evidence of aspiration   Pharyngeal Clearance No residue   Swallowing Physiology   Decreased Tongue Base Retraction? No   Laryngeal Elevation WFL (within functional limits)   Aspiration/Penetration Score 1 (No penetration or aspiration-Contrast does not enter the airway)   Pharyngeal-Esophageal Segment No impairment   Findings   Oral Phase Severity No impairment   Pharyngeal Phase Severity N/A   Treatment Diagnosis   Treatment Diagnosis dysphagia; oropharyngeal R13.12     Patient participated with a modified barium swallow study. #6 cuffless trach with PMV in place and on room air. Oropharyngeal swallow is grossly within functional limits. Timely initiation of the swallow with thin liquids via spoon, cup, and straw with no penetration/aspiration. Mastication time grossly within normal limits without pharyngeal residue present with any consistency. Recommend regular textures/thin liquids. Pills whole one at a time with a liquid wash.     Sinan Tierney MS, CCC-SLP

## 2020-02-27 LAB
ERYTHROCYTE [DISTWIDTH] IN BLOOD BY AUTOMATED COUNT: 13.6 % (ref 11.9–14.6)
HCT VFR BLD AUTO: 32.9 % (ref 35.8–46.3)
HGB BLD-MCNC: 10.4 G/DL (ref 11.7–15.4)
MCH RBC QN AUTO: 27.8 PG (ref 26.1–32.9)
MCHC RBC AUTO-ENTMCNC: 31.6 G/DL (ref 31.4–35)
MCV RBC AUTO: 88 FL (ref 79.6–97.8)
NRBC # BLD: 0 K/UL (ref 0–0.2)
PLATELET # BLD AUTO: 270 K/UL (ref 150–450)
PMV BLD AUTO: 10.7 FL (ref 9.4–12.3)
RBC # BLD AUTO: 3.74 M/UL (ref 4.05–5.2)
WBC # BLD AUTO: 11 K/UL (ref 4.3–11.1)

## 2020-02-27 PROCEDURE — 97112 NEUROMUSCULAR REEDUCATION: CPT

## 2020-02-27 PROCEDURE — 74011636637 HC RX REV CODE- 636/637: Performed by: PHYSICAL MEDICINE & REHABILITATION

## 2020-02-27 PROCEDURE — 74011250636 HC RX REV CODE- 250/636: Performed by: PHYSICAL MEDICINE & REHABILITATION

## 2020-02-27 PROCEDURE — 97116 GAIT TRAINING THERAPY: CPT

## 2020-02-27 PROCEDURE — 65310000000 HC RM PRIVATE REHAB

## 2020-02-27 PROCEDURE — 36415 COLL VENOUS BLD VENIPUNCTURE: CPT

## 2020-02-27 PROCEDURE — 97535 SELF CARE MNGMENT TRAINING: CPT

## 2020-02-27 PROCEDURE — 97530 THERAPEUTIC ACTIVITIES: CPT

## 2020-02-27 PROCEDURE — 74011250637 HC RX REV CODE- 250/637: Performed by: PHYSICAL MEDICINE & REHABILITATION

## 2020-02-27 PROCEDURE — 99232 SBSQ HOSP IP/OBS MODERATE 35: CPT | Performed by: PHYSICAL MEDICINE & REHABILITATION

## 2020-02-27 PROCEDURE — 97110 THERAPEUTIC EXERCISES: CPT

## 2020-02-27 PROCEDURE — 85027 COMPLETE CBC AUTOMATED: CPT

## 2020-02-27 RX ORDER — METOPROLOL TARTRATE 25 MG/1
25 TABLET, FILM COATED ORAL DAILY
Status: DISCONTINUED | OUTPATIENT
Start: 2020-02-28 | End: 2020-02-28 | Stop reason: HOSPADM

## 2020-02-27 RX ORDER — ATORVASTATIN CALCIUM 40 MG/1
40 TABLET, FILM COATED ORAL DAILY
Status: DISCONTINUED | OUTPATIENT
Start: 2020-02-28 | End: 2020-02-28 | Stop reason: HOSPADM

## 2020-02-27 RX ORDER — ACETAMINOPHEN 325 MG/1
650 TABLET ORAL
Status: DISCONTINUED | OUTPATIENT
Start: 2020-02-27 | End: 2020-02-27 | Stop reason: SDUPTHER

## 2020-02-27 RX ORDER — GABAPENTIN 100 MG/1
200 CAPSULE ORAL
Status: DISCONTINUED | OUTPATIENT
Start: 2020-02-27 | End: 2020-02-28 | Stop reason: HOSPADM

## 2020-02-27 RX ORDER — PREDNISONE 1 MG/1
2 TABLET ORAL 2 TIMES DAILY WITH MEALS
Status: DISCONTINUED | OUTPATIENT
Start: 2020-02-27 | End: 2020-02-28 | Stop reason: HOSPADM

## 2020-02-27 RX ORDER — HYDROCODONE BITARTRATE AND ACETAMINOPHEN 7.5; 325 MG/1; MG/1
1 TABLET ORAL
Status: DISCONTINUED | OUTPATIENT
Start: 2020-02-27 | End: 2020-02-28 | Stop reason: HOSPADM

## 2020-02-27 RX ORDER — FAMOTIDINE 20 MG/1
20 TABLET, FILM COATED ORAL EVERY 12 HOURS
Status: DISCONTINUED | OUTPATIENT
Start: 2020-02-27 | End: 2020-02-28 | Stop reason: HOSPADM

## 2020-02-27 RX ORDER — ACETAMINOPHEN 325 MG/1
650 TABLET ORAL
Status: DISCONTINUED | OUTPATIENT
Start: 2020-02-27 | End: 2020-02-28 | Stop reason: HOSPADM

## 2020-02-27 RX ORDER — TRAZODONE HYDROCHLORIDE 50 MG/1
50 TABLET ORAL
Status: DISCONTINUED | OUTPATIENT
Start: 2020-02-27 | End: 2020-02-28 | Stop reason: HOSPADM

## 2020-02-27 RX ORDER — ALPRAZOLAM 0.25 MG/1
0.25 TABLET ORAL 3 TIMES DAILY
Status: DISCONTINUED | OUTPATIENT
Start: 2020-02-27 | End: 2020-02-28 | Stop reason: HOSPADM

## 2020-02-27 RX ADMIN — HEPARIN SODIUM (PORCINE) LOCK FLUSH IV SOLN 100 UNIT/ML 900 UNITS: 100 SOLUTION at 07:39

## 2020-02-27 RX ADMIN — TRAZODONE HYDROCHLORIDE 50 MG: 50 TABLET ORAL at 21:19

## 2020-02-27 RX ADMIN — HEPARIN SODIUM (PORCINE) LOCK FLUSH IV SOLN 100 UNIT/ML 900 UNITS: 100 SOLUTION at 22:19

## 2020-02-27 RX ADMIN — GABAPENTIN 200 MG: 100 CAPSULE ORAL at 21:18

## 2020-02-27 RX ADMIN — ENOXAPARIN SODIUM 40 MG: 40 INJECTION SUBCUTANEOUS at 08:18

## 2020-02-27 RX ADMIN — SODIUM CHLORIDE 30 ML: 9 INJECTION INTRAMUSCULAR; INTRAVENOUS; SUBCUTANEOUS at 07:39

## 2020-02-27 RX ADMIN — HEPARIN SODIUM (PORCINE) LOCK FLUSH IV SOLN 100 UNIT/ML 900 UNITS: 100 SOLUTION at 14:49

## 2020-02-27 RX ADMIN — SODIUM CHLORIDE 30 ML: 9 INJECTION INTRAMUSCULAR; INTRAVENOUS; SUBCUTANEOUS at 14:48

## 2020-02-27 RX ADMIN — SENNOSIDES AND DOCUSATE SODIUM 1 TABLET: 8.6; 5 TABLET ORAL at 08:30

## 2020-02-27 RX ADMIN — ACETAMINOPHEN 650 MG: 325 TABLET, FILM COATED ORAL at 19:02

## 2020-02-27 RX ADMIN — ALPRAZOLAM 0.25 MG: 0.25 TABLET ORAL at 21:19

## 2020-02-27 RX ADMIN — NYSTATIN 500000 UNITS: 100000 SUSPENSION ORAL at 09:00

## 2020-02-27 RX ADMIN — SODIUM CHLORIDE 30 ML: 9 INJECTION INTRAMUSCULAR; INTRAVENOUS; SUBCUTANEOUS at 21:19

## 2020-02-27 RX ADMIN — NYSTATIN 500000 UNITS: 100000 SUSPENSION ORAL at 18:00

## 2020-02-27 RX ADMIN — FAMOTIDINE 20 MG: 20 TABLET ORAL at 21:19

## 2020-02-27 RX ADMIN — ALPRAZOLAM 0.25 MG: 0.25 TABLET ORAL at 08:29

## 2020-02-27 RX ADMIN — ALPRAZOLAM 0.25 MG: 0.25 TABLET ORAL at 17:26

## 2020-02-27 RX ADMIN — SENNOSIDES AND DOCUSATE SODIUM 1 TABLET: 8.6; 5 TABLET ORAL at 17:26

## 2020-02-27 RX ADMIN — PREDNISONE 2 MG: 1 TABLET ORAL at 17:26

## 2020-02-27 RX ADMIN — ATORVASTATIN CALCIUM 40 MG: 40 TABLET, FILM COATED ORAL at 08:30

## 2020-02-27 RX ADMIN — NYSTATIN 500000 UNITS: 100000 SUSPENSION ORAL at 13:00

## 2020-02-27 RX ADMIN — PREDNISONE 2 MG: 1 TABLET ORAL at 11:52

## 2020-02-27 RX ADMIN — DEXAMETHASONE SODIUM PHOSPHATE 4 MG: 4 INJECTION, SOLUTION INTRAMUSCULAR; INTRAVENOUS at 08:20

## 2020-02-27 RX ADMIN — FAMOTIDINE 20 MG: 20 TABLET, FILM COATED ORAL at 08:30

## 2020-02-27 NOTE — PROGRESS NOTES
Nutrition F/U:  Assessment:  The patient had a PEG placed on 2/21 and receiving boluses of Jevity 1.2 every 4 hrs. Patient had an MBS on 2/26. Per SLP, the patient's diet was advanced to regular textures with thin liquids. Patient states tolerance of PO diet and has been enjoying eating. Reports intake of <50% with 1-2 Ensure Enlives per day. She has not used the PEG tube since diet was advanced. Patient states desire to make healthier food choices when discharged home. Enteral Access:             PEG (currently not in use)  Macronutrient Needs (CBW 96 kg):  Estimated calorie needs - 3380-2618 kenny/day (15-18 kenny/kg/day)  Estimated protein needs - 77-96 gm pro/day (0.8-1 gm pro/kg/day)   Intake/Comparative Standards:  Per documentation, patient consuming average 67% of 3 recorded meals in 1 days. With 1-2 Ensure Enlives per day, intake meets 100% of kcal needs and 100% of protein needs. Diagnosis:  Overweight/Obese related to food and nutrition related knowledge deficit as evidenced by BMI 36.3 and no prior education provided on how to apply food and nutrition related information. Intervention:   Meals and Snacks: Continue current diet. Enteral Nutrition: Discontinue. Nutrition Education: Provided patient with education r/t a healthy diet. Discussed increasing intake of F/V, whole grains, unsaturated fats, lean proteins, and low-fat dairy. Discessed portion sizes using the Plate Method. Provided patient with [facility] Eating handout and The Plate Method by [facility]. Nutrition Discharge Plan: No nutrition needs.     Glenn Light, Dietetic Intern

## 2020-02-27 NOTE — PROGRESS NOTES
Time In 0700   Time Out 0740     Mobility   Score Comments   Supine to Sit 6: Independent    Sit to Stand 6: Independent    Transfer Assist 6: Independent Transfer Type: SPT   Equipment: N/A   Comments:      Activities of Daily Living    Score Comments   Eating 6: Independent    Oral Hyigene 6: Independent Completed standing at sink   Bathing 5: S/U or clean-up assist Type of Shower: Bath Pack  Position: Supported sitting and Standing PRN   Adaptive  Equipment: chair  Comments: setup assist of items   Upper Body  Dressing 4: Supervision or touching A Items Applied: Pullover and Bra  Position: Supported Sitting  Comments: Min assist to help bra and shirt overhead due to tight fit   Lower Body Dressing 5: S/U or clean-up assist Items Applied: Underwear and Elastic pants  Position: Supported sitting and Standing PRN  Adaptive Equipment: N/A  Comments:    Donning/[place] Footwear 5: S/U or clean-up assist Items Applied: Slip-on shoes  Adaptive Equipment: N/A  Comments:    Education  Pt reporting she has a tub/shower combo without shower chair, she notes there is a stool in her bathroom but it is not waterproof. Pt notes that she either sits in the tub or stands during shower but does not think she needs a shower chair. Notes her  can help her if she needs it. Discussed shower chair options if the need arises in the future. S: \"I don't think I will need Outpatient OT for my ADLs, is that what it would be for? \" Agreeable to therapy. Discussed purpose and benefits of Outpatient OT would be for LUE ROM and coordination if pt desires. Patient was able to ambulate ~20 feet without a device with independence. Re-applied new gauze around PEG insertion, noted drainage on R side of PEG, notified D[facility] [place]. Pain not expressed. Collaborated with RN regarding patient performance and POC.    Patient tolerated session well, but activity tolerance, balance, functional mobility, strength, coordination, cognition are still below baseline and require skilled facilitation to successfully and safely complete ADLs and transfers. Patient ended session seated EOB with breakfast, call remote, and phone within reach.        Mendel Emmanuel, JUSTYNR/L

## 2020-02-27 NOTE — PROGRESS NOTES
Problem: Falls - Risk of  Goal: *Absence of Falls  Description  Document Easter Getting Fall Risk and appropriate interventions in the flowsheet. Outcome: Progressing Towards Goal  Note: Fall Risk Interventions:  Mobility Interventions: Communicate number of staff needed for ambulation/transfer, OT consult for ADLs, Patient to call before getting OOB, PT Consult for mobility concerns, PT Consult for assist device competence, Utilize walker, cane, or other assistive device, Strengthening exercises (ROM-active/passive)         Medication Interventions: Assess postural VS orthostatic hypotension, Evaluate medications/consider consulting pharmacy, Patient to call before getting OOB, Teach patient to arise slowly    Elimination Interventions: Call light in reach, Patient to call for help with toileting needs    History of Falls Interventions: Evaluate medications/consider consulting pharmacy         Problem: Pressure Injury - Risk of  Goal: *Prevention of pressure injury  Description  Document Bahman Scale and appropriate interventions in the flowsheet. Outcome: Progressing Towards Goal  Note: Pressure Injury Interventions:  Sensory Interventions: Assess changes in LOC, Assess need for specialty bed, Avoid rigorous massage over bony prominences, Chair cushion, Check visual cues for pain, Discuss PT/OT consult with provider, Float heels, Maintain/enhance activity level, Keep linens dry and wrinkle-free, Minimize linen layers, Monitor skin under medical devices, Pressure redistribution bed/mattress (bed type), Pad between skin to skin, Sit a 90-degree angle/use footstool if needed, Turn and reposition approx.  every two hours (pillows and wedges if needed)    Moisture Interventions: Maintain skin hydration (lotion/cream), Minimize layers, Absorbent underpads, Moisture barrier    Activity Interventions: Assess need for specialty bed, Chair cushion, Increase time out of bed, Pressure redistribution bed/mattress(bed type), PT/OT evaluation    Mobility Interventions: Chair cushion, Assess need for specialty bed, Float heels, HOB 30 degrees or less, Pressure redistribution bed/mattress (bed type), PT/OT evaluation, Turn and reposition approx.  every two hours(pillow and wedges)    Nutrition Interventions: Document food/fluid/supplement intake, Offer support with meals,snacks and hydration    Friction and Shear Interventions: Feet elevated on foot rest, Foam dressings/transparent film/skin sealants, Lift sheet, Minimize layers

## 2020-02-27 NOTE — PROGRESS NOTES
OT Daily Note  Time In 0910   Time Out 1036     Subjective: \"I'd like to return back to work as soon as I can. \"  Pain: not expressed. Did express tension from lack of use in L shoulder/chest/lateral neck   Education: Yoga HEP  Interdisciplinary Communication: Respiratory therapy and RN regarding trach capping  Precautions: monitor O2 sats   Mobility   Score Comments   Sit to Stand 6: Independent    Transfer Assist 6: Independent Transfer Type: SPT   Equipment: N/A   Comments:      Neuro-Muscular Re-Education Daily Assessment   Applied biofreeze to L shoulder to alleviate discomfort and decrease tension in shoulder and neck region. Patient educated regarding therapeutic benefits of yoga practice for increasing ROM in [address] [place] [address] [address] (especially LUE), implementing breathing techniques, and overall increase in flexibility and functional mobility. Educated regarding Chair Yoga exercise program, including demonstration of poses and cues for deep breathing, and completed 1 set x 6-8 breaths this session of cat-cow, mountain pose, forward fold, extended side angle, spinal twist, pigeon, and modified warrior poses. Patient provided printed copy of Chair Yoga exercise program.   Responded well to modifications of warrior poses. Able to achieve approximately 80 degrees of L shoulder flexion/abduction. 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 Activity Tolerance Daily Assessment   Patient completed x 10 minutes UBE on medium resistance using BUEs seated in wheelchair. Patient completed x 7 minutes forward rotation, x 3 minutes backward rotation. Assessment: Making good progress. Respiratory therapist capped trach during session with pt's SpO2 level at 99% on room air. Re-checked post-exercises with 97%.  Pt noting prior to session that her BP was down this morning, up to 105/71 while sitting EOB.    Plan: Continue OT POC with focus on ADL/IADL skills, functional transfers, functional mobility, coordination, strength, static and dynamic balance, and activity tolerance to maximize safety and independence with ADLs and functional transfers      Mendel Leonard   2/27/2020

## 2020-02-27 NOTE — PROGRESS NOTES
Problem: Nutrition Deficit  Goal: *Optimize nutritional status  Outcome: Progressing Towards Goal     Problem: Patient Education: Go to Patient Education Activity  Goal: Patient/Family Education  Description  Patient / Deanna Lay family will verbalize understanding of PT safety recommendations, demonstrate appropriate assist for current functional mobility status, safety, and home exercise program by time of discharge. Outcome: Progressing Towards Goal     Problem: Falls - Risk of  Goal: *Absence of Falls  Description  Document Tia Manus Fall Risk and appropriate interventions in the flowsheet. Outcome: Progressing Towards Goal  Note: Fall Risk Interventions:  Mobility Interventions: OT consult for ADLs, Patient to call before getting OOB, PT Consult for mobility concerns, PT Consult for assist device competence, Utilize walker, cane, or other assistive device         Medication Interventions: Patient to call before getting OOB, Evaluate medications/consider consulting pharmacy    Elimination Interventions: Call light in reach, Patient to call for help with toileting needs, Toileting schedule/hourly rounds    History of Falls Interventions: Evaluate medications/consider consulting pharmacy, Consult care management for discharge planning         Problem: Patient Education: Go to Patient Education Activity  Goal: Patient/Family Education  Outcome: Progressing Towards Goal     Problem: Communication Impaired (Adult)  Goal: *Speech Goal: (INSERT TEXT)  Description  LTG: Patient will tolerate passy kalyan speaking valve to communicate wants and needs without respiratory decline. STG: Patient will participate in ongoing passy kalyan trials with ST only  STG: Patient will demonstrate independence with doning and doffing speaking valve.    STG: Patient will participate in dysphagia assessment to determine appropriateness and safety with po intake    Outcome: Progressing Towards Goal     Problem: Patient Education: Go to Patient Education Activity  Goal: Patient/Family Education  Outcome: Progressing Towards Goal     Problem: Patient Education: Go to Patient Education Activity  Goal: Patient/Family Education  Description  Pt/caregiver will demonstrate and verbalize good understanding of OT recommendations regarding ADL status, functional transfer status, home safety, DME, AE, energy conservation techniques, follow-up therapy, for increasing safety with functional tasks upon discharge. Outcome: Progressing Towards Goal     Problem: Pressure Injury - Risk of  Goal: *Prevention of pressure injury  Description  Document Bahman Scale and appropriate interventions in the flowsheet.   Outcome: Progressing Towards Goal  Note: Pressure Injury Interventions:  Sensory Interventions: Assess changes in LOC, Check visual cues for pain, Pressure redistribution bed/mattress (bed type), Maintain/enhance activity level, Discuss PT/OT consult with provider, Minimize linen layers, Keep linens dry and wrinkle-free    Moisture Interventions: Moisture barrier, Apply protective barrier, creams and emollients, Check for incontinence Q2 hours and as needed, Minimize layers    Activity Interventions: Assess need for specialty bed    Mobility Interventions: Pressure redistribution bed/mattress (bed type), PT/OT evaluation    Nutrition Interventions: Document food/fluid/supplement intake    Friction and Shear Interventions: Minimize layers, Lift sheet                Problem: Patient Education: Go to Patient Education Activity  Goal: Patient/Family Education  Outcome: Progressing Towards Goal

## 2020-02-27 NOTE — PROGRESS NOTES
2/26/20 @7732--dsg to R arm PICC line coming off. New dsg applied with sterile technique. Pt tolerate well.

## 2020-02-27 NOTE — PROGRESS NOTES
PHYSICAL THERAPY DAILY NOTE  Time In: 1300  Time Out: 1051  Patient Seen For: PM;Balance activities;Gait training; Therapeutic exercise    Subjective: Pt reports her L knee has been feeling sore. She says she heard she may get to go home tomorrow. Objective: Other (comment)(trach)    COGNITION Daily Assessment    Pt is alert and oriented and following commands appropriately 100% of the time. Pt communicates verbally. Pt has a pleasant affect. BED/MAT MOBILITY Daily Assessment    Rolling Right : 0 (Not tested)  Rolling Left : 0 (Not tested)  Supine to Sit : 0 (Not tested)  Sit to Supine : 0 (Not tested)       TRANSFERS Daily Assessment    Transfer Type: SPT without device  Transfer Assistance : 7 (Independent)  Sit to Stand Assistance: Completely independent  Car Transfers: Not tested       GAIT Daily Assessment   Ambulated well over even & uneven terrain (grass, curbs, stairs) with one rest break     Pt complained of L knee soreness following stairs at the end of ambulation Amount of Assistance: 5 (Supervision/setup)  Distance (ft): 1000 Feet (ft)  Assistive Device: Gait belt; Other (comment)(supervision assist)       STEPS or STAIRS Daily Assessment   Presented with step-to and step-through patterns during session Steps/Stairs Ambulated (#): 8  Level of Assist : 5 (Supervision/setup)  Rail Use: Both       BALANCE Daily Assessment   No LOB Sitting - Static: Good (unsupported)  Sitting - Dynamic: Good (unsupported)  Standing - Static: Good  Standing - Dynamic : Impaired       WHEELCHAIR MOBILITY Daily Assessment    Able to Propel (ft): 0 feet  Curbs/Ramps Assist Required (FIM Score): 0 (Not tested)  Wheelchair Setup Assist Required : 0 (Not tested)     Therapeutic Interventions:  NuStep level 4 x 12 min (at 10 min wade, 97% oxygen with 103 bpm HR)  SLS and DLS on foam pad while participating in UE activity (99% oxygen with 71 bpm HR)  Ambulation >1,000 ft on even and uneven terrain with supervision/SBA SLS B at standing frame participating in UE activity with cognitive component (98% oxygen)    BP 92/57   Pulse 87   Temp 98.9 °F (37.2 °C)   Resp 14   Wt 96 kg (211 lb 9.6 oz)   SpO2 97%   BMI 36.32 kg/m²      Pain level: no complaints of pain, but some soreness  Pain location: L knee  Pain interventions: rested from ambulation    Patient education: Encouraged pt to ambulate cautiously over unknown or uneven terrain. Interdisciplinary Communication: Collaborated with OT regarding pt's progress     Pt left in room with pt's  at bedside. Assessment: Pt tolerated therapy well today. Pt demonstrates improved activity tolerance, but still demonstrates some fatigue with longer duration tasks. Pt ambulated well over even and uneven terrain and had the most difficulty with stairs (althought that component was at the end of the treatment, so she may have been fatigued.)         Plan of Care: Continue with POC and progress as tolerated.       Iris Jimenez, PT  2/27/2020

## 2020-02-27 NOTE — PROGRESS NOTES
Trach care done. Site is clean and dry. Will order a smaller trach size. 02/26/20 2258   Patient Observations   Pulse (Heart Rate) 77   Resp Rate 16   O2 Sat (%) 98 %   Airway - Tracheostomy Tube Cuffed; Malvin   No Placement Date or Time found. Present on Admission/Arrival: Yes  Airway Types: Tracheostomy  Trach Tube Type: Cuffed; Dollyley  Airway Tube Size: 6 mm   Site Assessment Clean, dry, & intact   Trach Dressing Changed Yes   Trach Cleaned With Normal saline   Trach Tie Changed No   Trach Cannula Changed Yes   Inner Cannula #6 Malvin   [employer] of the lock   Side Secured Centered   Spare Trach at Bedside Yes   Spare Trach Tube One Size Smaller at Bedside No   Ambu Bag With Mask at Bedside Yes   [REMOVED] Airway - Tracheostomy Tube 02/14/20 Malvin   Removal Date/Time: 02/18/20 1634  Placement Date/Time: 02/14/20 0810   Number of Attempts: 1  Inserted By: Dr. Sinclair Speaks  Present on Admission/Arrival: No  Placement Verified: Auscultation;BBS;Chest x-ray;EtCO2  Airway Types: Endotracheal, cuffed  Trach . ..    Trach Cleaned With Normal saline   Respiratory   Respiratory (WDL) X   Respiratory Pattern Regular   Chest/Tracheal Assessment Chest expansion, symmetrical   Breath Sounds Bilateral Clear;Diminished   Cough Cough with suction   Airway Clearance   Suction Trach   Suction Device Suction catheter   Suction Catheter Size 14 Fr   Sputum Method Obtained Tracheal   Sputum Amount Moderate   Sputum Color/Odor White   Sputum Consistency Thick   Ventilator Measurements   Resp Rate Observed 16

## 2020-02-28 VITALS
BODY MASS INDEX: 36.32 KG/M2 | DIASTOLIC BLOOD PRESSURE: 75 MMHG | OXYGEN SATURATION: 93 % | SYSTOLIC BLOOD PRESSURE: 111 MMHG | RESPIRATION RATE: 14 BRPM | HEART RATE: 76 BPM | WEIGHT: 211.6 LBS | TEMPERATURE: 98.6 F

## 2020-02-28 PROCEDURE — 74011250636 HC RX REV CODE- 250/636: Performed by: PHYSICAL MEDICINE & REHABILITATION

## 2020-02-28 PROCEDURE — 74011250637 HC RX REV CODE- 250/637: Performed by: PHYSICAL MEDICINE & REHABILITATION

## 2020-02-28 PROCEDURE — 97530 THERAPEUTIC ACTIVITIES: CPT

## 2020-02-28 PROCEDURE — 97116 GAIT TRAINING THERAPY: CPT

## 2020-02-28 PROCEDURE — 97110 THERAPEUTIC EXERCISES: CPT

## 2020-02-28 PROCEDURE — 99239 HOSP IP/OBS DSCHRG MGMT >30: CPT | Performed by: PHYSICAL MEDICINE & REHABILITATION

## 2020-02-28 PROCEDURE — 97535 SELF CARE MNGMENT TRAINING: CPT

## 2020-02-28 PROCEDURE — 74011636637 HC RX REV CODE- 636/637: Performed by: PHYSICAL MEDICINE & REHABILITATION

## 2020-02-28 RX ORDER — HYDROCODONE BITARTRATE AND ACETAMINOPHEN 7.5; 325 MG/1; MG/1
1 TABLET ORAL
Qty: 14 TAB | Refills: 0 | Status: SHIPPED | OUTPATIENT
Start: 2020-02-28 | End: 2020-03-06

## 2020-02-28 RX ORDER — TRAZODONE HYDROCHLORIDE 50 MG/1
50 TABLET ORAL
Qty: 30 TAB | Refills: 1 | Status: SHIPPED | OUTPATIENT
Start: 2020-02-28 | End: 2020-10-15

## 2020-02-28 RX ORDER — METOPROLOL TARTRATE 25 MG/1
25 TABLET, FILM COATED ORAL DAILY
Qty: 30 TAB | Refills: 3 | Status: SHIPPED | OUTPATIENT
Start: 2020-02-28

## 2020-02-28 RX ORDER — GABAPENTIN 100 MG/1
200 CAPSULE ORAL
Qty: 30 CAP | Refills: 1 | Status: SHIPPED | OUTPATIENT
Start: 2020-02-28 | End: 2021-07-28

## 2020-02-28 RX ORDER — ALPRAZOLAM 0.25 MG/1
0.25 TABLET ORAL 2 TIMES DAILY
Qty: 30 TAB | Refills: 1 | Status: SHIPPED | OUTPATIENT
Start: 2020-02-28 | End: 2020-10-15

## 2020-02-28 RX ORDER — ATORVASTATIN CALCIUM 40 MG/1
40 TABLET, FILM COATED ORAL
Qty: 90 TAB | Refills: 3 | Status: SHIPPED | OUTPATIENT
Start: 2020-02-28

## 2020-02-28 RX ADMIN — ALPRAZOLAM 0.25 MG: 0.25 TABLET ORAL at 08:19

## 2020-02-28 RX ADMIN — SODIUM CHLORIDE 30 ML: 9 INJECTION INTRAMUSCULAR; INTRAVENOUS; SUBCUTANEOUS at 05:50

## 2020-02-28 RX ADMIN — ENOXAPARIN SODIUM 40 MG: 40 INJECTION SUBCUTANEOUS at 08:18

## 2020-02-28 RX ADMIN — FAMOTIDINE 20 MG: 20 TABLET ORAL at 08:17

## 2020-02-28 RX ADMIN — ACETAMINOPHEN 650 MG: 325 TABLET, FILM COATED ORAL at 10:18

## 2020-02-28 RX ADMIN — ATORVASTATIN CALCIUM 40 MG: 40 TABLET, FILM COATED ORAL at 08:17

## 2020-02-28 RX ADMIN — METOPROLOL TARTRATE 25 MG: 25 TABLET, FILM COATED ORAL at 08:17

## 2020-02-28 RX ADMIN — PREDNISONE 2 MG: 1 TABLET ORAL at 08:17

## 2020-02-28 RX ADMIN — HEPARIN SODIUM (PORCINE) LOCK FLUSH IV SOLN 100 UNIT/ML 900 UNITS: 100 SOLUTION at 05:51

## 2020-02-28 RX ADMIN — SENNOSIDES AND DOCUSATE SODIUM 1 TABLET: 8.6; 5 TABLET ORAL at 08:17

## 2020-02-28 NOTE — PROGRESS NOTES
PICC line pulled with assistance DEEP Lynch, tip intact, patient tolerated well. Patient assisted, educated with RT on [facility]. Inner Cannulas ordered as per RT for home. Peg Tube in place with dressing educated patient on flushing PEG daily 50ml of water and cleaning around with NS. Patient feels eager to go home.

## 2020-02-28 NOTE — PROGRESS NOTES
CASE MANAGEMENT DISCHARGE NOTE      Discharge Destination:  Home with Family Assistance    Discharge Date:   2/28/2020    DME: none    Home Health Agency: [facility]    Out Patient Facility: none    STR: none    Care Management Interventions  PCP Verified by CM: Yes(Dr. Salud Qureshi, unsure of date of last visit)  Transition of Care Consult (CM Consult): Home Health  Current Support Network: Lives with Spouse, Own Home  The Plan for Transition of Care is Related to the Following Treatment Goals : DC home with [facility] for RN for management of trach and peg tube and for pt to maintain and enhance quality of life with compliance of medication and follow up.   The Patient and/or Patient Representative was Provided with a Choice of Provider and Agrees with the Discharge Plan?: Yes  Name of the Patient Representative Who was Provided with a Choice of Provider and Agrees with the Discharge Plan: patient  Freedom of Choice List was Provided with Basic Dialogue that Supports the Patient's Individualized Plan of Care/Goals, Treatment Preferences and Shares the Quality Data Associated with the Providers?: Yes  [employer] Resource Information Provided?: Yes  Discharge Location  Discharge Placement: Home with home health

## 2020-02-28 NOTE — PROGRESS NOTES
Problem: Falls - Risk of  Goal: *Absence of Falls  Description  Document Yashira President Fall Risk and appropriate interventions in the flowsheet. Outcome: Progressing Towards Goal  Note: Fall Risk Interventions:  Mobility Interventions: Communicate number of staff needed for ambulation/transfer, OT consult for ADLs, Patient to call before getting OOB, PT Consult for mobility concerns, PT Consult for assist device competence, Strengthening exercises (ROM-active/passive), Utilize walker, cane, or other assistive device         Medication Interventions: Assess postural VS orthostatic hypotension, Evaluate medications/consider consulting pharmacy, Teach patient to arise slowly, Patient to call before getting OOB    Elimination Interventions: Call light in reach, Patient to call for help with toileting needs, Toileting schedule/hourly rounds    History of Falls Interventions: Evaluate medications/consider consulting pharmacy, Consult care management for discharge planning         Problem: Pressure Injury - Risk of  Goal: *Prevention of pressure injury  Description  Document Bahman Scale and appropriate interventions in the flowsheet. Outcome: Progressing Towards Goal  Note: Pressure Injury Interventions:  Sensory Interventions: Assess changes in LOC, Assess need for specialty bed, Avoid rigorous massage over bony prominences, Chair cushion, Check visual cues for pain, Discuss PT/OT consult with provider, Float heels, Keep linens dry and wrinkle-free, Maintain/enhance activity level, Minimize linen layers, Pressure redistribution bed/mattress (bed type), Turn and reposition approx.  every two hours (pillows and wedges if needed)    Moisture Interventions: Moisture barrier, Apply protective barrier, creams and emollients, Check for incontinence Q2 hours and as needed, Minimize layers    Activity Interventions: Assess need for specialty bed, Chair cushion, Increase time out of bed, Pressure redistribution bed/mattress(bed type), PT/OT evaluation    Mobility Interventions: Assess need for specialty bed, Chair cushion, Float heels, HOB 30 degrees or less, Pressure redistribution bed/mattress (bed type), PT/OT evaluation, Turn and reposition approx.  every two hours(pillow and wedges)    Nutrition Interventions: Document food/fluid/supplement intake, Offer support with meals,snacks and hydration    Friction and Shear Interventions: Minimize layers, Lift sheet

## 2020-02-28 NOTE — DISCHARGE SUMMARY
REHABILITATION DISCHARGE SUMMARY     Date of admission; 2/18/2020  Discharge Date: 2/28/2020    Endovascular neurosurgeon: Dr Talia Montanez  Primary Care Physician: Dr Alma Painter    Admission Condition: good  Discharged Condition: good    Hospital Course: The patient was admitted to [facility] on 2/18/2020 to [facility] s/p AVM/cervical spine cavernoma with cord edema, resp failure s/p Daniel Christensen a 53 y.o. female who originally presented to [facility] in [facility] with acute left neck pain and left arm weakness and paresthesia. The pt had a CT of head which was normal, the pt was transported to [facility] ED for a MRI of brain/C-spine, she had acute resp failure/bradycardia and required intubation. The MRI of Cspine was suspicious for vascular abnormality, AVM vs Cavernoma, with hemorrhage and edema noted Cervical Spine at C2-C5 area. The pt was transferred to [address] [place] [address] Dr. Lamar LANE via [facility] flight team to Dr. Talia Montanez and Endovascular team for further evaluation and plan of care. The pt was on propofol gtt on arrival and sedated, but it was turned off on arrival and pt was noted to wake up easily and began to follow commands. She is alert with obvious weakness on her left sided compared to R. She is able to hold RUE off bed, but unable to lift up LUE, she was able to give me a thumbs up bilat, and  with left hand. She sould move toes LLE and has tone. Pt remains intubated with 7.5 OETT, [email or web address]. GCS: E4, V1I, M6: 11I, NIHSS 12 ( intubated).   SBP goal 100-160 on admit with cardene gtt prn. She was placed on lipitor 40mg, .6 , NGT placed as well as A-line. ( of note; pt seen by PCP in Sept 2019 with c/o left ear pain, left arm pain and dizziness. No imaging)  Today, pt went for a cerebral angio with conscious sedation with fentanyl and versed. Findings included a questionable abnl vessel around C3 region. 2D ECHO with EF of 55-60%, no valvular abnl and no shunting. CT chest neg.  Obtained due to hypoxia. Tmax 101.3, bp 109//107.   Ms. 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 The risk of treatment is very high with chances of paralysis very high, at this time the treatment will be blood pressure control and monitoring. She will follow up with Dr. Eduardo Lund in 4-6 weeks with repeat MRI C-spine. SBP control with goal SBP <160. Pt denies any paresthesia today, she is moving all extrems. She is anxious and we did discuss xanax vs possible lexapro, but she decline anti-depressant at this time. No obvious neuro changes and clear to transfer out to [facility].  pt was unable to tolerate extubation when attempted: she had trach placed on 2/14/20. She has been off the vent and on trach collar since 2/16, 30-40% O2 . Her NIHSS today 2/18 is 1 and MRS is 1. Temp: Tm 101.7 last pm. Pan cx sent and neg thus far. Pt afebrile on dc. Pt completed 10d course of Vanc/Cefe during this admit for fever- all cx neg.       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 CXR neg. Will have RT decrease cuff pressure. Tolerating ATC well. Will have ST try PMV trials with eventual capping. Will downsize when tolerates, can at 7-10days post op; cont RT with assist with trach collar, wean OT; pt functionally expected to do well. Do not want to send home with a trach in but may have to depending on tolerance of downsizing. Keeping it now is definitely for safety due to spinal edema  -pt constantly suctioning secretions; will add scop patch and monitor. dont want mucous plugging. Very thin secretions; 6L trach collar   -trach stoma quite big, do not think we can downsize to a #6 trach.  Could not tolerate PMV at all; back pressure. Scopolamine patch placed for decreasing oral secretions.  sats 98%, 6L. Lungs clear; spt cx neg; will f/u CXR due to known left atelectasis and vasc congestion  -2/21 CXR slt improved, needs to take deep breaths. Will DOWNSIZE to #6 trach today/working on PMV tolerance;   2/22 downsize today. 28% trach collar; turned O2 to 3L. Off o2 for adls and sats 81%. Hopefully will tolerated PMV with smaller trach  -sats97 % RA right now; trach collar at noc 6L; do not feel she needs that much O2  -2/24 tolerating #6, did not require suctioning overnoc  2/25 - PMV tolerated well during the day. SaO2 good on RA.   2/26 - SaO2 97-99% on RA. minimal secretions, has not needed suctioning. Will start capping during the day. dc scopolomine patch  -2/28 has done well. sats 93% on RA this a.m. will f/u with general surgery for trach removal. We discussed when she must seek immediate medical assistance; mucus plug, sob, productive cough, fever.     Spinal cord edema/hematoma C3; dysphagia profound. Will need PEG. Cannot continue NGT for the length of time I suspect she will need. Will consult GI 2/20  -start trial of decadron 4mg IV q 8hrs to see if edema can be decreased and pt can tolerate secretions better and swallow 2/20; discussed with neurosurgery; 2/21 doing well today, managing secretions better  -2/22 PEG placed by Dr Maria R Gutierrez yesterday. Tolerated well. Can restart bolus tube feeds today.   -tolerating TF well  2/25 - tolerating bolus PEG feeds. MBS tomorrow am.  2/26 - passed MBS. Start reg, reg diet. Will wean PEG feeds as po intake increases. Continue decadron 4mg iv q12h.   -2/27 dc IV decadron , 2mg po bid x 3 d , then stop; 2/28 can dc steroid  -2/28 will f/u with Dr Maria R Gutierrez in 6 weeks for PEG removal     ID; fever 2/17 ; blood and spt cxs neg. Wbc normal. Urinalysis pending  -2/20 urinalysis suspicious, but cx neg thus far. tmax 100.2; f/u CXR; no leukocytosis; 2/21 afebrile.  Tm 99.7; 2/23 afebrile  2/28 - afebrile. 2/27 wbc was 11       Oral thrush; can use nystatin to coat roof of mouth with swab. Strict thorough oral care 2/20; 2/24  thrush improved     DVT risk / DVT Prophylaxis- Will require daily physician exam to assess for signs and symptoms as patient is at increased risk for of thromboembolism. Mobilization as tolerated. Intermittent pneumatic compression devices when in bed Thigh-high or knee-high thromboembolic deterrent hose when out of bed. Lovenox     Pain Control: ongoing significant pain in neck and head which is stable and controlled by PRN meds. Will require regular pain assessment and comprenhensive pain management, cont prn tylenol or motrin and Norco if needed for more mod to severe pain. Dc fentanyl  -2/20 paresthesias LLE; start Neurontin 200mg qhs for now; 2/21 much improved overnoc  - 2/25 - reports pain is better. Continue gabapentin hs. Prn percocet.      Wound Care: Monitor wound status daily per staff and physician. At risk for failure. Will require 24/7 rehab nursing. Routine catheter care; PICC line RUE. Neck wound healing; cont trach care     Hypertension - BP uncontrolled, fluctuating, managed medically. Goal SBP < 160; cont lopressor  -2/20 asymptomatic hypotension, dec lopressor and add parameters. sbp in the 80s, may be due to cervical SCI and autonomic dysfxn  -2/22 111/68 improved; 2/23 130/90; HR 80s  -2/26 - lopressor 25 bid. BP/HR in fair range.   -2/27 asympt hypotension this a.m. 92/57. Max . HR 70-89; dec lopressor and monitor; 2/28 111/75 (SBP ); on low dose lopressor only (25mg daily vs 100mg at home); f/u with PCP     HLD cont Lipitor 40mg     Anx/depression; refuses antidepressent. Was taking xanax at home and will cont low dose xanax at 0.25mg bid.  Hopefully as she sees herself recovering, anxiety will lessen  -2/19 inc anxiety, didn't sleep; inc trazadone and change xanax to tid; has sleep wake cycle mixed up due to coming from ICU environment.  -2/20 improved sleep last noc ; 2/21 excellent noc/had added neurontin. Cont trazadone and xanax; signif dec anxiety  -2/23 doing much better  - 2/26 - mood good.      Anemia; hgb 9.3 monitor; 2/19 hgb 8.8; no evid of bleed. Monitor  - hgb 8.6 2/23. Fairly stable. 2/27  10.4 much improved     Urinary retention/ neurogenic bladder - schedule voids q6-8 hrs. Check post-void residual as needed; In and Out catheterize if post-void residual is more than 400 cc.  -voiding without difficulty     bowel program - cont senna, and prn bowel program     GERD - resume PPI. At times may need additional antacids, Maalox prn.      2/27Plan; dc home once trach is capped and pt tolerates 24 hrs. Will do pulse oxim tonight if capped to assess O2 needs. 2/28 has been capped > 24hrs; no sob,no O2 needed. Nocturnal pulse ox performed on RA with sats staying above 90% all noc. Instructed pt in use of IS; 10x q 2hrs while awake encouraged       Functional Level On Admission:   \"bed mobility with Gertrudis/cueing for technique. Static sitting balance intact. Pt practiced functional transfers for ADLs with CGA/cueing, ambulation for ADLs with CGA/RW. Standing balance good in RW, cues for RW management. Pt toileted with Gertrudis for toilet transfer, CGA elvira hygiene in standing, Gertrudis for bowel hygiene thoroughness in standing. Grooming in standing with CGA/additional time, cueing to utilize LUE for functional tasks. LUE is less coordinated with pt dropping objects from L hand. \"  Ambulating 110 ft with RW CGA  Functional Level At Discharge:        Mobility    Score Comments   Supine to Sit 6: Independent     Sit to Stand 6: Independent     Transfer Assist 6: Independent Transfer Type: SPT   Equipment: N/A   Comments:       Activities of Daily Living     Score Comments   Eating 6: Independent     Oral Hyigene 6: Independent Completed standing at sink   Bathing 5: S/U or clean-up assist Type of Shower: Bath Pack  Position: Supported sitting and Standing PRN Adaptive  Equipment: chair  Comments: setup assist of items   Upper Body  Dressing 4: Supervision or touching A Items Applied: Pullover and Bra  Position: Supported Sitting  Comments: Min assist to help bra and shirt overhead due to tight fit   Lower Body Dressing 5: S/U or clean-up assist Items Applied: Underwear and Elastic pants  Position: Supported sitting and Standing PRN  Adaptive Equipment: N/A  Comments:    Donning/[place] Footwear 5: S/U or clean-up assist Items Applied: Slip-on shoes  Adaptive Equipment: N/A  Comments:    Education   Pt reporting she has a tub/shower combo without shower chair, she notes there is a stool in her bathroom but it is not waterproof. Pt notes that she either sits in the tub or stands during shower but does not think she needs a shower chair. Notes her  can help her if she needs it. Discussed shower chair options if the need arises in the future.          S: \"I don't think I will need Outpatient OT for my ADLs, is that what it would be for? \" Agreeable to therapy. Discussed purpose and benefits of Outpatient OT would be for LUE ROM and coordination if pt desires. Patient was able to ambulate ~20 feet without a device with independence. Re-applied new gauze around PEG insertion, noted drainage on R side of PEG, notified DEEP Ash. Pain not expressed. Collaborated with RN regarding patient performance and POC. Patient tolerated session well, but activity tolerance, balance, functional mobility, strength, coordination, cognition are still below baseline and require skilled facilitation to successfully and safely complete ADLs and transfers. Patient ended session seated EOB with breakfast, call remote, and phone within reach.         Menedl Emmanuel, OTR/L    PHYSICAL THERAPY DAILY NOTE  Time In: 1300  Time Out: 8743  Patient Seen For: PM;Balance activities;Gait training; Therapeutic exercise     Subjective: Pt reports her L knee has been feeling sore.  She says she heard she may get to go home tomorrow.            Objective: Other (comment)(trach)     COGNITION Daily Assessment     Pt is alert and oriented and following commands appropriately 100% of the time. Pt communicates verbally. Pt has a pleasant affect.         BED/MAT MOBILITY Daily Assessment     Rolling Right : 0 (Not tested)  Rolling Left : 0 (Not tested)  Supine to Sit : 0 (Not tested)  Sit to Supine : 0 (Not tested)         TRANSFERS Daily Assessment     Transfer Type: SPT without device  Transfer Assistance : 7 (Independent)  Sit to Stand Assistance: Completely independent  Car Transfers: Not tested         GAIT Daily Assessment   Ambulated well over even & uneven terrain (grass, curbs, stairs) with one rest break      Pt complained of L knee soreness following stairs at the end of ambulation Amount of Assistance: 5 (Supervision/setup)  Distance (ft): 1000 Feet (ft)  Assistive Device: Gait belt; Other (comment)(supervision assist)         STEPS or STAIRS Daily Assessment   Presented with step-to and step-through patterns during session Steps/Stairs Ambulated (#): 8  Level of Assist : 5 (Supervision/setup)  Rail Use: Both         BALANCE Daily Assessment   No LOB Sitting - Static: Good (unsupported)  Sitting - Dynamic: Good (unsupported)  Standing - Static: Good  Standing - Dynamic : Impaired         WHEELCHAIR MOBILITY Daily Assessment     Able to Propel (ft): 0 feet  Curbs/Ramps Assist Required (FIM Score): 0 (Not tested)  Wheelchair Setup Assist Required : 0 (Not tested)      Therapeutic Interventions:  NuStep level 4 x 12 min (at 10 min wade, 97% oxygen with 103 bpm HR)  SLS and DLS on foam pad while participating in UE activity (99% oxygen with 71 bpm HR)  Ambulation >1,000 ft on even and uneven terrain with supervision/SBA   SLS B at standing frame participating in UE activity with cognitive component (98% oxygen)     BP 92/57   Pulse 87   Temp 98.9 °F (37.2 °C)   Resp 14   Wt 96 kg (211 lb 9.6 oz) SpO2 97%   BMI 36.32 kg/m²       Pain level: no complaints of pain, but some soreness  Pain location: L knee  Pain interventions: rested from ambulation     Patient education: Encouraged pt to ambulate cautiously over unknown or uneven terrain.     Interdisciplinary Communication: Collaborated with OT regarding pt's progress      Pt left in room with pt's  at bedside.            Assessment: Pt tolerated therapy well today. Pt demonstrates improved activity tolerance, but still demonstrates some fatigue with longer duration tasks. Pt ambulated well over even and uneven terrain and had the most difficulty with stairs (althought that component was at the end of the treatment, so she may have been fatigued.)            Plan of Care: Continue with POC and progress as tolerated.       Alexa Brito, PT  2/27/2020    MODIFIED BARIUM SWALLOW STUDY       02/26/20 0846   Mental Status   Neurologic State Alert   Orientation Level Oriented X4   Cognition Follows commands   Perception Appears intact   Perseveration No perseveration noted   Safety/Judgement Awareness of environment   Oral Assessment   Labial No impairment   Dentition Natural   Lingual No impairment   Swallowing Study Trial   Type of Study Modified barium swallow   Film Views Lateral;Fluoro   Radiologist Dr Isadora Diaz   Patient Position upright in chair   Consistency Presented Thin liquid; Solid;Mixed consistency   How Presented Self-fed/presented;Straw;Cup/sip   Bolus Acceptance No impairment   Bolus Formation/Control No impairment   Propulsion No impairment   Oral Residue None   Initiation of Swallow No impairment   Timing No impairment   Penetration None   Aspiration/Timing No evidence of aspiration   Pharyngeal Clearance No residue   Swallowing Physiology   Decreased Tongue Base Retraction?  No   Laryngeal Elevation WFL (within functional limits)   Aspiration/Penetration Score 1 (No penetration or aspiration-Contrast does not enter the airway) Pharyngeal-Esophageal Segment No impairment   Findings   Oral Phase Severity No impairment   Pharyngeal Phase Severity N/A   Treatment Diagnosis   Treatment Diagnosis dysphagia; oropharyngeal R13.12      Patient participated with a modified barium swallow study. #6 cuffless trach with PMV in place and on room air. Oropharyngeal swallow is grossly within functional limits. Timely initiation of the swallow with thin liquids via spoon, cup, and straw with no penetration/aspiration. Mastication time grossly within normal limits without pharyngeal residue present with any consistency. Recommend regular textures/thin liquids. Pills whole one at a time with a liquid wash.     Short Fuze Plant MS, CCC-SLP       Home Architecture:    Home Environment: Private residence  # Steps to Enter: 2  Rails to Enter: No  One/Two Story Residence: One story  Living Alone: No  Support Systems: Family member(s)  Patient Expects to be Discharged to[de-identified] Rehabilitation facility  Current DME Used/Available at Home: None  Tub or Shower Type: Tub/Shower combination  Prior Level of Function/Work/Activity:  Independent with ADLs, IADLs, work tasks, functional mobility and driving. PMH;   Isrrael Bravo    Anxiety    Breast mass 2014   left    Carpal tunnel syndrome 2012   bilateral    Diffuse cystic mastopathy of breast, left 2015    Genital herpes 2014    Herpes    Hypertension    Menorrhagia    Ovarian cyst 04/15/2015   No past surgical history on file.    Procedure Laterality Date    BREAST BIOPSY Left 1912   US core, benign    BREAST BIOPSY EXCISION Right    benign     SECTION   times 2    COLONOSCOPY 2013   [facility] - Dr. Robbie La REMOVAL Right   RIGHT BREAST    TONSILLECTOMY    TUBAL LIGATION      No family history on file.   family history includes Breast cancer in her cousin; Diabetes in an other family member; Heart disease in her maternal grandmother; Lung cancer in her mother; No Known Problems in her daughter and daughter; Prostate cancer in her father.  UMBILICAL HERNIA REPAIR   2 times   Social History     Tobacco Use    Smoking status: Not on file   Substance Use Topics    Alcohol use: Not on file     Prior to Admission medications    Medication Sig Start Date End Date Taking? Authorizing Provider   ALPRAZolam Radha Drone) 0.25 mg tablet Take 1 Tab by mouth two (2) times a day. Max Daily Amount: 0.5 mg. 2/28/20  Yes Nguyen López MD   atorvastatin (LIPITOR) 40 mg tablet 1 Tab by Per NG tube route nightly. 2/28/20  Yes Nguyen López MD   metoprolol tartrate (LOPRESSOR) 25 mg tablet Take 1 Tab by mouth daily. 2/28/20  Yes Nguyen López MD   gabapentin (NEURONTIN) 100 mg capsule Take 2 Caps by mouth nightly as needed (Neuropathic discomfort left hemibody). 2/28/20  Yes Nguyen López MD   HYDROcodone-acetaminophen (NORCO) 7.5-325 mg per tablet Take 1 Tab by mouth every eight (8) hours as needed for Pain for up to 7 days. Max Daily Amount: 3 Tabs. Indications: pain 2/28/20 3/6/20 Yes Nguyen López MD   traZODone (DESYREL) 50 mg tablet Take 1 Tab by mouth nightly as needed for Sleep. 2/28/20  Yes Nguyen López MD   metoprolol tartrate (LOPRESSOR) 100 mg IR tablet 1 Tab by Per NG tube route two (2) times a day.  2/18/20   [place], NP     Allergies   Allergen Reactions    Ace Inhibitors Angioedema        Lab Review:   Recent Results (from the past 72 hour(s))   CBC W/O DIFF    Collection Time: 02/27/20  3:02 PM   Result Value Ref Range    WBC 11.0 4.3 - 11.1 K/uL    RBC 3.74 (L) 4.05 - 5.2 M/uL    HGB 10.4 (L) 11.7 - 15.4 g/dL    HCT 32.9 (L) 35.8 - 46.3 %    MCV 88.0 79.6 - 97.8 FL    MCH 27.8 26.1 - 32.9 PG    MCHC 31.6 31.4 - 35.0 g/dL    RDW 13.6 11.9 - 14.6 %    PLATELET 084 683 - 630 K/uL    MPV 10.7 9.4 - 12.3 FL    ABSOLUTE NRBC 0.00 0.0 - 0.2 K/uL       Functional Assessment:  Gross Assessment  AROM: Generally decreased, functional (02/25/20 1200)  PROM: Within functional limits (02/22/20 1200)  Strength: Generally decreased, functional (02/25/20 1200)  Coordination: Within functional limits (02/25/20 1200)  Tone: Normal (02/25/20 1200)  Sensation: Intact (02/25/20 1200)       Balance  Sitting - Static: Good (unsupported) (02/27/20 1400)  Sitting - Dynamic: Good (unsupported) (02/27/20 1400)  Standing - Static: Good (02/27/20 1400)  Standing - Dynamic : Impaired (02/27/20 1400)                     Murray De La Torre Fall Risk Assessment:  Murray De La Torre Fall Risk  Mobility: Ambulates or transfers with assist devices or assistance (02/27/20 2323)  Mobility Interventions: Communicate number of staff needed for ambulation/transfer;OT consult for ADLs; Patient to call before getting OOB;PT Consult for mobility concerns;PT Consult for assist device competence;Strengthening exercises (ROM-active/passive); Utilize walker, cane, or other assistive device (02/27/20 2323)  Mentation: Alert, oriented x 3 (02/27/20 2323)  Medication: Patient receiving anticonvulsants, sedatives(tranquilizers), psychotropics or hypnotics, hypoglycemics, narcotics, sleep aids, antihypertensives, laxatives, or diuretics (02/27/20 2323)  Medication Interventions: Assess postural VS orthostatic hypotension; Evaluate medications/consider consulting pharmacy; Teach patient to arise slowly; Patient to call before getting OOB (02/27/20 2323)  Elimination: Independent in elimination (02/27/20 2323)  Elimination Interventions: Call light in reach; Patient to call for help with toileting needs; Toileting schedule/hourly rounds (02/27/20 0715)  Prior Fall History: No (02/27/20 2323)  History of Falls Interventions: Evaluate medications/consider consulting pharmacy; Consult care management for discharge planning (02/27/20 0715)  Total Score: 2 (02/27/20 2323)  Standard Fall Precautions: Yes (02/27/20 2323)  High Fall Risk: Yes (02/27/20 2323) Speech Assessment:  Aspiration Signs/Symptoms: None (02/25/20 0949)      Ambulation:  Gait  Speed/Linda: Slow (02/25/20 1200)  Step Length: Right shortened;Left shortened (02/25/20 1200)  Gait Abnormalities: Other(decreased arm swing, trunk rotation, JOAO) (02/25/20 1200)  Distance (ft): 1000 Feet (ft) (02/27/20 1400)  Assistive Device: Gait belt; Other (comment)(supervision assist) (02/27/20 1400)  Rail Use: Both (02/27/20 1400)     Visit Vitals  /75   Pulse 76   Temp 98.6 °F (37 °C)   Resp 14   Wt 211 lb 9.6 oz (96 kg)   SpO2 93%   BMI 36.32 kg/m²      Intake and Output:    02/26 1901 - 02/28 0700  In: 480 [P.O.:480]  Out: -     Discharge Exam:  General: Alert and age appropriately oriented. No acute cardio respiratory distress. HEENT: Normocephalic,no scleral icterus  Oral mucosa moist without cyanosis; left facial lower weakness   Lungs: Clear to auscultation  bilaterally. Respiration even and unlabored; trach intact; capped   Heart: Regular rate and rhythm, S1, S2   No  murmurs, clicks, rub or gallops   Abdomen: Soft, non-tender, nondistended. Bowel sounds present. No organomegaly. PEG c/d/i   Genitourinary: defer   Neuromuscular:        Neuro intact   Skin/extremity: No rashes, no erythema. No calf tenderness BLE  No edema         Problem List as of 2/28/2020 Date Reviewed: 2/14/2020          Codes Class Noted - Resolved    Cavernoma ICD-10-CM: D18.00  ICD-9-CM: 228.00  2/3/2020 - Present        Acute respiratory failure with hypoxia [facility]) ICD-10-CM: J96.01  ICD-9-CM: 518.81  2/3/2020 - Present        Edema of spinal cord (HCC) ICD-10-CM: G95.19  ICD-9-CM: 336.1  2/3/2020 - Present        Acute left-sided weakness ICD-10-CM: R53.1  ICD-9-CM: 728.87  2/3/2020 - Present        AVM (arteriovenous malformation) spine ICD-10-CM: Q28.8  ICD-9-CM: 747.82  2/2/2020 - Present              Discharge Instructions:   1. Diet: Regular Diet  2. Activity: PT/OT/RN per Home Health  3.  Wound Care: cleanse peg site with saline or soapy water daily, may use gauze around stoma if tube irritating. Change trach dressing daily and prn. Cleanse area with saline. Current Discharge Medication List      START taking these medications    Details   gabapentin (NEURONTIN) 100 mg capsule Take 2 Caps by mouth nightly as needed (Neuropathic discomfort left hemibody). Qty: 30 Cap, Refills: 1      HYDROcodone-acetaminophen (NORCO) 7.5-325 mg per tablet Take 1 Tab by mouth every eight (8) hours as needed for Pain for up to 7 days. Max Daily Amount: 3 Tabs. Indications: pain  Qty: 14 Tab, Refills: 0    Associated Diagnoses: AVM (arteriovenous malformation) spine      traZODone (DESYREL) 50 mg tablet Take 1 Tab by mouth nightly as needed for Sleep. Qty: 30 Tab, Refills: 1         CONTINUE these medications which have CHANGED    Details   ALPRAZolam (XANAX) 0.25 mg tablet Take 1 Tab by mouth two (2) times a day. Max Daily Amount: 0.5 mg.  Qty: 30 Tab, Refills: 1    Associated Diagnoses: Abnormal MRI scan, head; Acute left-sided weakness; Edema of spinal cord ([facility] [address] Cerebrovascular dural AV fistula      atorvastatin (LIPITOR) 40 mg tablet 1 Tab by Per NG tube route nightly. Qty: 90 Tab, Refills: 3    Associated Diagnoses: AVM (arteriovenous malformation) spine      metoprolol tartrate (LOPRESSOR) 25 mg tablet Take 1 Tab by mouth daily. Qty: 30 Tab, Refills: 3         STOP taking these medications       valsartan (DIOVAN) 80 mg tablet Comments:   Reason for Stopping:         cloNIDine HCL (CATAPRES) 0.1 mg tablet Comments:   Reason for Stopping:               Rehabilitation Management:   1. Devices: None  2.  Consult: Rehab team including PT, OT, recreational therapy, and  and Speech therapy    Disposition: home    Follow-up with Dr Dixon Sanabria, Dr Raine Najera, General surgery NP, GI Dr Geena Maldonado MD

## 2020-02-28 NOTE — PROGRESS NOTES
Patient being discharged,leaving the floor with Janet ARMAS via WC. All questions answered, education given for Stroke prevention and Heart Attack signs/symptoms.   Prescriptions given, discharge packet and Esign done for AVS.

## 2020-02-28 NOTE — PROGRESS NOTES
Continuous pulse ox monitoring overnight on RA with trach capped-pt remained above 90% mostly in the mid-90's all night.

## 2020-02-28 NOTE — PROGRESS NOTES
OT DISCHARGE REPORT    Time In: 0700  Time Out: 0745  Mobility   Usual Performance Score Comments   Rolling 6: Independent Side: Bilateral     Supine to Sit 6: Independent    Sit to Supine 6: Independent    Sit to Stand 6: Independent    Transfer Assist 6: Independent Transfer Type: SPT   Equipment: N/A   Comments:      Activities of Daily Living    Usual Performance Score Comments   Eating 6: Independent    Oral Hyigene 6: Independent    Bathing 6: Independent Type of Shower: Shower  Position: Standing PRN and Unsupported Sitting   Adaptive  Equipment: Tub Transfer Bench and Grab Bars  Comments:    Upper Body  Dressing 4: Supervision or touching A Items Applied: Pullover and Bra  Position: Unsupported Sitting  Comments: Min assist to don sports bra overhead. Pt normally wears regular bra and dons independently   Lower Body Dressing 6: Independent Items Applied: Underwear and Elastic pants  Position: Standing PRN and Unsupported Sitting  Adaptive Equipment: N/A  Comments:    Donning/[place] Footwear 6: Independent Items Applied: Slip-on shoes  Adaptive Equipment: N/A  Comments: Toilet Transfer 6: Independent Transfer Type: SPT   Equipment: N/A   Comments: Toileting Hygiene 6: Independent Output: urine x1  Comments:    Education  Discussion of energy conservation techniques and re-educated on HEP activities      Plan of Care: Please see Care Plan for progress towards goals during stay  Precautions at Discharge: PEG and capped trach  Discharge Location: Home with spouse and daughter  Safety/Supervision Recommendations/Functional Level: Supervision with IADLs and home management initially. Ambulates independently without device. Independent with ADLs. Family Training: Completed informally with pt's spouse throughout stay. Pt verbalizes understanding and demonstration of home programs. Recommended Continuing Therapy: Recommend Outpatient OT to address decreased ROM and coordination of LUE.  Pt verbalizing that she would like to wait to see how she recovers before initiating OP therapy. Residual Deficits: decreased dynamic balance, decreased activity tolerance, decreased LUE ROM and coordination  Progress over LOS: Patient demonstrates excellent progress with functional mobility, RUE strength, functional use of BUE's, and coordination for performance of ADL and functional transfers, see above for details. Patient would benefit from skilled Outpatient OT services to address remaining deficits. Summary of Session:   S: \"I'm so excited to see my daughter. \" Agreeable to therapy. Focus of session was on morning ADL routine and discharge assessment. Patient was able to ambulate ~20 feet without device with independence. Pt demonstrates understanding of HEP activities including chair yoga, therasponge theraband exercises, as well as ROM exercises for carryover at home after discharge. SpO2 level at 93% on room air with capped trach. Pain not expressed. Collaborated with PT and confirmed patient readiness for discharge. Patient ended session sitting EOB with breakfast, call remote and phone within reach.          Mendel Davis, OTR/L  2/28/2020

## 2020-02-28 NOTE — PROGRESS NOTES
PHYSICAL THERAPY DISCHARGE SUMMARY  [id number]    Precautions at discharge: Other (comment)(trach and PEG tube)    Problem List:    Decreased strength B LE  [x]     Decreased strength trunk/core  [x]     Decreased AROM   []     Decreased PROM  []     Decreased balance sitting  []     Decreased balance standing  [x]     Decreased endurance  [x]     Pain  []       Functional Limitations:   Decreased independence with bed mobility  []     Decreased independence with functional transfers  []     Decreased independence with ambulation  [x]     Decreased independence with stair negotiation  [x]            Outcome Measures:     /75   Pulse 76   Temp 98.6 °F (37 °C)   Resp 14   Wt 96 kg (211 lb 9.6 oz)   SpO2 93%   BMI 36.32 kg/m²     Pain level: no complaints of pain or soreness  Pain location: N/A  Pain interventions: N/A    Patient education: Encouraged pt to perform HEP following discharge to maintain progress and improve activity tolerance. HEP given. Interdisciplinary Communication: Collaborated with OT regarding pt's needs after discharge    Pt left in room with  at bedside getting ready for discharge. Cognition: Pt follows multi-step commands appropriately and does not require cueing for safety. Pt has pleasant affect.     MMT Initial Asssessment   Right Lower Extremity Left Lower Extremity   Hip Flexion 5 4+   Knee Extension 4+ 5   Knee Flexion 5 5   Ankle Dorsiflexion 5 4+      MMT Discharge Assessment   Right Lower Extremity Left Lower Extremity   Hip Flexion 5 5   Knee Extension 5 5   Knee Flexion 5 5   Ankle Dorsiflexion 5 4+   0/5 No palpable muscle contraction  1/5 Palpable muscle contraction, no joint movement  2-/5 Less than full range of motion in gravity eliminated position  2/5 Able to complete full range of motion in gravity eliminated position  2+/5 Able to initiate movement against gravity  3-/5 More than half but not full range of motion against gravity  3/5 Able to complete full range of motion against gravity  3+/5 Completes full range of motion against gravity with minimal resistance  4-/5 Completes full range of motion against gravity with minimal-moderate resistance  4/5 Completes full range of motion against gravity with moderate resistance  4+/5 Completes full range of motion against gravity with moderate-maximum resistance  5/5 Completes full range of motion against gravity with maximum resistance    AROM: WFL    PRIMARY MODE OF LOCOMOTION: ambulation  Please see [facility] Interdisciplinary Eval: Coordination/Balance Section for details regarding FIM score description.     BED/CHAIR/WHEELCHAIR TRANSFERS Initial Assessment Discharge Assessment   Rolling Right 5 (Supervision) 7 (Independent)   Rolling Left 5 (Supervision) 7 (Independent)   Supine to Sit 4 (Minimal assistance) 7 (Independent)   Sit to Stand Minimal assistance Completely independent   Sit to Supine 4 (Minimal assistance) 7 (Independent)   Transfer Type SPT without device SPT without device   Comments       Car Transfer Not tested Supervision   Car Type           WHEELCHAIR MOBILITY/MANAGEMENT Initial Assessment Discharge Assessment   Able to Propel 0 feet 0 feet   Functional Level       Curbs/ramps assistance required 0 (Not tested) 0 (Not tested)   Wheelchair set up assistance required 0 (Not tested) 0 (Not tested)   Wheelchair management           WALKING INDEPENDENCE Initial Assessment Discharge Assessment   Assistive device Other (comment), Gait belt(close SBA) Other (comment)(supervision assist for long distances or uneven terrain)   Ambulation assistance - level surface 0 (Not tested) 5 (Supervision)   Distance 100 Feet (ft) 500 Feet (ft)   Comments Improved trunk rotation and B arm swing; increased yuri and step length Improved trunk rotation and B arm swing; increased yuri and step length   Ambulation assistance - unlevel surface 0 (Not tested) 5 (Supervision/setup)       STEPS/STAIRS Initial Assessment Discharge Assessment   Steps/Stairs ambulated 0 0   Rail Use (parallel bars) Both   Functional Level       Comments       Curbs/Ramps 0 (Not tested)         QUALITY INDICATOR ASSIST COMMENTS   Roll right (&return to back) 6: Independent    Roll left (& return to back) 6: Independent    Supine to sit 6: Independent    Sit to stand 6: Independent    Chair/bed-to-chair transfer 6: Independent    Walk 10 feet 6: Independent    Walk 50 feet with 2 turns 6: Independent    Walk 150 feet 4: Supervision or touching A    Walk 10 feet on uneven  4: Supervision or touching A    1 step/curb 4: Supervision or touching A    4 steps 4: Supervision or touching A    12 steps 4: Supervision or touching A     object 4: Supervision or touching A    Wheel 50' w/2 turns Not Tested: Not applicable secondary to pt not completing activity previously    Wheel 150' Not Tested: Not applicable secondary to pt not completing activity previously    Car Transfer 4: Supervision or touching A             PHYSICAL THERAPY PLAN OF CARE    LTGs: Pt has met 5/5 LTGs. Please see Care Plan for goals. Pt would benefit from continued skilled physical therapy in order to improve independent functional mobility within the home with use of least restrictive device. Interventions may include range of motion (AROM, PROM B LE/trunk), motor function (B LE/trunk strengthening/coordination), activity tolerance (vitals, oxygen saturation levels), bed mobility training, balance activities, gait training (progressive ambulation program), and functional transfer training. HEP handout: Given to pt in today's session. Pt to be discharged home with supervision provided by family. Therapy Recommendations upon discharge: 24 Hour Supervision(initially after discharge)   Equipment needs at discharge: N/A      Please see [id number] [address];  Interdisciplinary Eval, Care Plan, and Patient Education for further information regarding physical therapy discharge summary and plan of care.      Adrianne Spence, PT  2/28/2020

## 2020-02-28 NOTE — DISCHARGE INSTRUCTIONS
DISCHARGE SUMMARY from Nurse    PATIENT INSTRUCTIONS:    After general anesthesia or intravenous sedation, for 24 hours or while taking prescription Narcotics:  · Limit your activities  · Do not drive and operate hazardous machinery  · Do not make important personal or business decisions  · Do  not drink alcoholic beverages  · If you have not urinated within 8 hours after discharge, please contact your surgeon on call. Report the following to your surgeon:  · Excessive pain, swelling, redness or odor of or around the surgical area  · Temperature over 100.5  · Nausea and vomiting lasting longer than 4 hours or if unable to take medications  · Any signs of decreased circulation or nerve impairment to extremity: change in color, persistent  numbness, tingling, coldness or increase pain  · Any questions    What to do at Home:  Recommended activity: Patent needs help with  getting up from sitting . She will need supervision and minimal help getting in/ out or the shower . Setting up items, clothing, performing household activities in seated position . NO DRIVING . If you experience any of the following symptoms  OF A STROKE OR HEART ATTACH CALL 911 FOR other medical issues contact PCP      *  Please give a list of your current medications to your Primary Care Provider. *  Please update this list whenever your medications are discontinued, doses are      changed, or new medications (including over-the-counter products) are added. *  Please carry medication information at all times in case of emergency situations. These are general instructions for a healthy lifestyle:    No smoking/ No tobacco products/ Avoid exposure to second hand smoke  Surgeon General's Warning:  Quitting smoking now greatly reduces serious risk to your health.     Obesity, smoking, and sedentary lifestyle greatly increases your risk for illness    A healthy diet, regular physical exercise & weight monitoring are important for maintaining a healthy lifestyle    You may be retaining fluid if you have a history of heart failure or if you experience any of the following symptoms:  Weight gain of 3 pounds or more overnight or 5 pounds in a week, increased swelling in our hands or feet or shortness of breath while lying flat in bed. Please call your doctor as soon as you notice any of these symptoms; do not wait until your next office visit. The discharge information has been reviewed with the patient and spouse. The patient and spouse verbalized understanding. Discharge medications reviewed with the PATIENT and appropriate educational materials and side effects teaching were provided.   ___________________________________________________________________________________________________________________________________

## 2020-02-28 NOTE — PROGRESS NOTES
Problem: Self Care Deficits Care Plan (Adult)  Goal: *Therapy Goal (Edit Goal, Insert Text)  Description  STG 1: Patient will complete dressing with min assist using AE/DME PRN within 7 days. GOAL MET 2/25/2020    LTG 1: Patient will complete dressing with independence using AE/DME PRN within 14 days. GOAL MET 2/28/2020           Outcome: Resolved/Met  Goal: *Therapy Goal (Edit Goal, Insert Text)  Description  STG 2: Patient will complete bathing with minimal assistance using AE/DME PRN within 7 days. GOAL MET 2/25/2020    LTG 2: Patient will complete bathing with independence using AE/DME PRN within 14 days. GOAL MET 2/28/2020       Outcome: Resolved/Met  Goal: *Therapy Goal (Edit Goal, Insert Text)  Description  STG 3: Patient will complete toileting with minimal assistance using AE/DME PRN within 7 days. GOAL MET 2/25/2020      LTG 3: Patient will complete toileting with independence using AE/DME PRN within 14 days. GOAL MET 2/28/2020     Outcome: Resolved/Met  Goal: *Therapy Goal (Edit Goal, Insert Text)  Description  STG 4: Patient will complete toilet transfer with minimal assistance/SBA using AE/DME PRN within 7 days. GOAL MET 2/25/2020    LTG 4: Patient will complete toilet transfer with independence using AE/DME PRN within 14 days. GOAL MET 2/28/2020       Outcome: Resolved/Met  Goal: *Therapy Goal (Edit Goal, Insert Text)  Description  Patient will complete simple IADL simulation using AD PRN with supervision by discharge.   CONTINUE GOAL (2/25/2020)  GOAL MET 2/28/2020        Outcome: Resolved/Met  Goal: Interventions  Outcome: Resolved/Met    JONNY Lorenzo/BELKIS  2/28/2020

## 2020-02-28 NOTE — PROGRESS NOTES
Problem: Mobility Impaired (Adult and Pediatric)  Goal: *Therapy Goal (Edit Goal, Insert Text)  Description  STG 1. Pt will perform bed mobility tasks (rolling, sit <> supine) with supervision assist in 1 week. MET 02/25/2020  LTG 1. Pt will perform bed mobility tasks (rolling, sit <> supine) with mod I assist in 2 weeks. Outcome: Resolved/Met  Goal: *Therapy Goal (Edit Goal, Insert Text)  Description  STG 2. Patient will transfer sit<>stand and perform stand pivot transfer with LRAD and CGA assist in 1 week. MET 02/25/2020  LTG 2. Patient will transfer sit<>stand and perform stand pivot transfer with LRAD and supervision assist in 2 weeks. MET 02/25/2020      Outcome: Resolved/Met  Goal: *Therapy Goal (Edit Goal, Insert Text)  Description  STG 3. Patient will ambulate 150 feet with LRAD and min assist in 1 week. MET 02/25/2020  LTG 3. Patient will ambulate 300 feet with LRAD and supervision assist in 2 weeks. MET 02/25/2020    NEW LTG. Pt will ambulate 300 ft with LRAD and supervision assist over uneven terrain and/or in a busy environment in one week. NEW 02/25/2020      Outcome: Resolved/Met  Goal: *Therapy Goal (Edit Goal, Insert Text)  Description  STG 4. Patient will ambulate up/down 4 steps with LRAD and min assist in 1 week. MET 02/25/2020  LTG 4. Patient will ambulate up/down 12 steps with LRAD and supervision assist in 2 weeks. ONGOING   Outcome: Resolved/Met  Goal: *Therapy Goal (Edit Goal, Insert Text)  Description  LTG 5. Patient will maintain >90% oxygen saturation on room air while performing functional activities for 1 hour.  MET 02/25/2020        Outcome: Resolved/Met  Goal: Interventions  Outcome: Resolved/Met

## 2020-03-02 NOTE — PROGRESS NOTES
CM spoke with nurse from [address] [place] [address] on 3/2/2020 concerning patient's trach. [facility] did not have 6\" shiley for trach and they needed some supplies to last until appointment on March 9. CM called materials and 3 Shileys were tubed up and that's all they could give me. I called patient and nurse to let them know I could only give them 3. The [facility] nurse stated that she would try to call another supplier to get more supplies.  came to  supplies on 9th floor from nursing station.

## 2020-03-04 VITALS
RESPIRATION RATE: 23 BRPM | SYSTOLIC BLOOD PRESSURE: 111 MMHG | HEIGHT: 64 IN | OXYGEN SATURATION: 100 % | DIASTOLIC BLOOD PRESSURE: 67 MMHG | BODY MASS INDEX: 37.26 KG/M2 | WEIGHT: 218.26 LBS | TEMPERATURE: 99 F | HEART RATE: 78 BPM

## 2020-03-05 PROBLEM — E66.01 SEVERE OBESITY (HCC): Status: ACTIVE | Noted: 2020-03-05

## 2020-03-12 ENCOUNTER — APPOINTMENT (OUTPATIENT)
Dept: GENERAL RADIOLOGY | Age: 45
End: 2020-03-12
Attending: EMERGENCY MEDICINE
Payer: COMMERCIAL

## 2020-03-12 ENCOUNTER — APPOINTMENT (OUTPATIENT)
Dept: CT IMAGING | Age: 45
End: 2020-03-12
Attending: EMERGENCY MEDICINE
Payer: COMMERCIAL

## 2020-03-12 ENCOUNTER — HOSPITAL ENCOUNTER (EMERGENCY)
Age: 45
Discharge: HOME | End: 2020-03-13
Attending: EMERGENCY MEDICINE
Payer: COMMERCIAL

## 2020-03-12 DIAGNOSIS — M25.511 PAIN OF BOTH SHOULDER JOINTS: Primary | ICD-10-CM

## 2020-03-12 DIAGNOSIS — M54.2 NECK PAIN: ICD-10-CM

## 2020-03-12 DIAGNOSIS — M25.512 PAIN OF BOTH SHOULDER JOINTS: Primary | ICD-10-CM

## 2020-03-12 PROCEDURE — 99283 EMERGENCY DEPT VISIT LOW MDM: CPT

## 2020-03-12 PROCEDURE — 70450 CT HEAD/BRAIN W/O DYE: CPT

## 2020-03-12 PROCEDURE — 73030 X-RAY EXAM OF SHOULDER: CPT

## 2020-03-13 ENCOUNTER — APPOINTMENT (OUTPATIENT)
Dept: CT IMAGING | Age: 45
End: 2020-03-13
Attending: EMERGENCY MEDICINE
Payer: COMMERCIAL

## 2020-03-13 VITALS
DIASTOLIC BLOOD PRESSURE: 84 MMHG | SYSTOLIC BLOOD PRESSURE: 119 MMHG | RESPIRATION RATE: 16 BRPM | OXYGEN SATURATION: 96 % | TEMPERATURE: 98.8 F | HEART RATE: 71 BPM

## 2020-03-13 PROCEDURE — 72125 CT NECK SPINE W/O DYE: CPT

## 2020-03-13 RX ORDER — PREDNISONE 10 MG/1
TABLET ORAL
Qty: 21 TAB | Refills: 0 | OUTPATIENT
Start: 2020-03-13 | End: 2020-03-13

## 2020-03-13 NOTE — DISCHARGE INSTRUCTIONS
Patient Education        Neck Pain: Care Instructions  Your Care Instructions    You can have neck pain anywhere from the bottom of your head to the top of your shoulders. It can spread to the upper back or arms. Injuries, painting a ceiling, sleeping with your neck twisted, staying in one position for too long, and many other activities can cause neck pain. Most neck pain gets better with home care. Your doctor may recommend medicine to relieve pain or relax your muscles. He or she may suggest exercise and physical therapy to increase flexibility and relieve stress. You may need to wear a special (cervical) collar to support your neck for a day or two. Follow-up care is a key part of your treatment and safety. Be sure to make and go to all appointments, and call your doctor if you are having problems. It's also a good idea to know your test results and keep a list of the medicines you take. How can you care for yourself at home? · Try using a heating pad on a low or medium setting for 15 to 20 minutes every 2 or 3 hours. Try a warm shower in place of one session with the heating pad. · You can also try an ice pack for 10 to 15 minutes every 2 to 3 hours. Put a thin cloth between the ice and your skin. · Take pain medicines exactly as directed. ¨ If the doctor gave you a prescription medicine for pain, take it as prescribed. ¨ If you are not taking a prescription pain medicine, ask your doctor if you can take an over-the-counter medicine. · If your doctor recommends a cervical collar, wear it exactly as directed. When should you call for help? Call your doctor now or seek immediate medical care if:  ? · You have new or worsening numbness in your arms, buttocks or legs. ? · You have new or worsening weakness in your arms or legs. (This could make it hard to stand up.)   ? · You lose control of your bladder or bowels. ? Watch closely for changes in your health, and be sure to contact your doctor if:  ? · Your neck pain is getting worse. ? · You are not getting better after 1 week. ? · You do not get better as expected. Where can you learn more? Go to http://symone-preeti.info/. Enter [id number] in the search box to learn more about \"Neck Pain: Care Instructions. \"  Current as of: March 21, 2017  Content Version: 11.5  © 8437-1011 [employer]. Care instructions adapted under license by [employer] (which disclaims liability or warranty for this information). If you have questions about a medical condition or this instruction, always ask your healthcare professional. [address] any warranty or liability for your use of this information.

## 2020-03-13 NOTE — ED TRIAGE NOTES
Patient arrives to ED from home. Patient states she was working around the house today and felt a discomfort in her left shoulder. Patient describes the shoulder discomfort as \"tightness and burning\". It does not radiate.

## 2020-03-13 NOTE — ED NOTES
I have reviewed discharge instructions with the patient, spouse. The patient and spouse verbalized understanding. Patient left ED via Discharge Method: ambulatory to Home with family. Opportunity for questions and clarification provided. Patient given 0 scripts. To continue your aftercare when you leave the hospital, you may receive an automated call from our care team to check in on how you are doing. This is a free service and part of our promise to provide the best care and service to meet your aftercare needs.  If you have questions, or wish to unsubscribe from this service please call [phone]. Thank you for Choosing our [facility] Department.

## 2020-03-13 NOTE — ED PROVIDER NOTES
57-year-old -American female suffered a hemorrhagic CVA approximately 5 weeks ago. Has mild residual left sided weakness. She was at home this evening doing chores specifically laundry and mopping when she developed a burning discomfort across her upper back and shoulders. No chest pain or shortness of breath. No changes in her numbness and weakness of her left upper extremity. No vision changes. No headache. Patient is having some difficulty describing the symptoms. He denies fall or other direct trauma. The history is provided by the patient. Shoulder Pain           History reviewed. No pertinent past medical history. History reviewed. No pertinent surgical history. History reviewed. No pertinent family history.     Social History     Socioeconomic History    Marital status:      Spouse name: Not on file    Number of children: Not on file    Years of education: Not on file    Highest education level: Not on file   Occupational History    Not on file   Social Needs    Financial resource strain: Not on file    Food insecurity     Worry: Not on file     Inability: Not on file    Transportation needs     Medical: Not on file     Non-medical: Not on file   Tobacco Use    Smoking status: Never Smoker    Smokeless tobacco: Never Used   Substance and Sexual Activity    Alcohol use: Never     Frequency: Never    Drug use: Never    Sexual activity: Not on file   Lifestyle    Physical activity     Days per week: Not on file     Minutes per session: Not on file    Stress: Not on file   Relationships    Social connections     Talks on phone: Not on file     Gets together: Not on file     Attends [employer] service: Not on file     Active member of club or organization: Not on file     Attends meetings of clubs or organizations: Not on file     Relationship status: Not on file    Intimate partner violence     Fear of current or ex partner: Not on file     Emotionally abused: Not on file     Physically abused: Not on file     Forced sexual activity: Not on file   Other Topics Concern    Not on file   Social History Narrative    Not on file         ALLERGIES: Ace inhibitors    Review of Systems   Constitutional: Negative for fever. HENT: Negative for congestion. Respiratory: Negative for cough and shortness of breath. Cardiovascular: Negative for chest pain. Gastrointestinal: Negative for abdominal pain, nausea and vomiting. Genitourinary: Negative for dysuria. Musculoskeletal: Positive for back pain and neck pain. Skin: Negative for rash. Neurological: Negative for headaches. Vitals:    03/12/20 2136   BP: 137/84   Pulse: 90   Resp: 18   Temp: 98.1 °F (36.7 °C)   SpO2: 99%            Physical Exam  Vitals signs and nursing note reviewed. Constitutional:       General: She is not in acute distress. Appearance: Normal appearance. She is not toxic-appearing. HENT:      Head: Normocephalic and atraumatic. Nose: Nose normal.      Mouth/Throat:      Mouth: Mucous membranes are moist.      Pharynx: Oropharynx is clear. Eyes:      Extraocular Movements: Extraocular movements intact. Conjunctiva/sclera: Conjunctivae normal.      Pupils: Pupils are equal, round, and reactive to light. Neck:      Musculoskeletal: Normal range of motion and neck supple. No muscular tenderness. Cardiovascular:      Rate and Rhythm: Normal rate. Heart sounds: No murmur. Pulmonary:      Effort: Pulmonary effort is normal.      Breath sounds: Normal breath sounds. Abdominal:      General: There is no distension. Palpations: Abdomen is soft. Tenderness: There is no abdominal tenderness. Musculoskeletal: Normal range of motion. General: No swelling or tenderness. Skin:     General: Skin is warm and dry. Neurological:      Mental Status: She is alert and oriented to person, place, and time. Comments: Cranial nerves intact.   She does have general weakness to the left upper extremity and diminished sensation to left upper extremity. This is baseline since her CVA. Lower extremity strength is symmetric. Psychiatric:         Mood and Affect: Mood normal.         Behavior: Behavior normal.          MDM  Number of Diagnoses or Management Options  Neck pain:   Pain of both shoulder joints:   Diagnosis management comments: X-ray and head CT shows no acute abnormality. Upon review of medical records I became aware that her hematoma issue was within her C-spine not within her brain. I discussed the patient with Dr. Collette Jordan who took care of the patient on her initial admission and at this time she is requesting a noncontrast cervical spine CT to assess for reaccumulation of hematoma. If this is normal patient can go home and they will see her in the office. If there is an abnormality will need to be addressed Dr. Collette Jordan. Care will be turned over to Dr. Kailee Pérez to follow-up with the CT. Received care of patient from Dr. Scooter Queen awaiting CT results. CT noncontrast study read by radiology as normal with minor nonacute findings. Plan is to discharge follow-up with neurosurgery/neurology.          Amount and/or Complexity of Data Reviewed  Tests in the radiology section of CPT®: ordered and reviewed  Independent visualization of images, tracings, or specimens: yes    Risk of Complications, Morbidity, and/or Mortality  Presenting problems: moderate  Diagnostic procedures: moderate  Management options: moderate           Procedures

## 2020-03-18 ENCOUNTER — HOSPITAL ENCOUNTER (OUTPATIENT)
Dept: MRI IMAGING | Age: 45
Discharge: HOME | End: 2020-03-18
Attending: NEUROLOGICAL SURGERY
Payer: COMMERCIAL

## 2020-03-18 DIAGNOSIS — Q28.8 AVM (ARTERIOVENOUS MALFORMATION) SPINE: ICD-10-CM

## 2020-03-18 PROCEDURE — 74011250636 HC RX REV CODE- 250/636: Performed by: NEUROLOGICAL SURGERY

## 2020-03-18 PROCEDURE — 72156 MRI NECK SPINE W/O & W/DYE: CPT

## 2020-03-18 PROCEDURE — A9575 INJ GADOTERATE MEGLUMI 0.1ML: HCPCS | Performed by: NEUROLOGICAL SURGERY

## 2020-03-18 RX ORDER — GADOTERATE MEGLUMINE 376.9 MG/ML
19 INJECTION INTRAVENOUS
Status: COMPLETED | OUTPATIENT
Start: 2020-03-18 | End: 2020-03-18

## 2020-03-18 RX ORDER — SODIUM CHLORIDE 0.9 % (FLUSH) 0.9 %
10 SYRINGE (ML) INJECTION
Status: COMPLETED | OUTPATIENT
Start: 2020-03-18 | End: 2020-03-18

## 2020-03-18 RX ADMIN — Medication 10 ML: at 11:28

## 2020-03-18 RX ADMIN — GADOTERATE MEGLUMINE 19 ML: 376.9 INJECTION INTRAVENOUS at 11:28

## 2020-05-28 DIAGNOSIS — Q28.8 AVM (ARTERIOVENOUS MALFORMATION) SPINE: Primary | ICD-10-CM

## 2020-06-02 ENCOUNTER — HOSPITAL ENCOUNTER (OUTPATIENT)
Dept: OTHER | Age: 45
Discharge: HOME | End: 2020-06-02
Attending: NEUROLOGICAL SURGERY
Payer: COMMERCIAL

## 2020-06-02 VITALS
RESPIRATION RATE: 16 BRPM | HEIGHT: 64 IN | SYSTOLIC BLOOD PRESSURE: 127 MMHG | OXYGEN SATURATION: 95 % | BODY MASS INDEX: 33.97 KG/M2 | WEIGHT: 199 LBS | HEART RATE: 58 BPM | DIASTOLIC BLOOD PRESSURE: 73 MMHG

## 2020-06-02 DIAGNOSIS — Q28.8 AVM (ARTERIOVENOUS MALFORMATION) SPINE: ICD-10-CM

## 2020-06-02 LAB
ANION GAP SERPL CALC-SCNC: 6 MMOL/L (ref 7–16)
BASOPHILS # BLD: 0.1 K/UL (ref 0–0.2)
BASOPHILS NFR BLD: 1 % (ref 0–2)
BUN SERPL-MCNC: 9 MG/DL (ref 6–23)
CALCIUM SERPL-MCNC: 9.2 MG/DL (ref 8.3–10.4)
CHLORIDE SERPL-SCNC: 109 MMOL/L (ref 98–107)
CO2 SERPL-SCNC: 29 MMOL/L (ref 21–32)
CREAT SERPL-MCNC: 0.64 MG/DL (ref 0.6–1)
DIFFERENTIAL METHOD BLD: NORMAL
EOSINOPHIL # BLD: 0.1 K/UL (ref 0–0.8)
EOSINOPHIL NFR BLD: 1 % (ref 0.5–7.8)
ERYTHROCYTE [DISTWIDTH] IN BLOOD BY AUTOMATED COUNT: 13.7 % (ref 11.9–14.6)
GLUCOSE SERPL-MCNC: 94 MG/DL (ref 65–100)
HCT VFR BLD AUTO: 38.3 % (ref 35.8–46.3)
HGB BLD-MCNC: 12.1 G/DL (ref 11.7–15.4)
IMM GRANULOCYTES # BLD AUTO: 0 K/UL (ref 0–0.5)
IMM GRANULOCYTES NFR BLD AUTO: 0 % (ref 0–5)
INR PPP: 1
LYMPHOCYTES # BLD: 2 K/UL (ref 0.5–4.6)
LYMPHOCYTES NFR BLD: 39 % (ref 13–44)
MCH RBC QN AUTO: 27.1 PG (ref 26.1–32.9)
MCHC RBC AUTO-ENTMCNC: 31.6 G/DL (ref 31.4–35)
MCV RBC AUTO: 85.7 FL (ref 79.6–97.8)
MONOCYTES # BLD: 0.4 K/UL (ref 0.1–1.3)
MONOCYTES NFR BLD: 8 % (ref 4–12)
NEUTS SEG # BLD: 2.6 K/UL (ref 1.7–8.2)
NEUTS SEG NFR BLD: 50 % (ref 43–78)
NRBC # BLD: 0 K/UL (ref 0–0.2)
PLATELET # BLD AUTO: 183 K/UL (ref 150–450)
PMV BLD AUTO: 11.9 FL (ref 9.4–12.3)
POTASSIUM SERPL-SCNC: 3.6 MMOL/L (ref 3.5–5.1)
PROTHROMBIN TIME: 13.3 SEC (ref 12–14.7)
RBC # BLD AUTO: 4.47 M/UL (ref 4.05–5.2)
SODIUM SERPL-SCNC: 144 MMOL/L (ref 136–145)
WBC # BLD AUTO: 5.1 K/UL (ref 4.3–11.1)

## 2020-06-02 PROCEDURE — 80048 BASIC METABOLIC PNL TOTAL CA: CPT

## 2020-06-02 PROCEDURE — 74011636320 HC RX REV CODE- 636/320: Performed by: NEUROLOGICAL SURGERY

## 2020-06-02 PROCEDURE — 85610 PROTHROMBIN TIME: CPT

## 2020-06-02 PROCEDURE — 74011000250 HC RX REV CODE- 250: Performed by: NEUROLOGICAL SURGERY

## 2020-06-02 PROCEDURE — 85025 COMPLETE CBC W/AUTO DIFF WBC: CPT

## 2020-06-02 PROCEDURE — C1769 GUIDE WIRE: HCPCS

## 2020-06-02 PROCEDURE — C1760 CLOSURE DEV, VASC: HCPCS

## 2020-06-02 PROCEDURE — 77030012468 HC VLV BLEEDBK CNTRL ABBT -B

## 2020-06-02 PROCEDURE — C1887 CATHETER, GUIDING: HCPCS

## 2020-06-02 PROCEDURE — 36226 PLACE CATH VERTEBRAL ART: CPT

## 2020-06-02 PROCEDURE — 77030022017 HC DRSG HEMO QCLOT ZMED -A

## 2020-06-02 PROCEDURE — 36226 PLACE CATH VERTEBRAL ART: CPT | Performed by: NEUROLOGICAL SURGERY

## 2020-06-02 PROCEDURE — 74011250636 HC RX REV CODE- 250/636: Performed by: NEUROLOGICAL SURGERY

## 2020-06-02 PROCEDURE — C1894 INTRO/SHEATH, NON-LASER: HCPCS

## 2020-06-02 RX ORDER — ONDANSETRON 2 MG/ML
4 INJECTION INTRAMUSCULAR; INTRAVENOUS
Status: DISCONTINUED | OUTPATIENT
Start: 2020-06-02 | End: 2020-06-06 | Stop reason: HOSPADM

## 2020-06-02 RX ORDER — FENTANYL CITRATE 50 UG/ML
25-100 INJECTION, SOLUTION INTRAMUSCULAR; INTRAVENOUS
Status: DISCONTINUED | OUTPATIENT
Start: 2020-06-02 | End: 2020-06-02 | Stop reason: ALTCHOICE

## 2020-06-02 RX ORDER — HEPARIN SODIUM 200 [USP'U]/100ML
1000 INJECTION, SOLUTION INTRAVENOUS
Status: DISCONTINUED | OUTPATIENT
Start: 2020-06-02 | End: 2020-06-02 | Stop reason: ALTCHOICE

## 2020-06-02 RX ORDER — MIDAZOLAM HYDROCHLORIDE 1 MG/ML
.25-2 INJECTION, SOLUTION INTRAMUSCULAR; INTRAVENOUS
Status: DISCONTINUED | OUTPATIENT
Start: 2020-06-02 | End: 2020-06-02 | Stop reason: ALTCHOICE

## 2020-06-02 RX ORDER — ACETAMINOPHEN 325 MG/1
650 TABLET ORAL
Status: DISCONTINUED | OUTPATIENT
Start: 2020-06-02 | End: 2020-06-06 | Stop reason: HOSPADM

## 2020-06-02 RX ORDER — LORAZEPAM 0.5 MG/1
TABLET ORAL
COMMUNITY

## 2020-06-02 RX ORDER — SODIUM CHLORIDE 9 MG/ML
25 INJECTION, SOLUTION INTRAVENOUS ONCE
Status: COMPLETED | OUTPATIENT
Start: 2020-06-02 | End: 2020-06-02

## 2020-06-02 RX ORDER — LIDOCAINE HYDROCHLORIDE 20 MG/ML
20-200 INJECTION, SOLUTION INFILTRATION; PERINEURAL
Status: DISCONTINUED | OUTPATIENT
Start: 2020-06-02 | End: 2020-06-02 | Stop reason: ALTCHOICE

## 2020-06-02 RX ORDER — SODIUM CHLORIDE 9 MG/ML
150 INJECTION, SOLUTION INTRAVENOUS CONTINUOUS
Status: ACTIVE | OUTPATIENT
Start: 2020-06-02 | End: 2020-06-02

## 2020-06-02 RX ADMIN — FENTANYL CITRATE 25 MCG: 50 INJECTION INTRAMUSCULAR; INTRAVENOUS at 09:25

## 2020-06-02 RX ADMIN — IOPAMIDOL 50 ML: 612 INJECTION, SOLUTION INTRAVENOUS at 09:28

## 2020-06-02 RX ADMIN — MIDAZOLAM HYDROCHLORIDE 1 MG: 2 INJECTION, SOLUTION INTRAMUSCULAR; INTRAVENOUS at 08:55

## 2020-06-02 RX ADMIN — HEPARIN SODIUM 2000 UNITS: 200 INJECTION, SOLUTION INTRAVENOUS at 09:22

## 2020-06-02 RX ADMIN — FENTANYL CITRATE 50 MCG: 50 INJECTION INTRAMUSCULAR; INTRAVENOUS at 08:55

## 2020-06-02 RX ADMIN — SODIUM CHLORIDE 25 ML/HR: 9 INJECTION, SOLUTION INTRAVENOUS at 08:44

## 2020-06-02 RX ADMIN — ONDANSETRON 4 MG: 2 INJECTION INTRAMUSCULAR; INTRAVENOUS at 10:24

## 2020-06-02 RX ADMIN — LIDOCAINE HYDROCHLORIDE 60 MG: 20 INJECTION, SOLUTION INFILTRATION; PERINEURAL at 09:06

## 2020-06-02 RX ADMIN — HEPARIN SODIUM 2000 UNITS: 200 INJECTION, SOLUTION INTRAVENOUS at 09:21

## 2020-06-02 RX ADMIN — SODIUM CHLORIDE 150 ML/HR: 9 INJECTION, SOLUTION INTRAVENOUS at 09:35

## 2020-06-02 RX ADMIN — HEPARIN SODIUM 2000 UNITS: 200 INJECTION, SOLUTION INTRAVENOUS at 09:18

## 2020-06-02 RX ADMIN — HEPARIN SODIUM 2000 UNITS: 200 INJECTION, SOLUTION INTRAVENOUS at 09:15

## 2020-06-02 NOTE — DISCHARGE INSTRUCTIONS
[facility]  Cerebrovascular and Stroke Center            Cerebral Angiography Discharge Instructions    General Information: This test is done to evaluate the blood vessels in your brain and neck. Following the procedure, you will be asked to lie flat on your back for 2-6 hours after the procedure to prevent bleeding complications. The physician may use an arterial closure device that will decrease your recovery time. If this is used, your nurse will explain the difference in recovery. We have no way to determine if we can use a closure device until the procedure is started. We will let you know when you wake up after the procedure. Home Care Instructions: You can resume your regular diet. Do not shower, bathe, swim, drink alcohol, or make any important legal decisions in the next 48 hours. You may drive after 24 hours. Do not lift anything heavier than a gallon of milk for 2 days. Watch the site carefully for signs of infection, like fever, drainage, redness, and/or swelling. If you take Glucophage (Metformin) for diabetes, do not take it for the next 48 hours. If you were asked to hold any blood thinners prior to the procedure, you may restart that medicine the day after the procedure is completed or when instructed by your physician. Recline your car seat for the ride home. Call If: You should call your Physician and/or the Radiology Nurse if you have any signs of infection like fever, drainage, redness, and/or swelling. If the puncture site should ooze, please call. Also call if you have any pain, decreased sensation, numbness, tingling, swelling, or change in color to the affected extremity. SEEK IMMEDIATE MEDICAL CARE IF YOUR PUNCTURE SITE STARTS TO BLEED. APPLY ENOUGH FIRM PRESSURE TO THE SITE WITH THE TIPS OF YOUR FINGERS TO STOP THE BLEEDING. Arterial bleeding is a medical emergency and should be evaluated immediately.     Follow-Up Instructions:  Please follow up with your ordering doctor as he/she has requested. To Reach Us: If you have any questions about your procedure, please call Laurent Nixon NP at [phone]. Interventional Radiology General Nurse Discharge    After general anesthesia or intravenous sedation, for 24 hours or while taking prescription Narcotics:  · Limit your activities  · Do not drive and operate hazardous machinery  · Do not make important personal or business decisions  · Do  not drink alcoholic beverages  · If you have not urinated within 8 hours after discharge, please contact your surgeon on call. * Please give a list of your current medications to your Primary Care Provider. * Please update this list whenever your medications are discontinued, doses are     changed, or new medications (including over-the-counter products) are added. * Please carry medication information at all times in case of emergency situations. These are general instructions for a healthy lifestyle:    No smoking/ No tobacco products/ Avoid exposure to second hand smoke  Surgeon General's Warning:  Quitting smoking now greatly reduces serious risk to your health. Obesity, smoking, and sedentary lifestyle greatly increases your risk for illness  A healthy diet, regular physical exercise & weight monitoring are important for maintaining a healthy lifestyle    You may be retaining fluid if you have a history of heart failure or if you experience any of the following symptoms:  Weight gain of 3 pounds or more overnight or 5 pounds in a week, increased swelling in our hands or feet or shortness of breath while lying flat in bed. Please call your doctor as soon as you notice any of these symptoms; do not wait until your next office visit.     Recognize signs and symptoms of STROKE:  F-face looks uneven    A-arms unable to move or move unevenly    S-speech slurred or non-existent    T-time-call 911 as soon as signs and symptoms begin-DO NOT go       Back to bed or wait to see if you get better-TIME IS BRAIN.

## 2020-06-02 NOTE — PROGRESS NOTES
TRANSFER - OUT REPORT:    Verbal report given to Cassia Fernandez RN (name[facility] (unit) for routine progression of care       Report consisted of patients Situation, Background, Assessment and   Recommendations(SBAR). Information from the following report(s) Procedure Summary, Intake/Output and MAR was reviewed with the receiving nurse. Opportunity for questions and clarification was provided. Conscious Sedation:   75 Mcg of Fentanyl administered  1 Mg of Versed administered    Pt tolerated procedure well.      Visit Vitals  /79   Pulse 66   Resp 14   Ht 5' 4\" (1.626 m)   Wt 90.3 kg (199 lb)   SpO2 98%   Breastfeeding No   BMI 34.16 kg/m²     Past Medical History:   Diagnosis Date    Hypertension     Stroke [facility])      Peripheral IV 06/02/20 Left Antecubital (Active)

## 2020-06-02 NOTE — PROGRESS NOTES
Recovery period without difficulty. Pt alert and oriented and denies pain. Dressing is clean, dry, and intact. Reviewed discharge instructions with patient and spouse, both verbalized understanding. Pt escorted to lobby discharge area via wheelchair. Vital signs and Lotus score completed.

## 2020-06-02 NOTE — OP NOTES
Procedure: Cerebral angiogram  Surgeon: Dr. 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

## 2020-06-02 NOTE — H&P
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 She has left sided residual weakness. She is getting therapy for her right shoulder. She denies any other significant changes.      No past medical history on file.                    Allergies   Allergen Reactions    Ace Inhibitors Angioedema    Iodinated Contrast Media Unable to Obtain    Red Dye Other (comments)         No family history on file.      Social History            Socioeconomic History    Marital status:        Spouse name: Not on file    Number of children: Not on file    Years of education: Not on file    Highest education level: Not on file   Occupational History    Not on file   Social Needs    Financial resource strain: Not on file    Food insecurity       Worry: Not on file       Inability: Not on file    Transportation needs       Medical: Not on file       Non-medical: Not on file   Tobacco Use    Smoking status: Never Smoker    Smokeless tobacco: Never Used   Substance and Sexual Activity    Alcohol use: Never       Frequency: Never    Drug use: Never    Sexual activity: Not on file   Lifestyle    Physical activity       Days per week: Not on file       Minutes per session: Not on file    Stress: Not on file   Relationships    Social connections       Talks on phone: Not on file       Gets together: Not on file       Attends [employer] service: Not on file       Active member of club or organization: Not on file       Attends meetings of clubs or organizations: Not on file       Relationship status: Not on file    Intimate partner violence       Fear of current or ex partner: Not on file       Emotionally abused: Not on file       Physically abused: Not on file       Forced sexual activity: Not on file   Other Topics Concern    Not on file   Social History Narrative    Not on file                Current Outpatient Medications   Medication Sig Dispense Refill    HYDROcodone-acetaminophen (NORCO) 7.5-325 mg per tablet Take 1 Tab by mouth every eight (8) hours as needed for Pain.        ALPRAZolam (XANAX) 0.25 mg tablet Take 1 Tab by mouth two (2) times a day. Max Daily Amount: 0.5 mg. 30 Tab 1    atorvastatin (LIPITOR) 40 mg tablet 1 Tab by Per NG tube route nightly. 90 Tab 3    metoprolol tartrate (LOPRESSOR) 25 mg tablet Take 1 Tab by mouth daily. 30 Tab 3    gabapentin (NEURONTIN) 100 mg capsule Take 2 Caps by mouth nightly as needed (Neuropathic discomfort left hemibody). 30 Cap 1    traZODone (DESYREL) 50 mg tablet Take 1 Tab by mouth nightly as needed for Sleep. 30 Tab 1            ROS  Negative except HPI     Physical Exam     Cranial Nerves:   VIKAS, EOM's full, no nystagmus, no ptosis. Facial sensation is normal. Facial movement is symmetric. Hearing is normal bilaterally. Palate is midline with normal sternocleidomastoid and trapezius muscles are normal. Tongue is midline. Motor:  5/5 strength in upper and lower proximal and distal muscles on the right side. Left sided weakness. Left hand 3/5. Left leg drags when ambulating. Normal bulk and tone. No fasciculations. Reflexes:    Not done   Sensory:   Decreased sensation of the left arm. Gait:  Left leg drags a little when she walks   Tremor:   No tremor noted. Cerebellar:  No cerebellar signs present.              Assessment & Plan:  Patient is here for her follow up angiogram. I discussed the procedure and its risks with her. Consent was obtained.

## 2020-10-13 DIAGNOSIS — Q28.8 AVM (ARTERIOVENOUS MALFORMATION) SPINE: Primary | ICD-10-CM

## 2020-10-20 ENCOUNTER — HOSPITAL ENCOUNTER (OUTPATIENT)
Dept: OTHER | Age: 45
Discharge: HOME | End: 2020-10-20
Attending: NEUROLOGICAL SURGERY
Payer: COMMERCIAL

## 2020-10-20 VITALS
HEART RATE: 58 BPM | DIASTOLIC BLOOD PRESSURE: 81 MMHG | TEMPERATURE: 98 F | HEIGHT: 64 IN | RESPIRATION RATE: 16 BRPM | SYSTOLIC BLOOD PRESSURE: 155 MMHG | BODY MASS INDEX: 33.29 KG/M2 | WEIGHT: 195 LBS | OXYGEN SATURATION: 98 %

## 2020-10-20 DIAGNOSIS — Q28.8 AVM (ARTERIOVENOUS MALFORMATION) SPINE: ICD-10-CM

## 2020-10-20 LAB
ANION GAP SERPL CALC-SCNC: 3 MMOL/L (ref 7–16)
APTT PPP: 31.7 SEC (ref 24.3–35.4)
BUN SERPL-MCNC: 10 MG/DL (ref 6–23)
CALCIUM SERPL-MCNC: 9 MG/DL (ref 8.3–10.4)
CHLORIDE SERPL-SCNC: 109 MMOL/L (ref 98–107)
CO2 SERPL-SCNC: 31 MMOL/L (ref 21–32)
CREAT SERPL-MCNC: 0.73 MG/DL (ref 0.6–1)
ERYTHROCYTE [DISTWIDTH] IN BLOOD BY AUTOMATED COUNT: 13.2 % (ref 11.9–14.6)
GLUCOSE SERPL-MCNC: 95 MG/DL (ref 65–100)
HCT VFR BLD AUTO: 40 % (ref 35.8–46.3)
HGB BLD-MCNC: 12.8 G/DL (ref 11.7–15.4)
INR PPP: 1
MCH RBC QN AUTO: 28.1 PG (ref 26.1–32.9)
MCHC RBC AUTO-ENTMCNC: 32 G/DL (ref 31.4–35)
MCV RBC AUTO: 87.9 FL (ref 79.6–97.8)
NRBC # BLD: 0 K/UL (ref 0–0.2)
PLATELET # BLD AUTO: 182 K/UL (ref 150–450)
PMV BLD AUTO: 11.2 FL (ref 9.4–12.3)
POTASSIUM SERPL-SCNC: 4.1 MMOL/L (ref 3.5–5.1)
PROTHROMBIN TIME: 13.2 SEC (ref 12–14.7)
RBC # BLD AUTO: 4.55 M/UL (ref 4.05–5.2)
SODIUM SERPL-SCNC: 143 MMOL/L (ref 136–145)
WBC # BLD AUTO: 5.8 K/UL (ref 4.3–11.1)

## 2020-10-20 PROCEDURE — 85027 COMPLETE CBC AUTOMATED: CPT

## 2020-10-20 PROCEDURE — 36226 PLACE CATH VERTEBRAL ART: CPT | Performed by: NEUROLOGICAL SURGERY

## 2020-10-20 PROCEDURE — 74011000636 HC RX REV CODE- 636: Performed by: NEUROLOGICAL SURGERY

## 2020-10-20 PROCEDURE — 77030003629 HC NDL PERC VASC COOK -A

## 2020-10-20 PROCEDURE — C1760 CLOSURE DEV, VASC: HCPCS

## 2020-10-20 PROCEDURE — 80048 BASIC METABOLIC PNL TOTAL CA: CPT

## 2020-10-20 PROCEDURE — 85730 THROMBOPLASTIN TIME PARTIAL: CPT

## 2020-10-20 PROCEDURE — 74011000250 HC RX REV CODE- 250: Performed by: NEUROLOGICAL SURGERY

## 2020-10-20 PROCEDURE — C1894 INTRO/SHEATH, NON-LASER: HCPCS

## 2020-10-20 PROCEDURE — 85610 PROTHROMBIN TIME: CPT

## 2020-10-20 PROCEDURE — C1769 GUIDE WIRE: HCPCS

## 2020-10-20 PROCEDURE — 77030004521 HC CATH ANGI DX COOK -B

## 2020-10-20 PROCEDURE — 74011250636 HC RX REV CODE- 250/636: Performed by: NEUROLOGICAL SURGERY

## 2020-10-20 PROCEDURE — 77030012468 HC VLV BLEEDBK CNTRL ABBT -B

## 2020-10-20 PROCEDURE — 36226 PLACE CATH VERTEBRAL ART: CPT

## 2020-10-20 RX ORDER — MIDAZOLAM HYDROCHLORIDE 1 MG/ML
.25-2 INJECTION, SOLUTION INTRAMUSCULAR; INTRAVENOUS
Status: DISCONTINUED | OUTPATIENT
Start: 2020-10-20 | End: 2020-10-20 | Stop reason: ALTCHOICE

## 2020-10-20 RX ORDER — ONDANSETRON 2 MG/ML
4 INJECTION INTRAMUSCULAR; INTRAVENOUS
Status: CANCELLED | OUTPATIENT
Start: 2020-10-20

## 2020-10-20 RX ORDER — ONDANSETRON 2 MG/ML
4 INJECTION INTRAMUSCULAR; INTRAVENOUS
Status: DISCONTINUED | OUTPATIENT
Start: 2020-10-20 | End: 2020-10-24 | Stop reason: HOSPADM

## 2020-10-20 RX ORDER — HEPARIN SODIUM 200 [USP'U]/100ML
1000 INJECTION, SOLUTION INTRAVENOUS
Status: DISCONTINUED | OUTPATIENT
Start: 2020-10-20 | End: 2020-10-20 | Stop reason: ALTCHOICE

## 2020-10-20 RX ORDER — SODIUM CHLORIDE 9 MG/ML
150 INJECTION, SOLUTION INTRAVENOUS CONTINUOUS
Status: ACTIVE | OUTPATIENT
Start: 2020-10-20 | End: 2020-10-20

## 2020-10-20 RX ORDER — FENTANYL CITRATE 50 UG/ML
25-100 INJECTION, SOLUTION INTRAMUSCULAR; INTRAVENOUS
Status: DISCONTINUED | OUTPATIENT
Start: 2020-10-20 | End: 2020-10-20 | Stop reason: ALTCHOICE

## 2020-10-20 RX ORDER — ACETAMINOPHEN 325 MG/1
650 TABLET ORAL
Status: CANCELLED | OUTPATIENT
Start: 2020-10-20

## 2020-10-20 RX ORDER — SODIUM CHLORIDE 9 MG/ML
25 INJECTION, SOLUTION INTRAVENOUS ONCE
Status: COMPLETED | OUTPATIENT
Start: 2020-10-20 | End: 2020-10-20

## 2020-10-20 RX ORDER — ACETAMINOPHEN 325 MG/1
650 TABLET ORAL
Status: DISCONTINUED | OUTPATIENT
Start: 2020-10-20 | End: 2020-10-24 | Stop reason: HOSPADM

## 2020-10-20 RX ORDER — SODIUM CHLORIDE 9 MG/ML
150 INJECTION, SOLUTION INTRAVENOUS CONTINUOUS
Status: CANCELLED | OUTPATIENT
Start: 2020-10-20 | End: 2020-10-20

## 2020-10-20 RX ORDER — LIDOCAINE HYDROCHLORIDE 20 MG/ML
20-200 INJECTION, SOLUTION INFILTRATION; PERINEURAL
Status: DISCONTINUED | OUTPATIENT
Start: 2020-10-20 | End: 2020-10-20 | Stop reason: ALTCHOICE

## 2020-10-20 RX ADMIN — HEPARIN SODIUM 2000 UNITS: 200 INJECTION, SOLUTION INTRAVENOUS at 08:30

## 2020-10-20 RX ADMIN — IOPAMIDOL 150 ML: 612 INJECTION, SOLUTION INTRAVENOUS at 08:58

## 2020-10-20 RX ADMIN — HEPARIN SODIUM 2000 UNITS: 200 INJECTION, SOLUTION INTRAVENOUS at 08:28

## 2020-10-20 RX ADMIN — HEPARIN SODIUM 2000 UNITS: 200 INJECTION, SOLUTION INTRAVENOUS at 08:29

## 2020-10-20 RX ADMIN — HEPARIN SODIUM 2000 UNITS: 200 INJECTION, SOLUTION INTRAVENOUS at 08:27

## 2020-10-20 RX ADMIN — FENTANYL CITRATE 50 MCG: 50 INJECTION, SOLUTION INTRAMUSCULAR; INTRAVENOUS at 08:15

## 2020-10-20 RX ADMIN — LIDOCAINE HYDROCHLORIDE 100 MG: 20 INJECTION, SOLUTION INFILTRATION; PERINEURAL at 08:26

## 2020-10-20 RX ADMIN — MIDAZOLAM 1 MG: 1 INJECTION INTRAMUSCULAR; INTRAVENOUS at 08:15

## 2020-10-20 RX ADMIN — LIDOCAINE HYDROCHLORIDE 40 MG: 20 INJECTION, SOLUTION INFILTRATION; PERINEURAL at 08:54

## 2020-10-20 RX ADMIN — SODIUM CHLORIDE 25 ML/HR: 900 INJECTION, SOLUTION INTRAVENOUS at 08:17

## 2020-10-20 NOTE — PROGRESS NOTES
TRANSFER - OUT REPORT:    Verbal report given to Elizabeth RN (name) on Keaton Tadeo  being transferred to IR recovery (unit) for routine progression of care       Report consisted of patients Situation, Background, Assessment and   Recommendations(SBAR). Information from the following report(s) Procedure Summary and MAR was reviewed with the receiving nurse. Opportunity for questions and clarification was provided. Conscious Sedation:   50 Mcg of Fentanyl administered  1 Mg of Versed administered    Pt tolerated procedure well.      Visit Vitals  BP (!) 147/70   Pulse (!) 58   Temp 98 °F (36.7 °C)   Resp 16   Ht 5' 4\" (1.626 m)   Wt 88.5 kg (195 lb)   SpO2 100%   BMI 33.47 kg/m²     Past Medical History:   Diagnosis Date    Hypertension     Stroke [facility])      Peripheral IV 10/20/20 Left Antecubital (Active)

## 2020-10-20 NOTE — PROGRESS NOTES
Patient ambulatory to restroom and back prior to discharge. Site rechecked and pulses intact. Recovery period without difficulty. Pt alert and oriented and denies pain. Dressing is clean, dry, and intact. Reviewed discharge instructions with patient and family member, both verbalized understanding. Pt escorted to lobby discharge area via wheelchair. Vital signs and Lotus score completed.

## 2020-10-20 NOTE — DISCHARGE INSTRUCTIONS
[address] [facility],[address]  Cerebrovascular and Stroke Center            Cerebral Angiography Discharge Instructions    General Information: This test is done to evaluate the blood vessels in your brain and neck. Following the procedure, you will be asked to lie flat on your back for 2-6 hours after the procedure to prevent bleeding complications. The physician may use an arterial closure device that will decrease your recovery time. If this is used, your nurse will explain the difference in recovery. We have no way to determine if we can use a closure device until the procedure is started. We will let you know when you wake up after the procedure. Home Care Instructions: You can resume your regular diet. Do not shower, bathe, swim, drink alcohol, or make any important legal decisions in the next 48 hours. You may drive after 24 hours. Do not lift anything heavier than a gallon of milk for 2 days. Watch the site carefully for signs of infection, like fever, drainage, redness, and/or swelling. If you take Glucophage (Metformin) for diabetes, do not take it for the next 48 hours. If you were asked to hold any blood thinners prior to the procedure, you may restart that medicine the day after the procedure is completed or when instructed by your physician. Recline your car seat for the ride home. Call If: You should call your Physician and/or the Radiology Nurse if you have any signs of infection like fever, drainage, redness, and/or swelling. If the puncture site should ooze, please call. Also call if you have any pain, decreased sensation, numbness, tingling, swelling, or change in color to the affected extremity. SEEK IMMEDIATE MEDICAL CARE IF YOUR PUNCTURE SITE STARTS TO BLEED. APPLY ENOUGH FIRM PRESSURE TO THE SITE WITH THE TIPS OF YOUR FINGERS TO STOP THE BLEEDING. Arterial bleeding is a medical emergency and should be evaluated immediately.     Follow-Up Instructions:  Please follow up with your ordering doctor as he/she has requested. To Reach Us: If you have any questions about your procedure, please call Syed Yap NP at [phone]. Recognize signs and symptoms of STROKE:  F-face looks uneven    A-arms unable to move or move unevenly    S-speech slurred or non-existent    T-time-call 911 as soon as signs and symptoms begin-DO NOT go       Back to bed or wait to see if you get better-TIME IS BRAIN.

## 2020-10-20 NOTE — H&P
History and Physical    Patient: Dom Clark MRN: [id number]  SSN: [id number]    YOB: 1975  Age: 50 y.o. Sex: female      Subjective: Dom Clark is a 50 y.o. female who is s/p a cervical spinal hemorrhage from an AVF in 2/2020. She has recovered well, but is still having left sided numbness and weakness. Past Medical History:   Diagnosis Date    Hypertension     Stroke [facility])      Past Surgical History:   Procedure Laterality Date    ABDOMEN SURGERY PROC UNLISTED      hernia    HX GYN      tubal    HX HEENT        No family history on file. Social History     Tobacco Use    Smoking status: Never Smoker    Smokeless tobacco: Never Used   Substance Use Topics    Alcohol use: Never     Frequency: Never      Prior to Admission medications    Medication Sig Start Date End Date Taking? Authorizing Provider   LORazepam (Ativan) 0.5 mg tablet Take  by mouth. Yes Provider, Historical   atorvastatin (LIPITOR) 40 mg tablet 1 Tab by Per NG tube route nightly. 2/28/20  Yes Nguyen Son MD   metoprolol tartrate (LOPRESSOR) 25 mg tablet Take 1 Tab by mouth daily. 2/28/20  Yes Nguyen Son MD   gabapentin (NEURONTIN) 100 mg capsule Take 2 Caps by mouth nightly as needed (Neuropathic discomfort left hemibody).  2/28/20  Yes Nguyen Son MD        Allergies   Allergen Reactions    Ace Inhibitors Angioedema    Red Dye Other (comments)       Review of Systems:  Constitutional: well nourished male in NAD  Eyes:  no change in visual acuity, no photophobia  Ears, nose, mouth, throat, and face: no  Odynphagia, dysphagia, no thrush or exudate, negative for chronic sinus congestion, recurrent headaches  Respiratory: negative for SOB, hemoptysis or cough  Cardiovascular: negative for CP, palpitations, or PND  Gastrointestinal: negative for abdominal pain, no hematemesis, hematochezia or BRBPR  Genitourinary: no urgency, frequency, or dysuria, no nocturia  Integument/breast: negative for skin rash or skin lesions  Hematologic/lymphatic: negative for known bleeding disorder  Musculoskeletal:negative for joint pain or joint tenderness  Neurological: negative for lightheadedness, syncope or presyncopal events, no seizure or CVA history  Behavioral/Psych: negative for depression or chronic anxiety,   Endocrine: negative for polydyspia, polyuria or intolerance to heat or cold  Allergic/Immunologic: negative for chronic allergic rhinitis, or known connective tissue disorder        Objective:     Vitals:    10/20/20 0706 10/20/20 0712   BP: (!) 152/72    Pulse: 64    Resp: 16    Temp: 98 °F (36.7 °C)    SpO2: 96%    Weight:  195 lb (88.5 kg)   Height:  5' 4\" (1.626 m)        Physical Exam:  General:  Alert, cooperative, no distress, appears stated age. HEENT: Supple, symmetrical, trachea midline   Lungs:   Clear to auscultation bilaterally. Heart:  Regular rate and rhythm   Abdomen:   Soft, non-tender. Bowel sounds normal. No masses,  No organomegaly. Extremities: Extremities normal, atraumatic, no cyanosis or edema. Pulses: 2+ and symmetric all extremities. Skin: Skin color, texture, turgor normal. No rashes or lesions   Neurologic: CNII-XII intact. Right side 5/5. Left side 4/5. Decreased sensation on left side. Assessment:     Hospital Problems  Date Reviewed: 3/18/2020    None          Plan:     Patient is here for follow up cerebral angiogram for her spinal AVF. I discussed the procedure with her and her family. Consent was obtained.      Signed By: Flynn Bhat MD     October 20, 2020

## 2020-10-20 NOTE — OP NOTES
Procedure: Cerebral angiogram  Surgeon: Dr. Leiva President  Pre-op Dx: s/p spinal hemorrhage in 2/2020  Post-op Dx: same  Anesthesia: Conscious sedation with fentanyl and versed  Complications: None  EBL: 10cc  Specimens: None  Findings: Spinal AVF filling from the left vertebral artery more visible at C3 now.

## 2021-01-15 DIAGNOSIS — Q28.8 AVM (ARTERIOVENOUS MALFORMATION) SPINE: Primary | ICD-10-CM

## 2021-01-19 ENCOUNTER — HOSPITAL ENCOUNTER (OUTPATIENT)
Dept: OTHER | Age: 46
Discharge: HOME | End: 2021-01-19
Attending: NEUROLOGICAL SURGERY
Payer: COMMERCIAL

## 2021-01-19 VITALS
HEART RATE: 63 BPM | RESPIRATION RATE: 14 BRPM | DIASTOLIC BLOOD PRESSURE: 76 MMHG | OXYGEN SATURATION: 98 % | WEIGHT: 200 LBS | TEMPERATURE: 98.2 F | SYSTOLIC BLOOD PRESSURE: 128 MMHG | BODY MASS INDEX: 34.33 KG/M2

## 2021-01-19 DIAGNOSIS — Q28.8 ARTERIOVENOUS MALFORMATION OF SPINE: ICD-10-CM

## 2021-01-19 DIAGNOSIS — Q28.8 AVM (ARTERIOVENOUS MALFORMATION) SPINE: ICD-10-CM

## 2021-01-19 LAB
ANION GAP SERPL CALC-SCNC: 5 MMOL/L (ref 7–16)
APTT PPP: 33.1 SEC (ref 24.1–35.1)
BUN SERPL-MCNC: 12 MG/DL (ref 6–23)
CALCIUM SERPL-MCNC: 9 MG/DL (ref 8.3–10.4)
CHLORIDE SERPL-SCNC: 109 MMOL/L (ref 98–107)
CO2 SERPL-SCNC: 29 MMOL/L (ref 21–32)
CREAT SERPL-MCNC: 0.77 MG/DL (ref 0.6–1)
ERYTHROCYTE [DISTWIDTH] IN BLOOD BY AUTOMATED COUNT: 13.2 % (ref 11.9–14.6)
GLUCOSE SERPL-MCNC: 96 MG/DL (ref 65–100)
HCT VFR BLD AUTO: 38.4 % (ref 35.8–46.3)
HGB BLD-MCNC: 12.7 G/DL (ref 11.7–15.4)
INR PPP: 1
MCH RBC QN AUTO: 28.8 PG (ref 26.1–32.9)
MCHC RBC AUTO-ENTMCNC: 33.1 G/DL (ref 31.4–35)
MCV RBC AUTO: 87.1 FL (ref 79.6–97.8)
NRBC # BLD: 0 K/UL (ref 0–0.2)
PLATELET # BLD AUTO: 191 K/UL (ref 150–450)
PMV BLD AUTO: 11.1 FL (ref 9.4–12.3)
POTASSIUM SERPL-SCNC: 3.9 MMOL/L (ref 3.5–5.1)
PROTHROMBIN TIME: 13.6 SEC (ref 12.5–14.7)
RBC # BLD AUTO: 4.41 M/UL (ref 4.05–5.2)
SODIUM SERPL-SCNC: 143 MMOL/L (ref 136–145)
WBC # BLD AUTO: 5.6 K/UL (ref 4.3–11.1)

## 2021-01-19 PROCEDURE — 77030012468 HC VLV BLEEDBK CNTRL ABBT -B

## 2021-01-19 PROCEDURE — 75894 X-RAYS TRANSCATH THERAPY: CPT

## 2021-01-19 PROCEDURE — 80048 BASIC METABOLIC PNL TOTAL CA: CPT

## 2021-01-19 PROCEDURE — C1760 CLOSURE DEV, VASC: HCPCS

## 2021-01-19 PROCEDURE — C1894 INTRO/SHEATH, NON-LASER: HCPCS

## 2021-01-19 PROCEDURE — C1769 GUIDE WIRE: HCPCS

## 2021-01-19 PROCEDURE — 85610 PROTHROMBIN TIME: CPT

## 2021-01-19 PROCEDURE — 85027 COMPLETE CBC AUTOMATED: CPT

## 2021-01-19 PROCEDURE — 74011250636 HC RX REV CODE- 250/636: Performed by: NEUROLOGICAL SURGERY

## 2021-01-19 PROCEDURE — 77030003629 HC NDL PERC VASC COOK -A

## 2021-01-19 PROCEDURE — 77030021532 HC CATH ANGI DX IMPRS MRTM -B

## 2021-01-19 PROCEDURE — 85730 THROMBOPLASTIN TIME PARTIAL: CPT

## 2021-01-19 PROCEDURE — 76377 3D RENDER W/INTRP POSTPROCES: CPT | Performed by: NEUROLOGICAL SURGERY

## 2021-01-19 PROCEDURE — 74011000636 HC RX REV CODE- 636: Performed by: NEUROLOGICAL SURGERY

## 2021-01-19 PROCEDURE — 36226 PLACE CATH VERTEBRAL ART: CPT | Performed by: NEUROLOGICAL SURGERY

## 2021-01-19 PROCEDURE — 74011000250 HC RX REV CODE- 250: Performed by: NEUROLOGICAL SURGERY

## 2021-01-19 RX ORDER — SODIUM CHLORIDE 9 MG/ML
25 INJECTION, SOLUTION INTRAVENOUS ONCE
Status: COMPLETED | OUTPATIENT
Start: 2021-01-19 | End: 2021-01-19

## 2021-01-19 RX ORDER — FENTANYL CITRATE 50 UG/ML
25-100 INJECTION, SOLUTION INTRAMUSCULAR; INTRAVENOUS
Status: DISCONTINUED | OUTPATIENT
Start: 2021-01-19 | End: 2021-01-19 | Stop reason: ALTCHOICE

## 2021-01-19 RX ORDER — ACETAMINOPHEN 325 MG/1
650 TABLET ORAL
Status: DISCONTINUED | OUTPATIENT
Start: 2021-01-19 | End: 2021-01-23 | Stop reason: HOSPADM

## 2021-01-19 RX ORDER — MIDAZOLAM HYDROCHLORIDE 1 MG/ML
.25-2 INJECTION, SOLUTION INTRAMUSCULAR; INTRAVENOUS
Status: DISCONTINUED | OUTPATIENT
Start: 2021-01-19 | End: 2021-01-19 | Stop reason: ALTCHOICE

## 2021-01-19 RX ORDER — HEPARIN SODIUM 200 [USP'U]/100ML
1000 INJECTION, SOLUTION INTRAVENOUS
Status: DISCONTINUED | OUTPATIENT
Start: 2021-01-19 | End: 2021-01-19 | Stop reason: ALTCHOICE

## 2021-01-19 RX ORDER — ONDANSETRON 2 MG/ML
4 INJECTION INTRAMUSCULAR; INTRAVENOUS
Status: DISCONTINUED | OUTPATIENT
Start: 2021-01-19 | End: 2021-01-23 | Stop reason: HOSPADM

## 2021-01-19 RX ORDER — LIDOCAINE HYDROCHLORIDE 20 MG/ML
20-200 INJECTION, SOLUTION INFILTRATION; PERINEURAL
Status: DISCONTINUED | OUTPATIENT
Start: 2021-01-19 | End: 2021-01-19 | Stop reason: ALTCHOICE

## 2021-01-19 RX ADMIN — LIDOCAINE HYDROCHLORIDE 100 MG: 20 INJECTION, SOLUTION INFILTRATION; PERINEURAL at 08:52

## 2021-01-19 RX ADMIN — FENTANYL CITRATE 25 MCG: 50 INJECTION, SOLUTION INTRAMUSCULAR; INTRAVENOUS at 09:06

## 2021-01-19 RX ADMIN — IOPAMIDOL 90 ML: 612 INJECTION, SOLUTION INTRAVENOUS at 09:12

## 2021-01-19 RX ADMIN — MIDAZOLAM 1 MG: 1 INJECTION INTRAMUSCULAR; INTRAVENOUS at 08:30

## 2021-01-19 RX ADMIN — FENTANYL CITRATE 50 MCG: 50 INJECTION, SOLUTION INTRAMUSCULAR; INTRAVENOUS at 08:30

## 2021-01-19 RX ADMIN — HEPARIN SODIUM 2000 UNITS: 200 INJECTION, SOLUTION INTRAVENOUS at 08:52

## 2021-01-19 RX ADMIN — HEPARIN SODIUM 2000 UNITS: 200 INJECTION, SOLUTION INTRAVENOUS at 08:53

## 2021-01-19 RX ADMIN — HEPARIN SODIUM 2000 UNITS: 200 INJECTION, SOLUTION INTRAVENOUS at 08:50

## 2021-01-19 RX ADMIN — HEPARIN SODIUM 2000 UNITS: 200 INJECTION, SOLUTION INTRAVENOUS at 08:51

## 2021-01-19 RX ADMIN — SODIUM CHLORIDE 25 ML/HR: 900 INJECTION, SOLUTION INTRAVENOUS at 08:33

## 2021-01-19 NOTE — PROGRESS NOTES
5 FR sheath removed from right femoral artery at 0910 by Dr. Eladio Cooney. Hemostasis achieved at 0915. Closure device: YES  Manual pressure: NO   No swelling or bleeding noted at site. Pedal pulse present in right foot. Dressing clean, dry and intact.

## 2021-01-19 NOTE — OP NOTES
Procedure: Cerebral angiogram  Surgeon: Dr. Nils Oquendo  Pre-op Dx: s/p spinal hemorrhage 1/2020  Post-op Dx: same  Anesthesia: Conscious sedation with fentanyl and versed  Complications: None  EBL: 10cc  Specimens: None  Findings: Cervical spine AVF that is fed by the anterior spinal artery and is anterior to the spinal cord and sits in the midline.

## 2021-01-19 NOTE — DISCHARGE INSTRUCTIONS
[address] [facility],4Th Floor  [facility]            Cerebral Angiography Discharge Instructions    General Information: This test is done to evaluate the blood vessels in your brain and neck. Following the procedure, you will be asked to lie flat on your back for 2-6 hours after the procedure to prevent bleeding complications. The physician may use an arterial closure device that will decrease your recovery time. If this is used, your nurse will explain the difference in recovery. We have no way to determine if we can use a closure device until the procedure is started. We will let you know when you wake up after the procedure. Home Care Instructions: You can resume your regular diet. Do not bathe or swim until the site is healed. You may remove the dressing and shower in the morning. You may drive after 48 hours. Do not lift anything heavier than a gallon of milk for 5 days. Watch the site carefully for signs of infection, like fever, drainage, redness, and/or swelling. If you were asked to hold any blood thinners prior to the procedure, you may restart that medicine the day after the procedure is completed or when instructed by your physician. Recline your car seat for the ride home. Call If: You should call your Physician and/or the Radiology Nurse if you have any signs of infection like fever, drainage, redness, and/or swelling. If the puncture site should ooze, please call. Also call if you have any pain, decreased sensation, numbness, tingling, swelling, or change in color to the affected extremity. SEEK IMMEDIATE MEDICAL CARE IF YOUR PUNCTURE SITE STARTS TO BLEED. APPLY ENOUGH FIRM PRESSURE TO THE SITE WITH THE TIPS OF YOUR FINGERS TO STOP THE BLEEDING. Arterial bleeding is a medical emergency and should be evaluated immediately. Follow-Up Instructions:  Please follow up with your ordering doctor as he/she has requested. To Reach Us:   If you have any questions about your procedure, please call Toya Zimmerman NP at [phone]. Interventional Radiology General Nurse Discharge    After general anesthesia or intravenous sedation, for 24 hours or while taking prescription Narcotics:  · Limit your activities  · Do not drive and operate hazardous machinery  · Do not make important personal or business decisions  · Do  not drink alcoholic beverages  · If you have not urinated within 8 hours after discharge, please contact your surgeon on call. * Please give a list of your current medications to your Primary Care Provider. * Please update this list whenever your medications are discontinued, doses are     changed, or new medications (including over-the-counter products) are added. * Please carry medication information at all times in case of emergency situations. These are general instructions for a healthy lifestyle:    No smoking/ No tobacco products/ Avoid exposure to second hand smoke  Surgeon General's Warning:  Quitting smoking now greatly reduces serious risk to your health. Obesity, smoking, and sedentary lifestyle greatly increases your risk for illness  A healthy diet, regular physical exercise & weight monitoring are important for maintaining a healthy lifestyle    You may be retaining fluid if you have a history of heart failure or if you experience any of the following symptoms:  Weight gain of 3 pounds or more overnight or 5 pounds in a week, increased swelling in our hands or feet or shortness of breath while lying flat in bed. Please call your doctor as soon as you notice any of these symptoms; do not wait until your next office visit. Recognize signs and symptoms of STROKE:  F-face looks uneven    A-arms unable to move or move unevenly    S-speech slurred or non-existent    T-time-call 911 as soon as signs and symptoms begin-DO NOT go       Back to bed or wait to see if you get better-TIME IS BRAIN.

## 2021-01-19 NOTE — H&P
History and Physical    Patient: Verena Cannon MRN: [id number]  SSN: [id number]    YOB: 1975  Age: 50 y.o. Sex: female      Subjective: Verena Cannon is a 50 y.o. 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 She has recovered well. She presents for follow up angiogram. Nothing acute has happened since I last saw her. Past Medical History:   Diagnosis Date    Hypertension     Stroke [facility])      Past Surgical History:   Procedure Laterality Date    ABDOMEN SURGERY PROC UNLISTED      hernia    HX GYN      tubal    HX HEENT        No family history on file. Social History     Tobacco Use    Smoking status: Never Smoker    Smokeless tobacco: Never Used   Substance Use Topics    Alcohol use: Never     Frequency: Never      Prior to Admission medications    Medication Sig Start Date End Date Taking? Authorizing Provider   LORazepam (Ativan) 0.5 mg tablet Take  by mouth. Yes Provider, Historical   atorvastatin (LIPITOR) 40 mg tablet 1 Tab by Per NG tube route nightly. 2/28/20  Yes Nguyen Cummings MD   metoprolol tartrate (LOPRESSOR) 25 mg tablet Take 1 Tab by mouth daily. 2/28/20  Yes Nguyen Cummings MD   gabapentin (NEURONTIN) 100 mg capsule Take 2 Caps by mouth nightly as needed (Neuropathic discomfort left hemibody). Patient taking differently: Take 300 mg by mouth nightly as needed (Neuropathic discomfort left hemibody).  2/28/20  Yes Nguyen Cummings MD        Allergies   Allergen Reactions    Ace Inhibitors Angioedema    Red Dye Other (comments)       Review of Systems:  Constitutional: well nourished male in NAD  Eyes:  no change in visual acuity, no photophobia  Ears, nose, mouth, throat, and face: no  Odynphagia, dysphagia, no thrush or exudate, negative for chronic sinus congestion, recurrent headaches  Respiratory: negative for SOB, hemoptysis or cough  Cardiovascular: negative for CP, palpitations, or PND  Gastrointestinal: negative for abdominal pain, no hematemesis, hematochezia or BRBPR  Genitourinary: no urgency, frequency, or dysuria, no nocturia  Integument/breast: negative for skin rash or skin lesions  Hematologic/lymphatic: negative for known bleeding disorder  Musculoskeletal:negative for joint pain or joint tenderness  Neurological: negative for lightheadedness, syncope or presyncopal events, no seizure or CVA history  Behavioral/Psych: negative for depression or chronic anxiety,   Endocrine: negative for polydyspia, polyuria or intolerance to heat or cold  Allergic/Immunologic: negative for chronic allergic rhinitis, or known connective tissue disorder        Objective:     Vitals:    01/19/21 0731 01/19/21 0827 01/19/21 0831 01/19/21 0836   BP:  (!) 172/97 (!) 165/92 (!) 160/84   Pulse:  64 70 (!) 46   Resp:  16 15 14   Temp:       SpO2:  100% 100% 100%   Weight: 200 lb (90.7 kg)           Physical Exam:  General:  Alert, cooperative, no distress, appears stated age. HEENT: Supple, symmetrical, trachea midline, no adenopathy, thyroid: no enlargment/tenderness/nodules, no carotid bruit and no JVD. Lungs:   Clear to auscultation bilaterally. Heart:  Regular rate and rhythm, S1, S2 normal, no murmur, click, rub or gallop. Abdomen:   Soft, non-tender. Bowel sounds normal. No masses,  No organomegaly. Extremities: Extremities normal, atraumatic, no cyanosis or edema. Pulses: 2+ and symmetric all extremities. Skin: Skin color, texture, turgor normal. No rashes or lesions   Neurologic: CNII-XII intact. Normal strength, sensation throughout. Assessment:     Hospital Problems  Date Reviewed: 3/18/2020    None          Plan:     Patient is here for follow up angiogram. I discussed the procedure with her. Consent was obtained.     Signed By: Fernanda Rosales MD     January 19, 2021

## 2021-01-19 NOTE — PROGRESS NOTES
TRANSFER - OUT REPORT:    Verbal report given to Vasu Travis RN (name) on [employer]     Report consisted of patients Situation, Background, Assessment and   Recommendations(SBAR). Dual NIHSS performed. Information from the following report(s) Procedure Summary, Intake/Output and MAR was reviewed with the receiving nurse. Lines:   Peripheral IV 01/19/21 Left Antecubital (Active)        Opportunity for questions and clarification was provided.       Patient transported with:   Registered Nurse

## 2021-06-24 ENCOUNTER — APPOINTMENT (OUTPATIENT)
Dept: CT IMAGING | Age: 46
End: 2021-06-24
Attending: EMERGENCY MEDICINE
Payer: COMMERCIAL

## 2021-06-24 ENCOUNTER — HOSPITAL ENCOUNTER (EMERGENCY)
Age: 46
Discharge: HOME | End: 2021-06-24
Attending: EMERGENCY MEDICINE
Payer: COMMERCIAL

## 2021-06-24 VITALS
HEART RATE: 62 BPM | DIASTOLIC BLOOD PRESSURE: 71 MMHG | HEIGHT: 64 IN | SYSTOLIC BLOOD PRESSURE: 128 MMHG | WEIGHT: 210 LBS | BODY MASS INDEX: 35.85 KG/M2 | OXYGEN SATURATION: 98 % | TEMPERATURE: 99 F | RESPIRATION RATE: 18 BRPM

## 2021-06-24 DIAGNOSIS — R51.9 ACUTE NONINTRACTABLE HEADACHE, UNSPECIFIED HEADACHE TYPE: Primary | ICD-10-CM

## 2021-06-24 PROCEDURE — 74011250637 HC RX REV CODE- 250/637: Performed by: EMERGENCY MEDICINE

## 2021-06-24 PROCEDURE — 70450 CT HEAD/BRAIN W/O DYE: CPT

## 2021-06-24 PROCEDURE — 99284 EMERGENCY DEPT VISIT MOD MDM: CPT

## 2021-06-24 RX ORDER — TRAMADOL HYDROCHLORIDE 50 MG/1
50 TABLET ORAL
Status: COMPLETED | OUTPATIENT
Start: 2021-06-24 | End: 2021-06-24

## 2021-06-24 RX ORDER — TRAMADOL HYDROCHLORIDE 50 MG/1
50 TABLET ORAL
Qty: 11 TABLET | Refills: 0 | Status: SHIPPED | OUTPATIENT
Start: 2021-06-24 | End: 2021-06-27

## 2021-06-24 RX ADMIN — TRAMADOL HYDROCHLORIDE 50 MG: 50 TABLET, FILM COATED ORAL at 07:13

## 2021-06-24 NOTE — DISCHARGE INSTRUCTIONS
Return with any fevers, vomiting, confusion, speech or language problems, worsening symptoms, or additional concerns. Please follow-up with your primary care doctor and try to arrange follow-up with one of the doctors Dr. John Talley referred you to.

## 2021-06-24 NOTE — ED PROVIDER NOTES
39-year-old lady presents with concerns about pain in the left side of her head behind her left ear. She says she has a history of an AVM and had a previous head bleed. She was last evaluated in January of this year and said that things have been stable. She also notes that she got a letter from Dr. Caron Bailey office this past Friday informing her that Dr. Frida Salinas would be leaving the [facility] system. She says she is trying to follow-up with one of the doctors that were mentioned in that letter. Patient says with this pain she had some mild nausea but no fevers or vomiting. No difficulty breathing. No speech problems. No weakness or numbness. No other associated symptoms. Elements of this note were created using speech recognition software. As such, errors of speech recognition may be present. Past Medical History:   Diagnosis Date    Hypertension     Stroke [facility])        Past Surgical History:   Procedure Laterality Date    ABDOMEN SURGERY PROC UNLISTED      hernia    HX GYN      tubal    HX HEENT           No family history on file.     Social History     Socioeconomic History    Marital status:      Spouse name: Not on file    Number of children: Not on file    Years of education: Not on file    Highest education level: Not on file   Occupational History    Not on file   Tobacco Use    Smoking status: Never Smoker    Smokeless tobacco: Never Used   Substance and Sexual Activity    Alcohol use: Never    Drug use: Never    Sexual activity: Not on file   Other Topics Concern     Service Not Asked    Blood Transfusions Not Asked    Caffeine Concern Not Asked    Occupational Exposure Not Asked    Hobby Hazards Not Asked    Sleep Concern Not Asked    Stress Concern Not Asked    Weight Concern Not Asked    Special Diet Not Asked    Back Care Not Asked    Exercise Not Asked    Bike Helmet Not Asked   [address] Not Asked    Self-Exams Not Asked   Social History Narrative    Not on file     Social Determinants of Health     Financial Resource Strain:     Difficulty of Paying Living Expenses:    Food Insecurity:     Worried About Running Out of Food in the Last Year:     [address] in the Last Year:    Transportation Needs:     Lack of Transportation (Medical):  Lack of Transportation (Non-Medical):    Physical Activity:     Days of Exercise per Week:     Minutes of Exercise per Session:    Stress:     Feeling of Stress :    Social Connections:     Frequency of Communication with Friends and Family:     Frequency of Social Gatherings with Friends and Family:     Attends Restoration Services:     Active Member of Clubs or Organizations:     Attends Club or Organization Meetings:     Marital Status:    Intimate Partner Violence:     Fear of Current or Ex-Partner:     Emotionally Abused:     Physically Abused:     Sexually Abused: ALLERGIES: Ace inhibitors and Red dye    Review of Systems   Constitutional: Negative for chills, diaphoresis and fever. HENT: Negative for congestion, rhinorrhea and sore throat. Eyes: Negative for redness and visual disturbance. Respiratory: Negative for cough, chest tightness, shortness of breath and wheezing. Cardiovascular: Negative for chest pain and palpitations. Gastrointestinal: Positive for nausea. Negative for abdominal pain, blood in stool, diarrhea and vomiting. Musculoskeletal: Negative for arthralgias, myalgias and neck stiffness. Skin: Negative for rash. Neurological: Positive for headaches. Negative for dizziness and weakness. Psychiatric/Behavioral: Negative for confusion and sleep disturbance. The patient is not nervous/anxious.         Vitals:    06/24/21 0700 06/24/21 0709 06/24/21 0738   BP: 131/83 (!) 196/88 131/63   Pulse: (!) 55 67 62   Resp: 16     Temp: 99 °F (37.2 °C)     SpO2: 98% 99% 97%   Weight: 95.3 kg (210 lb)     Height: 5' 4\" (1.626 m)              Physical Exam  Vitals and nursing note reviewed. Constitutional:       Appearance: Normal appearance. HENT:      Head: Normocephalic and atraumatic. Comments: Nontender to palpation     Mouth/Throat:      Mouth: Mucous membranes are moist.      Pharynx: No oropharyngeal exudate. Cardiovascular:      Pulses: Normal pulses. Heart sounds: Normal heart sounds. Pulmonary:      Effort: Pulmonary effort is normal.      Breath sounds: Normal breath sounds. Neurological:      General: No focal deficit present. Mental Status: She is alert and oriented to person, place, and time. Cranial Nerves: No cranial nerve deficit. Motor: No weakness. Gait: Gait normal.   Psychiatric:         Mood and Affect: Mood normal.         Thought Content: Thought content normal.          MDM  Number of Diagnoses or Management Options  Diagnosis management comments: CT scan of her head is negative. I will plan to give her a prescription for some Ultram to see if that alleviates her symptoms. I will discharge her home.          Procedures

## 2021-06-24 NOTE — ED TRIAGE NOTES
Pt arrives via POV with mask in place from home. Pt c/o head pain/tenderness behind left ear radiating down to left side of her neck. Pt is followed by Dr. Dl Feliciano. Last had an angio in January 2021 for AVM. Denies blurred vision, dizziness or syncope. Takes Metoprolol 25mg bid. Has not missed any doses. Recent sinus infection. Finished antibiotics last week. Did not miss any doses.

## 2021-06-24 NOTE — ED NOTES
I have reviewed discharge instructions with the patient. The patient and spouse verbalized understanding. Patient left ED via Discharge Method: ambulatory to Home with spouse. Opportunity for questions and clarification provided. Patient given 1 scripts. To continue your aftercare when you leave the hospital, you may receive an automated call from our care team to check in on how you are doing. This is a free service and part of our promise to provide the best care and service to meet your aftercare needs.  If you have questions, or wish to unsubscribe from this service please call [phone]. Thank you for Choosing our [facility].

## 2021-07-19 ENCOUNTER — HOSPITAL ENCOUNTER (OUTPATIENT)
Dept: CT IMAGING | Age: 46
Discharge: HOME | End: 2021-07-19
Attending: NEUROLOGICAL SURGERY
Payer: COMMERCIAL

## 2021-07-19 DIAGNOSIS — Q28.8 ARTERIOVENOUS FISTULA OF SPINAL CORD VESSELS: ICD-10-CM

## 2021-07-19 LAB — CREAT BLD-MCNC: 0.62 MG/DL (ref 0.8–1.5)

## 2021-07-19 PROCEDURE — 70498 CT ANGIOGRAPHY NECK: CPT

## 2021-07-19 PROCEDURE — 74011000636 HC RX REV CODE- 636: Performed by: NEUROLOGICAL SURGERY

## 2021-07-19 PROCEDURE — 82565 ASSAY OF CREATININE: CPT

## 2021-07-19 PROCEDURE — 74011000258 HC RX REV CODE- 258: Performed by: NEUROLOGICAL SURGERY

## 2021-07-19 RX ORDER — SODIUM CHLORIDE 0.9 % (FLUSH) 0.9 %
10 SYRINGE (ML) INJECTION
Status: COMPLETED | OUTPATIENT
Start: 2021-07-19 | End: 2021-07-19

## 2021-07-19 RX ADMIN — SODIUM CHLORIDE 100 ML: 900 INJECTION, SOLUTION INTRAVENOUS at 09:25

## 2021-07-19 RX ADMIN — IOPAMIDOL 50 ML: 755 INJECTION, SOLUTION INTRAVENOUS at 09:24

## 2021-07-19 RX ADMIN — Medication 10 ML: at 09:25

## 2022-03-14 ENCOUNTER — TRANSCRIBE ORDER (OUTPATIENT)
Dept: SCHEDULING | Age: 47
End: 2022-03-14

## 2022-03-14 DIAGNOSIS — Q28.8 ARTERIOVENOUS FISTULA OF SPINAL CORD VESSELS: Primary | ICD-10-CM

## 2022-03-18 PROBLEM — R53.1 ACUTE LEFT-SIDED WEAKNESS: Status: ACTIVE | Noted: 2020-02-03

## 2022-03-19 PROBLEM — D18.00 CAVERNOMA: Status: ACTIVE | Noted: 2020-02-03

## 2022-03-19 PROBLEM — Q28.8 AVM (ARTERIOVENOUS MALFORMATION) SPINE: Status: ACTIVE | Noted: 2020-02-02

## 2022-03-19 PROBLEM — J96.01 ACUTE RESPIRATORY FAILURE WITH HYPOXIA (HCC): Status: ACTIVE | Noted: 2020-02-03

## 2022-03-20 PROBLEM — G95.19 EDEMA OF SPINAL CORD (HCC): Status: ACTIVE | Noted: 2020-02-03

## 2022-03-20 PROBLEM — E66.01 SEVERE OBESITY (HCC): Status: ACTIVE | Noted: 2020-03-05

## 2022-03-25 ENCOUNTER — HOSPITAL ENCOUNTER (OUTPATIENT)
Dept: CT IMAGING | Age: 47
Discharge: HOME | End: 2022-03-25
Attending: NEUROLOGICAL SURGERY
Payer: COMMERCIAL

## 2022-03-25 DIAGNOSIS — Q28.8 ARTERIOVENOUS FISTULA OF SPINAL CORD VESSELS: ICD-10-CM

## 2022-03-25 LAB — CREAT BLD-MCNC: 0.62 MG/DL (ref 0.8–1.5)

## 2022-03-25 PROCEDURE — 74011000636 HC RX REV CODE- 636: Performed by: NEUROLOGICAL SURGERY

## 2022-03-25 PROCEDURE — 82565 ASSAY OF CREATININE: CPT

## 2022-03-25 PROCEDURE — 74011000258 HC RX REV CODE- 258: Performed by: NEUROLOGICAL SURGERY

## 2022-03-25 PROCEDURE — 70498 CT ANGIOGRAPHY NECK: CPT

## 2022-03-25 RX ORDER — SODIUM CHLORIDE 0.9 % (FLUSH) 0.9 %
10 SYRINGE (ML) INJECTION
Status: COMPLETED | OUTPATIENT
Start: 2022-03-25 | End: 2022-03-25

## 2022-03-25 RX ADMIN — SODIUM CHLORIDE 100 ML: 9 INJECTION, SOLUTION INTRAVENOUS at 09:15

## 2022-03-25 RX ADMIN — Medication 10 ML: at 09:16

## 2022-03-25 RX ADMIN — IOPAMIDOL 50 ML: 755 INJECTION, SOLUTION INTRAVENOUS at 09:14

## 2022-09-01 NOTE — PROGRESS NOTES
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 Tolerating PMV. Was to have started capping yesterday but didn't happen. Anxious to do so. Very pleased that she can eat now. Has been given \"green socks\" by therapists and is safe to be up indep in room. Denies cp, sob. On RA this a.m. sats 96; still using O2 over trach at noc.      Objective:     Current Facility-Administered Medications   Medication Dose Route Frequency    heparin (porcine) pf 900 Units  900 Units InterCATHeter Q8H    0.9% sodium chloride injection 30 mL  30 mL IntraVENous PRN    senna-docusate (PERICOLACE) 8.6-50 mg per tablet 1 Tab  1 Tab Oral BID    dexamethasone (DECADRON) 4 mg/mL injection 4 mg  4 mg IntraVENous Q12H    lactulose (CHRONULAC) 10 gram/15 mL solution 45 mL  30 g Per G Tube DAILY PRN    gabapentin (NEURONTIN) capsule 200 mg  200 mg Per G Tube QHS    acetaminophen (TYLENOL) tablet 650 mg  650 mg Per G Tube Q6H PRN    ALPRAZolam (XANAX) tablet 0.25 mg  0.25 mg Per G Tube TID    atorvastatin (LIPITOR) tablet 40 mg  40 mg Per G Tube DAILY    bisacodyL (DULCOLAX) suppository 10 mg  10 mg Rectal DAILY PRN    enoxaparin (LOVENOX) injection 40 mg  40 mg SubCUTAneous Q24H    HYDROcodone-acetaminophen (NORCO) 7.5-325 mg per tablet 1 Tab  1 Tab Per G Tube Q4H PRN    famotidine (PEPCID) tablet 20 mg  20 mg Per G Tube Q12H    lip protectant (BLISTEX) ointment 1 Each  1 Each Topical PRN    nystatin (MYCOSTATIN) 100,000 unit/mL oral suspension 500,000 Units  500,000 Units Oral QID    ondansetron (ZOFRAN) injection 4 mg  4 mg IntraVENous Q4H PRN    metoprolol tartrate (LOPRESSOR) tablet 25 mg  25 mg Oral BID    phenol throat spray (CHLORASEPTIC) 1 Spray  1 Spray Oral PRN    How Severe Is Your Skin Lesion?: mild Has Your Skin Lesion Been Treated?: not been treated scopolamine (TRANSDERM-SCOP) 1 mg over 3 days 1 Patch  1 Patch TransDERmal Q72H    senna-docusate (PERICOLACE) 8.6-50 mg per tablet 1 Tab  1 Tab Oral BID PRN    traZODone (DESYREL) tablet 50 mg  50 mg Per G Tube QHS PRN     Review of Systems:Denies chest pain, shortness of breath, cough, headache, visual problems, abdominal pain, dysurea, calf pain. Pertinent positives are as noted in the medical records and unremarkable otherwise. Visit Vitals  BP 92/57   Pulse 87   Temp 98.9 °F (37.2 °C)   Resp 14   Wt 211 lb 9.6 oz (96 kg)   SpO2 96%   BMI 36.32 kg/m²        Physical Exam:   General: Alert and age appropriately oriented. No acute cardio respiratory distress. HEENT: Normocephalic,no scleral icterus  Oral mucosa moist without cyanosis; left facial lower weakness   Lungs: Clear to auscultation  bilaterally. Respiration even and unlabored; trach intact   Heart: Regular rate and rhythm, S1, S2   No  murmurs, clicks, rub or gallops   Abdomen: Soft, non-tender, nondistended. Bowel sounds present. No organomegaly. Genitourinary: defer   Neuromuscular:      Neuro intact   Skin/extremity: No rashes, no erythema.  No calf tenderness BLE  No edema                                                                            Functional Assessment:  Gross Assessment  AROM: Generally decreased, functional (02/25/20 1200)  PROM: Within functional limits (02/22/20 1200)  Strength: Generally decreased, functional (02/25/20 1200)  Coordination: Within functional limits (02/25/20 1200)  Tone: Normal (02/25/20 1200)  Sensation: Intact (02/25/20 1200)       Balance  Sitting - Static: Good (unsupported) (02/26/20 1400)  Sitting - Dynamic: Good (unsupported) (02/26/20 1400)  Standing - Static: Good (02/26/20 1400)  Standing - Dynamic : Impaired (02/26/20 1400)                     Rajesh Burleson Fall Risk Assessment:  Rajesh Burlesno Fall Risk  Mobility: Ambulates or transfers with assist devices or assistance (02/27/20 0013)  Mobility Interventions: Communicate number of staff needed for ambulation/transfer;OT consult for ADLs; Patient to call before getting OOB;PT Consult for mobility concerns;PT Consult for assist device competence;Utilize walker, cane, or other assistive device;Strengthening exercises (ROM-active/passive) (02/27/20 0013)  Mentation: Alert, oriented x 3 (02/27/20 0013)  Medication: Patient receiving anticonvulsants, sedatives(tranquilizers), psychotropics or hypnotics, hypoglycemics, narcotics, sleep aids, antihypertensives, laxatives, or diuretics (02/27/20 0013)  Medication Interventions: Assess postural VS orthostatic hypotension; Evaluate medications/consider consulting pharmacy; Patient to call before getting OOB; Teach patient to arise slowly (02/27/20 0013)  Elimination: Needs assistance with toileting (02/27/20 0013)  Elimination Interventions: Call light in reach; Patient to call for help with toileting needs (02/27/20 0013)  Prior Fall History: No (02/27/20 0013)  History of Falls Interventions: Evaluate medications/consider consulting pharmacy (02/26/20 0700)  Total Score: 3 (02/27/20 0013)  Standard Fall Precautions: Yes (02/27/20 0013)  High Fall Risk: Yes (02/27/20 0013)     Speech Assessment:  Aspiration Signs/Symptoms: None (02/25/20 0949)      Ambulation:  Gait  Speed/Linda: Slow (02/25/20 1200)  Step Length: Right shortened;Left shortened (02/25/20 1200)  Gait Abnormalities: Other(decreased arm swing, trunk rotation, JOAO) (02/25/20 1200)  Distance (ft): 200 Feet (ft) (02/26/20 1400)  Assistive Device: Gait belt; Other (comment)(close SBA) (02/26/20 1400)  Rail Use: Both (02/25/20 1200)     Labs/Studies:  No results found for this or any previous visit (from the past 67 hour(s)).     Assessment:     Problem List as of 2/27/2020 Date Reviewed: 2/14/2020          Codes Class Noted - Resolved    [place] ICD-10-CM: D18.00  ICD-9-CM: 228.00  2/3/2020 - Present        Acute respiratory failure with hypoxia ([address] Is This A New Presentation, Or A Follow-Up?: Skin Lesions ICD-10-CM: J96.01  ICD-9-CM: 518.81  2/3/2020 - Present        Edema of spinal cord ([address] ICD-10-CM: G95.19  ICD-9-CM: 336.1  2/3/2020 - Present        Acute left-sided weakness ICD-10-CM: R53.1  ICD-9-CM: 728.87  2/3/2020 - Present        AVM (arteriovenous malformation) spine ICD-10-CM: Q28.8  ICD-9-CM: 747.82  2/2/2020 - Present                   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 CXR neg. Will have RT decrease cuff pressure. Tolerating ATC well. Will have ST try PMV trials with eventual capping. Will downsize when tolerates, can at 7-10days post op; cont RT with assist with trach collar, wean OT; pt functionally expected to do well. Do not want to send home with a trach in but may have to depending on tolerance of downsizing. Keeping it now is definitely for safety due to spinal edema  -pt constantly suctioning secretions; will add scop patch and monitor. dont want mucous plugging. Very thin secretions; 6L trach collar   -trach stoma quite big, do not think we can downsize to a #6 trach. Could not tolerate PMV at all; back pressure. Scopolamine patch placed for decreasing oral secretions.  sats 98%, 6L. Lungs clear; spt cx neg; will f/u CXR due to known left atelectasis and vasc congestion  -2/21 CXR slt improved, needs to take deep breaths. Will DOWNSIZE to #6 trach today/working on PMV tolerance;   2/22 downsize today. 28% trach collar; turned O2 to 3L. Off o2 for adls and sats 03%. Hopefully will tolerated PMV with smaller trach  -sats97 % RA right now; trach collar at noc 6L; do not feel she needs that much O2  -2/24 tolerating #6, did not require suctioning overnoc  2/25 - PMV tolerated well during the day. SaO2 good on RA.   2/26 - SaO2 97-99% on RA. minimal secretions, has not needed suctioning. Will start capping during the day.  dc scopolomine patch     Spinal cord edema/hematoma C3; dysphagia profound. Will need PEG. Cannot continue NGT for the length of time I suspect she will need. Will consult GI 2/20  -start trial of decadron 4mg IV q 8hrs to see if edema can be decreased and pt can tolerate secretions better and swallow 2/20; discussed with neurosurgery; 2/21 doing well today, managing secretions better  -2/22 PEG placed by Dr Dagmar Patel yesterday. Tolerated well. Can restart bolus tube feeds today.   -tolerating TF well  2/25 - tolerating bolus PEG feeds. MBS tomorrow am.  2/26 - passed MBS. Start reg, reg diet. Will wean PEG feeds as po intake increases. Continue decadron 4mg iv q12h.   -2/27 dc IV decadron , 2mg po bid x 3 d , then stop     ID; fever 2/17 ; blood and spt cxs neg. Wbc normal. Urinalysis pending  -2/20 urinalysis suspicious, but cx neg thus far. tmax 100.2; f/u CXR; no leukocytosis; 2/21 afebrile. Tm 99.7; 2/23 afebrile  2/25 - afebrile.         Oral thrush; can use nystatin to coat roof of mouth with swab. Strict thorough oral care 2/20; 2/24  thrush improved     DVT risk / DVT Prophylaxis- Will require daily physician exam to assess for signs and symptoms as patient is at increased risk for of thromboembolism. Mobilization as tolerated. Intermittent pneumatic compression devices when in bed Thigh-high or knee-high thromboembolic deterrent hose when out of bed. Lovenox     Pain Control: ongoing significant pain in neck and head which is stable and controlled by PRN meds. Will require regular pain assessment and comprenhensive pain management, cont prn tylenol or motrin and Norco if needed for more mod to severe pain. Dc fentanyl  -2/20 paresthesias LLE; start Neurontin 200mg qhs for now; 2/21 much improved overnoc  - 2/25 - reports pain is better. Continue gabapentin hs. Prn percocet.      Wound Care: Monitor wound status daily per staff and physician. At risk for failure.  Will require 24/7 rehab nursing. Routine catheter care; PICC line RUE. Neck wound healing; cont trach care     Hypertension - BP uncontrolled, fluctuating, managed medically. Goal SBP < 160; cont lopressor  -2/20 asymptomatic hypotension, dec lopressor and add parameters. sbp in the 80s, may be due to cervical SCI and autonomic dysfxn  -2/22 111/68 improved; 2/23 130/90; HR 80s  -2/26 - lopressor 25 bid. BP/HR in fair range.   -2/27 asympt hypotension this a.m. 92/57. Max . HR 70-89; dc lopressor and monitor     HLD cont Lipitor 40mg     Anx/depression; refuses antidepressent. Was taking xanax at home and will cont low dose xanax at 0.25mg bid. Hopefully as she sees herself recovering, anxiety will lessen  -2/19 inc anxiety, didn't sleep; inc trazadone and change xanax to tid; has sleep wake cycle mixed up due to coming from ICU environment.  -2/20 improved sleep last noc ; 2/21 excellent noc/had added neurontin. Cont trazadone and xanax; signif dec anxiety  -2/23 doing much better  - 2/26 - mood good.      Anemia; hgb 9.3 monitor; 2/19 hgb 8.8; no evid of bleed. Monitor  - hgb 8.6 2/23. Fairly stable. 2/27 recheck today      Urinary retention/ neurogenic bladder - schedule voids q6-8 hrs. Check post-void residual as needed; In and Out catheterize if post-void residual is more than 400 cc.  -voiding without difficulty     bowel program - cont senna, and prn bowel program     GERD - resume PPI. At times may need additional antacids, Maalox prn.      2/27Plan; dc home once trach is capped and pt tolerates 24 hrs. Will do pulse oxim tonight if capped to assess O2 needs. Time spent was 25 minutes with over 1/2 in direct patient care/examination, consultation and coordination of care.      Signed By: Josué Hamlin MD     February 27, 2020

## 2023-01-09 NOTE — PROGRESS NOTES
Resting comfortably in bed,  at bedside. Trach drsg intact, with 02 to trach. Call bell within reach, hourly rounds completed this shift. no rashes , no suspicious lesions , no areas of discoloration , no jaundice present , good turgor , no masses , no tenderness on palpation

## 2023-02-13 ENCOUNTER — OFFICE VISIT (OUTPATIENT)
Dept: NEUROLOGY | Age: 48
End: 2023-02-13
Payer: COMMERCIAL

## 2023-02-13 VITALS
BODY MASS INDEX: 37.76 KG/M2 | DIASTOLIC BLOOD PRESSURE: 80 MMHG | SYSTOLIC BLOOD PRESSURE: 140 MMHG | WEIGHT: 220 LBS | HEART RATE: 57 BPM

## 2023-02-13 DIAGNOSIS — M54.81 OCCIPITAL NEURALGIA OF LEFT SIDE: ICD-10-CM

## 2023-02-13 DIAGNOSIS — G56.00 CARPAL TUNNEL SYNDROME, UNSPECIFIED LATERALITY: ICD-10-CM

## 2023-02-13 DIAGNOSIS — I69.359 HISTORY OF HEMORRHAGIC STROKE WITH RESIDUAL HEMIPARESIS (HCC): ICD-10-CM

## 2023-02-13 DIAGNOSIS — M79.2 NEURALGIA: ICD-10-CM

## 2023-02-13 DIAGNOSIS — H92.02 UNILATERAL OTALGIA, LEFT: Primary | ICD-10-CM

## 2023-02-13 PROCEDURE — 99205 OFFICE O/P NEW HI 60 MIN: CPT | Performed by: PSYCHIATRY & NEUROLOGY

## 2023-02-13 RX ORDER — LORAZEPAM 0.5 MG/1
0.5 TABLET ORAL 2 TIMES DAILY PRN
COMMUNITY
Start: 2016-02-22

## 2023-02-13 RX ORDER — FAMOTIDINE 40 MG/1
40 TABLET, FILM COATED ORAL NIGHTLY PRN
COMMUNITY
Start: 2022-03-24 | End: 2023-03-24

## 2023-02-13 RX ORDER — LORATADINE 10 MG/1
10 TABLET ORAL DAILY
COMMUNITY

## 2023-02-13 RX ORDER — ACETAMINOPHEN 500 MG
2 TABLET ORAL PRN
COMMUNITY

## 2023-02-13 RX ORDER — FLUTICASONE PROPIONATE 50 MCG
1 SPRAY, SUSPENSION (ML) NASAL DAILY
COMMUNITY
Start: 2021-06-09 | End: 2023-10-04

## 2023-02-13 RX ORDER — BACLOFEN 10 MG/1
10 TABLET ORAL 3 TIMES DAILY
Qty: 90 TABLET | Refills: 2 | Status: SHIPPED | OUTPATIENT
Start: 2023-02-13 | End: 2023-03-15

## 2023-02-13 RX ORDER — METOPROLOL SUCCINATE 25 MG/1
25 TABLET, EXTENDED RELEASE ORAL 2 TIMES DAILY
COMMUNITY

## 2023-02-13 RX ORDER — ATORVASTATIN CALCIUM 40 MG/1
40 TABLET, FILM COATED ORAL DAILY
COMMUNITY
Start: 2022-12-29

## 2023-02-13 ASSESSMENT — ENCOUNTER SYMPTOMS
ABDOMINAL PAIN: 0
BLOOD IN STOOL: 0
HEARTBURN: 0
PHOTOPHOBIA: 0
HEMOPTYSIS: 0
COUGH: 0
SINUS PAIN: 0
RESPIRATORY NEGATIVE: 1
EYE DISCHARGE: 0
GASTROINTESTINAL NEGATIVE: 1
DIARRHEA: 0
BLURRED VISION: 0
CONSTIPATION: 0
EYE REDNESS: 0
SPUTUM PRODUCTION: 0
EYE PAIN: 0
SORE THROAT: 0
ORTHOPNEA: 0
WHEEZING: 0
VOMITING: 0
NAUSEA: 0
DOUBLE VISION: 0
STRIDOR: 0
EYES NEGATIVE: 1
SHORTNESS OF BREATH: 0
BACK PAIN: 0

## 2023-02-13 ASSESSMENT — VISUAL ACUITY: OU: 1

## 2023-02-13 NOTE — PROGRESS NOTES
2/13/2023  [address] [place] [address]     Patient is referred by the following provider for consultation regarding as below:    Fairly constant left ear pain at the eustachian area; left occipital pain. Possibly and likely neuralgia. Dear      Thierry Schuler MD                                    NEUROLOGY     CONSULTATION                   Chief Complaint:  Left ear pain  Left posterior head pain  Hx stroke  Left arm weakness and sensory change  Paresthesia hands        Patient is a 47-year-old right-handed  AA female who had spinal cord AVM treated under Dr. Margaux chang in approximately 2019. That has been followed since then. Dr. Sharath Rojas is moved to [facility]. I think from the patient's discussion she continues to be followed by Dr. Sharath Rojas. No new injuries etc. appear to be associated with this.                         right handed 50 y.o.  female left occipital and left cervical pain in the ear area. Possibly neither of these are associated with her previous AVM. * I reviewed the available and pertinent records - including eHR and Care Everywhere - notes of PMHx, PSHx, Fam Hx, and  and have examined patient with the following findings:       IMAGING REVIEW:  I REVIEWED PERTINENT  IMAGES AND REPORTS WITH THE PATIENT PERSONALLY, DIRECTLY AND FULLY. 14 extra  MINUTES. MRI [facility]  Impression  Mild chronic white matter microangiopathy versus a demyelinating process. Not acute infarct, hemorrhage, or mass. Exam End: --    Specimen Collected: 06/08/22 Last Resulted: 06/10/22   Received From: [facility] 3/18/2020  IMPRESSION:   1. Interval decrease in the degree of cord expansion cord edema when compared   with the prior exam.   2. 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 3. Interval evolution in the appearance of the cord hemorrhage.    4. Multilevel cervical spondylosis, stable in appearance when compared with the   prior exam.       Past Medical History:  Past Medical History:   Diagnosis Date    AVM (arteriovenous malformation) spine 2020    Hypertension     Stroke [facility])        Past Surgical History:  Past Surgical History:   Procedure Laterality Date    GYN      tubal    HEENT      DC UNLISTED PROCEDURE ABDOMEN PERITONEUM & OMENTUM      hernia       Social History:  Social History     Socioeconomic History    Marital status:      Spouse name: Not on file    Number of children: Not on file    Years of education: Not on file    Highest education level: Not on file   Occupational History    Not on file   Tobacco Use    Smoking status: Never    Smokeless tobacco: Never   Substance and Sexual Activity    Alcohol use: Never    Drug use: Never    Sexual activity: Not on file   Other Topics Concern    Not on file   Social History Narrative    Not on file     Social Determinants of Health     Financial Resource Strain: Not on file   Food Insecurity: Not on file   Transportation Needs: Not on file   Physical Activity: Not on file   Stress: Not on file   Social Connections: Not on file   Intimate Partner Violence: Not on file   Housing Stability: Not on file       Family History:   History reviewed. No pertinent family history. Medications:      Current Outpatient Medications:     acetaminophen (TYLENOL) 500 MG tablet, Take 2 tablets by mouth as needed, Disp: , Rfl:     atorvastatin (LIPITOR) 40 MG tablet, Take 40 mg by mouth daily, Disp: , Rfl:     famotidine (PEPCID) 40 MG tablet, Take 40 mg by mouth nightly as needed, Disp: , Rfl:     fluticasone (FLONASE) 50 MCG/ACT nasal spray, 1 spray by Nasal route daily, Disp: , Rfl:     LORazepam (ATIVAN) 0.5 MG tablet, Take 0.5 mg by mouth 2 times daily as needed. , Disp: , Rfl:     loratadine (CLARITIN) 10 MG tablet, Take 10 mg by mouth daily, Disp: , Rfl:     metoprolol succinate (TOPROL XL) 25 MG extended release tablet, Take 25 mg by mouth in the morning and at bedtime, Disp: , Rfl:     baclofen (LIORESAL) 10 MG tablet, Take 1 tablet by mouth 3 times daily, Disp: 90 tablet, Rfl: 2      Allergies   Allergen Reactions    Red Dye Other (See Comments)     IV DYE  IV DYE      Ace Inhibitors Angioedema     angioedema      Nsaids Other (See Comments)     History of AV bleed?? History of AV bleed? ? Review of Systems:  Review of Systems   Constitutional: Negative. Negative for chills, diaphoresis, fever, malaise/fatigue and weight loss. HENT:  Positive for ear pain. Negative for congestion, ear discharge, hearing loss, nosebleeds, sinus pain, sore throat and tinnitus. Eyes: Negative. Negative for blurred vision, double vision, photophobia, pain, discharge and redness. Respiratory: Negative. Negative for cough, hemoptysis, sputum production, shortness of breath, wheezing and stridor. Cardiovascular: Negative. Negative for chest pain, palpitations, orthopnea, claudication, leg swelling and PND. Gastrointestinal: Negative. Negative for abdominal pain, blood in stool, constipation, diarrhea, heartburn, melena, nausea and vomiting. Genitourinary: Negative. Musculoskeletal: Negative. Negative for back pain, falls, joint pain, myalgias and neck pain. Skin: Negative. Negative for itching and rash. Neurological:  Positive for sensory change and headaches. Negative for dizziness, tingling, tremors, speech change, focal weakness, seizures, loss of consciousness and weakness. Left occipital pain compatible with neuralgia. Left mastoid to eustachian pain possibly otalgia. Endo/Heme/Allergies: Negative. Negative for environmental allergies and polydipsia. Does not bruise/bleed easily. Psychiatric/Behavioral:  Negative for depression, hallucinations, memory loss, substance abuse and suicidal ideas. The patient is nervous/anxious and has insomnia. All other systems reviewed and are negative.        Extended / Orthostatic Vitals:    Vitals:    02/13/23 1018   BP: (!) 140/80   Site: Right Upper Arm   Position: Sitting   Pulse: 57   Weight: 220 lb (99.8 kg)        Physical Exam  Vitals reviewed. Constitutional:       General: She is awake. She is not in acute distress. Appearance: She is well-developed, well-groomed and overweight. She is not ill-appearing, toxic-appearing or diaphoretic. HENT:      Head: Normocephalic and atraumatic. No raccoon eyes, abrasion, contusion, right periorbital erythema, left periorbital erythema or laceration. Right Ear: Hearing normal.      Left Ear: Hearing normal.   Eyes:      General: Lids are normal. Vision grossly intact. No visual field deficit or scleral icterus. Right eye: No discharge. Left eye: No discharge. Extraocular Movements: Extraocular movements intact. Right eye: Normal extraocular motion and no nystagmus. Left eye: Normal extraocular motion and no nystagmus. Conjunctiva/sclera: Conjunctivae normal.      Right eye: Right conjunctiva is not injected. Left eye: Left conjunctiva is not injected. Pupils: Pupils are equal, round, and reactive to light. Neck:      Trachea: Phonation normal.   Pulmonary:      Effort: Pulmonary effort is normal. No respiratory distress. Breath sounds: No wheezing. Musculoskeletal:         General: No swelling, tenderness, deformity or signs of injury. Normal range of motion. Cervical back: Normal range of motion and neck supple. No signs of trauma, rigidity or torticollis. Normal range of motion. Right lower leg: No edema. Left lower leg: No edema. Skin:     General: Skin is warm and dry. Coloration: Skin is not cyanotic, jaundiced or pale. Nails: There is no clubbing. Neurological:      Mental Status: She is alert and easily aroused. Mental status is at baseline. Cranial Nerves: Cranial nerves 2-12 are intact.  No cranial nerve deficit, dysarthria or facial asymmetry. Sensory: Sensory deficit (? dec hands fingers pp) present. Motor: Motor function is intact. No weakness, tremor, atrophy, abnormal muscle tone or seizure activity. Coordination: Coordination is intact. Romberg sign negative. Coordination normal. Finger-Nose-Finger Test normal.      Gait: Gait is intact. Gait and tandem walk normal.      Deep Tendon Reflexes: Reflexes abnormal.      Reflex Scores:       Tricep reflexes are 2+ on the right side and 2+ on the left side. Bicep reflexes are 3+ on the right side and 3+ on the left side. Brachioradialis reflexes are 2+ on the right side and 2+ on the left side. Patellar reflexes are 3+ on the right side and 3+ on the left side. Achilles reflexes are 2+ on the right side and 2+ on the left side. Comments: PERRLA and no Glabellar. Positive bilat Hoffmans. No Aman. No spasticity . Highly sensitive left occitalalgia      Left otalgia towards eustachian tube. Seems separate from the occipitalgia. FROM neck without Spurling or Lhermitte -- w right TINEL and Flick + phalen = ? Bilat. Normal vib. Gait. Psychiatric:         Attention and Perception: Attention normal.         Mood and Affect: Mood normal.         Speech: Speech normal.         Behavior: Behavior normal. Behavior is cooperative. Neurologic Exam     Mental Status   Attention: normal. Concentration: normal.   Speech: speech is normal   Level of consciousness: alert  Knowledge: good and consistent with education. Able to perform simple calculations. Normal comprehension. Cranial Nerves   Cranial nerves II through XII intact. CN III, IV, VI   Pupils are equal, round, and reactive to light. Motor Exam   Muscle bulk: normal  Overall muscle tone: normalBilateral Ferris reflexes.       Sensory Exam   Right arm light touch: decreased from wrist (moreso than left)  Left arm light touch: decreased from wrist    Gait, Coordination, and Reflexes     Gait  Gait: normal    Coordination   Finger to nose coordination: normal  Tandem walking coordination: normal    Tremor   Resting tremor: absent  Intention tremor: absent  Action tremor: absent    Reflexes   Right brachioradialis: 2+  Left brachioradialis: 2+  Right biceps: 3+  Left biceps: 3+  Right triceps: 2+  Left triceps: 2+  Right patellar: 3+  Left patellar: 3+  Right achilles: 2+  Left achilles: 2+  Right Ferris: present  Left Ferris: present  Right ankle clonus: absent  Left ankle clonus: absent  There is no tic, twitch, tonic or clonic activity noted. No dyskinesia. Assessment   Assessment / Plan:    Magi Hinton was seen today for new patient. Diagnoses and all orders for this visit:    Unilateral otalgia, left    Occipital neuralgia of left side    History of hemorrhagic stroke with residual hemiparesis (HCC)    Neuralgia  -     baclofen (LIORESAL) 10 MG tablet; Take 1 tablet by mouth 3 times daily      The Diagnosis and differential diagnostic considerations, and Rx Tx were reviewed with the patient at length. I have spent greater than 50% of visit discussing and counseling of patient 65 min visit for treatment and diagnostic plan review. More than 50% of this visit  time was spent in counseling and care coordination. The above time includes pre-  and post- face-face time in records review, and preparation including available pertinent images and reports. Notes: Patient is to continue all medications as directed by prescribing physicians. Continuations on today's visit are made based on the patient's report of current medications. Patient acknowledges the above examination and reviews. Current Meds Verified: Current meds/immunizations reviewed, including purpose with pt. Med Recon list given to pt/family.  Pt advised to discard old med lists and provide all providers with current list at each visit and carry list with them in case of emergency. [ *NOTE:  parts or all of this consultation are produced using artificial voice recognition software.   Some speech errors are inherent in such software and may be included in the produced record. ]                Mary Anne Farmer MD  Consultative Neurology, [address] [facility] [place],   [address]  Phone:  [phone]  Fax:   [phone]

## 2023-02-28 ENCOUNTER — TELEPHONE (OUTPATIENT)
Dept: NEUROLOGY | Age: 48
End: 2023-02-28

## 2023-02-28 NOTE — TELEPHONE ENCOUNTER
Rash/itch face ? Swell at 10th day of baclofen. .. Stopped and to ER -- and Benadryl         Better. .. Discussed at full    Try the Benadryl for now. Complete the visits and testing.   Will review then     (Other [de-identified]  ? Carbamazepine v pregabalin v duloxetine or even low dose amtiriptyline. )))        Jerry Cartagena MD  Consultative Neurology, [address] [facility] [address] [place] [address] [place] [address], [address] [facility]  Phone:  [phone]  Fax:   [phone]

## 2023-02-28 NOTE — TELEPHONE ENCOUNTER
Pt called in stating that she is having a reaction to the bacoflen. Pt states that her face is itching and breaking out. Please Advise.

## 2024-04-01 ENCOUNTER — HOSPITAL ENCOUNTER (OUTPATIENT)
Dept: CT IMAGING | Age: 49
Discharge: HOME | End: 2024-04-04
Payer: MEDICARE

## 2024-04-01 DIAGNOSIS — Q28.8 CONGENITAL SPINAL VESSEL ANOMALY: ICD-10-CM

## 2024-04-01 PROCEDURE — 6360000004 HC RX CONTRAST MEDICATION: Performed by: NEUROLOGICAL SURGERY

## 2024-04-01 PROCEDURE — 70498 CT ANGIOGRAPHY NECK: CPT | Performed by: RADIOLOGY

## 2024-04-01 PROCEDURE — 70498 CT ANGIOGRAPHY NECK: CPT

## 2024-04-01 RX ADMIN — IOPAMIDOL 50 ML: 755 INJECTION, SOLUTION INTRAVENOUS at 15:38

## 2024-04-26 NOTE — PROGRESS NOTES
Patient was removed from vent PS 8/+8, 30% and placed on ATC 40%. Patient tolerated transition well. Patient has a strong, productive cough. Cuff is deflated. Ambu bag and mask are at bedside. Vent is on standby at bedside. 33.1

## (undated) DEVICE — REM POLYHESIVE ADULT PATIENT RETURN ELECTRODE: Brand: VALLEYLAB

## (undated) DEVICE — GARMENT,MEDLINE,DVT,INT,CALF,MED, GEN2: Brand: MEDLINE

## (undated) DEVICE — SPONGE: SPECIALTY PEANUT XR 100/CS: Brand: MEDICAL ACTION INDUSTRIES

## (undated) DEVICE — SUTURE PROL SZ 0 L30IN NONABSORBABLE BLU L26MM CT-2 1/2 CIR 8412H

## (undated) DEVICE — PROTECTOR CUSHION ULNAR NERVE --

## (undated) DEVICE — JELLY LUBRICATING 10GM PREFIL SYR LUBE

## (undated) DEVICE — 2000CC GUARDIAN II: Brand: GUARDIAN

## (undated) DEVICE — KENDALL RADIOLUCENT FOAM MONITORING ELECTRODE RECTANGULAR SHAPE: Brand: KENDALL

## (undated) DEVICE — MINOR SPLIT GENERAL: Brand: MEDLINE INDUSTRIES, INC.

## (undated) DEVICE — KIT GASTMY PERC PEG PULL 20FR -- ENDOVIVE BX/2

## (undated) DEVICE — VALVE CIRC OD22MM ID22MM 1 W CAPPED MON PRT

## (undated) DEVICE — CONNECTOR TBNG OD5-7MM O2 END DISP

## (undated) DEVICE — BLOCK BITE AD 60FR W/ VELC STRP ADDRESSES MOST PT AND

## (undated) DEVICE — SOLUTION IV 1000ML 0.9% SOD CHL

## (undated) DEVICE — DRAPE SHT 3 QTR PROXIMA 53X77 --

## (undated) DEVICE — GAUZE,SPONGE,4"X4",16PLY,STRL,LF,10/TRAY: Brand: MEDLINE

## (undated) DEVICE — SUTURE PROL SZ 2-0 L36IN NONABSORBABLE BLU SH L26MM 1/2 CIR 8523H

## (undated) DEVICE — TRAY PREP DRY W/ PREM GLV 2 APPL 6 SPNG 2 UNDPD 1 OVERWRAP

## (undated) DEVICE — SUTURE VCRL SZ 3-0 L18IN ABSRB UD L26MM SH 1/2 CIR J864D

## (undated) DEVICE — STRIP,CLOSURE,WOUND,MEDI-STRIP,1/2X4: Brand: MEDLINE

## (undated) DEVICE — AGENT HEMSTAT W2XL14IN OXIDIZED REGENERATED CELOS ABSRB FOR

## (undated) DEVICE — TUBE VENT L18CM DEAD SPACE 30ML 12.5GM 15MM M/F CONN FOR

## (undated) DEVICE — CANNULA NSL ORAL AD FOR CAPNOFLEX CO2 O2 AIRLFE

## (undated) DEVICE — SUTURE ETHLN SZ 2-0 L18IN NONABSORBABLE BLK L26MM PS 3/8 585H